# Patient Record
Sex: MALE | Race: BLACK OR AFRICAN AMERICAN | Employment: UNEMPLOYED | URBAN - METROPOLITAN AREA
[De-identification: names, ages, dates, MRNs, and addresses within clinical notes are randomized per-mention and may not be internally consistent; named-entity substitution may affect disease eponyms.]

---

## 2017-11-06 ENCOUNTER — TELEPHONE (OUTPATIENT)
Dept: FAMILY MEDICINE CLINIC | Age: 52
End: 2017-11-06

## 2017-11-06 ENCOUNTER — OFFICE VISIT (OUTPATIENT)
Dept: FAMILY MEDICINE CLINIC | Age: 52
End: 2017-11-06

## 2017-11-06 VITALS
OXYGEN SATURATION: 96 % | TEMPERATURE: 98.6 F | HEART RATE: 87 BPM | DIASTOLIC BLOOD PRESSURE: 93 MMHG | HEIGHT: 70 IN | BODY MASS INDEX: 40.8 KG/M2 | SYSTOLIC BLOOD PRESSURE: 138 MMHG | WEIGHT: 285 LBS | RESPIRATION RATE: 18 BRPM

## 2017-11-06 DIAGNOSIS — I10 ESSENTIAL HYPERTENSION: ICD-10-CM

## 2017-11-06 DIAGNOSIS — F33.0 MILD EPISODE OF RECURRENT MAJOR DEPRESSIVE DISORDER (HCC): ICD-10-CM

## 2017-11-06 DIAGNOSIS — B18.2 CHRONIC HEPATITIS C WITHOUT HEPATIC COMA (HCC): ICD-10-CM

## 2017-11-06 DIAGNOSIS — M54.50 CHRONIC BILATERAL LOW BACK PAIN WITHOUT SCIATICA: ICD-10-CM

## 2017-11-06 DIAGNOSIS — G89.29 CHRONIC BILATERAL LOW BACK PAIN WITHOUT SCIATICA: ICD-10-CM

## 2017-11-06 DIAGNOSIS — N18.30 STAGE 3 CHRONIC KIDNEY DISEASE (HCC): ICD-10-CM

## 2017-11-06 DIAGNOSIS — E78.2 MIXED HYPERLIPIDEMIA: ICD-10-CM

## 2017-11-06 LAB — HBA1C MFR BLD HPLC: 13.9 %

## 2017-11-06 RX ORDER — DICLOFENAC SODIUM 10 MG/G
GEL TOPICAL 4 TIMES DAILY
Qty: 100 G | Refills: 0 | Status: SHIPPED | OUTPATIENT
Start: 2017-11-06 | End: 2017-11-29 | Stop reason: SDUPTHER

## 2017-11-06 RX ORDER — GABAPENTIN 600 MG/1
TABLET ORAL
Refills: 0 | COMMUNITY
Start: 2017-10-10 | End: 2017-11-06 | Stop reason: SDUPTHER

## 2017-11-06 RX ORDER — LISINOPRIL AND HYDROCHLOROTHIAZIDE 12.5; 2 MG/1; MG/1
2 TABLET ORAL DAILY
Qty: 60 TAB | Refills: 2 | Status: SHIPPED | OUTPATIENT
Start: 2017-11-06 | End: 2017-11-29 | Stop reason: SDUPTHER

## 2017-11-06 RX ORDER — ATORVASTATIN CALCIUM 40 MG/1
40 TABLET, FILM COATED ORAL DAILY
Qty: 30 TAB | Refills: 2 | Status: SHIPPED | OUTPATIENT
Start: 2017-11-06 | End: 2017-11-29 | Stop reason: SDUPTHER

## 2017-11-06 RX ORDER — LISINOPRIL 40 MG/1
TABLET ORAL
Refills: 0 | COMMUNITY
Start: 2017-10-24 | End: 2017-11-06 | Stop reason: ALTCHOICE

## 2017-11-06 RX ORDER — INSULIN GLARGINE 100 [IU]/ML
70 INJECTION, SOLUTION SUBCUTANEOUS 2 TIMES DAILY
Qty: 42 ML | Refills: 2 | Status: SHIPPED | OUTPATIENT
Start: 2017-11-06 | End: 2017-11-29 | Stop reason: SDUPTHER

## 2017-11-06 RX ORDER — CITALOPRAM 20 MG/1
TABLET, FILM COATED ORAL
Qty: 30 TAB | Refills: 2 | Status: SHIPPED | OUTPATIENT
Start: 2017-11-06 | End: 2017-11-29 | Stop reason: SDUPTHER

## 2017-11-06 RX ORDER — GABAPENTIN 800 MG/1
1200 TABLET ORAL 3 TIMES DAILY
Qty: 135 TAB | Refills: 2 | Status: SHIPPED | OUTPATIENT
Start: 2017-11-06 | End: 2017-12-04 | Stop reason: DRUGHIGH
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
## 2017-11-06 NOTE — PROGRESS NOTES
Chief Complaint   Patient presents with   [address] patient   Discuss restarting medication for Depression and diabetes  Last medication 3 months ago  1. Have you been to the ER, urgent care clinic since your last visit? Hospitalized since your last visit? Yes [facility] pain    2. Have you seen or consulted any other health care providers outside of the [address] [place] [address] since your last visit? Include any pap smears or colon screening.  No   Room 8

## 2017-11-06 NOTE — MR AVS SNAPSHOT
Visit Information Date & Time Provider Department Dept. Phone Encounter #  
 11/6/2017 11:30 AM Zuleika Goldman MD [facility] at [address] [id number] Follow-up Instructions Return in about 4 weeks (around 12/4/2017), or if symptoms worsen or fail to improve. Upcoming Health Maintenance Date Due FOBT Q 1 YEAR AGE 50-75 3/16/2017 DTaP/Tdap/Td series (1 - Tdap) 12/28/2017* *Topic was postponed. The date shown is not the original due date. Allergies as of 11/6/2017  Review Complete On: 11/6/2017 By: Zuleika Goldman MD  
 No Known Allergies Current Immunizations  Never Reviewed Name Date Influenza Vaccine 9/8/2017 Not reviewed this visit You Were Diagnosed With   
  
 Codes Comments Uncontrolled type 2 diabetes mellitus with peripheral neuropathy (HCC)    -  Primary ICD-10-CM: E11.42, E11.65 ICD-9-CM: 250.62, 357.2 Essential hypertension     ICD-10-CM: I10 
ICD-9-CM: 401.9 Mild episode of recurrent major depressive disorder (HCC)     ICD-10-CM: F33.0 ICD-9-CM: 296.31 Chronic hepatitis C without hepatic coma (HCC)     ICD-10-CM: B18.2 ICD-9-CM: 070.54 Chronic bilateral low back pain without sciatica     ICD-10-CM: M54.5, G89.29 ICD-9-CM: 724.2, 338.29 Mixed hyperlipidemia     ICD-10-CM: E78.2 ICD-9-CM: 272.2 Stage 3 chronic kidney disease     ICD-10-CM: N18.3 ICD-9-CM: 105. 3 Vitals BP Pulse Temp Resp Height(growth percentile) Weight(growth percentile) (!) 138/93 (BP 1 Location: Left arm, BP Patient Position: Sitting) 87 98.6 °F (37 °C) (Oral) 18 5' 10\" (1.778 m) 285 lb (129.3 kg) SpO2 BMI Smoking Status 96% 40.89 kg/m2 Former Smoker Vitals History BMI and BSA Data Body Mass Index Body Surface Area  
 40.89 kg/m 2 2.53 m 2 Your Updated Medication List  
  
   
This list is accurate as of: 11/6/17  1:21 PM.  Always use your most recent med list.  
  
  
 atorvastatin 40 mg tablet Commonly known as:  LIPITOR Take 1 Tab by mouth daily. citalopram 20 mg tablet Commonly known as:  Maddison Narrow Take 1/2 tab by mouth daily x 1 week and then increase to 1 tab daily  
  
 diclofenac 1 % Gel Commonly known as:  VOLTAREN Apply  to affected area four (4) times daily. FLUARIX QUAD 3572-5423 (PF) Syrg injection Generic drug:  influenza vaccine 2017 (3 yrs+)(PF)  
TO BE ADMINISTERED BY PHARMACIST FOR IMMUNIZATION  
  
 gabapentin 800 mg tablet Commonly known as:  NEURONTIN Take 1.5 Tabs by mouth three (3) times daily. insulin glargine 100 unit/mL (3 mL) Inpn Commonly known as:  LANTUS,BASAGLAR  
70 Units by SubCUTAneous route two (2) times a day. insulin glulisine 100 unit/mL injection Commonly known as:  APIDRA  
7 Units by SubCUTAneous route Before breakfast, lunch, and dinner. lisinopril-hydroCHLOROthiazide 20-12.5 mg per tablet Commonly known as:  Sung Decent Take 2 Tabs by mouth daily. Prescriptions Printed Refills  
 lisinopril-hydroCHLOROthiazide (PRINZIDE, ZESTORETIC) 20-12.5 mg per tablet 2 Sig: Take 2 Tabs by mouth daily. Class: Print Route: Oral  
 citalopram (CELEXA) 20 mg tablet 2 Sig: Take 1/2 tab by mouth daily x 1 week and then increase to 1 tab daily Class: Print  
 insulin glargine (LANTUS,BASAGLAR) 100 unit/mL (3 mL) inpn 2 Si Units by SubCUTAneous route two (2) times a day. Class: Print Route: SubCUTAneous  
 insulin glulisine (APIDRA) 100 unit/mL injection 2 Si Units by SubCUTAneous route Before breakfast, lunch, and dinner. Class: Print Route: SubCUTAneous  
 gabapentin (NEURONTIN) 800 mg tablet 2 Sig: Take 1.5 Tabs by mouth three (3) times daily. Class: Print Route: Oral  
 atorvastatin (LIPITOR) 40 mg tablet 2 Sig: Take 1 Tab by mouth daily. Class: Print  Route: Oral  
 diclofenac (VOLTAREN) 1 % gel 0  
 Sig: Apply  to affected area four (4) times daily. Class: Print Route: Topical  
  
We Performed the Following AMB POC HEMOGLOBIN A1C [69286 CPT(R)]  DIABETES EYE EXAM [HM6 Custom]  DIABETES FOOT EXAM [HM7 Custom] REFERRAL TO PHYSICAL THERAPY [VBA05 Custom] Comments:  
 Please evaluate patient for: chronic low back pain, prev PT helped mauricio with aqua therapy Follow-up Instructions Return in about 4 weeks (around 12/4/2017), or if symptoms worsen or fail to improve. To-Do List   
 11/06/2017 Lab:  LIPID PANEL   
  
 11/06/2017 Lab:  METABOLIC PANEL, COMPREHENSIVE   
  
 11/06/2017 Lab:  TSH 3RD GENERATION Referral Information Referral ID Referred By Referred To  
  
 [id number] Addie Celis Not Available Visits Status Start Date End Date 1 New Request 11/6/17 11/6/18 If your referral has a status of pending review or denied, additional information will be sent to support the outcome of this decision. Introducing [facility]! Jeri Junior introduces [employer] patient portal. Now you can access parts of your medical record, email your doctor's office, and request medication refills online. 1. In your internet browser, go to [email or web address] 2. Click on the First Time User? Click Here link in the Sign In box. You will see the New Member Sign Up page. 3. Enter your [employer] Access Code exactly as it appears below. You will not need to use this code after youve completed the sign-up process. If you do not sign up before the expiration date, you must request a new code. · [employer] Access Code: [address] Expires: 2/4/2018 12:35 PM 
 
4. Enter the last four digits of your Social Security Number (xxxx) and Date of Birth (mm/dd/yyyy) as indicated and click Submit. You will be taken to the next sign-up page. 5. Create a [employer] ID.  This will be your [employer] login ID and cannot be changed, so think of one that is secure and easy to remember. 6. Create a [employer] password. You can change your password at any time. 7. Enter your Password Reset Question and Answer. This can be used at a later time if you forget your password. 8. Enter your e-mail address. You will receive e-mail notification when new information is available in [address] 9. Click Sign Up. You can now view and download portions of your medical record. 10. Click the Download Summary menu link to download a portable copy of your medical information. If you have questions, please visit the Frequently Asked Questions section of the [employer] website. Remember, [employer] is NOT to be used for urgent needs. For medical emergencies, dial 911. Now available from your iPhone and Android! Please provide this summary of care documentation to your next provider. Your primary care clinician is listed as NONE. If you have any questions after today's visit, please call [phone].

## 2017-11-06 NOTE — TELEPHONE ENCOUNTER
----- Message from Maris Pacheco sent at 11/6/2017  3:42 PM EST -----  Regarding: /telephone  Pt is requesting a call back in regards to his RX that was called into the pharm. Best contact [phone].     Urgent

## 2017-11-08 NOTE — TELEPHONE ENCOUNTER
Spoke with pharmacist regarding patient insulin. Insurance will not cover 30 day supply of Insulin as prescribed. Patient will need to  every three weeks.

## 2017-11-13 ENCOUNTER — TELEPHONE (OUTPATIENT)
Dept: FAMILY MEDICINE CLINIC | Age: 52
End: 2017-11-13

## 2017-11-13 NOTE — TELEPHONE ENCOUNTER
Pt checking on refill for gabapentin (NEURONTIN) 800 mg tablet [        Best number to reach him is [phone]

## 2017-11-14 LAB
ALBUMIN SERPL-MCNC: 4.2 G/DL (ref 3.5–5.5)
ALBUMIN/GLOB SERPL: 1.5 {RATIO} (ref 1.2–2.2)
ALP SERPL-CCNC: 92 IU/L (ref 39–117)
ALT SERPL-CCNC: 52 IU/L (ref 0–44)
AST SERPL-CCNC: 30 IU/L (ref 0–40)
BILIRUB SERPL-MCNC: 0.4 MG/DL (ref 0–1.2)
BUN SERPL-MCNC: 17 MG/DL (ref 6–24)
BUN/CREAT SERPL: 14 (ref 9–20)
CALCIUM SERPL-MCNC: 9.8 MG/DL (ref 8.7–10.2)
CHLORIDE SERPL-SCNC: 101 MMOL/L (ref 96–106)
CHOLEST SERPL-MCNC: 132 MG/DL (ref 100–199)
CO2 SERPL-SCNC: 23 MMOL/L (ref 18–29)
CREAT SERPL-MCNC: 1.19 MG/DL (ref 0.76–1.27)
GFR SERPLBLD CREATININE-BSD FMLA CKD-EPI: 71 ML/MIN/1.73
GFR SERPLBLD CREATININE-BSD FMLA CKD-EPI: 82 ML/MIN/1.73
GLOBULIN SER CALC-MCNC: 2.8 G/DL (ref 1.5–4.5)
GLUCOSE SERPL-MCNC: 222 MG/DL (ref 65–99)
HDLC SERPL-MCNC: 25 MG/DL
LDLC SERPL CALC-MCNC: 72 MG/DL (ref 0–99)
POTASSIUM SERPL-SCNC: 4.1 MMOL/L (ref 3.5–5.2)
PROT SERPL-MCNC: 7 G/DL (ref 6–8.5)
SODIUM SERPL-SCNC: 142 MMOL/L (ref 134–144)
TRIGL SERPL-MCNC: 173 MG/DL (ref 0–149)
TSH SERPL DL<=0.005 MIU/L-ACNC: 0.47 UIU/ML (ref 0.45–4.5)
VLDLC SERPL CALC-MCNC: 35 MG/DL (ref 5–40)

## 2017-11-28 ENCOUNTER — TELEPHONE (OUTPATIENT)
Dept: FAMILY MEDICINE CLINIC | Age: 52
End: 2017-11-28

## 2017-11-28 NOTE — TELEPHONE ENCOUNTER
----- Message from Chico Ross sent at 11/28/2017  1:08 PM EST -----  Regarding: Dr. Jorge Jaime Rx  Patient would like an Rx for \"Cilayis\" sent to [address] [facility] on file. Please call patient back at [phone] if there are any questions.

## 2017-11-29 DIAGNOSIS — I10 ESSENTIAL HYPERTENSION: ICD-10-CM

## 2017-11-29 DIAGNOSIS — M54.50 CHRONIC BILATERAL LOW BACK PAIN WITHOUT SCIATICA: ICD-10-CM

## 2017-11-29 DIAGNOSIS — E78.2 MIXED HYPERLIPIDEMIA: ICD-10-CM

## 2017-11-29 DIAGNOSIS — F33.0 MILD EPISODE OF RECURRENT MAJOR DEPRESSIVE DISORDER (HCC): ICD-10-CM

## 2017-11-29 DIAGNOSIS — G89.29 CHRONIC BILATERAL LOW BACK PAIN WITHOUT SCIATICA: ICD-10-CM

## 2017-11-29 RX ORDER — ATORVASTATIN CALCIUM 40 MG/1
40 TABLET, FILM COATED ORAL DAILY
Qty: 90 TAB | Refills: 0 | Status: SHIPPED | OUTPATIENT
Start: 2017-11-29 | End: 2018-03-05 | Stop reason: SDUPTHER

## 2017-11-29 RX ORDER — DICLOFENAC SODIUM 10 MG/G
GEL TOPICAL 4 TIMES DAILY
Qty: 300 G | Refills: 0 | Status: SHIPPED | OUTPATIENT
Start: 2017-11-29 | End: 2018-01-09 | Stop reason: SDUPTHER

## 2017-11-29 RX ORDER — LISINOPRIL AND HYDROCHLOROTHIAZIDE 12.5; 2 MG/1; MG/1
2 TABLET ORAL DAILY
Qty: 180 TAB | Refills: 0 | Status: SHIPPED | OUTPATIENT
Start: 2017-11-29 | End: 2017-12-04 | Stop reason: ALTCHOICE

## 2017-11-29 RX ORDER — CITALOPRAM 20 MG/1
TABLET, FILM COATED ORAL
Qty: 90 TAB | Refills: 0 | Status: SHIPPED | OUTPATIENT
Start: 2017-11-29 | End: 2018-03-05 | Stop reason: SDUPTHER

## 2017-11-29 RX ORDER — INSULIN GLARGINE 100 [IU]/ML
70 INJECTION, SOLUTION SUBCUTANEOUS 2 TIMES DAILY
Qty: 126 ML | Refills: 0 | Status: SHIPPED | OUTPATIENT
Start: 2017-11-29 | End: 2017-12-04 | Stop reason: SDUPTHER

## 2017-11-29 NOTE — TELEPHONE ENCOUNTER
----- Message from Linda Silveira sent at 11/29/2017 10:03 AM EST -----  Regarding: Dr Melody Ken is calling to check on the status of their refill request for pt's refill's, please call [phone].

## 2017-11-30 NOTE — TELEPHONE ENCOUNTER
Pt confirmed that prescriptions were called into pharmacy but still wants to know if he will be getting Cialis

## 2017-12-04 ENCOUNTER — OFFICE VISIT (OUTPATIENT)
Dept: FAMILY MEDICINE CLINIC | Age: 52
End: 2017-12-04

## 2017-12-04 VITALS
HEART RATE: 80 BPM | WEIGHT: 298 LBS | SYSTOLIC BLOOD PRESSURE: 148 MMHG | HEIGHT: 70 IN | TEMPERATURE: 97.7 F | RESPIRATION RATE: 20 BRPM | BODY MASS INDEX: 42.66 KG/M2 | DIASTOLIC BLOOD PRESSURE: 93 MMHG | OXYGEN SATURATION: 97 %

## 2017-12-04 DIAGNOSIS — Z23 ENCOUNTER FOR IMMUNIZATION: ICD-10-CM

## 2017-12-04 DIAGNOSIS — I10 ESSENTIAL HYPERTENSION: ICD-10-CM

## 2017-12-04 PROBLEM — E66.01 OBESITY, MORBID (HCC): Status: ACTIVE | Noted: 2017-12-04

## 2017-12-04 RX ORDER — GABAPENTIN 300 MG/1
900 CAPSULE ORAL 3 TIMES DAILY
Qty: 270 CAP | Refills: 0 | Status: SHIPPED | OUTPATIENT
Start: 2017-12-04 | End: 2018-01-03

## 2017-12-04 RX ORDER — METFORMIN HYDROCHLORIDE 500 MG/1
TABLET, EXTENDED RELEASE ORAL
Qty: 60 TAB | Refills: 2 | Status: SHIPPED | OUTPATIENT
Start: 2017-12-04 | End: 2018-03-05 | Stop reason: SDUPTHER

## 2017-12-04 RX ORDER — INSULIN GLARGINE 100 [IU]/ML
67 INJECTION, SOLUTION SUBCUTANEOUS 2 TIMES DAILY
Qty: 126 ML | Refills: 0
Start: 2017-12-04 | End: 2018-01-09 | Stop reason: SDUPTHER

## 2017-12-04 RX ORDER — LISINOPRIL 40 MG/1
40 TABLET ORAL DAILY
Qty: 90 TAB | Refills: 0 | Status: SHIPPED | OUTPATIENT
Start: 2017-12-04 | End: 2018-03-05 | Stop reason: SDUPTHER

## 2017-12-04 RX ORDER — PREGABALIN 75 MG/1
75 CAPSULE ORAL 2 TIMES DAILY
Qty: 60 CAP | Refills: 1 | Status: SHIPPED | OUTPATIENT
Start: 2017-12-04 | End: 2018-01-09 | Stop reason: SDUPTHER

## 2017-12-04 RX ORDER — AMLODIPINE BESYLATE 5 MG/1
5 TABLET ORAL DAILY
Qty: 30 TAB | Refills: 2 | Status: SHIPPED | OUTPATIENT
Start: 2017-12-04 | End: 2018-01-09 | Stop reason: SDUPTHER
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
## 2017-12-04 NOTE — PATIENT INSTRUCTIONS
Vaccine Information Statement    Influenza (Flu) Vaccine (Inactivated or Recombinant): What you need to know    Many Vaccine Information Statements are available in Latvian and other languages. See www.immunize.org/vis  Hojas de Información Sobre Vacunas están disponibles en Español y en muchos otros idiomas. Visite www.immunize.org/vis    1. Why get vaccinated? Influenza (flu) is a contagious disease that spreads around the [place] every year, usually between October and May. Flu is caused by influenza viruses, and is spread mainly by coughing, sneezing, and close contact. Anyone can get flu. Flu strikes suddenly and can last several days. Symptoms vary by age, but can include:   fever/chills   sore throat   muscle aches   fatigue   cough   headache    runny or stuffy nose    Flu can also lead to pneumonia and blood infections, and cause diarrhea and seizures in children. If you have a medical condition, such as heart or lung disease, flu can make it worse. Flu is more dangerous for some people. Infants and young children, people 72years of age and older, pregnant women, and people with certain health conditions or a weakened immune system are at greatest risk. Each year thousands of people in the [facility] die from flu, and many more are hospitalized. Flu vaccine can:   keep you from getting flu,   make flu less severe if you do get it, and   keep you from spreading flu to your family and other people. 2. Inactivated and recombinant flu vaccines    A dose of flu vaccine is recommended every flu season. Children 6 months through 6years of age may need two doses during the same flu season. Everyone else needs only one dose each flu season.        Some inactivated flu vaccines contain a very small amount of a mercury-based preservative called thimerosal. Studies have not shown thimerosal in vaccines to be harmful, but flu vaccines that do not contain thimerosal are available. There is no live flu virus in flu shots. They cannot cause the flu. There are many flu viruses, and they are always changing. Each year a new flu vaccine is made to protect against three or four viruses that are likely to cause disease in the upcoming flu season. But even when the vaccine doesnt exactly match these viruses, it may still provide some protection    Flu vaccine cannot prevent:   flu that is caused by a virus not covered by the vaccine, or   illnesses that look like flu but are not. It takes about 2 weeks for protection to develop after vaccination, and protection lasts through the flu season. 3. Some people should not get this vaccine    Tell the person who is giving you the vaccine:     If you have any severe, life-threatening allergies. If you ever had a life-threatening allergic reaction after a dose of flu vaccine, or have a severe allergy to any part of this vaccine, you may be advised not to get vaccinated. Most, but not all, types of flu vaccine contain a small amount of egg protein.  If you ever had Guillain-Barré Syndrome (also called GBS). Some people with a history of GBS should not get this vaccine. This should be discussed with your doctor.  If you are not feeling well. It is usually okay to get flu vaccine when you have a mild illness, but you might be asked to come back when you feel better. 4. Risks of a vaccine reaction    With any medicine, including vaccines, there is a chance of reactions. These are usually mild and go away on their own, but serious reactions are also possible. Most people who get a flu shot do not have any problems with it.      Minor problems following a flu shot include:    soreness, redness, or swelling where the shot was given     hoarseness   sore, red or itchy eyes   cough   fever   aches   headache   itching   fatigue  If these problems occur, they usually begin soon after the shot and last 1 or 2 days. More serious problems following a flu shot can include the following:     There may be a small increased risk of Guillain-Barré Syndrome (GBS) after inactivated flu vaccine. This risk has been estimated at 1 or 2 additional cases per million people vaccinated. This is much lower than the risk of severe complications from flu, which can be prevented by flu vaccine.  Young children who get the flu shot along with pneumococcal vaccine (PCV13) and/or DTaP vaccine at the same time might be slightly more likely to have a seizure caused by fever. Ask your doctor for more information. Tell your doctor if a child who is getting flu vaccine has ever had a seizure. Problems that could happen after any injected vaccine:      People sometimes faint after a medical procedure, including vaccination. Sitting or lying down for about 15 minutes can help prevent fainting, and injuries caused by a fall. Tell your doctor if you feel dizzy, or have vision changes or ringing in the ears.  Some people get severe pain in the shoulder and have difficulty moving the arm where a shot was given. This happens very rarely.  Any medication can cause a severe allergic reaction. Such reactions from a vaccine are very rare, estimated at about 1 in a million doses, and would happen within a few minutes to a few hours after the vaccination. As with any medicine, there is a very remote chance of a vaccine causing a serious injury or death. The safety of vaccines is always being monitored. For more information, visit: www.cdc.gov/vaccinesafety/    5. What if there is a serious reaction? What should I look for?  Look for anything that concerns you, such as signs of a severe allergic reaction, very high fever, or unusual behavior.     Signs of a severe allergic reaction can include hives, swelling of the face and throat, difficulty breathing, a fast heartbeat, dizziness, and weakness - usually within a few minutes to a few hours after the vaccination. What should I do?  If you think it is a severe allergic reaction or other emergency that cant wait, call 9-1-1 and get the person to the nearest hospital. Otherwise, call your doctor.  Reactions should be reported to the Vaccine Adverse Event Reporting System (VAERS). Your doctor should file this report, or you can do it yourself through  the VAERS web site at www.vaers. [facility].gov, or by calling 6-422.873.3309. VAERS does not give medical advice. 6. The National Vaccine Injury Compensation Program    The [facility] Vaccine Injury Compensation Program (VICP) is a federal program that was created to compensate people who may have been injured by certain vaccines. Persons who believe they may have been injured by a vaccine can learn about the program and about filing a claim by calling 7-926.344.6361 or visiting the [employer] website at www.[facility].gov/vaccinecompensation. There is a time limit to file a claim for compensation. 7. How can I learn more?  Ask your healthcare provider. He or she can give you the vaccine package insert or suggest other sources of information.  Call your local or state health department.  Contact the Centers for Disease Control and Prevention (CDC):  - Call 0-613.956.6574 (1-800-CDC-INFO) or  - Visit CDCs website at www.cdc.gov/flu    Vaccine Information Statement   Inactivated Influenza Vaccine   8/7/2015  42 NIURKA Junaid Dealos [id number]    Department of Health and Human Services  Centers for Disease Control and Prevention    Office Use Only

## 2017-12-04 NOTE — PROGRESS NOTES
Chief Complaint   Patient presents with    Hypertension    Diabetes     Patient here today for  A 4 week follow up. C/O bilateral feet pain. Lisa Doe. is a 50 y.o. male who presents for routine immunizations. He denies any symptoms , reactions or allergies that would exclude them from being immunized today. Risks and adverse reactions were discussed and the VIS was given to them. All questions were addressed. He was observed for 15 min post injection. There were no reactions observed.     Nery Sparks LPN

## 2017-12-04 NOTE — MR AVS SNAPSHOT
Visit Information Date & Time Provider Department Dept. Phone Encounter #  
 12/4/2017  1:40 PM Alena Ferrari MD [facility] at [address] [id number] Follow-up Instructions Return in about 6 weeks (around 1/15/2018), or if symptoms worsen or fail to improve. Upcoming Health Maintenance Date Due MICROALBUMIN Q1 3/16/1977 EYE EXAM RETINAL OR DILATED Q1 3/16/1977 Pneumococcal 19-64 Medium Risk (1 of 1 - PPSV23) 3/16/1986 FOBT Q 1 YEAR AGE 50-75 3/16/2017 DTaP/Tdap/Td series (1 - Tdap) 12/28/2017* HEMOGLOBIN A1C Q6M 5/6/2018 FOOT EXAM Q1 11/6/2018 LIPID PANEL Q1 11/13/2018 *Topic was postponed. The date shown is not the original due date. Allergies as of 12/4/2017  Review Complete On: 12/4/2017 By: Alena Ferrari MD  
 No Known Allergies Current Immunizations  Never Reviewed Name Date Influenza Vaccine 9/8/2017 Influenza Vaccine (Quad) PF  Incomplete Not reviewed this visit You Were Diagnosed With   
  
 Codes Comments Uncontrolled type 2 diabetes mellitus with peripheral neuropathy (HCC)    -  Primary ICD-10-CM: E11.42, E11.65 ICD-9-CM: 250.62, 357.2 Essential hypertension     ICD-10-CM: I10 
ICD-9-CM: 401.9 Encounter for immunization     ICD-10-CM: A16 ICD-9-CM: V03.89 Vitals BP Pulse Temp Resp Height(growth percentile) Weight(growth percentile) (!) 148/93 80 97.7 °F (36.5 °C) (Oral) 20 5' 10\" (1.778 m) 298 lb (135.2 kg) SpO2 BMI Smoking Status 97% 42.76 kg/m2 Former Smoker Vitals History BMI and BSA Data Body Mass Index Body Surface Area 42.76 kg/m 2 2.58 m 2 Preferred Pharmacy Pharmacy Name Phone [id number], [address] [phone] Your Updated Medication List  
  
   
This list is accurate as of: 12/4/17  2:16 PM.  Always use your most recent med list. amLODIPine 5 mg tablet Commonly known as:  Izetta Harder Take 1 Tab by mouth daily. atorvastatin 40 mg tablet Commonly known as:  LIPITOR Take 1 Tab by mouth daily. citalopram 20 mg tablet Commonly known as:  CELEXA  
1 tab daily  
  
 diclofenac 1 % Gel Commonly known as:  VOLTAREN Apply  to affected area four (4) times daily. FLUARIX QUAD 5247-1265 (PF) Syrg injection Generic drug:  influenza vaccine 2017-18 (3 yrs+)(PF)  
TO BE ADMINISTERED BY PHARMACIST FOR IMMUNIZATION  
  
 gabapentin 300 mg capsule Commonly known as:  NEURONTIN Take 3 Caps by mouth three (3) times daily for 30 days. insulin glargine 100 unit/mL (3 mL) Inpn Commonly known as:  LANTUS,BASAGLAR  
67 Units by SubCUTAneous route two (2) times a day. insulin glulisine 100 unit/mL injection Commonly known as:  APIDRA  
7 Units by SubCUTAneous route Before breakfast, lunch, and dinner. lisinopril 40 mg tablet Commonly known as:  Yanez Mira Take 1 Tab by mouth daily. metFORMIN  mg tablet Commonly known as:  GLUCOPHAGE XR Take 1 tab by mouth daily with dinner x 1 week and then increase to 2 tabs daily with dinner  
  
 pregabalin 75 mg capsule Commonly known as:  Ilanman Speaker Take 1 Cap by mouth two (2) times a day. Max Daily Amount: 150 mg. Indications: Diabetic Peripheral Neuropathy Prescriptions Printed Refills  
 pregabalin (LYRICA) 75 mg capsule 1 Sig: Take 1 Cap by mouth two (2) times a day. Max Daily Amount: 150 mg. Indications: Diabetic Peripheral Neuropathy Class: Print Route: Oral  
  
Prescriptions Sent to Pharmacy Refills  
 metFORMIN ER (GLUCOPHAGE XR) 500 mg tablet 2 Sig: Take 1 tab by mouth daily with dinner x 1 week and then increase to 2 tabs daily with dinner  Class: Normal  
 Pharmacy: [address] [place] [address] [facility] Ph #: [phone]  
 amLODIPine (NORVASC) 5 mg tablet 2 Sig: Take 1 Tab by mouth daily. Class: Normal  
 Pharmacy: [address] [place] [address], [address]  #: [phone] Route: Oral  
 lisinopril (PRINIVIL, ZESTRIL) 40 mg tablet 0 Sig: Take 1 Tab by mouth daily. Class: Normal  
 Pharmacy: [address] [place] [address], [address] Ph #: [phone] Route: Oral  
 gabapentin (NEURONTIN) 300 mg capsule 0 Sig: Take 3 Caps by mouth three (3) times daily for 30 days. Class: Normal  
 Pharmacy: [address] [place] [address], [address]  #: [phone] Route: Oral  
  
We Performed the Following INFLUENZA VIRUS VAC QUAD,SPLIT,PRESV FREE SYRINGE IM [id number] CPT(R)] AK IMMUNIZ ADMIN,1 SINGLE/COMB VAC/TOXOID [id number] CPT(R)] Follow-up Instructions Return in about 6 weeks (around 1/15/2018), or if symptoms worsen or fail to improve. Patient Instructions Vaccine Information Statement Influenza (Flu) Vaccine (Inactivated or Recombinant): What you need to know Many Vaccine Information Statements are available in Hebrew and other languages. See www.immunize.org/vis Hojas de Información Sobre Vacunas están disponibles en Español y en muchos otros idiomas. Visite www.immunize.org/vis 1. Why get vaccinated? Influenza (flu) is a contagious disease that spreads around the [place] every year, usually between October and May. Flu is caused by influenza viruses, and is spread mainly by coughing, sneezing, and close contact. Anyone can get flu. Flu strikes suddenly and can last several days. Symptoms vary by age, but can include: 
 fever/chills  sore throat  muscle aches  fatigue  cough  headache  runny or stuffy nose Flu can also lead to pneumonia and blood infections, and cause diarrhea and seizures in children. If you have a medical condition, such as heart or lung disease, flu can make it worse. Flu is more dangerous for some people. Infants and young children, people 72years of age and older, pregnant women, and people with certain health conditions or a weakened immune system are at greatest risk. Each year thousands of people in the [facility] die from flu, and many more are hospitalized. Flu vaccine can: 
 keep you from getting flu, 
 make flu less severe if you do get it, and 
 keep you from spreading flu to your family and other people. 2. Inactivated and recombinant flu vaccines A dose of flu vaccine is recommended every flu season. Children 6 months through 6years of age may need two doses during the same flu season. Everyone else needs only one dose each flu season. Some inactivated flu vaccines contain a very small amount of a mercury-based preservative called thimerosal. Studies have not shown thimerosal in vaccines to be harmful, but flu vaccines that do not contain thimerosal are available. There is no live flu virus in flu shots. They cannot cause the flu. There are many flu viruses, and they are always changing. Each year a new flu vaccine is made to protect against three or four viruses that are likely to cause disease in the upcoming flu season. But even when the vaccine doesnt exactly match these viruses, it may still provide some protection Flu vaccine cannot prevent: 
 flu that is caused by a virus not covered by the vaccine, or 
 illnesses that look like flu but are not. It takes about 2 weeks for protection to develop after vaccination, and protection lasts through the flu season. 3. Some people should not get this vaccine Tell the person who is giving you the vaccine:  If you have any severe, life-threatening allergies. If you ever had a life-threatening allergic reaction after a dose of flu vaccine, or have a severe allergy to any part of this vaccine, you may be advised not to get vaccinated. Most, but not all, types of flu vaccine contain a small amount of egg protein.  If you ever had Guillain-Barré Syndrome (also called GBS). Some people with a history of GBS should not get this vaccine. This should be discussed with your doctor.  If you are not feeling well. It is usually okay to get flu vaccine when you have a mild illness, but you might be asked to come back when you feel better. 4. Risks of a vaccine reaction With any medicine, including vaccines, there is a chance of reactions. These are usually mild and go away on their own, but serious reactions are also possible. Most people who get a flu shot do not have any problems with it. Minor problems following a flu shot include:  
 soreness, redness, or swelling where the shot was given  hoarseness  sore, red or itchy eyes  cough  fever  aches  headache  itching  fatigue If these problems occur, they usually begin soon after the shot and last 1 or 2 days. More serious problems following a flu shot can include the following:  There may be a small increased risk of Guillain-Barré Syndrome (GBS) after inactivated flu vaccine. This risk has been estimated at 1 or 2 additional cases per million people vaccinated. This is much lower than the risk of severe complications from flu, which can be prevented by flu vaccine.  Young children who get the flu shot along with pneumococcal vaccine (PCV13) and/or DTaP vaccine at the same time might be slightly more likely to have a seizure caused by fever. Ask your doctor for more information. Tell your doctor if a child who is getting flu vaccine has ever had a seizure. Problems that could happen after any injected vaccine:  People sometimes faint after a medical procedure, including vaccination. Sitting or lying down for about 15 minutes can help prevent fainting, and injuries caused by a fall. Tell your doctor if you feel dizzy, or have vision changes or ringing in the ears.  Some people get severe pain in the shoulder and have difficulty moving the arm where a shot was given. This happens very rarely.  Any medication can cause a severe allergic reaction. Such reactions from a vaccine are very rare, estimated at about 1 in a million doses, and would happen within a few minutes to a few hours after the vaccination. As with any medicine, there is a very remote chance of a vaccine causing a serious injury or death. The safety of vaccines is always being monitored. For more information, visit: www.cdc.gov/vaccinesafety/ 
 
 
The [facility] Vaccine Injury Compensation Program (VICP) is a federal program that was created to compensate people who may have been injured by certain vaccines. Persons who believe they may have been injured by a vaccine can learn about the program and about filing a claim by calling 0-348.338.8401 or visiting the [employer] website at www.[facility].gov/vaccinecompensation. There is a time limit to file a claim for compensation. 7. How can I learn more?  Ask your healthcare provider. He or she can give you the vaccine package insert or suggest other sources of information.  Call your local or state health department.  Contact the Centers for Disease Control and Prevention (CDC): 
- Call 8-867.463.9986 (8-459-LNR-INFO) or 
- Visit CDCs website at www.cdc.gov/flu Vaccine Information Statement Inactivated Influenza Vaccine 8/7/2015 
42 NIURKA Jensen [id number] [facility] and [place] Centers for Disease Control and Prevention Office Use Only Introducing [facility]! Em Garcia introduces [employer] patient portal. Now you can access parts of your medical record, email your doctor's office, and request medication refills online. 1. In your internet browser, go to [email or web address] 2. Click on the First Time User? Click Here link in the Sign In box. You will see the New Member Sign Up page. 3. Enter your [employer] Access Code exactly as it appears below. You will not need to use this code after youve completed the sign-up process. If you do not sign up before the expiration date, you must request a new code. · [employer] Access Code: [address] Expires: 2/4/2018 12:35 PM 
 
4. Enter the last four digits of your Social Security Number (xxxx) and Date of Birth (mm/dd/yyyy) as indicated and click Submit. You will be taken to the next sign-up page. 5. Create a [employer] ID. This will be your [employer] login ID and cannot be changed, so think of one that is secure and easy to remember. 6. Create a [employer] password. You can change your password at any time. 7. Enter your Password Reset Question and Answer. This can be used at a later time if you forget your password. 8. Enter your e-mail address. You will receive e-mail notification when new information is available in [address] 9. Click Sign Up. You can now view and download portions of your medical record. 10. Click the Download Summary menu link to download a portable copy of your medical information. If you have questions, please visit the Frequently Asked Questions section of the [employer] website. Remember, [employer] is NOT to be used for urgent needs. For medical emergencies, dial 911. Now available from your iPhone and Android! Please provide this summary of care documentation to your next provider. Your primary care clinician is listed as NONE. If you have any questions after today's visit, please call [phone].

## 2017-12-11 ENCOUNTER — TELEPHONE (OUTPATIENT)
Dept: FAMILY MEDICINE CLINIC | Age: 52
End: 2017-12-11

## 2017-12-11 NOTE — TELEPHONE ENCOUNTER
Pt reports he needs a prior auth for Lyrica     Checking to see if the [facility]armacy had contacted us     Pt can be reached at [phone]

## 2017-12-13 ENCOUNTER — DOCUMENTATION ONLY (OUTPATIENT)
Dept: FAMILY MEDICINE CLINIC | Age: 52
End: 2017-12-13

## 2017-12-14 ENCOUNTER — HOSPITAL ENCOUNTER (OUTPATIENT)
Dept: MRI IMAGING | Age: 52
Discharge: HOME | End: 2017-12-14
Attending: SPECIALIST
Payer: MEDICAID

## 2017-12-14 DIAGNOSIS — M54.16 LUMBAR RADICULOPATHY: ICD-10-CM

## 2017-12-14 DIAGNOSIS — M47.816 LUMBAR SPONDYLOSIS: ICD-10-CM

## 2017-12-14 PROCEDURE — 72148 MRI LUMBAR SPINE W/O DYE: CPT

## 2018-01-09 ENCOUNTER — OFFICE VISIT (OUTPATIENT)
Dept: FAMILY MEDICINE CLINIC | Age: 53
End: 2018-01-09

## 2018-01-09 VITALS
HEART RATE: 78 BPM | OXYGEN SATURATION: 95 % | BODY MASS INDEX: 42.09 KG/M2 | HEIGHT: 70 IN | RESPIRATION RATE: 18 BRPM | SYSTOLIC BLOOD PRESSURE: 151 MMHG | TEMPERATURE: 98.3 F | DIASTOLIC BLOOD PRESSURE: 94 MMHG | WEIGHT: 294 LBS

## 2018-01-09 DIAGNOSIS — B18.2 CHRONIC HEPATITIS C WITHOUT HEPATIC COMA (HCC): ICD-10-CM

## 2018-01-09 DIAGNOSIS — G89.29 CHRONIC BILATERAL LOW BACK PAIN WITHOUT SCIATICA: ICD-10-CM

## 2018-01-09 DIAGNOSIS — M54.50 CHRONIC BILATERAL LOW BACK PAIN WITHOUT SCIATICA: ICD-10-CM

## 2018-01-09 DIAGNOSIS — I10 ESSENTIAL HYPERTENSION: ICD-10-CM

## 2018-01-09 RX ORDER — INSULIN GLARGINE 100 [IU]/ML
75 INJECTION, SOLUTION SUBCUTANEOUS 2 TIMES DAILY
Qty: 1 ML | Refills: 0
Start: 2018-01-09 | End: 2018-03-09 | Stop reason: SDUPTHER

## 2018-01-09 RX ORDER — PREGABALIN 100 MG/1
100 CAPSULE ORAL 2 TIMES DAILY
Qty: 60 CAP | Refills: 2 | Status: SHIPPED | OUTPATIENT
Start: 2018-01-09 | End: 2018-02-27 | Stop reason: SDUPTHER

## 2018-01-09 RX ORDER — AMLODIPINE BESYLATE 10 MG/1
10 TABLET ORAL DAILY
Qty: 30 TAB | Refills: 2 | Status: SHIPPED | OUTPATIENT
Start: 2018-01-09 | End: 2018-04-02 | Stop reason: SDUPTHER

## 2018-01-09 RX ORDER — DICLOFENAC SODIUM 10 MG/G
GEL TOPICAL
Qty: 300 G | Refills: 0 | Status: SHIPPED | OUTPATIENT
Start: 2018-01-09 | End: 2018-02-07 | Stop reason: SDUPTHER
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
## 2018-01-09 NOTE — MR AVS SNAPSHOT
Visit Information Date & Time Provider Department Dept. Phone Encounter #  
 1/9/2018  1:00 PM Pat Vuong MD [facility] at [address] [id number] Follow-up Instructions Return in about 4 weeks (around 2/6/2018), or if symptoms worsen or fail to improve. Upcoming Health Maintenance Date Due MICROALBUMIN Q1 3/16/1977 EYE EXAM RETINAL OR DILATED Q1 3/16/1977 DTaP/Tdap/Td series (1 - Tdap) 3/16/1988 FOBT Q 1 YEAR AGE 50-75 3/16/2017 HEMOGLOBIN A1C Q6M 5/6/2018 FOOT EXAM Q1 11/6/2018 LIPID PANEL Q1 11/13/2018 Allergies as of 1/9/2018  Review Complete On: 1/9/2018 By: Pat Vuong MD  
 No Known Allergies Current Immunizations  Never Reviewed Name Date Influenza Vaccine 9/8/2017 Pneumococcal Polysaccharide (PPSV-23) 12/4/2017  1:40 PM  
  
 Not reviewed this visit You Were Diagnosed With   
  
 Codes Comments Uncontrolled type 2 diabetes mellitus with peripheral neuropathy (HCC)    -  Primary ICD-10-CM: E11.42, E11.65 ICD-9-CM: 250.62, 357.2 Essential hypertension     ICD-10-CM: I10 
ICD-9-CM: 401.9 Chronic bilateral low back pain without sciatica     ICD-10-CM: M54.5, G89.29 ICD-9-CM: 724.2, 338.29 Chronic hepatitis C without hepatic coma (HCC)     ICD-10-CM: B18.2 ICD-9-CM: 070.54 Vitals BP Pulse Temp Resp Height(growth percentile) Weight(growth percentile) (!) 151/94 (BP 1 Location: Left arm, BP Patient Position: Sitting) 78 98.3 °F (36.8 °C) (Oral) 18 5' 10\" (1.778 m) 294 lb (133.4 kg) SpO2 BMI Smoking Status 95% 42.18 kg/m2 Former Smoker Vitals History BMI and BSA Data Body Mass Index Body Surface Area  
 42.18 kg/m 2 2.57 m 2 Preferred Pharmacy Pharmacy Name Phone [id number] [place], [address] [phone] Your Updated Medication List  
  
   
 This list is accurate as of: 1/9/18  2:21 PM.  Always use your most recent med list. amLODIPine 10 mg tablet Commonly known as:  Bosie Shield Take 1 Tab by mouth daily. atorvastatin 40 mg tablet Commonly known as:  LIPITOR Take 1 Tab by mouth daily. citalopram 20 mg tablet Commonly known as:  CELEXA  
1 tab daily  
  
 diclofenac 1 % Gel Commonly known as:  VOLTAREN Apply  to affected area four (4) times daily as needed. FLUARIX QUAD 4734-1672 (PF) Syrg injection Generic drug:  influenza vaccine 2017-18 (3 yrs+)(PF)  
TO BE ADMINISTERED BY PHARMACIST FOR IMMUNIZATION  
  
 insulin glargine 100 unit/mL (3 mL) Inpn Commonly known as:  LANTUS,BASAGLAR  
75 Units by SubCUTAneous route two (2) times a day. insulin glulisine 100 unit/mL injection Commonly known as:  APIDRA  
7 Units by SubCUTAneous route Before breakfast, lunch, and dinner. lisinopril 40 mg tablet Commonly known as:  Stephanie Grebe Take 1 Tab by mouth daily. metFORMIN  mg tablet Commonly known as:  GLUCOPHAGE XR Take 1 tab by mouth daily with dinner x 1 week and then increase to 2 tabs daily with dinner  
  
 pregabalin 100 mg capsule Commonly known as:  Bridgett Herndon Take 1 Cap by mouth two (2) times a day. Max Daily Amount: 200 mg. Indications: Diabetic Peripheral Neuropathy Prescriptions Printed Refills  
 pregabalin (LYRICA) 100 mg capsule 2 Sig: Take 1 Cap by mouth two (2) times a day. Max Daily Amount: 200 mg. Indications: Diabetic Peripheral Neuropathy Class: Print Route: Oral  
  
Prescriptions Sent to Pharmacy Refills  
 diclofenac (VOLTAREN) 1 % gel 0 Sig: Apply  to affected area four (4) times daily as needed. Class: Normal  
 Pharmacy: [address], [address]  #: [phone]  Route: Topical  
 amLODIPine (NORVASC) 10 mg tablet 2  
 Sig: Take 1 Tab by mouth daily. Class: Normal  
 Pharmacy: [address] [place] [address], [address]  #: [phone] Route: Oral  
  
Follow-up Instructions Return in about 4 weeks (around 2/6/2018), or if symptoms worsen or fail to improve. To-Do List   
 02/13/2018 Lab:  HEMOGLOBIN A1C WITH EAG   
  
 02/13/2018 Lab:  METABOLIC PANEL, BASIC Introducing [facility]! Carmelita Fothergill introduces [employer] patient portal. Now you can access parts of your medical record, email your doctor's office, and request medication refills online. 1. In your internet browser, go to [email or web address] 2. Click on the First Time User? Click Here link in the Sign In box. You will see the New Member Sign Up page. 3. Enter your [employer] Access Code exactly as it appears below. You will not need to use this code after youve completed the sign-up process. If you do not sign up before the expiration date, you must request a new code. · [employer] Access Code: [address] Expires: 2/4/2018 12:35 PM 
 
4. Enter the last four digits of your Social Security Number (xxxx) and Date of Birth (mm/dd/yyyy) as indicated and click Submit. You will be taken to the next sign-up page. 5. Create a [employer] ID. This will be your [employer] login ID and cannot be changed, so think of one that is secure and easy to remember. 6. Create a [employer] password. You can change your password at any time. 7. Enter your Password Reset Question and Answer. This can be used at a later time if you forget your password. 8. Enter your e-mail address. You will receive e-mail notification when new information is available in [address] 9. Click Sign Up. You can now view and download portions of your medical record. 10. Click the Download Summary menu link to download a portable copy of your medical information. If you have questions, please visit the Frequently Asked Questions section of the [employer] website. Remember, [employer] is NOT to be used for urgent needs. For medical emergencies, dial 911. Now available from your iPhone and Android! Please provide this summary of care documentation to your next provider. Your primary care clinician is listed as Anna Jensen. If you have any questions after today's visit, please call [phone].

## 2018-01-24 ENCOUNTER — TELEPHONE (OUTPATIENT)
Dept: FAMILY MEDICINE CLINIC | Age: 53
End: 2018-01-24

## 2018-01-24 NOTE — TELEPHONE ENCOUNTER
Pt reports weight gain of  20 pds over 2 wks -now weighs 308     Pls call to advise     Best number to reach him is [phone]

## 2018-01-24 NOTE — TELEPHONE ENCOUNTER
Patient was called and advised that Dr Soraya Ceron had increased his Lyrica had been increased to 150 mg three times a day. Patient was advised per Dr Hailey Rene that Lyrica can cause weight increase,but patient needs to call pain management physician to change dosage.

## 2018-01-30 ENCOUNTER — APPOINTMENT (OUTPATIENT)
Dept: MRI IMAGING | Age: 53
End: 2018-01-30
Attending: ORTHOPAEDIC SURGERY
Payer: MEDICAID

## 2018-01-30 ENCOUNTER — HOSPITAL ENCOUNTER (OUTPATIENT)
Dept: MRI IMAGING | Age: 53
Discharge: HOME | End: 2018-01-30
Attending: ORTHOPAEDIC SURGERY
Payer: MEDICAID

## 2018-01-30 DIAGNOSIS — M54.50 LOW BACK PAIN: ICD-10-CM

## 2018-01-30 DIAGNOSIS — Z78.9 WEIGHT ABOVE 97TH PERCENTILE: ICD-10-CM

## 2018-01-30 PROCEDURE — 72141 MRI NECK SPINE W/O DYE: CPT

## 2018-01-31 ENCOUNTER — HOSPITAL ENCOUNTER (OUTPATIENT)
Dept: MRI IMAGING | Age: 53
Discharge: HOME | End: 2018-01-31
Attending: ORTHOPAEDIC SURGERY
Payer: MEDICAID

## 2018-01-31 DIAGNOSIS — M54.50 LOW BACK PAIN: ICD-10-CM

## 2018-01-31 DIAGNOSIS — Z78.9 WEIGHT ABOVE 97TH PERCENTILE: ICD-10-CM

## 2018-01-31 PROCEDURE — 72146 MRI CHEST SPINE W/O DYE: CPT

## 2018-02-07 DIAGNOSIS — M54.50 CHRONIC BILATERAL LOW BACK PAIN WITHOUT SCIATICA: ICD-10-CM

## 2018-02-07 DIAGNOSIS — G89.29 CHRONIC BILATERAL LOW BACK PAIN WITHOUT SCIATICA: ICD-10-CM

## 2018-02-07 RX ORDER — DICLOFENAC SODIUM 10 MG/G
GEL TOPICAL
Qty: 300 G | Refills: 0 | Status: SHIPPED | OUTPATIENT
Start: 2018-02-07 | End: 2018-02-15 | Stop reason: SDUPTHER

## 2018-02-13 DIAGNOSIS — I10 ESSENTIAL HYPERTENSION: ICD-10-CM

## 2018-02-15 DIAGNOSIS — G89.29 CHRONIC BILATERAL LOW BACK PAIN WITHOUT SCIATICA: ICD-10-CM

## 2018-02-15 DIAGNOSIS — M54.50 CHRONIC BILATERAL LOW BACK PAIN WITHOUT SCIATICA: ICD-10-CM

## 2018-02-15 RX ORDER — DICLOFENAC SODIUM 10 MG/G
GEL TOPICAL
Qty: 300 G | Refills: 0 | Status: SHIPPED | OUTPATIENT
Start: 2018-02-15 | End: 2018-02-27 | Stop reason: SDUPTHER

## 2018-02-27 ENCOUNTER — DOCUMENTATION ONLY (OUTPATIENT)
Dept: FAMILY MEDICINE CLINIC | Age: 53
End: 2018-02-27

## 2018-02-27 ENCOUNTER — OFFICE VISIT (OUTPATIENT)
Dept: FAMILY MEDICINE CLINIC | Age: 53
End: 2018-02-27

## 2018-02-27 VITALS
BODY MASS INDEX: 44.64 KG/M2 | RESPIRATION RATE: 20 BRPM | DIASTOLIC BLOOD PRESSURE: 80 MMHG | HEIGHT: 70 IN | HEART RATE: 80 BPM | TEMPERATURE: 97.9 F | SYSTOLIC BLOOD PRESSURE: 136 MMHG | OXYGEN SATURATION: 97 % | WEIGHT: 311.8 LBS

## 2018-02-27 DIAGNOSIS — M54.50 CHRONIC BILATERAL LOW BACK PAIN WITHOUT SCIATICA: ICD-10-CM

## 2018-02-27 DIAGNOSIS — G89.29 CHRONIC BILATERAL LOW BACK PAIN WITHOUT SCIATICA: ICD-10-CM

## 2018-02-27 DIAGNOSIS — Z01.818 PREOP EXAMINATION: Primary | ICD-10-CM

## 2018-02-27 DIAGNOSIS — G47.33 OSA (OBSTRUCTIVE SLEEP APNEA): ICD-10-CM

## 2018-02-27 PROBLEM — M48.00 SPINAL STENOSIS: Status: ACTIVE | Noted: 2018-02-27

## 2018-02-27 PROBLEM — J45.909 ASTHMA: Status: ACTIVE | Noted: 2018-02-27

## 2018-02-27 PROBLEM — R06.81 APNEA: Status: ACTIVE | Noted: 2018-02-27

## 2018-02-27 LAB — HBA1C MFR BLD HPLC: 7.3 %

## 2018-02-27 RX ORDER — DICLOFENAC SODIUM 10 MG/G
GEL TOPICAL
Qty: 300 G | Refills: 3 | Status: SHIPPED | OUTPATIENT
Start: 2018-02-27 | End: 2018-03-08 | Stop reason: SDUPTHER

## 2018-02-27 RX ORDER — GLECAPREVIR AND PIBRENTASVIR 40; 100 MG/1; MG/1
TABLET, FILM COATED ORAL
COMMUNITY
Start: 2018-01-11 | End: 2018-02-27 | Stop reason: ALTCHOICE

## 2018-02-27 RX ORDER — PREGABALIN 150 MG/1
CAPSULE ORAL
Refills: 0 | COMMUNITY
Start: 2018-02-21 | End: 2019-08-22 | Stop reason: ALTCHOICE

## 2018-02-27 RX ORDER — DIAZEPAM 5 MG/1
TABLET ORAL
Refills: 0 | COMMUNITY
Start: 2018-01-15 | End: 2018-02-27

## 2018-02-27 RX ORDER — HYDROCODONE BITARTRATE AND ACETAMINOPHEN 5; 325 MG/1; MG/1
TABLET ORAL
Refills: 0 | COMMUNITY
Start: 2018-02-07 | End: 2018-04-13

## 2018-02-27 NOTE — PROGRESS NOTES
Chief Complaint   Patient presents with    Pre-op Exam     Surgery 3/12/2018 Dr Rachel Santillan Spinal Stenosis Spondylolisthosis L4-5,DDD L5-S1,Lumbar Laminectomy L4-5,PSF L4-S1   1. Have you been to the ER, urgent care clinic since your last visit? Hospitalized since your last visit? No    2. Have you seen or consulted any other health care providers outside of the [address] [place] [address] since your last visit? Include any pap smears or colon screening.  Yes Where: Dr Celia Gonzalez 8
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
## 2018-02-28 ENCOUNTER — HOSPITAL ENCOUNTER (OUTPATIENT)
Dept: PREADMISSION TESTING | Age: 53
Discharge: HOME | End: 2018-02-28
Payer: MEDICAID

## 2018-02-28 VITALS
DIASTOLIC BLOOD PRESSURE: 90 MMHG | WEIGHT: 302 LBS | OXYGEN SATURATION: 97 % | SYSTOLIC BLOOD PRESSURE: 147 MMHG | TEMPERATURE: 98.2 F | HEART RATE: 80 BPM | BODY MASS INDEX: 42.28 KG/M2 | HEIGHT: 71 IN

## 2018-02-28 LAB
ABO + RH BLD: NORMAL
ANION GAP SERPL CALC-SCNC: 8 MMOL/L (ref 5–15)
APPEARANCE UR: CLEAR
BACTERIA URNS QL MICRO: NEGATIVE /HPF
BILIRUB UR QL: NEGATIVE
BLOOD GROUP ANTIBODIES SERPL: NORMAL
BUN SERPL-MCNC: 15 MG/DL (ref 6–20)
BUN/CREAT SERPL: 11 (ref 12–20)
CALCIUM SERPL-MCNC: 9.1 MG/DL (ref 8.5–10.1)
CHLORIDE SERPL-SCNC: 106 MMOL/L (ref 97–108)
CO2 SERPL-SCNC: 28 MMOL/L (ref 21–32)
COLOR UR: NORMAL
CREAT SERPL-MCNC: 1.36 MG/DL (ref 0.7–1.3)
EPITH CASTS URNS QL MICRO: NORMAL /LPF
ERYTHROCYTE [DISTWIDTH] IN BLOOD BY AUTOMATED COUNT: 12.7 % (ref 11.5–14.5)
GLUCOSE SERPL-MCNC: 168 MG/DL (ref 65–100)
GLUCOSE UR STRIP.AUTO-MCNC: NEGATIVE MG/DL
HCT VFR BLD AUTO: 38.8 % (ref 36.6–50.3)
HGB BLD-MCNC: 13 G/DL (ref 12.1–17)
HGB UR QL STRIP: NEGATIVE
HYALINE CASTS URNS QL MICRO: NORMAL /LPF (ref 0–5)
INR PPP: 1 (ref 0.9–1.1)
KETONES UR QL STRIP.AUTO: NEGATIVE MG/DL
LEUKOCYTE ESTERASE UR QL STRIP.AUTO: NEGATIVE
MCH RBC QN AUTO: 28.2 PG (ref 26–34)
MCHC RBC AUTO-ENTMCNC: 33.5 G/DL (ref 30–36.5)
MCV RBC AUTO: 84.2 FL (ref 80–99)
NITRITE UR QL STRIP.AUTO: NEGATIVE
NRBC # BLD: 0 K/UL (ref 0–0.01)
NRBC BLD-RTO: 0 PER 100 WBC
PH UR STRIP: 6 [PH] (ref 5–8)
PLATELET # BLD AUTO: 201 K/UL (ref 150–400)
PMV BLD AUTO: 11.6 FL (ref 8.9–12.9)
POTASSIUM SERPL-SCNC: 4.1 MMOL/L (ref 3.5–5.1)
PROT UR STRIP-MCNC: NEGATIVE MG/DL
PROTHROMBIN TIME: 10.4 SEC (ref 9–11.1)
RBC # BLD AUTO: 4.61 M/UL (ref 4.1–5.7)
RBC #/AREA URNS HPF: NORMAL /HPF (ref 0–5)
SODIUM SERPL-SCNC: 142 MMOL/L (ref 136–145)
SP GR UR REFRACTOMETRY: 1.02 (ref 1–1.03)
SPECIMEN EXP DATE BLD: NORMAL
UA: UC IF INDICATED,UAUC: NORMAL
UROBILINOGEN UR QL STRIP.AUTO: 1 EU/DL (ref 0.2–1)
WBC # BLD AUTO: 5.7 K/UL (ref 4.1–11.1)
WBC URNS QL MICRO: NORMAL /HPF (ref 0–4)

## 2018-02-28 PROCEDURE — 93005 ELECTROCARDIOGRAM TRACING: CPT

## 2018-02-28 PROCEDURE — 85610 PROTHROMBIN TIME: CPT | Performed by: ORTHOPAEDIC SURGERY

## 2018-02-28 PROCEDURE — 86850 RBC ANTIBODY SCREEN: CPT | Performed by: ORTHOPAEDIC SURGERY

## 2018-02-28 PROCEDURE — 85027 COMPLETE CBC AUTOMATED: CPT | Performed by: ORTHOPAEDIC SURGERY

## 2018-02-28 PROCEDURE — 80048 BASIC METABOLIC PNL TOTAL CA: CPT | Performed by: ORTHOPAEDIC SURGERY

## 2018-02-28 PROCEDURE — 81001 URINALYSIS AUTO W/SCOPE: CPT | Performed by: ORTHOPAEDIC SURGERY

## 2018-02-28 PROCEDURE — 36415 COLL VENOUS BLD VENIPUNCTURE: CPT | Performed by: ORTHOPAEDIC SURGERY

## 2018-02-28 NOTE — ADVANCED PRACTICE NURSE
Patient informed of need for follow up with PCP regarding HIGH risk for SHIRA and possibility of need for sleep study. Advised that patients with SHIRA are at increased risk for adverse clinical outcomes ranging from decreased daytime alertness and quality of life, to cardiovascular morbidities and mortality, and risk for hospitalization.

## 2018-03-01 LAB
ATRIAL RATE: 73 BPM
BACTERIA SPEC CULT: NORMAL
BACTERIA SPEC CULT: NORMAL
CALCULATED P AXIS, ECG09: 41 DEGREES
CALCULATED R AXIS, ECG10: 23 DEGREES
CALCULATED T AXIS, ECG11: 18 DEGREES
DIAGNOSIS, 93000: NORMAL
P-R INTERVAL, ECG05: 198 MS
Q-T INTERVAL, ECG07: 384 MS
QRS DURATION, ECG06: 90 MS
QTC CALCULATION (BEZET), ECG08: 423 MS
SERVICE CMNT-IMP: NORMAL
VENTRICULAR RATE, ECG03: 73 BPM

## 2018-03-02 PROBLEM — N18.30 STAGE 3 CHRONIC KIDNEY DISEASE (HCC): Status: RESOLVED | Noted: 2017-11-06 | Resolved: 2018-03-02

## 2018-03-05 DIAGNOSIS — I10 ESSENTIAL HYPERTENSION: ICD-10-CM

## 2018-03-05 DIAGNOSIS — F33.0 MILD EPISODE OF RECURRENT MAJOR DEPRESSIVE DISORDER (HCC): ICD-10-CM

## 2018-03-05 DIAGNOSIS — E78.2 MIXED HYPERLIPIDEMIA: ICD-10-CM

## 2018-03-05 RX ORDER — ATORVASTATIN CALCIUM 40 MG/1
TABLET, FILM COATED ORAL
Qty: 90 TAB | Refills: 0 | Status: SHIPPED | OUTPATIENT
Start: 2018-03-05 | End: 2018-05-28 | Stop reason: SDUPTHER

## 2018-03-05 RX ORDER — CITALOPRAM 20 MG/1
TABLET, FILM COATED ORAL
Qty: 90 TAB | Refills: 0 | Status: SHIPPED | OUTPATIENT
Start: 2018-03-05 | End: 2018-05-28 | Stop reason: SDUPTHER

## 2018-03-05 RX ORDER — LISINOPRIL 40 MG/1
TABLET ORAL
Qty: 90 TAB | Refills: 0 | Status: SHIPPED | OUTPATIENT
Start: 2018-03-05 | End: 2018-07-14 | Stop reason: SDUPTHER

## 2018-03-05 RX ORDER — METFORMIN HYDROCHLORIDE 500 MG/1
TABLET, EXTENDED RELEASE ORAL
Qty: 60 TAB | Refills: 2 | Status: SHIPPED | OUTPATIENT
Start: 2018-03-05 | End: 2018-05-28 | Stop reason: SDUPTHER

## 2018-03-08 DIAGNOSIS — G89.29 CHRONIC BILATERAL LOW BACK PAIN WITHOUT SCIATICA: ICD-10-CM

## 2018-03-08 DIAGNOSIS — M54.50 CHRONIC BILATERAL LOW BACK PAIN WITHOUT SCIATICA: ICD-10-CM

## 2018-03-08 NOTE — TELEPHONE ENCOUNTER
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 It should be 4 of them. Best contact: [phone].

## 2018-03-08 NOTE — TELEPHONE ENCOUNTER
[facility] calling - states ins will cover if written for brand name and medically necessary

## 2018-03-08 NOTE — TELEPHONE ENCOUNTER
Patient unsure of medication needing refill. Will call back with names. Advised PA pending for Diclofenac gel.

## 2018-03-09 RX ORDER — INSULIN GLARGINE 100 [IU]/ML
INJECTION, SOLUTION SUBCUTANEOUS
Qty: 120 ML | Refills: 0 | Status: SHIPPED | OUTPATIENT
Start: 2018-03-09 | End: 2018-03-21

## 2018-03-09 RX ORDER — DICLOFENAC SODIUM 10 MG/G
GEL TOPICAL
Qty: 300 G | Refills: 3 | OUTPATIENT
Start: 2018-03-09 | End: 2018-04-13

## 2018-03-21 DIAGNOSIS — J45.909 ASTHMA, UNSPECIFIED ASTHMA SEVERITY, UNSPECIFIED WHETHER COMPLICATED, UNSPECIFIED WHETHER PERSISTENT: Primary | ICD-10-CM

## 2018-03-21 RX ORDER — ALBUTEROL SULFATE 90 UG/1
2 AEROSOL, METERED RESPIRATORY (INHALATION)
Qty: 1 INHALER | Refills: 0 | Status: SHIPPED | OUTPATIENT
Start: 2018-03-21 | End: 2019-01-11 | Stop reason: SDUPTHER

## 2018-03-21 RX ORDER — TRAMADOL HYDROCHLORIDE 50 MG/1
TABLET ORAL
Refills: 0 | COMMUNITY
Start: 2018-03-09 | End: 2018-04-13

## 2018-03-21 RX ORDER — ALBUTEROL SULFATE 0.83 MG/ML
2.5 SOLUTION RESPIRATORY (INHALATION)
Qty: 24 EACH | Refills: 0 | Status: SHIPPED | OUTPATIENT
Start: 2018-03-21 | End: 2020-06-03

## 2018-03-21 RX ORDER — INSULIN DEGLUDEC 100 U/ML
70 INJECTION, SOLUTION SUBCUTANEOUS 2 TIMES DAILY
Qty: 126 ML | Refills: 1 | Status: SHIPPED | OUTPATIENT
Start: 2018-03-21 | End: 2018-05-10

## 2018-03-21 NOTE — TELEPHONE ENCOUNTER
----- Message from Eden Beltran sent at 3/21/2018  9:18 AM EDT -----  Regarding: Dr. Viola Jurado Rx  Patient would like Rx refill for albuterol and slow acting [facility]. He is out of insulin. Please call patient back at [phone] if there are questions.

## 2018-03-21 NOTE — TELEPHONE ENCOUNTER
Patient called and notified that Insulin prescription refill sent to Pharmacy but Dr. Claudia Cid changed it to [facility] (due to Insurance not approve Roopa Cordon) and in reference to request for Albuterol Nebulizer solution. Patient notified that we did not have Albuterol medication listed as a medication that he has been taking. Patient stated that he has Asthma and that it had been prescribed Albuterol Medications by his previous PCP.

## 2018-04-02 DIAGNOSIS — I10 ESSENTIAL HYPERTENSION: ICD-10-CM

## 2018-04-02 RX ORDER — AMLODIPINE BESYLATE 10 MG/1
TABLET ORAL
Qty: 30 TAB | Refills: 2 | Status: SHIPPED | OUTPATIENT
Start: 2018-04-02 | End: 2018-07-14 | Stop reason: SDUPTHER

## 2018-04-06 NOTE — PERIOP NOTES
PAT PREOP PHONE INTERVIEW COMPLETED WITH: Mayi Menendez, PATIENT. PREOPERATIVE INSTRUCTIONS REVIEWED WITH PATIENT. PREOP DIET AND NUTRITION UPDATED GUIDELINES/ INSTRUCTIONS REVIEWED WITH PATIENT. HE UNDERSTANDS USE OF CHG WIPES, HAS FROM PREVIOUS PAT APPT. PATIENT ADVISED NOT TO EAT/DRINK ANYTHING PAST MIDNIGHT EVENING PRIOR TO SURGERY,  NOTHING TO EAT MORNING OF SURGERY;  LEAVE ALL VALUABLES AT HOME; DO BRING PICTURE ID, INSURANCE CARD AND ANY COPAY; WEAR COMFORTABLE CLOTHING;  NO PERFUMES, POWDERS, LOTIONS; NO ALCOHOL 24 HOURS BEFORE OR AFTER SURGERY;  WILL NEED TO BE DRIVEN HOME BY FAMILY OR FRIEND;  AVOID TAKING NSAIDS, ASPIRIN, FISH OIL, VITAMIN E OR GLUCOSAMINE/CHONDROITIN DURING THIS TIME PRIOR TO SURGERY;  MAY TAKE TYLENOL. INSTRUCTED TO REPORT  [address] BY SURGEON'S OFFICE.

## 2018-04-09 ENCOUNTER — ANESTHESIA EVENT (OUTPATIENT)
Dept: SURGERY | Age: 53
DRG: 304 | End: 2018-04-09
Payer: MEDICAID

## 2018-04-10 ENCOUNTER — HOSPITAL ENCOUNTER (INPATIENT)
Age: 53
LOS: 3 days | Discharge: HOME | DRG: 304 | End: 2018-04-13
Attending: ORTHOPAEDIC SURGERY | Admitting: ORTHOPAEDIC SURGERY
Payer: MEDICAID

## 2018-04-10 ENCOUNTER — APPOINTMENT (OUTPATIENT)
Dept: GENERAL RADIOLOGY | Age: 53
DRG: 304 | End: 2018-04-10
Attending: ORTHOPAEDIC SURGERY
Payer: MEDICAID

## 2018-04-10 ENCOUNTER — ANESTHESIA (OUTPATIENT)
Dept: SURGERY | Age: 53
DRG: 304 | End: 2018-04-10
Payer: MEDICAID

## 2018-04-10 ENCOUNTER — OFFICE VISIT (OUTPATIENT)
Dept: FAMILY MEDICINE CLINIC | Age: 53
End: 2018-04-10

## 2018-04-10 VITALS
HEART RATE: 71 BPM | BODY MASS INDEX: 43.2 KG/M2 | OXYGEN SATURATION: 98 % | WEIGHT: 308.6 LBS | SYSTOLIC BLOOD PRESSURE: 136 MMHG | HEIGHT: 71 IN | TEMPERATURE: 98.5 F | RESPIRATION RATE: 16 BRPM | DIASTOLIC BLOOD PRESSURE: 84 MMHG

## 2018-04-10 DIAGNOSIS — I10 ESSENTIAL HYPERTENSION: ICD-10-CM

## 2018-04-10 DIAGNOSIS — E66.01 OBESITY, MORBID (HCC): ICD-10-CM

## 2018-04-10 DIAGNOSIS — G89.29 CHRONIC BILATERAL LOW BACK PAIN WITHOUT SCIATICA: ICD-10-CM

## 2018-04-10 DIAGNOSIS — M54.50 CHRONIC BILATERAL LOW BACK PAIN WITHOUT SCIATICA: ICD-10-CM

## 2018-04-10 DIAGNOSIS — M43.16 SPONDYLOLISTHESIS, LUMBAR REGION: Primary | ICD-10-CM

## 2018-04-10 PROBLEM — E11.21 TYPE 2 DIABETES WITH NEPHROPATHY (HCC): Status: ACTIVE | Noted: 2018-04-10

## 2018-04-10 LAB
ABO + RH BLD: NORMAL
BLOOD GROUP ANTIBODIES SERPL: NORMAL
GLUCOSE BLD STRIP.AUTO-MCNC: 126 MG/DL (ref 65–100)
GLUCOSE BLD STRIP.AUTO-MCNC: 178 MG/DL (ref 65–100)
SERVICE CMNT-IMP: ABNORMAL
SERVICE CMNT-IMP: ABNORMAL
SPECIMEN EXP DATE BLD: NORMAL

## 2018-04-10 PROCEDURE — 74011000272 HC RX REV CODE- 272: Performed by: ORTHOPAEDIC SURGERY

## 2018-04-10 PROCEDURE — 77030014007 HC SPNG HEMSTAT J&J -B: Performed by: ORTHOPAEDIC SURGERY

## 2018-04-10 PROCEDURE — 77030013079 HC BLNKT BAIR HGGR 3M -A: Performed by: ANESTHESIOLOGY

## 2018-04-10 PROCEDURE — 77030037728 HC GRFT BN FBR CORT 3DEMIN 30CC BACT -I: Performed by: ORTHOPAEDIC SURGERY

## 2018-04-10 PROCEDURE — 74011250636 HC RX REV CODE- 250/636: Performed by: ANESTHESIOLOGY

## 2018-04-10 PROCEDURE — 77030029099 HC BN WAX SSPC -A: Performed by: ORTHOPAEDIC SURGERY

## 2018-04-10 PROCEDURE — 77030020782 HC GWN BAIR PAWS FLX 3M -B

## 2018-04-10 PROCEDURE — 77030038600 HC TU BPLR IRR DISP STRY -B: Performed by: ORTHOPAEDIC SURGERY

## 2018-04-10 PROCEDURE — 76210000017 HC OR PH I REC 1.5 TO 2 HR: Performed by: ORTHOPAEDIC SURGERY

## 2018-04-10 PROCEDURE — 82962 GLUCOSE BLOOD TEST: CPT

## 2018-04-10 PROCEDURE — 77030031139 HC SUT VCRL2 J&J -A: Performed by: ORTHOPAEDIC SURGERY

## 2018-04-10 PROCEDURE — 0SG0071 FUSION OF LUMBAR VERTEBRAL JOINT WITH AUTOLOGOUS TISSUE SUBSTITUTE, POSTERIOR APPROACH, POSTERIOR COLUMN, OPEN APPROACH: ICD-10-PCS | Performed by: ORTHOPAEDIC SURGERY

## 2018-04-10 PROCEDURE — 76060000039 HC ANESTHESIA 4 TO 4.5 HR: Performed by: ORTHOPAEDIC SURGERY

## 2018-04-10 PROCEDURE — 76010000175 HC OR TIME 4 TO 4.5 HR INTENSV-TIER 1: Performed by: ORTHOPAEDIC SURGERY

## 2018-04-10 PROCEDURE — C1713 ANCHOR/SCREW BN/BN,TIS/BN: HCPCS | Performed by: ORTHOPAEDIC SURGERY

## 2018-04-10 PROCEDURE — 77030026438 HC STYL ET INTUB CARD -A: Performed by: ANESTHESIOLOGY

## 2018-04-10 PROCEDURE — 74011000250 HC RX REV CODE- 250: Performed by: ORTHOPAEDIC SURGERY

## 2018-04-10 PROCEDURE — 74011000250 HC RX REV CODE- 250

## 2018-04-10 PROCEDURE — 74011636637 HC RX REV CODE- 636/637: Performed by: PHYSICIAN ASSISTANT

## 2018-04-10 PROCEDURE — 77030034479 HC ADH SKN CLSR PRINEO J&J -B: Performed by: ORTHOPAEDIC SURGERY

## 2018-04-10 PROCEDURE — 86900 BLOOD TYPING SEROLOGIC ABO: CPT | Performed by: ORTHOPAEDIC SURGERY

## 2018-04-10 PROCEDURE — 74011250636 HC RX REV CODE- 250/636

## 2018-04-10 PROCEDURE — 36415 COLL VENOUS BLD VENIPUNCTURE: CPT | Performed by: ORTHOPAEDIC SURGERY

## 2018-04-10 PROCEDURE — 74011250636 HC RX REV CODE- 250/636: Performed by: PHYSICIAN ASSISTANT

## 2018-04-10 PROCEDURE — 77030012406 HC DRN WND PENRS BARD -A: Performed by: ORTHOPAEDIC SURGERY

## 2018-04-10 PROCEDURE — 72020 X-RAY EXAM OF SPINE 1 VIEW: CPT

## 2018-04-10 PROCEDURE — 65270000029 HC RM PRIVATE

## 2018-04-10 PROCEDURE — 77030012894

## 2018-04-10 PROCEDURE — 0SG3071 FUSION OF LUMBOSACRAL JOINT WITH AUTOLOGOUS TISSUE SUBSTITUTE, POSTERIOR APPROACH, POSTERIOR COLUMN, OPEN APPROACH: ICD-10-PCS | Performed by: ORTHOPAEDIC SURGERY

## 2018-04-10 PROCEDURE — 77030018836 HC SOL IRR NACL ICUM -A: Performed by: ORTHOPAEDIC SURGERY

## 2018-04-10 PROCEDURE — 77030008684 HC TU ET CUF COVD -B: Performed by: ANESTHESIOLOGY

## 2018-04-10 PROCEDURE — 74011250636 HC RX REV CODE- 250/636: Performed by: ORTHOPAEDIC SURGERY

## 2018-04-10 PROCEDURE — 77030032490 HC SLV COMPR SCD KNE COVD -B: Performed by: ORTHOPAEDIC SURGERY

## 2018-04-10 PROCEDURE — 77030003194 HC GRFT RECOMB BN MEDT -I1: Performed by: ORTHOPAEDIC SURGERY

## 2018-04-10 PROCEDURE — 77030018723 HC ELCTRD BLD COVD -A: Performed by: ORTHOPAEDIC SURGERY

## 2018-04-10 PROCEDURE — 77030036946 HC GRFT BN FBR CORT 3DEMIN 10CC BACT -G: Performed by: ORTHOPAEDIC SURGERY

## 2018-04-10 PROCEDURE — 77030004391 HC BUR FLUT MEDT -C: Performed by: ORTHOPAEDIC SURGERY

## 2018-04-10 PROCEDURE — 77030034850: Performed by: ORTHOPAEDIC SURGERY

## 2018-04-10 DEVICE — CREO&REG; 5.5, 7.5 X 45MM POLYAXIAL SCREW
Type: IMPLANTABLE DEVICE | Site: SPINE LUMBAR | Status: FUNCTIONAL
Brand: CREO

## 2018-04-10 DEVICE — CREO&REG; 5.5, 6.5 X 45MM POLYAXIAL SCREW
Type: IMPLANTABLE DEVICE | Site: SPINE LUMBAR | Status: FUNCTIONAL
Brand: CREO

## 2018-04-10 DEVICE — IMPLANTABLE DEVICE: Type: IMPLANTABLE DEVICE | Site: SPINE LUMBAR | Status: FUNCTIONAL

## 2018-04-10 DEVICE — 5.5MM CURVED ROD, TITANIUM ALLOY, 70MM LENGTH
Type: IMPLANTABLE DEVICE | Site: SPINE LUMBAR | Status: FUNCTIONAL
Brand: CREO

## 2018-04-10 DEVICE — GRAFT BNE 3D 30 CC CORTICAL FIBER: Type: IMPLANTABLE DEVICE | Site: SPINE LUMBAR | Status: FUNCTIONAL

## 2018-04-10 DEVICE — 5.5 LOCKING CAP, CREO
Type: IMPLANTABLE DEVICE | Site: SPINE LUMBAR | Status: FUNCTIONAL
Brand: CREO

## 2018-04-10 DEVICE — BONE GRAFT KIT 7510200 INFUSE SMALL
Type: IMPLANTABLE DEVICE | Site: SPINE LUMBAR | Status: FUNCTIONAL
Brand: INFUSE® BONE GRAFT

## 2018-04-10 RX ORDER — ACETAMINOPHEN 325 MG/1
650 TABLET ORAL
Status: DISCONTINUED | OUTPATIENT
Start: 2018-04-10 | End: 2018-04-13 | Stop reason: HOSPADM

## 2018-04-10 RX ORDER — MIDAZOLAM HYDROCHLORIDE 1 MG/ML
1 INJECTION, SOLUTION INTRAMUSCULAR; INTRAVENOUS AS NEEDED
Status: DISCONTINUED | OUTPATIENT
Start: 2018-04-10 | End: 2018-04-10 | Stop reason: HOSPADM

## 2018-04-10 RX ORDER — SODIUM CHLORIDE 0.9 % (FLUSH) 0.9 %
5-10 SYRINGE (ML) INJECTION AS NEEDED
Status: DISCONTINUED | OUTPATIENT
Start: 2018-04-10 | End: 2018-04-13 | Stop reason: HOSPADM

## 2018-04-10 RX ORDER — ALBUTEROL SULFATE 90 UG/1
2 AEROSOL, METERED RESPIRATORY (INHALATION)
Status: DISCONTINUED | OUTPATIENT
Start: 2018-04-10 | End: 2018-04-10 | Stop reason: CLARIF

## 2018-04-10 RX ORDER — DEXTROSE, SODIUM CHLORIDE, SODIUM LACTATE, POTASSIUM CHLORIDE, AND CALCIUM CHLORIDE 5; .6; .31; .03; .02 G/100ML; G/100ML; G/100ML; G/100ML; G/100ML
125 INJECTION, SOLUTION INTRAVENOUS CONTINUOUS
Status: DISCONTINUED | OUTPATIENT
Start: 2018-04-10 | End: 2018-04-10 | Stop reason: HOSPADM

## 2018-04-10 RX ORDER — SODIUM CHLORIDE 9 MG/ML
125 INJECTION, SOLUTION INTRAVENOUS CONTINUOUS
Status: DISPENSED | OUTPATIENT
Start: 2018-04-10 | End: 2018-04-11

## 2018-04-10 RX ORDER — DEXTROSE 50 % IN WATER (D50W) INTRAVENOUS SYRINGE
12.5-25 AS NEEDED
Status: DISCONTINUED | OUTPATIENT
Start: 2018-04-10 | End: 2018-04-10 | Stop reason: CLARIF

## 2018-04-10 RX ORDER — MIDAZOLAM HYDROCHLORIDE 1 MG/ML
1 INJECTION, SOLUTION INTRAMUSCULAR; INTRAVENOUS
Status: DISCONTINUED | OUTPATIENT
Start: 2018-04-10 | End: 2018-04-10 | Stop reason: HOSPADM

## 2018-04-10 RX ORDER — ONDANSETRON 2 MG/ML
INJECTION INTRAMUSCULAR; INTRAVENOUS AS NEEDED
Status: DISCONTINUED | OUTPATIENT
Start: 2018-04-10 | End: 2018-04-10 | Stop reason: HOSPADM

## 2018-04-10 RX ORDER — ALBUTEROL SULFATE 0.83 MG/ML
2.5 SOLUTION RESPIRATORY (INHALATION)
Status: DISCONTINUED | OUTPATIENT
Start: 2018-04-10 | End: 2018-04-13 | Stop reason: HOSPADM

## 2018-04-10 RX ORDER — ONDANSETRON 2 MG/ML
4 INJECTION INTRAMUSCULAR; INTRAVENOUS AS NEEDED
Status: DISCONTINUED | OUTPATIENT
Start: 2018-04-10 | End: 2018-04-10 | Stop reason: HOSPADM

## 2018-04-10 RX ORDER — POLYETHYLENE GLYCOL 3350 17 G/17G
17 POWDER, FOR SOLUTION ORAL DAILY
Status: DISCONTINUED | OUTPATIENT
Start: 2018-04-11 | End: 2018-04-13 | Stop reason: HOSPADM

## 2018-04-10 RX ORDER — KETAMINE HYDROCHLORIDE 10 MG/ML
INJECTION, SOLUTION INTRAMUSCULAR; INTRAVENOUS AS NEEDED
Status: DISCONTINUED | OUTPATIENT
Start: 2018-04-10 | End: 2018-04-10 | Stop reason: HOSPADM

## 2018-04-10 RX ORDER — MAGNESIUM SULFATE 100 %
4 CRYSTALS MISCELLANEOUS AS NEEDED
Status: DISCONTINUED | OUTPATIENT
Start: 2018-04-10 | End: 2018-04-13 | Stop reason: HOSPADM

## 2018-04-10 RX ORDER — PHENYLEPHRINE HCL IN 0.9% NACL 0.4MG/10ML
SYRINGE (ML) INTRAVENOUS AS NEEDED
Status: DISCONTINUED | OUTPATIENT
Start: 2018-04-10 | End: 2018-04-10 | Stop reason: HOSPADM

## 2018-04-10 RX ORDER — HYDROMORPHONE HYDROCHLORIDE 1 MG/ML
INJECTION, SOLUTION INTRAMUSCULAR; INTRAVENOUS; SUBCUTANEOUS AS NEEDED
Status: DISCONTINUED | OUTPATIENT
Start: 2018-04-10 | End: 2018-04-10 | Stop reason: HOSPADM

## 2018-04-10 RX ORDER — MAGNESIUM SULFATE 100 %
4 CRYSTALS MISCELLANEOUS AS NEEDED
Status: DISCONTINUED | OUTPATIENT
Start: 2018-04-10 | End: 2018-04-10 | Stop reason: CLARIF

## 2018-04-10 RX ORDER — ACETAMINOPHEN 10 MG/ML
INJECTION, SOLUTION INTRAVENOUS AS NEEDED
Status: DISCONTINUED | OUTPATIENT
Start: 2018-04-10 | End: 2018-04-10 | Stop reason: HOSPADM

## 2018-04-10 RX ORDER — SUCCINYLCHOLINE CHLORIDE 20 MG/ML
INJECTION INTRAMUSCULAR; INTRAVENOUS AS NEEDED
Status: DISCONTINUED | OUTPATIENT
Start: 2018-04-10 | End: 2018-04-10 | Stop reason: HOSPADM

## 2018-04-10 RX ORDER — SODIUM CHLORIDE 0.9 % (FLUSH) 0.9 %
5-10 SYRINGE (ML) INJECTION AS NEEDED
Status: DISCONTINUED | OUTPATIENT
Start: 2018-04-10 | End: 2018-04-10 | Stop reason: HOSPADM

## 2018-04-10 RX ORDER — GLYCOPYRROLATE 0.2 MG/ML
INJECTION INTRAMUSCULAR; INTRAVENOUS AS NEEDED
Status: DISCONTINUED | OUTPATIENT
Start: 2018-04-10 | End: 2018-04-10 | Stop reason: HOSPADM

## 2018-04-10 RX ORDER — MORPHINE SULFATE 10 MG/ML
2 INJECTION, SOLUTION INTRAMUSCULAR; INTRAVENOUS
Status: DISCONTINUED | OUTPATIENT
Start: 2018-04-10 | End: 2018-04-10 | Stop reason: HOSPADM

## 2018-04-10 RX ORDER — NEOSTIGMINE METHYLSULFATE 1 MG/ML
INJECTION INTRAVENOUS AS NEEDED
Status: DISCONTINUED | OUTPATIENT
Start: 2018-04-10 | End: 2018-04-10 | Stop reason: HOSPADM

## 2018-04-10 RX ORDER — FENTANYL CITRATE 50 UG/ML
50 INJECTION, SOLUTION INTRAMUSCULAR; INTRAVENOUS AS NEEDED
Status: COMPLETED | OUTPATIENT
Start: 2018-04-10 | End: 2018-04-10

## 2018-04-10 RX ORDER — INSULIN LISPRO 100 [IU]/ML
INJECTION, SOLUTION INTRAVENOUS; SUBCUTANEOUS
Status: DISCONTINUED | OUTPATIENT
Start: 2018-04-11 | End: 2018-04-10 | Stop reason: SDUPTHER

## 2018-04-10 RX ORDER — LIDOCAINE HYDROCHLORIDE 20 MG/ML
INJECTION, SOLUTION EPIDURAL; INFILTRATION; INTRACAUDAL; PERINEURAL AS NEEDED
Status: DISCONTINUED | OUTPATIENT
Start: 2018-04-10 | End: 2018-04-10 | Stop reason: HOSPADM

## 2018-04-10 RX ORDER — AMLODIPINE BESYLATE 5 MG/1
10 TABLET ORAL DAILY
Status: DISCONTINUED | OUTPATIENT
Start: 2018-04-11 | End: 2018-04-13 | Stop reason: HOSPADM

## 2018-04-10 RX ORDER — SODIUM CHLORIDE, SODIUM LACTATE, POTASSIUM CHLORIDE, CALCIUM CHLORIDE 600; 310; 30; 20 MG/100ML; MG/100ML; MG/100ML; MG/100ML
125 INJECTION, SOLUTION INTRAVENOUS CONTINUOUS
Status: DISCONTINUED | OUTPATIENT
Start: 2018-04-10 | End: 2018-04-10 | Stop reason: HOSPADM

## 2018-04-10 RX ORDER — METFORMIN HYDROCHLORIDE 500 MG/1
500 TABLET, EXTENDED RELEASE ORAL 2 TIMES DAILY WITH MEALS
Status: DISCONTINUED | OUTPATIENT
Start: 2018-04-11 | End: 2018-04-11

## 2018-04-10 RX ORDER — OXYCODONE HYDROCHLORIDE 5 MG/1
5 TABLET ORAL
Status: DISCONTINUED | OUTPATIENT
Start: 2018-04-10 | End: 2018-04-11 | Stop reason: ALTCHOICE

## 2018-04-10 RX ORDER — DIPHENHYDRAMINE HYDROCHLORIDE 50 MG/ML
12.5 INJECTION, SOLUTION INTRAMUSCULAR; INTRAVENOUS AS NEEDED
Status: DISCONTINUED | OUTPATIENT
Start: 2018-04-10 | End: 2018-04-10 | Stop reason: HOSPADM

## 2018-04-10 RX ORDER — FACIAL-BODY WIPES
10 EACH TOPICAL DAILY PRN
Status: DISCONTINUED | OUTPATIENT
Start: 2018-04-12 | End: 2018-04-13 | Stop reason: HOSPADM

## 2018-04-10 RX ORDER — SODIUM CHLORIDE 0.9 % (FLUSH) 0.9 %
5-10 SYRINGE (ML) INJECTION EVERY 8 HOURS
Status: DISCONTINUED | OUTPATIENT
Start: 2018-04-11 | End: 2018-04-13 | Stop reason: HOSPADM

## 2018-04-10 RX ORDER — SODIUM CHLORIDE 0.9 % (FLUSH) 0.9 %
5-10 SYRINGE (ML) INJECTION EVERY 8 HOURS
Status: DISCONTINUED | OUTPATIENT
Start: 2018-04-10 | End: 2018-04-10 | Stop reason: HOSPADM

## 2018-04-10 RX ORDER — HYDROMORPHONE HCL/0.9% NACL/PF 0.5 MG/ML
PLASTIC BAG, INJECTION (ML) INTRAVENOUS CONTINUOUS
Status: DISCONTINUED | OUTPATIENT
Start: 2018-04-10 | End: 2018-04-11

## 2018-04-10 RX ORDER — LIDOCAINE HYDROCHLORIDE 10 MG/ML
0.5 INJECTION, SOLUTION EPIDURAL; INFILTRATION; INTRACAUDAL; PERINEURAL AS NEEDED
Status: DISCONTINUED | OUTPATIENT
Start: 2018-04-10 | End: 2018-04-10 | Stop reason: HOSPADM

## 2018-04-10 RX ORDER — FENTANYL CITRATE 50 UG/ML
INJECTION, SOLUTION INTRAMUSCULAR; INTRAVENOUS AS NEEDED
Status: DISCONTINUED | OUTPATIENT
Start: 2018-04-10 | End: 2018-04-10 | Stop reason: HOSPADM

## 2018-04-10 RX ORDER — INSULIN LISPRO 100 [IU]/ML
INJECTION, SOLUTION INTRAVENOUS; SUBCUTANEOUS
Status: DISPENSED
Start: 2018-04-10 | End: 2018-04-11

## 2018-04-10 RX ORDER — PREGABALIN 75 MG/1
150 CAPSULE ORAL 3 TIMES DAILY
Status: DISCONTINUED | OUTPATIENT
Start: 2018-04-11 | End: 2018-04-13 | Stop reason: HOSPADM

## 2018-04-10 RX ORDER — DIPHENHYDRAMINE HCL 12.5MG/5ML
12.5 ELIXIR ORAL
Status: ACTIVE | OUTPATIENT
Start: 2018-04-10 | End: 2018-04-11

## 2018-04-10 RX ORDER — DEXAMETHASONE SODIUM PHOSPHATE 4 MG/ML
INJECTION, SOLUTION INTRA-ARTICULAR; INTRALESIONAL; INTRAMUSCULAR; INTRAVENOUS; SOFT TISSUE AS NEEDED
Status: DISCONTINUED | OUTPATIENT
Start: 2018-04-10 | End: 2018-04-10 | Stop reason: HOSPADM

## 2018-04-10 RX ORDER — LISINOPRIL 20 MG/1
40 TABLET ORAL DAILY
Status: DISCONTINUED | OUTPATIENT
Start: 2018-04-11 | End: 2018-04-13 | Stop reason: HOSPADM

## 2018-04-10 RX ORDER — OXYCODONE HYDROCHLORIDE 5 MG/1
5 TABLET ORAL AS NEEDED
Status: DISCONTINUED | OUTPATIENT
Start: 2018-04-10 | End: 2018-04-10 | Stop reason: HOSPADM

## 2018-04-10 RX ORDER — ATORVASTATIN CALCIUM 40 MG/1
40 TABLET, FILM COATED ORAL DAILY
Status: DISCONTINUED | OUTPATIENT
Start: 2018-04-11 | End: 2018-04-13 | Stop reason: HOSPADM

## 2018-04-10 RX ORDER — DEXAMETHASONE SODIUM PHOSPHATE 4 MG/ML
4 INJECTION, SOLUTION INTRA-ARTICULAR; INTRALESIONAL; INTRAMUSCULAR; INTRAVENOUS; SOFT TISSUE
Status: DISCONTINUED | OUTPATIENT
Start: 2018-04-10 | End: 2018-04-13 | Stop reason: HOSPADM

## 2018-04-10 RX ORDER — ROCURONIUM BROMIDE 10 MG/ML
INJECTION, SOLUTION INTRAVENOUS AS NEEDED
Status: DISCONTINUED | OUTPATIENT
Start: 2018-04-10 | End: 2018-04-10 | Stop reason: HOSPADM

## 2018-04-10 RX ORDER — MIDAZOLAM HYDROCHLORIDE 1 MG/ML
INJECTION, SOLUTION INTRAMUSCULAR; INTRAVENOUS AS NEEDED
Status: DISCONTINUED | OUTPATIENT
Start: 2018-04-10 | End: 2018-04-10 | Stop reason: HOSPADM

## 2018-04-10 RX ORDER — DEXTROSE 50 % IN WATER (D50W) INTRAVENOUS SYRINGE
12.5-25 AS NEEDED
Status: DISCONTINUED | OUTPATIENT
Start: 2018-04-10 | End: 2018-04-13 | Stop reason: HOSPADM

## 2018-04-10 RX ORDER — ONDANSETRON 2 MG/ML
4 INJECTION INTRAMUSCULAR; INTRAVENOUS
Status: ACTIVE | OUTPATIENT
Start: 2018-04-10 | End: 2018-04-11

## 2018-04-10 RX ORDER — CEFAZOLIN SODIUM/WATER 2 G/20 ML
2 SYRINGE (ML) INTRAVENOUS EVERY 8 HOURS
Status: COMPLETED | OUTPATIENT
Start: 2018-04-11 | End: 2018-04-11

## 2018-04-10 RX ORDER — HYDROCODONE BITARTRATE AND ACETAMINOPHEN 10; 325 MG/1; MG/1
TABLET ORAL
Refills: 0 | COMMUNITY
Start: 2018-04-03 | End: 2018-04-13

## 2018-04-10 RX ORDER — EPHEDRINE SULFATE 50 MG/ML
INJECTION, SOLUTION INTRAVENOUS AS NEEDED
Status: DISCONTINUED | OUTPATIENT
Start: 2018-04-10 | End: 2018-04-10 | Stop reason: HOSPADM

## 2018-04-10 RX ORDER — DIAZEPAM 5 MG/1
5 TABLET ORAL
Status: DISCONTINUED | OUTPATIENT
Start: 2018-04-10 | End: 2018-04-13 | Stop reason: HOSPADM

## 2018-04-10 RX ORDER — OXYCODONE HYDROCHLORIDE 5 MG/1
10 TABLET ORAL
Status: DISCONTINUED | OUTPATIENT
Start: 2018-04-10 | End: 2018-04-11 | Stop reason: ALTCHOICE

## 2018-04-10 RX ORDER — PROPOFOL 10 MG/ML
INJECTION, EMULSION INTRAVENOUS AS NEEDED
Status: DISCONTINUED | OUTPATIENT
Start: 2018-04-10 | End: 2018-04-10 | Stop reason: HOSPADM

## 2018-04-10 RX ORDER — AMOXICILLIN 250 MG
1 CAPSULE ORAL 2 TIMES DAILY
Status: DISCONTINUED | OUTPATIENT
Start: 2018-04-11 | End: 2018-04-13 | Stop reason: HOSPADM

## 2018-04-10 RX ORDER — MORPHINE SULFATE 4 MG/ML
INJECTION, SOLUTION INTRAMUSCULAR; INTRAVENOUS
Status: COMPLETED
Start: 2018-04-10 | End: 2018-04-10

## 2018-04-10 RX ORDER — INSULIN LISPRO 100 [IU]/ML
INJECTION, SOLUTION INTRAVENOUS; SUBCUTANEOUS
Status: DISCONTINUED | OUTPATIENT
Start: 2018-04-11 | End: 2018-04-13 | Stop reason: HOSPADM

## 2018-04-10 RX ORDER — CITALOPRAM 20 MG/1
20 TABLET, FILM COATED ORAL DAILY
Status: DISCONTINUED | OUTPATIENT
Start: 2018-04-11 | End: 2018-04-13 | Stop reason: HOSPADM

## 2018-04-10 RX ORDER — NALOXONE HYDROCHLORIDE 0.4 MG/ML
0.4 INJECTION, SOLUTION INTRAMUSCULAR; INTRAVENOUS; SUBCUTANEOUS AS NEEDED
Status: DISCONTINUED | OUTPATIENT
Start: 2018-04-10 | End: 2018-04-13 | Stop reason: HOSPADM

## 2018-04-10 RX ORDER — FENTANYL CITRATE 50 UG/ML
25 INJECTION, SOLUTION INTRAMUSCULAR; INTRAVENOUS
Status: COMPLETED | OUTPATIENT
Start: 2018-04-10 | End: 2018-04-10

## 2018-04-10 RX ADMIN — MORPHINE SULFATE 2 MG: 4 INJECTION, SOLUTION INTRAMUSCULAR; INTRAVENOUS at 22:01

## 2018-04-10 RX ADMIN — FENTANYL CITRATE 100 MCG: 50 INJECTION, SOLUTION INTRAMUSCULAR; INTRAVENOUS at 17:10

## 2018-04-10 RX ADMIN — MORPHINE SULFATE 2 MG: 4 INJECTION, SOLUTION INTRAMUSCULAR; INTRAVENOUS at 22:20

## 2018-04-10 RX ADMIN — HYDROMORPHONE HYDROCHLORIDE 0.4 MG: 1 INJECTION, SOLUTION INTRAMUSCULAR; INTRAVENOUS; SUBCUTANEOUS at 20:44

## 2018-04-10 RX ADMIN — GLYCOPYRROLATE 0.5 MG: 0.2 INJECTION INTRAMUSCULAR; INTRAVENOUS at 20:47

## 2018-04-10 RX ADMIN — ROCURONIUM BROMIDE 10 MG: 10 INJECTION, SOLUTION INTRAVENOUS at 19:31

## 2018-04-10 RX ADMIN — FENTANYL CITRATE 50 MCG: 50 INJECTION, SOLUTION INTRAMUSCULAR; INTRAVENOUS at 16:03

## 2018-04-10 RX ADMIN — FENTANYL CITRATE 50 MCG: 50 INJECTION, SOLUTION INTRAMUSCULAR; INTRAVENOUS at 15:09

## 2018-04-10 RX ADMIN — CEFAZOLIN 3 G: 1 INJECTION, POWDER, FOR SOLUTION INTRAMUSCULAR; INTRAVENOUS; PARENTERAL at 17:22

## 2018-04-10 RX ADMIN — INSULIN LISPRO 2 UNITS: 100 INJECTION, SOLUTION INTRAVENOUS; SUBCUTANEOUS at 22:18

## 2018-04-10 RX ADMIN — ONDANSETRON 4 MG: 2 INJECTION INTRAMUSCULAR; INTRAVENOUS at 20:10

## 2018-04-10 RX ADMIN — HYDROMORPHONE HYDROCHLORIDE 0.2 MG: 1 INJECTION, SOLUTION INTRAMUSCULAR; INTRAVENOUS; SUBCUTANEOUS at 20:10

## 2018-04-10 RX ADMIN — EPHEDRINE SULFATE 5 MG: 50 INJECTION, SOLUTION INTRAVENOUS at 17:54

## 2018-04-10 RX ADMIN — SUCCINYLCHOLINE CHLORIDE 200 MG: 20 INJECTION INTRAMUSCULAR; INTRAVENOUS at 17:11

## 2018-04-10 RX ADMIN — NEOSTIGMINE METHYLSULFATE 3.5 MG: 1 INJECTION INTRAVENOUS at 20:47

## 2018-04-10 RX ADMIN — SODIUM CHLORIDE 125 ML/HR: 900 INJECTION, SOLUTION INTRAVENOUS at 22:57

## 2018-04-10 RX ADMIN — DEXAMETHASONE SODIUM PHOSPHATE 8 MG: 4 INJECTION, SOLUTION INTRA-ARTICULAR; INTRALESIONAL; INTRAMUSCULAR; INTRAVENOUS; SOFT TISSUE at 17:30

## 2018-04-10 RX ADMIN — Medication: at 21:29

## 2018-04-10 RX ADMIN — ACETAMINOPHEN 1000 MG: 10 INJECTION, SOLUTION INTRAVENOUS at 17:29

## 2018-04-10 RX ADMIN — KETAMINE HYDROCHLORIDE 50 MG: 10 INJECTION, SOLUTION INTRAMUSCULAR; INTRAVENOUS at 17:30

## 2018-04-10 RX ADMIN — EPHEDRINE SULFATE 5 MG: 50 INJECTION, SOLUTION INTRAVENOUS at 18:45

## 2018-04-10 RX ADMIN — EPHEDRINE SULFATE 5 MG: 50 INJECTION, SOLUTION INTRAVENOUS at 17:52

## 2018-04-10 RX ADMIN — PROPOFOL 200 MG: 10 INJECTION, EMULSION INTRAVENOUS at 17:10

## 2018-04-10 RX ADMIN — FENTANYL CITRATE 25 MCG: 50 INJECTION, SOLUTION INTRAMUSCULAR; INTRAVENOUS at 22:10

## 2018-04-10 RX ADMIN — ROCURONIUM BROMIDE 45 MG: 10 INJECTION, SOLUTION INTRAVENOUS at 17:17

## 2018-04-10 RX ADMIN — LIDOCAINE HYDROCHLORIDE 100 MG: 20 INJECTION, SOLUTION EPIDURAL; INFILTRATION; INTRACAUDAL; PERINEURAL at 17:10

## 2018-04-10 RX ADMIN — MIDAZOLAM HYDROCHLORIDE 2 MG: 1 INJECTION, SOLUTION INTRAMUSCULAR; INTRAVENOUS at 17:03

## 2018-04-10 RX ADMIN — SODIUM CHLORIDE, SODIUM LACTATE, POTASSIUM CHLORIDE, AND CALCIUM CHLORIDE 125 ML/HR: 600; 310; 30; 20 INJECTION, SOLUTION INTRAVENOUS at 14:57

## 2018-04-10 RX ADMIN — EPHEDRINE SULFATE 5 MG: 50 INJECTION, SOLUTION INTRAVENOUS at 18:42

## 2018-04-10 RX ADMIN — HYDROMORPHONE HYDROCHLORIDE 0.2 MG: 1 INJECTION, SOLUTION INTRAMUSCULAR; INTRAVENOUS; SUBCUTANEOUS at 20:16

## 2018-04-10 RX ADMIN — FENTANYL CITRATE 50 MCG: 50 INJECTION, SOLUTION INTRAMUSCULAR; INTRAVENOUS at 19:58

## 2018-04-10 RX ADMIN — ROCURONIUM BROMIDE 5 MG: 10 INJECTION, SOLUTION INTRAVENOUS at 17:10

## 2018-04-10 RX ADMIN — EPHEDRINE SULFATE 5 MG: 50 INJECTION, SOLUTION INTRAVENOUS at 19:31

## 2018-04-10 RX ADMIN — ROCURONIUM BROMIDE 20 MG: 10 INJECTION, SOLUTION INTRAVENOUS at 18:07

## 2018-04-10 RX ADMIN — PROPOFOL 50 MG: 10 INJECTION, EMULSION INTRAVENOUS at 17:59

## 2018-04-10 RX ADMIN — Medication 80 MCG: at 17:42

## 2018-04-10 RX ADMIN — FENTANYL CITRATE 25 MCG: 50 INJECTION, SOLUTION INTRAMUSCULAR; INTRAVENOUS at 22:15

## 2018-04-10 RX ADMIN — FENTANYL CITRATE 25 MCG: 50 INJECTION, SOLUTION INTRAMUSCULAR; INTRAVENOUS at 22:01

## 2018-04-10 RX ADMIN — FENTANYL CITRATE 50 MCG: 50 INJECTION, SOLUTION INTRAMUSCULAR; INTRAVENOUS at 20:10

## 2018-04-10 RX ADMIN — SODIUM CHLORIDE, SODIUM LACTATE, POTASSIUM CHLORIDE, AND CALCIUM CHLORIDE: 600; 310; 30; 20 INJECTION, SOLUTION INTRAVENOUS at 14:32

## 2018-04-10 RX ADMIN — FENTANYL CITRATE 25 MCG: 50 INJECTION, SOLUTION INTRAMUSCULAR; INTRAVENOUS at 22:30

## 2018-04-10 RX ADMIN — Medication 80 MCG: at 17:47

## 2018-04-10 RX ADMIN — HYDROMORPHONE HYDROCHLORIDE 0.2 MG: 1 INJECTION, SOLUTION INTRAMUSCULAR; INTRAVENOUS; SUBCUTANEOUS at 20:27

## 2018-04-10 NOTE — IP AVS SNAPSHOT
[address] 
[phone] Patient: Lia Escobar. MRN: [id number] :1965 A check rosanna indicates which time of day the medication should be taken. My Medications START taking these medications Instructions Each Dose to Equal  
 Morning Noon Evening Bedtime  
 diazePAM 5 mg tablet Commonly known as:  VALIUM Your last dose was: Your next dose is: Take 1 Tab by mouth every six (6) hours as needed (muscle spasms as directed). Max Daily Amount: 20 mg.  
 5 mg  
    
   
   
   
  
 oxyCODONE IR 5 mg immediate release tablet Commonly known as:  Kati Nicky Your last dose was: Your next dose is: Take 1-2 Tabs by mouth every four (4) hours as needed for Pain. Max Daily Amount: 60 mg.  
 5-10 mg CHANGE how you take these medications Instructions Each Dose to Equal  
 Morning Noon Evening Bedtime  
 insulin glulisine U-100 100 unit/mL injection Commonly known as:  APIDRA U-100 INSULIN What changed:  additional instructions Your last dose was: Your next dose is:    
   
   
 7 Units by SubCUTAneous route Before breakfast, lunch, and dinner. 7 Units  
    
   
   
   
  
 metFORMIN  mg tablet Commonly known as:  GLUCOPHAGE XR What changed:  See the new instructions. Your last dose was: Your next dose is: TAKE 1 TAB BY MOUTH DAILY WITH DINNER X 1 WEEK AND THEN INCREASE TO 2 TABS DAILY WITH DINNER  
     
   
   
   
  
  
CONTINUE taking these medications Instructions Each Dose to Equal  
 Morning Noon Evening Bedtime * albuterol 90 mcg/actuation inhaler Commonly known as:  PROVENTIL HFA, VENTOLIN HFA, PROAIR HFA Your last dose was: Your next dose is: Take 2 Puffs by inhalation every four (4) hours as needed for Wheezing. 2 Puff * albuterol 2.5 mg /3 mL (0.083 %) nebulizer solution Commonly known as:  PROVENTIL VENTOLIN Your last dose was: Your next dose is:    
   
   
 3 mL by Nebulization route every four (4) hours as needed for Wheezing. 2.5 mg  
    
   
   
   
  
 amLODIPine 10 mg tablet Commonly known as:  Aarti Stacey Your last dose was: Your next dose is: TAKE 1 TAB BY MOUTH DAILY. atorvastatin 40 mg tablet Commonly known as:  LIPITOR Your last dose was: Your next dose is: TAKE 1 TABLET BY MOUTH EVERY DAY  
     
   
   
   
  
 citalopram 20 mg tablet Commonly known as:  Nichols Joni Your last dose was: Your next dose is: TAKE 1 TABLET BY MOUTH EVERY DAY  
     
   
   
   
  
 insulin degludec 100 unit/mL (3 mL) Inpn Commonly known as:  TRESIBA FLEXTOUCH U-100 Your last dose was: Your next dose is:    
   
   
 70 Units by SubCUTAneous route two (2) times a day for 180 days. 70 Units  
    
   
   
   
  
 lisinopril 40 mg tablet Commonly known as:  Myra Hallmans Your last dose was: Your next dose is: TAKE 1 TABLET BY MOUTH EVERY DAY  
     
   
   
   
  
 LYRICA 150 mg capsule Generic drug:  pregabalin Your last dose was: Your next dose is: TAKE ONE CAPSULE BY MOUTH 3 TIMES A DAY * Notice: This list has 2 medication(s) that are the same as other medications prescribed for you. Read the directions carefully, and ask your doctor or other care provider to review them with you. STOP taking these medications   
 diclofenac 1 % Gel Commonly known as:  VOLTAREN  
   
  
 HYDROcodone-acetaminophen  mg tablet Commonly known as:  [place] No HYDROcodone-acetaminophen 5-325 mg per tablet Commonly known as:  NORCO  
   
  
 traMADol 50 mg tablet Commonly known as:  Nolan Mora  
 Where to Get Your Medications Information on where to get these meds will be given to you by the nurse or doctor. ! Ask your nurse or doctor about these medications  
  diazePAM 5 mg tablet  
 oxyCODONE IR 5 mg immediate release tablet

## 2018-04-10 NOTE — H&P
PRE-OP HISTORY AND PHYSICAL    Subjective:     Patient is a 48 y.o.  male presented with a history of pain and lower extremity weakness and was found to have severe lumbar spinal stenosis. .  Onset of symptoms was gradual with gradually worsening course since that time. He is being admitted for surgical management of this condition. The indications for the procedure include pain and weakness. .    Patient Active Problem List    Diagnosis Date Noted    Type 2 diabetes with nephropathy ([address] 04/10/2018    Spondylolisthesis, lumbar region 04/10/2018    Spinal stenosis 02/27/2018    Apnea 02/27/2018    Asthma 02/27/2018    Obesity, morbid ([address] 12/04/2017    Uncontrolled type 2 diabetes mellitus with peripheral neuropathy ([address] 11/06/2017    Essential hypertension 11/06/2017    Mild episode of recurrent major depressive disorder ([address] 11/06/2017    Chronic hepatitis C without hepatic coma ([address] 11/06/2017    Chronic bilateral low back pain without sciatica 11/06/2017    Mixed hyperlipidemia 11/06/2017    Testicular mass 10/29/2013     Past Medical History:   Diagnosis Date    Arthritis     2010    Asthma 1995    INHALER USE PRN    Chronic bilateral low back pain without sciatica 11/6/2017    Chronic hepatitis C without hepatic coma ([address] 11/6/2017    treated at [address] [place] [address] Chronic pain     Essential hypertension 11/6/2017    GERD (gastroesophageal reflux disease)     History of cardiac cath     Hypercholesterolemia     Mild episode of recurrent major depressive disorder ([address] 11/06/2012    Morbid obesity ([address]     Sleep apnea     \"CANNOT AFFORD CPAP\", DR BARRAZA TO FOLLOW UP POST OP    Stage 3 chronic kidney disease 11/06/2017    IMPROVED     Uncontrolled type 2 diabetes mellitus with peripheral neuropathy ([address] 11/06/2011      Past Surgical History:   Procedure Laterality Date    HX COLONOSCOPY  2013    CJW    HX HEART CATHETERIZATION      NEG PER PATIENT    HX HERNIA REPAIR  2462    UMBILICAL    HX KNEE ARTHROSCOPY  1999    right knee    HX KNEE ARTHROSCOPY  2002    left knee    HX ROTATOR CUFF REPAIR Right 2016    HX UROLOGICAL  2015    Vasectomy       Prior to Admission medications    Medication Sig Start Date End Date Taking? Authorizing Provider   amLODIPine (NORVASC) 10 mg tablet TAKE 1 TAB BY MOUTH DAILY. 4/2/18  Yes Liana Long MD   insulin degludec (TRESIBA FLEXTOUCH U-100) 100 unit/mL (3 mL) inpn 70 Units by SubCUTAneous route two (2) times a day for 180 days. 3/21/18 9/17/18 Yes Liana Long MD   traMADol (ULTRAM) 50 mg tablet TAKE 1 TABLET BY MOUTH EVERY 4 TO 6 HOURS AS NEEDED FOR PAIN 3/9/18  Yes Historical Provider   albuterol (PROVENTIL HFA, VENTOLIN HFA, PROAIR HFA) 90 mcg/actuation inhaler Take 2 Puffs by inhalation every four (4) hours as needed for Wheezing. 3/21/18  Yes Liana Long MD   diclofenac (VOLTAREN) 1 % gel APPLY TO AFFECTED AREA FOUR (4) TIMES DAILY. ANASTACIA 1 Brand necessary 3/9/18  Yes Liana Long MD   lisinopril (PRINIVIL, ZESTRIL) 40 mg tablet TAKE 1 TABLET BY MOUTH EVERY DAY 3/5/18  Yes Liana Long MD   citalopram (CELEXA) 20 mg tablet TAKE 1 TABLET BY MOUTH EVERY DAY 3/5/18  Yes Liana Long MD   metFORMIN ER (GLUCOPHAGE XR) 500 mg tablet TAKE 1 TAB BY MOUTH DAILY WITH DINNER X 1 WEEK AND THEN INCREASE TO 2 TABS DAILY WITH DINNER  Patient taking differently: TAKE 1 TAB BY MOUTH DAILY WITH BREAKFAST AND DINNER. 3/5/18  Yes Liana Long MD   atorvastatin (LIPITOR) 40 mg tablet TAKE 1 TABLET BY MOUTH EVERY DAY 3/5/18  Yes Liana Long MD   LYRICA 150 mg capsule TAKE ONE CAPSULE BY MOUTH 3 TIMES A DAY 2/21/18  Yes Historical Provider   insulin glulisine (APIDRA) 100 unit/mL injection 7 Units by SubCUTAneous route Before breakfast, lunch, and dinner. Patient taking differently: 7 Units by SubCUTAneous route Before breakfast, lunch, and dinner.  SLIDING SCALE 11/29/17  Yes Liana Long MD   HYDROcodone-acetaminophen [facility]  mg tablet TAKE 1 TABLET BY MOUTH 3 TIMES A DAY AS NEEDED *PA NEEDED 4/2 4/3/18   Historical Provider   albuterol (PROVENTIL VENTOLIN) 2.5 mg /3 mL (0.083 %) nebulizer solution 3 mL by Nebulization route every four (4) hours as needed for Wheezing. 3/21/18   Daryle Adolf, MD   HYDROcodone-acetaminophen ([place]) 5-325 mg per tablet TAKE 1 TABLET BY MOUTH 3 TIMES A DAY AS NEEDED 2/7/18   Historical Provider     No Known Allergies   Social History   Substance Use Topics    Smoking status: Former Smoker     Packs/day: 1.50     Years: 19.00     Quit date: 8/6/2009    Smokeless tobacco: Never Used    Alcohol use No      Family History   Problem Relation Age of Onset    Hypertension Mother     Diabetes Mother     Heart Disease Mother     Pneumonia Father 67    Diabetes Father     Diabetes Sister     Other Brother 9     House Fire    Diabetes Sister     Other Sister 15     House Fire    Other Sister [address] Other Sister [address] Other Son 6     HURRICANE ARA    Anesth Problems Neg Hx       Review of Systems  A comprehensive review of systems was negative except for that written in the HPI. Objective:     Patient Vitals for the past 8 hrs:   BP Temp Pulse Resp SpO2 Height Weight   04/10/18 1418 128/71 98.3 °F (36.8 °C) 75 17 96 % 5' 11\" (1.803 m) 140 kg (308 lb 9.6 oz)     Visit Vitals    /71 (BP 1 Location: Right arm, BP Patient Position: At rest)    Pulse 75    Temp 98.3 °F (36.8 °C)    Resp 17    Ht 5' 11\" (1.803 m)    Wt 140 kg (308 lb 9.6 oz)    SpO2 96%    BMI 43.04 kg/m2     General:  Alert, cooperative, no distress, appears stated age. Head:  Normocephalic, without obvious abnormality, atraumatic. Eyes:  Conjunctivae/corneas clear. PERRL, EOMs intact. Fundi benign   Ears:  Normal TMs and external ear canals both ears. Nose: Nares normal. Septum midline. Mucosa normal. No drainage or sinus tenderness.    Throat: Lips, mucosa, and tongue normal. Teeth and gums normal.   Neck: Supple, symmetrical, trachea midline, no adenopathy, thyroid: no enlargement/tenderness/nodules, no carotid bruit and no JVD. Back:   Symmetric, no curvature. ROM normal. No CVA tenderness. Lungs:   Clear to auscultation bilaterally. Chest wall:  No tenderness or deformity. Heart:  Regular rate and rhythm, S1, S2 normal, no murmur, click, rub or gallop. Abdomen:   Soft, non-tender. Bowel sounds normal. No masses,  No organomegaly. Genitalia:  Normal male without lesion, discharge or tenderness. Rectal:  Normal tone, normal prostate, no masses or tenderness  Guaiac negative stool. Extremities: Extremities normal, atraumatic, no cyanosis or edema. Pulses: 2+ and symmetric all extremities. Skin: Skin color, texture, turgor normal. No rashes or lesions   Lymph nodes: Cervical, supraclavicular, and axillary nodes normal.   Neurologic: CNII-XII intact. Normal strength, sensation and reflexes throughout. Assessment:     Active Problems:    Spondylolisthesis, lumbar region (4/10/2018)    Lumbar Spinal Stenosis    Plan:     The various methods of treatment have been discussed with the patient and family. After consideration of risks, benefits and other options for treatment, the patient has consented to lumbar laminectomy with posterior spinal fusion. .  .

## 2018-04-10 NOTE — IP AVS SNAPSHOT
[age] y [address] 
[phone] Patient: Leo Bryson. MRN: [id number] :1965 About your hospitalization You were admitted on:  April 10, 2018 You last received care in the:  [address] [place] [address] You were discharged on:  2018 Why you were hospitalized Your primary diagnosis was:  Not on File Your diagnoses also included:  Spondylolisthesis, Lumbar Region Follow-up Information Follow up With Details Comments Contact [address]   [address] 
1st [facility] [address] 
[phone] Yris Leger MD   [address] [place] [facility] [address] 
[phone] Mikey Rodriguez MD Call Please call the office upon being discharged to home to schedule your 2-week post-op office visit. [facility] [address] [place] [facility] [address] 
[phone] Discharge Orders None A check rosanna indicates which time of day the medication should be taken. My Medications START taking these medications Instructions Each Dose to Equal  
 Morning Noon Evening Bedtime  
 diazePAM 5 mg tablet Commonly known as:  VALIUM Your last dose was: Your next dose is: Take 1 Tab by mouth every six (6) hours as needed (muscle spasms as directed). Max Daily Amount: 20 mg.  
 5 mg  
    
   
   
   
  
 oxyCODONE IR 5 mg immediate release tablet Commonly known as:  Verlan Wilder Your last dose was: Your next dose is: Take 1-2 Tabs by mouth every four (4) hours as needed for Pain. Max Daily Amount: 60 mg.  
 5-10 mg CHANGE how you take these medications Instructions Each Dose to Equal  
 Morning Noon Evening Bedtime  
 insulin glulisine U-100 100 unit/mL injection Commonly known as:  APIDRA U-100 INSULIN What changed:  additional instructions Your last dose was: Your next dose is:    
   
   
 7 Units by SubCUTAneous route Before breakfast, lunch, and dinner. 7 Units  
    
   
   
   
  
 metFORMIN  mg tablet Commonly known as:  GLUCOPHAGE XR What changed:  See the new instructions. Your last dose was: Your next dose is: TAKE 1 TAB BY MOUTH DAILY WITH DINNER X 1 WEEK AND THEN INCREASE TO 2 TABS DAILY WITH DINNER  
     
   
   
   
  
  
CONTINUE taking these medications Instructions Each Dose to Equal  
 Morning Noon Evening Bedtime * albuterol 90 mcg/actuation inhaler Commonly known as:  PROVENTIL HFA, VENTOLIN HFA, PROAIR HFA Your last dose was: Your next dose is: Take 2 Puffs by inhalation every four (4) hours as needed for Wheezing. 2 Puff * albuterol 2.5 mg /3 mL (0.083 %) nebulizer solution Commonly known as:  PROVENTIL VENTOLIN Your last dose was: Your next dose is:    
   
   
 3 mL by Nebulization route every four (4) hours as needed for Wheezing. 2.5 mg  
    
   
   
   
  
 amLODIPine 10 mg tablet Commonly known as:  Jil Palma Your last dose was: Your next dose is: TAKE 1 TAB BY MOUTH DAILY. atorvastatin 40 mg tablet Commonly known as:  LIPITOR Your last dose was: Your next dose is: TAKE 1 TABLET BY MOUTH EVERY DAY  
     
   
   
   
  
 citalopram 20 mg tablet Commonly known as:  Drena Lisa Your last dose was: Your next dose is: TAKE 1 TABLET BY MOUTH EVERY DAY  
     
   
   
   
  
 insulin degludec 100 unit/mL (3 mL) Inpn Commonly known as:  TRESIBA FLEXTOUCH U-100 Your last dose was: Your next dose is:    
   
   
 70 Units by SubCUTAneous route two (2) times a day for 180 days. 70 Units  
    
   
   
   
  
 lisinopril 40 mg tablet Commonly known as:  Ana Luisa Larios  
   
 Your last dose was: Your next dose is: TAKE 1 TABLET BY MOUTH EVERY DAY  
     
   
   
   
  
 LYRICA 150 mg capsule Generic drug:  pregabalin Your last dose was: Your next dose is: TAKE ONE CAPSULE BY MOUTH 3 TIMES A DAY * Notice: This list has 2 medication(s) that are the same as other medications prescribed for you. Read the directions carefully, and ask your doctor or other care provider to review them with you. STOP taking these medications   
 diclofenac 1 % Gel Commonly known as:  VOLTAREN  
   
  
 HYDROcodone-acetaminophen  mg tablet Commonly known as:  Alejandro Soares HYDROcodone-acetaminophen 5-325 mg per tablet Commonly known as:  NORCO  
   
  
 traMADol 50 mg tablet Commonly known as:  ULTRAM  
   
  
  
  
Where to Get Your Medications Information on where to get these meds will be given to you by the nurse or doctor. ! Ask your nurse or doctor about these medications  
  diazePAM 5 mg tablet  
 oxyCODONE IR 5 mg immediate release tablet Opioid Education Prescription Opioids: What You Need to Know: 
 
Prescription opioids can be used to help relieve moderate-to-severe pain and are often prescribed following a surgery or injury, or for certain health conditions. These medications can be an important part of treatment but also come with serious risks. Opioids are strong pain medicines. Examples include hydrocodone, oxycodone, fentanyl, and morphine. Heroin is an example of an illegal opioid. It is important to work with your health care provider to make sure you are getting the safest, most effective care. WHAT ARE THE RISKS AND SIDE EFFECTS OF OPIOID USE? Prescription opioids carry serious risks of addiction and overdose, especially with prolonged use. An opioid overdose, often marked by slow breathing, can cause sudden death.   The use of prescription opioids can have a number of side effects as well, even when taken as directed. · Tolerance-meaning you might need to take more of a medication for the same pain relief · Physical dependence-meaning you have symptoms of withdrawal when the medication is stopped. Withdrawal symptoms can include nausea, sweating, chills, diarrhea, stomach cramps, and muscle aches. Withdrawal can last up to several weeks, depending on which drug you took and how long you took it. · Increased sensitivity to pain · Constipation · Nausea, vomiting, and dry mouth · Sleepiness and dizziness · Confusion · Depression · Low levels of testosterone that can result in lower sex drive, energy, and strength · Itching and sweating RISKS ARE GREATER WITH:      
· History of drug misuse, substance use disorder, or overdose · Mental health conditions (such as depression or anxiety) · Sleep apnea · Older age (72 years or older) · Pregnancy Avoid alcohol while taking prescription opioids. Also, unless specifically advised by your health care provider, medications to avoid include: · Benzodiazepines (such as Xanax or Valium) · Muscle relaxants (such as Soma or Flexeril) · Hypnotics (such as Ambien or Lunesta) · Other prescription opioids KNOW YOUR OPTIONS Talk to your health care provider about ways to manage your pain that don't involve prescription opioids. Some of these options may actually work better and have fewer risks and side effects. Options may include: 
· Pain relievers such as acetaminophen, ibuprofen, and naproxen · Some medications that are also used for depression or seizures · Physical therapy and exercise · Counseling to help patients learn how to cope better with triggers of pain and stress. · Application of heat or cold compress · Massage therapy · Relaxation techniques Be Informed Make sure you know the name of your medication, how much and how often to take it, and its potential risks & side effects. IF YOU ARE PRESCRIBED OPIOIDS FOR PAIN: 
· Never take opioids in greater amounts or more often than prescribed. Remember the goal is not to be pain-free but to manage your pain at a tolerable level. · Follow up with your primary care provider to: · Work together to create a plan on how to manage your pain. · Talk about ways to help manage your pain that don't involve prescription opioids. · Talk about any and all concerns and side effects. · Help prevent misuse and abuse. · Never sell or share prescription opioids · Help prevent misuse and abuse. · Store prescription opioids in a secure place and out of reach of others (this may include visitors, children, friends, and family). · Safely dispose of unused/unwanted prescription opioids: Find your community drug take-back program or your pharmacy mail-back program, or flush them down the toilet, following guidance from the Food and Drug Administration (www.fda.gov/Drugs/ResourcesForYou). · Visit www.cdc.gov/drugoverdose to learn about the risks of opioid abuse and overdose. · If you believe you may be struggling with addiction, tell your health care provider and ask for guidance or call [address] [place] [address] at [phone]. Discharge Instructions [facility] Maria Jacobo M.D. GORDY Johns PA-C 
[phone] Lumbar Surgery Discharge Instructions Activities 1. You are going home a well person, be as active as possible. Your only exercise should be walking. Start with short frequent walks and increase your walking distance each day. Limit the amount of time you sit to 20-30 minute intervals. Sitting for prolonged periods of time will be uncomfortable for you following your surgery. 2. Do not lift anything over five pounds. 3. Do not do any straining, twisting or bending. 4. When you are in the bed, you may lay on your back or on either side. Do not lay on your stomach. Diet ? You may resume your normal diet. If your throat is sore, you may want to eat soft foods for a few days. Be sure to drink plenty of fluids, it is important to keep yourself hydrated. ? Avoid alcoholic beverages and tobacco products. Medications 1. Do not take anti-inflammatory medications or aspirin unless instructed by your physician. 2. Take your pain medication as directed. 3. It is important to have regular bowel movements. Pain medications may cause constipation. Stool softeners, prune juice, and increasing your water and fiber intake may help in preventing constipation. 4. Laxatives should only be used if the above preventative measures have failed and you still have not had a bowel movement after three days. Driving You may not drive or return to work until instructed by your physician. However, you may ride in the car for short periods of time. Brace ? If you have a back brace, you should wear your brace at all times when you are out of bed. Do not wear the brace while in bed or showering. ? Remember to always wear a cotton t-shirt underneath your brace. ? Do not bend or twist when your brace is off. Showering 1. You may shower with the Prineo dressing in place - it is designed to be watertight. 2. Leave the dressing on during your shower allowing the water to run over it. 3. Reminder- your brace can be removed while showering. Remember to not bend or twist while your brace is off.   
4. Do not take a tub bath. Caring for your incision 1. You have a new dressing applied to your incision called a Prineo dressing, covered with gauze. You should leave this dressing in place until you are seen in the office in two weeks.  
2. You may have steri strips on your incision (small, white pieces of tape). Do not pull the steri strips; they will fall off on their own after several days. If you have sutures or staples, they will be removed when you see your physician. 3. Do not rub or apply any lotions or ointments to your incision site. Stockings You have been given T.E.D. stockings to wear. Continue to wear these for 7 days after your discharge. Put them on in the morning and take them off at night. (You may require some assistance with this task as you may be restricted in your bending.)  They are used to increase your circulation and prevent blood clots from forming in your legs. Follow Up Once you are home, call your physicians office to schedule an appointment for your two-week postoperative visit. Contact Dr. Cole Gomez at [phone], [phone]. Notify your physician if you develop any of the following conditions: 1. Fever above 101 degrees for 24 hours. 2. Nausea or vomiting. 3. Severe headache. 4. Inability to urinate. 5. Loss of bowel or bladder function. 6. Changes in sensation in your extremities (numbness, tingling, loss of color) that was not present before your surgery. 7. Severe pain or pain not relieved by medications. 8. Redness, swelling, or drainage from your incision. 9. Persistent pain in the chest or calf of either leg. 10. Increased weakness (if this is greater than before your surgery). Marion Cqplplhmeigy-[phone] (after hours call [phone]) Dr. Sixto Jennings [employer] Announcement We are excited to announce that we are making your provider's discharge notes available to you in [employer]. You will see these notes when they are completed and signed by the physician that discharged you from your recent hospital stay.   If you have any questions or concerns about any information you see in [employer], please call the Health Information Department where you were seen or reach out to your Primary Care Provider for more information about your plan of care. Introducing [facility] & HEALTH SERVICES! Dear Adolfo Purcell: 
Thank you for requesting a [employer] account. Our records indicate that you already have an active [employer] account. You can access your account anytime at [email or web address] Did you know that you can access your hospital and ER discharge instructions at any time in [employer]? You can also review all of your test results from your hospital stay or ER visit. Additional Information If you have questions, please visit the Frequently Asked Questions section of the [employer] website at [email or web address] Remember, [employer] is NOT to be used for urgent needs. For medical emergencies, dial 911. Now available from your iPhone and Android! Introducing Sha Connolly As a [employer] [facility] patient, I wanted to make you aware of our electronic visit tool called Sha Connolly. [employer] allows you to connect within minutes with a medical provider 24 hours a day, seven days a week via a mobile device or tablet or logging into a secure website from your computer. You can access Sha Connolly from anywhere in the [place]. A virtual visit might be right for you when you have a simple condition and feel like you just dont want to get out of bed, or cant get away from work for an appointment, when your regular [facility] [employer] [facility] provider is not available (evenings, weekends or holidays), or when youre out of town and need minor care. Electronic visits cost only $49 and if the [employer] provider determines a prescription is needed to treat your condition, one can be electronically transmitted to a nearby pharmacy*. Please take a moment to enroll today if you have not already done so. The enrollment process is free and takes just a few minutes.   To enroll, please download the ScaleMP praveena to your tablet or phone, or visit [email or web address] to enroll on your computer. And, as an [address] [place] [address] patient with a [employer] account, the results of your visits will be scanned into your electronic medical record and your primary care provider will be able to view the scanned results. We urge you to continue to see your regular [facility] provider for your ongoing medical care. And while your primary care provider may not be the one available when you seek a Sha 71lbsramosfin virtual visit, the peace of mind you get from getting a real diagnosis real time can be priceless. For more information on [employer], view our Frequently Asked Questions (FAQs) at [email or web address]. Sincerely, 
 
Eligio Diana MD 
Chief Medical Officer [employer] *:  certain medications cannot be prescribed via [employer] Providers Seen During Your Hospitalization Provider Specialty Primary office phone Jori Villagran MD Orthopedic Surgery [phone] Your Primary Care Physician (PCP) Primary Care Physician Office Phone Office Fax Keesha Germain [phone] You are allergic to the following No active allergies Recent Documentation Height Weight BMI Smoking Status 1.803 m 140 kg 43.04 kg/m2 Former Smoker Emergency Contacts Name Discharge Info Relation Home Work Mobile Arabella Meche  Mother [14] [phone] Dorothy Albright DISCHARGE CAREGIVER [3] Girlfriend [18] [phone] Arabella Meche  Parent [1] [phone] Patient Belongings The following personal items are in your possession at time of discharge: 
  Dental Appliances: None  Visual Aid: Glasses, At bedside   Hearing Aids/Status: Does not own  Home Medications: None   Jewelry: None  Clothing:  (belongings to PACU)    Other Valuables: Shira Paulino (sent to PACU) Please provide this summary of care documentation to your next provider. Signatures-by signing, you are acknowledging that this After Visit Summary has been reviewed with you and you have received a copy. Patient Signature:  ____________________________________________________________ Date:  ____________________________________________________________  
  
Estela Faes Provider Signature:  ____________________________________________________________ Date:  ____________________________________________________________

## 2018-04-10 NOTE — PROGRESS NOTES
Chief Complaint   Patient presents with    Diabetes     6 weeks follow-up   1. Have you been to the ER, urgent care clinic since your last visit? Hospitalized since your last visit? No    2. Have you seen or consulted any other health care providers outside of the [address] [place] [address] since your last visit? Include any pap smears or colon screening. No     Eye exam-as per patient he will sched. Appt.   Room #7

## 2018-04-10 NOTE — MR AVS SNAPSHOT
[address] [facility] 
[phone] Patient: Dorinda Blake. MRN: [id number] :1965 Visit Information Date & Time Provider Department Dept. Phone Encounter #  
 4/10/2018  8:30 AM Daryle Adolf, MD [facility] [address] [id number] Follow-up Instructions Return in about 3 months (around 7/10/2018), or if symptoms worsen or fail to improve, for Regular Follow up. Your Appointments 2018  2:20 PM  
New Patient with Jaiden Morgan MD  
[address] [facility] Appt Note: NP refd by Dr. Shell Montenegro continued care of OSA_ not on pap.  
 217 [facility] [address] [place] [address] [address]  
  
   
 [address] [address] Upcoming Health Maintenance Date Due MICROALBUMIN Q1 3/16/1977 EYE EXAM RETINAL OR DILATED Q1 3/16/1977 FOBT Q 1 YEAR AGE 50-75 3/16/2017 HEMOGLOBIN A1C Q6M 2018 FOOT EXAM Q1 2018 LIPID PANEL Q1 2018 DTaP/Tdap/Td series (3 - Td) 2028 Allergies as of 4/10/2018  Review Complete On: 4/10/2018 By: Daryle Adolf, MD  
 No Known Allergies Current Immunizations  Never Reviewed Name Date Hep A Vaccine 2016 12:00 AM  
 Hep A and Hep B Vaccine 2018 12:00 AM  
 Hep B Vaccine (Adult) 2016 12:00 AM, 2015 12:00 AM, 2014 12:00 AM  
 Influenza Vaccine 2018 12:00 AM, 2017, 10/8/2015 12:00 AM, 10/22/2014 12:00 AM, 10/29/2013 12:00 AM  
 Pneumococcal Polysaccharide (PPSV-23) 2017  1:40 PM, 10/8/2015 12:00 AM  
 Tdap 10/8/2015 12:00 AM  
  
 Not reviewed this visit You Were Diagnosed With   
  
 Codes Comments Uncontrolled type 2 diabetes mellitus with peripheral neuropathy (HCC)    -  Primary ICD-10-CM: E11.42, E11.65 ICD-9-CM: 250.62, 357.2 Essential hypertension     ICD-10-CM: I10 
ICD-9-CM: 401.9 Obesity, morbid ([facility]     ICD-10-CM: E66.01 
ICD-9-CM: 278.01 Vitals BP Pulse Temp Resp Height(growth percentile) Weight(growth percentile) 136/84 (BP 1 Location: Right arm, BP Patient Position: Sitting) 71 98.5 °F (36.9 °C) (Oral) 16 5' 11\" (1.803 m) 308 lb 9.6 oz (140 kg) SpO2 BMI Smoking Status 98% 43.04 kg/m2 Former Smoker Vitals History BMI and BSA Data Body Mass Index Body Surface Area 43.04 kg/m 2 2.65 m 2 Preferred Pharmacy Pharmacy Name Phone [facility]- [place], [address] [phone] Your Updated Medication List  
  
   
This list is accurate as of 4/10/18  9:43 AM.  Always use your most recent med list.  
  
  
  
  
 * albuterol 90 mcg/actuation inhaler Commonly known as:  PROVENTIL HFA, VENTOLIN HFA, PROAIR HFA Take 2 Puffs by inhalation every four (4) hours as needed for Wheezing. * albuterol 2.5 mg /3 mL (0.083 %) nebulizer solution Commonly known as:  PROVENTIL VENTOLIN  
3 mL by Nebulization route every four (4) hours as needed for Wheezing. amLODIPine 10 mg tablet Commonly known as:  Kaushal Fam TAKE 1 TAB BY MOUTH DAILY. atorvastatin 40 mg tablet Commonly known as:  LIPITOR  
TAKE 1 TABLET BY MOUTH EVERY DAY  
  
 citalopram 20 mg tablet Commonly known as:  CELEXA  
TAKE 1 TABLET BY MOUTH EVERY DAY  
  
 diclofenac 1 % Gel Commonly known as:  VOLTAREN  
APPLY TO AFFECTED AREA FOUR (4) TIMES DAILY. ANASTACIA 1 Brand necessary * HYDROcodone-acetaminophen 5-325 mg per tablet Commonly known as:  NORCO  
TAKE 1 TABLET BY MOUTH 3 TIMES A DAY AS NEEDED  
  
 * HYDROcodone-acetaminophen  mg tablet Commonly known as:  NORCO  
TAKE 1 TABLET BY MOUTH 3 TIMES A DAY AS NEEDED *PA NEEDED 4/2  
  
 insulin degludec 100 unit/mL (3 mL) Inpn Commonly known as:  TRESIBA FLEXTOUCH U-100  
70 Units by SubCUTAneous route two (2) times a day for 180 days. insulin glulisine U-100 100 unit/mL injection Commonly known as:  APIDRA U-100 INSULIN  
7 Units by SubCUTAneous route Before breakfast, lunch, and dinner. lisinopril 40 mg tablet Commonly known as:  PRINIVIL, ZESTRIL  
TAKE 1 TABLET BY MOUTH EVERY DAY  
  
 LYRICA 150 mg capsule Generic drug:  pregabalin TAKE ONE CAPSULE BY MOUTH 3 TIMES A DAY  
  
 metFORMIN  mg tablet Commonly known as:  GLUCOPHAGE XR  
TAKE 1 TAB BY MOUTH DAILY WITH DINNER X 1 WEEK AND THEN INCREASE TO 2 TABS DAILY WITH DINNER  
  
 traMADol 50 mg tablet Commonly known as:  ULTRAM  
TAKE 1 TABLET BY MOUTH EVERY 4 TO 6 HOURS AS NEEDED FOR PAIN  
  
 * Notice: This list has 4 medication(s) that are the same as other medications prescribed for you. Read the directions carefully, and ask your doctor or other care provider to review them with you. We Performed the Following MICROALBUMIN, UR, RAND W/ MICROALB/CREAT RATIO [id number] CPT(R)] REFERRAL TO OPHTHALMOLOGY [REF57 Custom] Comments:  
 Please evaluate patient for glaucoma screening and eye exam  
  
Follow-up Instructions Return in about 3 months (around 7/10/2018), or if symptoms worsen or fail to improve, for Regular Follow up. Referral Information Referral ID Referred By Referred To  
  
 [id number] Lenord Shone Not Available Visits Status Start Date End Date 1 New Request 4/10/18 4/10/19 If your referral has a status of pending review or denied, additional information will be sent to support the outcome of this decision. Introducing hospitals & HEALTH SERVICES! Dear Rio Figueredo: 
Thank you for requesting a [employer] account. Our records indicate that you already have an active [employer] account. You can access your account anytime at [email or web address] Did you know that you can access your hospital and ER discharge instructions at any time in [employer]? You can also review all of your test results from your hospital stay or ER visit. Additional Information If you have questions, please visit the Frequently Asked Questions section of the [employer] website at [email or web address] Remember, [employer] is NOT to be used for urgent needs. For medical emergencies, dial 911. Now available from your iPhone and Android! Please provide this summary of care documentation to your next provider. Your primary care clinician is listed as Boone Mueller. If you have any questions after today's visit, please call [phone].

## 2018-04-10 NOTE — ANESTHESIA PREPROCEDURE EVALUATION
Anesthetic History   No history of anesthetic complications            Review of Systems / Medical History  Patient summary reviewed, nursing notes reviewed and pertinent labs reviewed    Pulmonary  Within defined limits                 Neuro/Psych   Within defined limits           Cardiovascular  Within defined limits  Hypertension                   GI/Hepatic/Renal  Within defined limits   GERD  Hepatitis: type C    Liver disease     Endo/Other  Within defined limits  Diabetes    Morbid obesity and arthritis     Other Findings              Physical Exam    Airway  Mallampati: II  TM Distance: > 6 cm  Neck ROM: normal range of motion   Mouth opening: Normal     Cardiovascular  Regular rate and rhythm,  S1 and S2 normal,  no murmur, click, rub, or gallop             Dental  No notable dental hx       Pulmonary  Breath sounds clear to auscultation               Abdominal  GI exam deferred       Other Findings            Anesthetic Plan    ASA: 3  Anesthesia type: general          Induction: Intravenous  Anesthetic plan and risks discussed with: Patient
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
## 2018-04-11 ENCOUNTER — HOME HEALTH ADMISSION (OUTPATIENT)
Dept: HOME HEALTH SERVICES | Facility: HOME HEALTH | Age: 53
End: 2018-04-11
Payer: MEDICAID

## 2018-04-11 LAB
ALBUMIN/CREAT UR: 5.9 MG/G CREAT (ref 0–30)
ANION GAP SERPL CALC-SCNC: 7 MMOL/L (ref 5–15)
BUN SERPL-MCNC: 24 MG/DL (ref 6–20)
BUN/CREAT SERPL: 15 (ref 12–20)
CALCIUM SERPL-MCNC: 8.5 MG/DL (ref 8.5–10.1)
CHLORIDE SERPL-SCNC: 109 MMOL/L (ref 97–108)
CO2 SERPL-SCNC: 23 MMOL/L (ref 21–32)
CREAT SERPL-MCNC: 1.58 MG/DL (ref 0.7–1.3)
CREAT UR-MCNC: 208.8 MG/DL
GLUCOSE BLD STRIP.AUTO-MCNC: 124 MG/DL (ref 65–100)
GLUCOSE BLD STRIP.AUTO-MCNC: 138 MG/DL (ref 65–100)
GLUCOSE BLD STRIP.AUTO-MCNC: 138 MG/DL (ref 65–100)
GLUCOSE BLD STRIP.AUTO-MCNC: 146 MG/DL (ref 65–100)
GLUCOSE SERPL-MCNC: 159 MG/DL (ref 65–100)
HGB BLD-MCNC: 12.8 G/DL (ref 12.1–17)
MICROALBUMIN UR-MCNC: 12.3 UG/ML
POTASSIUM SERPL-SCNC: 4.9 MMOL/L (ref 3.5–5.1)
SERVICE CMNT-IMP: ABNORMAL
SODIUM SERPL-SCNC: 139 MMOL/L (ref 136–145)

## 2018-04-11 PROCEDURE — 97116 GAIT TRAINING THERAPY: CPT

## 2018-04-11 PROCEDURE — 65270000029 HC RM PRIVATE

## 2018-04-11 PROCEDURE — 97161 PT EVAL LOW COMPLEX 20 MIN: CPT

## 2018-04-11 PROCEDURE — 97165 OT EVAL LOW COMPLEX 30 MIN: CPT

## 2018-04-11 PROCEDURE — 80048 BASIC METABOLIC PNL TOTAL CA: CPT | Performed by: PHYSICIAN ASSISTANT

## 2018-04-11 PROCEDURE — 74011250636 HC RX REV CODE- 250/636: Performed by: PHYSICIAN ASSISTANT

## 2018-04-11 PROCEDURE — 82962 GLUCOSE BLOOD TEST: CPT

## 2018-04-11 PROCEDURE — G8988 SELF CARE GOAL STATUS: HCPCS

## 2018-04-11 PROCEDURE — 74011636637 HC RX REV CODE- 636/637: Performed by: PHYSICIAN ASSISTANT

## 2018-04-11 PROCEDURE — 36415 COLL VENOUS BLD VENIPUNCTURE: CPT | Performed by: PHYSICIAN ASSISTANT

## 2018-04-11 PROCEDURE — 77010033678 HC OXYGEN DAILY

## 2018-04-11 PROCEDURE — 85018 HEMOGLOBIN: CPT | Performed by: PHYSICIAN ASSISTANT

## 2018-04-11 PROCEDURE — 74011250637 HC RX REV CODE- 250/637: Performed by: PHYSICIAN ASSISTANT

## 2018-04-11 PROCEDURE — 97535 SELF CARE MNGMENT TRAINING: CPT

## 2018-04-11 PROCEDURE — G8987 SELF CARE CURRENT STATUS: HCPCS

## 2018-04-11 RX ORDER — HYDROMORPHONE HYDROCHLORIDE 4 MG/1
4 TABLET ORAL
Status: DISCONTINUED | OUTPATIENT
Start: 2018-04-11 | End: 2018-04-13

## 2018-04-11 RX ORDER — HYDROMORPHONE HYDROCHLORIDE 2 MG/ML
0.5 INJECTION, SOLUTION INTRAMUSCULAR; INTRAVENOUS; SUBCUTANEOUS
Status: CANCELLED | OUTPATIENT
Start: 2018-04-11

## 2018-04-11 RX ORDER — HYDROMORPHONE HYDROCHLORIDE 2 MG/1
2 TABLET ORAL
Status: DISCONTINUED | OUTPATIENT
Start: 2018-04-11 | End: 2018-04-13

## 2018-04-11 RX ADMIN — Medication 2 G: at 09:56

## 2018-04-11 RX ADMIN — HYDROMORPHONE HYDROCHLORIDE 4 MG: 4 TABLET ORAL at 21:27

## 2018-04-11 RX ADMIN — HYDROMORPHONE HYDROCHLORIDE 2 MG: 2 TABLET ORAL at 18:05

## 2018-04-11 RX ADMIN — AMLODIPINE BESYLATE 10 MG: 5 TABLET ORAL at 09:59

## 2018-04-11 RX ADMIN — CITALOPRAM HYDROBROMIDE 20 MG: 20 TABLET ORAL at 09:59

## 2018-04-11 RX ADMIN — Medication 10 ML: at 05:23

## 2018-04-11 RX ADMIN — PREGABALIN 150 MG: 75 CAPSULE ORAL at 15:42

## 2018-04-11 RX ADMIN — PREGABALIN 150 MG: 75 CAPSULE ORAL at 00:12

## 2018-04-11 RX ADMIN — PREGABALIN 150 MG: 75 CAPSULE ORAL at 09:59

## 2018-04-11 RX ADMIN — STANDARDIZED SENNA CONCENTRATE AND DOCUSATE SODIUM 1 TABLET: 8.6; 5 TABLET, FILM COATED ORAL at 20:44

## 2018-04-11 RX ADMIN — POLYETHYLENE GLYCOL 3350 17 G: 17 POWDER, FOR SOLUTION ORAL at 09:58

## 2018-04-11 RX ADMIN — INSULIN LISPRO 2 UNITS: 100 INJECTION, SOLUTION INTRAVENOUS; SUBCUTANEOUS at 06:59

## 2018-04-11 RX ADMIN — STANDARDIZED SENNA CONCENTRATE AND DOCUSATE SODIUM 1 TABLET: 8.6; 5 TABLET, FILM COATED ORAL at 00:00

## 2018-04-11 RX ADMIN — Medication 2 G: at 01:23

## 2018-04-11 RX ADMIN — Medication 10 ML: at 21:34

## 2018-04-11 RX ADMIN — DIAZEPAM 5 MG: 5 TABLET ORAL at 05:22

## 2018-04-11 RX ADMIN — STANDARDIZED SENNA CONCENTRATE AND DOCUSATE SODIUM 1 TABLET: 8.6; 5 TABLET, FILM COATED ORAL at 09:59

## 2018-04-11 RX ADMIN — OXYCODONE HYDROCHLORIDE 10 MG: 5 TABLET ORAL at 08:15

## 2018-04-11 RX ADMIN — Medication 5 ML: at 00:00

## 2018-04-11 RX ADMIN — ATORVASTATIN CALCIUM 40 MG: 40 TABLET, FILM COATED ORAL at 09:59

## 2018-04-11 RX ADMIN — OXYCODONE HYDROCHLORIDE 10 MG: 5 TABLET ORAL at 14:37

## 2018-04-11 RX ADMIN — PREGABALIN 150 MG: 75 CAPSULE ORAL at 21:27

## 2018-04-11 RX ADMIN — DIAZEPAM 5 MG: 5 TABLET ORAL at 16:32

## 2018-04-11 RX ADMIN — ACETAMINOPHEN 650 MG: 325 TABLET, FILM COATED ORAL at 05:22

## 2018-04-11 RX ADMIN — OXYCODONE HYDROCHLORIDE 10 MG: 5 TABLET ORAL at 11:16

## 2018-04-11 RX ADMIN — LISINOPRIL 40 MG: 20 TABLET ORAL at 09:59

## 2018-04-11 NOTE — OP NOTES
[address]  ACUTE CARE OP NOTE    Favian Lomas.  MR#: [id number]  : 1965  ACCOUNT #: [de-identified]   DATE OF SERVICE: 04/10/2018    PREOPERATIVE DIAGNOSES:  1. Severe lumbar spinal stenosis with associated spondylolisthesis L4-L5, L5-S1. 2.  Morbid obesity. POSTOPERATIVE DIAGNOSES:  1. Severe lumbar spinal stenosis with associated spondylolisthesis L4-L5, L5-S1. 2.  Morbid obesity. PROCEDURE PERFORMED:    1. Lumbar laminectomy, L4, L5, S1.  Bilateral decompression L4, L5 and S1 nerve roots. 2.  Bilateral lateral fusion L4-S1 using Globus spinal instrumentation, supplemental cancellous allograft and bone morphogenic protein (Infuse). SURGEON:  Dorinda Owen MD    ASSISTANT:  VALERY Morales.    ANESTHESIA:  General.    ESTIMATED BLOOD LOSS:  600 mls    SPECIMENS REMOVED:  None    COMPLICATIONS:  None      INDICATIONS:  The patient is a 51-year-old gentleman weighing 308 pounds seen with complaints of back, hip and leg pain. Degree of symptoms and history of symptomatology consistent with that of lumbar stenosis. Imaging demonstrates severe facet arthrosis at L4-5 and notable disk degeneration L5-S1. He has a moderately severe stenosis L4-5 and foraminal disease at L5-S1. Given degree of symptomatology to improve with conservative measures a lumbar decompression and stabilization procedure offered. The potential benefits, complications were reviewed. DESCRIPTION OF PROCEDURE:  The patient brought to the operating room in a supine position. General anesthetic administered. The patient eventually placed in the prone position on the Yeyo type frame. The low back region was prepped and draped in normal fashion. Preoperative antibiotics administered. Thigh high KILEY hose, sequential pumps applied to the patient's lower extremity. Incision made extending from about L3 down to the sacrum. A generous incision was utilized related to his size. Subcutaneous tissue then divided in line with the incision down to level of the fascia. With the fascia incised in the midline, the subperiosteal dissection is completed. It should be noted this patient is morbidly obese, weighing 308 pounds. He is a very muscular gentleman on top of it. So the exposure alone took me well over an hour to complete. I would estimate the surgical procedure related to his size took twice as long to perform and certainly from a technical standpoint was twice as difficult. Deep retractors were placed. The spinous processes of L4 and 5 resected. Lamina thinned using a Leksell rongeur. Then, using a series of 3 and 4 mm Kerrisons, completed a central decompression of the spinal canal with obvious stenosis at the L4-5 segment. A partial medial facetectomy completed at L4-5, allowing visualization to decompress the L4-5 and eventually the S1 nerve roots. I completed a very wide foraminotomy over the L5 roots which I thought clinically were quite tight. I could not pass the ball probes with any ease. Once completed, though it was much easier and I felt the roots to be free. I then removed the overhanging osteophytes about the facet joint at L4-5 and L5-S1. Based on the degree of bone resection, L5-S1, I felt that this needed to be fused. This particular to related to the fact that this is adjacent to a fusion at the L4-5 level which was absolutely necessary. I then placed drill holes in the pedicle and the pedicles were cannulated bilaterally. Pins were placed in the pedicles and marked. An x-ray was taken to verify position direction. 40 mL of cancellous allograft utilized and packed over the decorticated surfaces to include the transverse processes, the sacral ala and facet joints. Screws measured 6.5 mm in diameter, 45 mm length in L4 and L5 and into the sacrum. I used a 7.5 mm diameter screw, same length with excellent purchase.   These were connected with the rods as appropriate contour secured using the locking cap and final tightening device. A slight amount of distraction was placed across each segment. The wound had been irrigated earlier. X-ray showed good position of the instrumentation. A drain was utilized. Fascia closed using #1 Vicryl. The subcutaneous tissue, skin closed using 2-0 and 3-0 Vicryl. The patient then awoken from anesthetic, extubated and taken to recovery in stable condition.       MD RENEA Lerma / [place]  D: 04/10/2018 20:18     T: 04/11/2018 08:48  JOB #: [id number]

## 2018-04-11 NOTE — PROGRESS NOTES
Care Management Interventions  PCP Verified by CM: Yes  Palliative Care Criteria Met (RRAT>21 & CHF Dx)?: No  Mode of Transport at Discharge: Other (see comment) (family)  Transition of Care Consult (CM Consult): [address]: Yes  MyChart Signup: No  Discharge Durable Medical Equipment: No  Physical Therapy Consult: Yes  Occupational Therapy Consult: Yes  Speech Therapy Consult: No  Current Support Network: Own Home  Confirm Follow Up Transport: Family  Plan discussed with Pt/Family/Caregiver: Yes  Freedom of Choice Offered: Yes  [place] Resource Information Provided?: No  Discharge Location  Discharge Placement: Home with home health    Reason for Admission:  L4-5 LUMBAR LAMINECTOMY, POSTERIOR SPINAL FUSION L4-S1    RRAT Score:    19   Do you (patient/family) have any concerns for transition/discharge? no     Plan for utilizing home health: [facility].   Likelihood of readmission?      low   Transition of Care Plan:  Patient admitted for scheduled surgery, has been working with the therapies and they are recommending home health at this time. Cm met with patient to discuss and offer support. Patient will be staying with his fiance at discharge Tommy Holder, address: [address] [place], [place] [address], [address], P: [phone]). Referral sent to [facility]; waiting for response.   [employer] , [place]

## 2018-04-11 NOTE — ANESTHESIA POSTPROCEDURE EVALUATION
Post-Anesthesia Evaluation and Assessment    Patient: Jimmy Gonzalez MRN: [id number]  SSN: [id number]    YOB: 1965  Age: 48 y.o. Sex: male       Cardiovascular Function/Vital Signs  Visit Vitals    /61    Pulse 72    Temp 36.3 °C (97.4 °F)    Resp 10    Ht 5' 11\" (1.803 m)    Wt 140 kg (308 lb 9.6 oz)    SpO2 100%    BMI 43.04 kg/m2       Patient is status post general anesthesia for Procedure(s):  L4-5 LUMBAR LAMINECTOMY, POSTERIOR SPINAL FUSION L4-S1. Nausea/Vomiting: None    Postoperative hydration reviewed and adequate. Pain:  Pain Scale 1: Numeric (0 - 10) (04/10/18 2215)  Pain Intensity 1: 7 (04/10/18 2215)   Managed    Neurological Status:   Neuro (WDL): Within Defined Limits (04/10/18 2114)  Neuro  LUE Motor Response: Purposeful (04/10/18 2139)  LLE Motor Response: Purposeful (04/10/18 2139)  RUE Motor Response: Purposeful (04/10/18 2139)  RLE Motor Response: Purposeful (04/10/18 2139)   At baseline    Mental Status and Level of Consciousness: Arousable    Pulmonary Status:   O2 Device: CO2 nasal cannula (04/10/18 2120)   Adequate oxygenation and airway patent    Complications related to anesthesia: None    Post-anesthesia assessment completed.  No concerns    Signed By: Bandar Hirsch DO     April 10, 2018

## 2018-04-11 NOTE — PROGRESS NOTES
Bedside shift change report given to Natalee (oncoming nurse) by Mario Person (offgoing nurse). Report included the following information SBAR, Kardex, Intake/Output, Recent Results and Cardiac Rhythm .

## 2018-04-11 NOTE — PROGRESS NOTES
Katerina RASMUSSEN (on call for Dr. Morris Rivas) notified and aware that patient has been receiving Oxycodone 10 mg by mouth every 3 hours as needed for pain. Oxycodone 10 mg was given at 14:37. Patient stated that his pain is rating at a level of 8. Patient did receive Valium 5 mg early this morning which only brought his pain level down to a level of 7. Patient takes Norco 10 mg-325 mg and Tramadol. Patient also stated that he would take Tramadol during the day and then the [place] with Tramadol at night. The Norco only helped with sleep during the night but not helped with pain relief. RN inquiring a change in medication for better pain relief. Bharat RASMUSSEN ordered,\"Discontinue Oxycodone and start on Dilaudid 2 mg 1-2 tablets by mouth every 3 hours for pain. Only give the Valium 5 mg by mouth if patient is in severe pain. \" RN will implement VALERY Mcintosh's orders.

## 2018-04-11 NOTE — PERIOP NOTES
TRANSFER - OUT REPORT:    Verbal report given to [facility] on Jerzy LUCIA Zeyad Chin.  being transferred to  for routine post - op       Report consisted of patients Situation, Background, Assessment and   Recommendations(SBAR). Time Pre op antibiotic given:1722  Anesthesia Stop time: 2123  Kirk Present on Transfer to floor:Yes  Order for Kirk on Chart:Yes  Discharge Prescriptions with Chart:N/A    Information from the following report(s) SBAR, OR Summary, Procedure Summary, Intake/Output and MAR was reviewed with the receiving nurse. Opportunity for questions and clarification was provided. Is the patient on 02? YES       L/Min 2       Other N/A    Is the patient on a monitor? NO    Is the nurse transporting with the patient? NO    Surgical Waiting Area notified of patient's transfer from PACU? YES      The following personal items collected during your admission accompanied patient upon transfer:   Dental Appliance: Dental Appliances: None  Vision: Visual Aid: Glasses  Hearing Aid:    Jewelry: Jewelry: None  Clothing: Clothing:  (belongings to PACU)  Other Valuables:  Other Valuables: Shawn Farias (sent to PACU)  Valuables sent to safe:

## 2018-04-11 NOTE — PROGRESS NOTES
Ortho Spine Daily Progress Note    Date of Surgery:  4/10/2018      Patient: Joey Arriaza. YOB: 1965  Age: 48 y.o. SUBJECTIVE:   1 Day Post-Op following L4-5 LUMBAR LAMINECTOMY, POSTERIOR SPINAL FUSION L4-S1    The patient states significant post op back pain this morning. The paitient states that their pre-operative lower extremity symptoms appear to be improved. The patient rates their current level of pain as 8/10, on the VAS. The patient's post operative pain is adequately controlled. The patient denies CP, SOB, N/V/D, dizziness, abdominal pain, HA. OBJECTIVE:     Vital Signs:    Temp (24hrs), Av.2 °F (36.8 °C), Min:97.4 °F (36.3 °C), Max:98.9 °F (37.2 °C)    Patient Vitals for the past 8 hrs:   BP Temp Pulse Resp SpO2   18 0503 113/67 98.4 °F (36.9 °C) 90 16 99 %   18 0123 111/61 98.9 °F (37.2 °C) 80 15 100 %   18 0044 112/66 98.1 °F (36.7 °C) 82 14 100 %           Physical Exam:  General: A&Ox3, NAD. Respiratory: Respirations are unlabored. Abdomen: S/NT  Surgical site: C,D and I.  Musculoskeletal: Calves are S/NT, Lionel dorsi/plantar flexion/EHL/Quads intact, Lionel DP 2+  Neurological:  Lionel LE's NVI    Laboratory Values:             Recent Labs      18   0508   HGB  12.8   CREA  1.58*   GLU  159*     Lab Results   Component Value Date/Time    Sodium 139 2018 05:08 AM    Potassium 4.9 2018 05:08 AM    Chloride 109 (H) 2018 05:08 AM    CO2 23 2018 05:08 AM    Glucose 159 (H) 2018 05:08 AM    BUN 24 (H) 2018 05:08 AM    Creatinine 1.58 (H) 2018 05:08 AM    Calcium 8.5 2018 05:08 AM       Hemovac Output:    240  ml in the last 12 hours. PLAN:     S/P L4-5 LUMBAR LAMINECTOMY, POSTERIOR SPINAL FUSION L4-S1 Mobilize with PT BID until discharged. Hemodynamics Hgb 12.9,  tolerating well thus far. Will continue to monitor. Keep hemovac until tomorrow. Wound Continue dressing changes PRN. Post Operative Pain Pain Control: D/C PCA, oxycodone for pain control. DVT Prophylaxis Continue with SCD'S, Encourage Ankle Pump Exercises, Mobilize. Discharge Disposition Discharge plan: Will focus on pain control and mobilizing with PT/nursing. Will continue to monitor progress closely. The patient was seen and evaluated by Dr Chelle Murguia who agrees with the findings and treatment plan as outlined above.         Signed By: Jim Casron PA-C  April 11, 2018 7:35 AM

## 2018-04-11 NOTE — PROGRESS NOTES
Problem: Mobility Impaired (Adult and Pediatric)  Goal: *Acute Goals and Plan of Care (Insert Text)  Physical Therapy Goals  Initiated 4/11/2018    1. Patient will move from supine to sit and sit to supine  in bed with supervision/set-up within 4 days. 2. Patient will perform sit to stand with supervision/set-up within 4 days. 3. Patient will ambulate with modified independence for 200 feet with the least restrictive device within 4 days. 4. Patient will ascend/descend 6 stairs with 1 handrail(s) with modified independence within 4 days. 5. Patient will verbalize and demonstrate understanding of spinal precautions (No bending, lifting greater than 5 lbs, or twisting; log-roll technique; frequent repositioning as instructed) within 4 days. physical Therapy EVALUATION  Patient: Shaun Varela (56 y.o. male)  Date: 4/11/2018  Primary Diagnosis: SPINAL STENOSIS, SPONDYLOLISTHESIS, DDD  L5-S1  Spondylolisthesis, lumbar region  Procedure(s) (LRB):  L4-5 LUMBAR LAMINECTOMY, POSTERIOR SPINAL FUSION L4-S1 (N/A) 1 Day Post-Op   Precautions:   Back    ASSESSMENT :  Based on the objective data described below, the patient presents with increased post-op pain, impaired ROM, decreased strength, and below baseline functional mobility post L4-5 lum nesbitt and fusion L4-S1. He was ambulatory with a cane prior to admission and reports bilateral LE pain with right sided tingling. This is improved post operatively with a primary c/o surgical pain. He reports being fitted for a brace pre-op and is awaiting delivery after surgery. He plans to discharge home to his fiance's home with HHPT follow up. Patient will benefit from skilled intervention to address the above impairments.   Patients rehabilitation potential is considered to be Good  Factors which may influence rehabilitation potential include:   [x]         None noted  []         Mental ability/status  []         Medical condition  []         Home/family situation and support systems  []         Safety awareness  []         Pain tolerance/management  []         Other:      PLAN :  Recommendations and Planned Interventions:  []           Bed Mobility Training             []    Neuromuscular Re-Education  []           Transfer Training                   []    Orthotic/Prosthetic Training  []           Gait Training                         []    Modalities  []           Therapeutic Exercises           []    Edema Management/Control  []           Therapeutic Activities            []    Patient and Family Training/Education  []           Other (comment):    Frequency/Duration: Patient will be followed by physical therapy  twice daily to address goals. Discharge Recommendations: Home Health  Further Equipment Recommendations for Discharge: to be determined       SUBJECTIVE:   Patient stated I did better than I expected.   Education provided regarding back precautions, log roll technique, brace wear schedule, and sitting for >30 min.     OBJECTIVE DATA SUMMARY:   HISTORY:    Past Medical History:   Diagnosis Date    Arthritis     2010    Asthma 1995    INHALER USE PRN    Chronic bilateral low back pain without sciatica 11/6/2017    Chronic hepatitis C without hepatic coma ([facility] [address] 11/6/2017    treated at [address] [place] [address]     Essential hypertension 11/6/2017    GERD (gastroesophageal reflux disease)     History of cardiac cath     Hypercholesterolemia     Mild episode of recurrent major depressive disorder ([facility] [address] 11/06/2012    Morbid obesity ([facility] [address]     Sleep apnea     \"CANNOT AFFORD CPAP\", DR BARRAZA TO FOLLOW UP POST OP    Stage 3 chronic kidney disease 11/06/2017    IMPROVED     Uncontrolled type 2 diabetes mellitus with peripheral neuropathy ([facility] [address] 11/06/2011     Past Surgical History:   Procedure Laterality Date    HX COLONOSCOPY  2013    CJW    HX HEART CATHETERIZATION      NEG PER PATIENT    HX HERNIA REPAIR  5557    UMBILICAL    HX KNEE ARTHROSCOPY  1999    right knee    HX KNEE ARTHROSCOPY  2002    left knee    HX ROTATOR CUFF REPAIR Right 2016    HX UROLOGICAL  2015    Vasectomy      Prior Level of Function/Home Situation: modified independent with cane prior to admission  Personal factors and/or comorbidities impacting plan of care:     Home Situation  Home Environment: Private residence  # Steps to Enter: 6  Rails to Enter: Yes  Hand Rails : Bilateral  One/Two Story Residence: Two story  Lift Chair Available: No  Living Alone: No  Support Systems: Family member(s), Spouse/Significant Other/Partner  Patient Expects to be Discharged to[de-identified] Private residence  Current DME Used/Available at Home: Cane, straight, Glucometer, Nebulizer    EXAMINATION/PRESENTATION/DECISION MAKING:   Critical Behavior:  Neurologic State: Alert  Orientation Level: Oriented X4        Hearing:   Auditory  Auditory Impairment: None  Hearing Aids/Status: Does not own  Skin:  Dressing and drain intact  Edema:   Range Of Motion:  AROM: Generally decreased, functional                       Strength:    Strength: Generally decreased, functional                    Tone & Sensation:   Tone: Normal              Sensation: Intact               Coordination:  Coordination: Within functional limits  Vision:      Functional Mobility:  Bed Mobility:  Rolling: Minimum assistance  Supine to Sit: Moderate assistance     Scooting: Minimum assistance  Transfers:  Sit to Stand: Minimum assistance  Stand to Sit: Minimum assistance                       Balance:   Sitting: Intact  Standing: Impaired  Standing - Static: Fair  Standing - Dynamic : Fair  Ambulation/Gait Training:  Distance (ft): 35 Feet (ft)  Assistive Device: Walker, rolling  Ambulation - Level of Assistance: Minimal assistance     Gait Description (WDL): Exceptions to WDL  Gait Abnormalities: Decreased step clearance;Shuffling gait        Base of Support: Widened     Speed/Eliza: Slow;Shuffled  Step Length: Left shortened;Right shortened Functional Measure:         Physical Therapy Evaluation Charge Determination   History Examination Presentation Decision-Making   MEDIUM  Complexity : 1-2 comorbidities / personal factors will impact the outcome/ POC  MEDIUM Complexity : 3 Standardized tests and measures addressing body structure, function, activity limitation and / or participation in recreation  LOW Complexity : Stable, uncomplicated  LOW Complexity : FOTO score of       Based on the above components, the patient evaluation is determined to be of the following complexity level: LOW     Pain:  Pain Scale 1: Numeric (0 - 10)  Pain Intensity 1: 7  Pain Location 1: Back  Pain Orientation 1: Lower  Pain Description 1: Aching  Pain Intervention(s) 1: Medication (see MAR)  Activity Tolerance:       After treatment:   []         Patient left in no apparent distress sitting up in chair  [x]         Patient left in no apparent distress in bed  [x]         Call bell left within reach  [x]         Nursing notified  []         Caregiver present  []         Bed alarm activated    COMMUNICATION/EDUCATION:   The patients plan of care was discussed with: Registered Nurse. [x]         Fall prevention education was provided and the patient/caregiver indicated understanding. [x]         Patient/family have participated as able in goal setting and plan of care. [x]         Patient/family agree to work toward stated goals and plan of care. []         Patient understands intent and goals of therapy, but is neutral about his/her participation. []         Patient is unable to participate in goal setting and plan of care.     Thank you for this referral.  Sheila Roberts, PT, DPT   Time Calculation: 28 mins

## 2018-04-11 NOTE — BRIEF OP NOTE
BRIEF OPERATIVE NOTE    Date of Procedure: 4/10/2018   Preoperative Diagnosis: SPINAL STENOSIS, SPONDYLOLISTHESIS, DDD   L5-S1  Postoperative Diagnosis: SPINAL STENOSIS, SPONDYLOLISTHESIS, DDD   L5-S1    Procedure(s):  LUMBAR LAMINECTOMY L4-S1, POSTERIOR SPINAL FUSION L4-S1  Surgeon(s) and Role:     * Farrah Vivas MD - Primary         Surgical Assistant: Griselda Foss PA-C    Surgical Staff:  Maria Elena Pisano: Silverio Hernandez RN  Circ-Relief: [facility]  Physician Assistant: Griselda Foss PA-C  Scrub RN-1: Dana Saleh RN  Scrub RN-Relief: Anoop Morel RN  Event Time In   Incision Start 1747   Incision Close 2048     Anesthesia: General   Estimated Blood Loss: 600 ml  Specimens: * No specimens in log *   Findings: SPINAL STENOSIS, SPONDYLOLISTHESIS, DDD  Complications: NONE  Implants:   Implant Name Type Inv.  Item Serial No.  Lot No. LRB No. Used Action   3 demin cortical fibers 30cc   [id number]  N/A N/A 1 Implanted   GRAFT FIBERS BNE JASON 10CC -- 3DEMIN - [id number]  GRAFT FIBERS BNE JASON 10CC -- 3DEMIN [id number] [employer] N/A N/A 1 Implanted   GRAFT BNE RECOMB HUM INFUS SM --  - SN/A  GRAFT BNE RECOMB HUM INFUS SM --  N/A [employer] [id number] N/A 1 Implanted   SCR SPNE PRE-ASSBL 6.5X45MM -- CREO 5.5MM - SN/A  SCR SPNE PRE-ASSBL 6.5X45MM -- CREO 5.5MM N/A [employer] N/A N/A 4 Implanted   SCR SPNE PRE-ASSBL 7.5X45MM -- CREO 5.5MM - SN/A  SCR SPNE PRE-ASSBL 7.5X45MM -- CREO 5.5MM N/A [employer] N/A N/A 2 Implanted   JADON SPNE CRV TI 5.5X70MM -- CREO - SN/A  JADON SPNE CRV TI 5.5X70MM -- CREO N/A [employer] N/A N/A 2 Implanted   CREO LOCKING CAP     N/A   N/A N/A 6 Implanted

## 2018-04-11 NOTE — PROGRESS NOTES
Problem: Mobility Impaired (Adult and Pediatric)  Goal: *Acute Goals and Plan of Care (Insert Text)  Physical Therapy Goals  Initiated 4/11/2018    1. Patient will move from supine to sit and sit to supine  in bed with supervision/set-up within 4 days. 2. Patient will perform sit to stand with supervision/set-up within 4 days. 3. Patient will ambulate with modified independence for 200 feet with the least restrictive device within 4 days. 4. Patient will ascend/descend 6 stairs with 1 handrail(s) with modified independence within 4 days. 5. Patient will verbalize and demonstrate understanding of spinal precautions (No bending, lifting greater than 5 lbs, or twisting; log-roll technique; frequent repositioning as instructed) within 4 days. physical Therapy TREATMENT  Patient: Mona Soliz (56 y.o. male)  Date: 4/11/2018  Precautions: Back  Chart, physical therapy assessment, plan of care and goals were reviewed. ASSESSMENT:  Patient received in bed and recently medicated. Brace present for pm session with education provided regarding application, wear schedule, and reviewed back precautions. Improved activity tolerance for the pm but continued reliance on RW for balance. Encouraged him to reduce WB on walker and to stand erect with moderate improvement noted. Returned to bedside chair in NAD. Anticipate continued progress towards discharge home with HHPT services. Progression toward goals:  [x]      Improving appropriately and progressing toward goals  []      Improving slowly and progressing toward goals  []      Not making progress toward goals and plan of care will be adjusted     PLAN:  Patient continues to benefit from skilled intervention to address the above impairments. Continue treatment per established plan of care.   Discharge Recommendations:  Home Health and To Be Determined  Further Equipment Recommendations for Discharge:  to be determined       SUBJECTIVE:   Patient stated This brace makes me sit up taller.    The patient stated 3/3 back precautions. Reviewed all 3 with patient. OBJECTIVE DATA SUMMARY:   Functional Mobility Training:  Bed Mobility:  Log Rolling: Moderate assistance  Supine to Sit: Moderate assistance     Scooting: Minimum assistance        Brace donned with  maximal assistance while seated on EOB  Transfers:  Sit to Stand: Minimum assistance  Stand to Sit: Minimum assistance                             Ambulation/Gait Training:  Distance (ft): 65 Feet (ft)  Assistive Device: Walker, rolling  Ambulation - Level of Assistance: Minimal assistance     Gait Description (WDL): Exceptions to WDL  Gait Abnormalities: Decreased step clearance;Shuffling gait        Base of Support: Widened     Speed/Eliza: Slow;Shuffled  Step Length: Left shortened;Right shortened                   Stairs: Therapeutic Exercises:     Pain:  Pain Scale 1: Numeric (0 - 10)  Pain Intensity 1: 7  Pain Location 1: Back  Pain Orientation 1: Lower  Pain Description 1: Aching  Pain Intervention(s) 1: Medication (see MAR) (given roxicodone 10mg po)  Activity Tolerance:     Please refer to the flowsheet for vital signs taken during this treatment.   After treatment:   [x]  Patient left in no apparent distress sitting up in chair  []  Patient left in no apparent distress in bed  [x]  Call bell left within reach  [x]  Nursing notified  []  Caregiver present  []  Bed alarm activated    COMMUNICATION/COLLABORATION:   The patients plan of care was discussed with: Registered Nurse    Urszula Wolff PT, DPT   Time Calculation: 19 mins

## 2018-04-11 NOTE — DIABETES MGMT
DTC Progress Note    Recommendations/ Comments: POD 1 spinal surgery. BG's in range. Highest BG 10 mg/dl at bedtime last night after receiving dexamethasone one time dose yesterday. If appropriate, please consider   If BG's rise to  > 180 mg/dL, add basal Lantus 25 units daily  Document po intake    Current hospital DM medication: lispro normal correction scale    Chart reviewed on [address] .    Patient is a 48 y.o. male with history of DM with the following listed in PTA meds:  Tresiba 70 units BID  Apidra 7 units 3x/day with meals  Metformin  mg BID    Consult for assessment of home managment received. Pt is sound asleep - had just received pain medication. DTC will return to complete assessment. A1c:   No results found for: HBA1C, HGBE8, IMK2ZHSM    Recent Glucose Results:   Lab Results   Component Value Date/Time     (H) 04/11/2018 05:08 AM    GLUCPOC 138 (H) 04/11/2018 12:06 PM    GLUCPOC 146 (H) 04/11/2018 06:06 AM    GLUCPOC 178 (H) 04/10/2018 10:13 PM        Lab Results   Component Value Date/Time    Creatinine 1.58 (H) 04/11/2018 05:08 AM     Estimated Creatinine Clearance: 79.2 mL/min (based on Cr of 1.58). Active Orders   Diet    DIET GI LITE (POST SURGICAL) No Conc. Sweets        PO intake: No data found. Will continue to follow as needed.     Thank you  Antonia Oglesby RN, CDE

## 2018-04-11 NOTE — PROGRESS NOTES
Problem: Self Care Deficits Care Plan (Adult)  Goal: *Acute Goals and Plan of Care (Insert Text)  Occupational Therapy Goals  Initiated 4/11/2018    1. Patient will perform lower body dressing with supervision/set-up using Reacher, Stocking Aid and Dressing Stick PRN within 7 days. 2.  Patient will perform bathing with supervision/set-up using long-handled sponge within 7 days. 3.  Patient will perform grooming with RW standing at sink at supervision/set-up within 7 days. 4.  Patient will don/doff back brace at supervision/set-up within 7 days. 5.  Patient will verbalize/demonstrate 3/3 back precautions during ADL tasks without cues within 7 days. 6.  Patient will perform toileting with supervision/ set-up within 7 days. 7.  Patient will perform toilet transfer with supervision/ set-up with 7 days. Occupational Therapy EVALUATION  Patient: Ashley Hernandes (56 y.o. male)  Date: 4/11/2018  Primary Diagnosis: SPINAL STENOSIS, SPONDYLOLISTHESIS, DDD  L5-S1  Spondylolisthesis, lumbar region  Procedure(s) (LRB):  L4-5 LUMBAR LAMINECTOMY, POSTERIOR SPINAL FUSION L4-S1 (N/A) 1 Day Post-Op   Precautions:   Back    ASSESSMENT :  Based on the objective data described below, the patient presents with back pain, back precautions, impaired functional mobility, and impaired ADL independence s/p above surgery. Patient presents sidelying in bed, alert, oriented x4, and agreeable to therapy. Patient requires min-A for rolling in bed and mod-A for sidelying-sit for trunk righting, receptive to instruction on logrolling technique. Patient receptive to education and provided with handout on back precautions, ADL modifications, activity recommendations, and home safety. Patient limited by pain, declines ambulating to bathroom at this time. Patient unable to tailor sit, would benefit from instruction using AE for LB dressing/ bathing.   Patient currently performs UB ADLs with set-up, requires mod-A to max-A for LB ADLs (inferred). Patient mod-I at baseline using SPC. Patient lives with jac, who works during the day. Anticipate discharge home with support pending progress. Patient will benefit from skilled intervention to address the above impairments. Patients rehabilitation potential is considered to be Good  Factors which may influence rehabilitation potential include:   []             None noted  []             Mental ability/status  []             Medical condition  []             Home/family situation and support systems  []             Safety awareness  [x]             Pain tolerance/management  []             Other:      PLAN :  Recommendations and Planned Interventions:  [x]               Self Care Training                  [x]        Therapeutic Activities  [x]               Functional Mobility Training    []        Cognitive Retraining  [x]               Therapeutic Exercises           [x]        Endurance Activities  [x]               Balance Training                   []        Neuromuscular Re-Education  []               Visual/Perceptual Training     [x]   Home Safety Training  [x]               Patient Education                 [x]        Family Training/Education  []               Other (comment):    Frequency/Duration: Patient will be followed by occupational therapy 5 times a week to address goals. Discharge Recommendations: home with family support pending progress  Further Equipment Recommendations for Discharge: AE for LB dressing/ bathing     SUBJECTIVE:   Patient stated I'm really hurting today, but I'll do more tomorrow.     OBJECTIVE DATA SUMMARY:   HISTORY:   Past Medical History:   Diagnosis Date    Arthritis     2010    Asthma 1995    INHALER USE PRN    Chronic bilateral low back pain without sciatica 11/6/2017    Chronic hepatitis C without hepatic coma ([facility] [address] 11/6/2017    treated at [address] [place] [address]     Essential hypertension 11/6/2017    GERD (gastroesophageal reflux disease)     History of cardiac cath     Hypercholesterolemia     Mild episode of recurrent major depressive disorder ([facility] [address] 11/06/2012    Morbid obesity ([facility] [address]     Sleep apnea     \"CANNOT AFFORD CPAP\", DR BARRAZA TO FOLLOW UP POST OP    Stage 3 chronic kidney disease 11/06/2017    IMPROVED     Uncontrolled type 2 diabetes mellitus with peripheral neuropathy ([facility] [address] 11/06/2011     Past Surgical History:   Procedure Laterality Date    HX COLONOSCOPY  2013    CJW    HX HEART CATHETERIZATION      NEG PER PATIENT    HX HERNIA REPAIR  9352    UMBILICAL    HX KNEE ARTHROSCOPY  1999    right knee    HX KNEE ARTHROSCOPY  2002    left knee    HX ROTATOR CUFF REPAIR Right 2016    HX UROLOGICAL  2015    Vasectomy        Prior Level of Function/Environment/Context: mod-I with ADLs and IADLs using SPC. Lives with jac, who works during the day    Expanded or extensive additional review of patient history:     Home Situation  Home Environment: Private residence  # Steps to Enter: 6  Rails to Enter: Yes  Hand Rails : Bilateral  One/Two Story Residence: Two story  Lift Chair Available: No  Living Alone: No  Support Systems: Family member(s), Spouse/Significant Other/Partner  Patient Expects to be Discharged to[de-identified] Private residence  Current DME Used/Available at Home: Cane, straight, Glucometer, Nebulizer  Tub or Shower Type: Tub/Shower combination  []  Right hand dominant   []  Left hand dominant    EXAMINATION OF PERFORMANCE DEFICITS:  Cognitive/Behavioral Status:  Neurologic State: Alert  Orientation Level: Oriented X4  Cognition: Appropriate decision making; Appropriate for age attention/concentration; Appropriate safety awareness; Follows commands  Perception: Appears intact  Perseveration: No perseveration noted  Safety/Judgement: Awareness of environment; Insight into deficits    Skin: visible skin appears intact. Surgical dressing c/d/i    Edema: none noted    Hearing:   Auditory  Auditory Impairment: None  Hearing Aids/Status: Does not own    Vision/Perceptual:                           Acuity: Within Defined Limits         Range of Motion:    AROM: Generally decreased, functional (BUE)  PROM: Generally decreased, functional (BUE)                      Strength:    Strength: Generally decreased, functional (BUE)                Coordination:  Coordination: Within functional limits  Fine Motor Skills-Upper: Left Intact; Right Intact    Gross Motor Skills-Upper: Left Intact; Right Intact    Tone & Sensation:    Tone: Normal  Sensation: Impaired (reports tingling in both feet at baseline due to neuropathy)                      Balance:  Sitting: Intact  Standing: Impaired  Standing - Static: Fair  Standing - Dynamic : Fair    Functional Mobility and Transfers for ADLs:  Bed Mobility:  Rolling: Minimum assistance  Supine to Sit: Moderate assistance  Scooting: Minimum assistance      ADL Assessment:  Feeding: Independent (inferred)    Oral Facial Hygiene/Grooming: Setup (inferred)    Bathing: Moderate assistance (inferred due to impaired reach/ bending precaution)    Upper Body Dressing: Setup (inferred)    Lower Body Dressing: Maximum assistance (inferred, unable to tailor sit, bending precaution)    Toileting: Contact guard assistance (inferred)                ADL Intervention and task modifications:              Cognitive Retraining  Safety/Judgement: Awareness of environment; Insight into deficits      Functional Measure:  Barthel Index:    Bathin  Bladder: 0  Bowels: 10  Groomin  Dressin  Feeding: 10  Mobility: 0  Stairs: 0  Toilet Use: 5  Transfer (Bed to Chair and Back): 10  Total: 45       Barthel and G-code impairment scale:  Percentage of impairment CH  0% CI  1-19% CJ  20-39% CK  40-59% CL  60-79% CM  80-99% CN  100%   Barthel Score 0-100 100 99-80 79-60 59-40 20-39 1-19   0   Barthel Score 0-20 20 17-19 13-16 9-12 5-8 1-4 0      The Barthel ADL Index: Guidelines  1.  The index should be used as a record of what a patient does, not as a record of what a patient could do. 2. The main aim is to establish degree of independence from any help, physical or verbal, however minor and for whatever reason. 3. The need for supervision renders the patient not independent. 4. A patient's performance should be established using the best available evidence. Asking the patient, friends/relatives and nurses are the usual sources, but direct observation and common sense are also important. However direct testing is not needed. 5. Usually the patient's performance over the preceding 24-48 hours is important, but occasionally longer periods will be relevant. 6. Middle categories imply that the patient supplies over 50 per cent of the effort. 7. Use of aids to be independent is allowed. Rodolfo Alvarado., Barthel, D.W. (6823). Functional evaluation: the Barthel Index. [address] [facility] (14)2. Lula Libman [place], J.J.M.F, Selena Goss., Jeanette Figueroa., [facility], [address] (1999). Measuring the change indisability after inpatient rehabilitation; comparison of the responsiveness of the Barthel Index and Functional [place] Measure. Journal of Neurology, Neurosurgery, and Psychiatry, 66(4), 446-564. Nondsarina Escamilla, N.J.A, MELONY Euceda.DANIEL, & Marlee Hunter, MWilliamA. (2004.) Assessment of post-stroke quality of life in cost-effectiveness studies: The usefulness of the Barthel Index and the EuroQoL-5D. Quality of Life Research, 13, 508-66   . G codes: In compliance with CMSs Claims Based Outcome Reporting, the following G-code set was chosen for this patient based on their primary functional limitation being treated: The outcome measure chosen to determine the severity of the functional limitation was the Barthel Index with a score of 45/100 which was correlated with the impairment scale. ?  Self Care:     - CURRENT STATUS: CK - 40%-59% impaired, limited or restricted    - GOAL STATUS: CI - 1%-19% impaired, limited or restricted    - D/C STATUS:  ---------------To be determined---------------     Occupational Therapy Evaluation Charge Determination   History Examination Decision-Making   LOW Complexity : Brief history review  LOW Complexity : 1-3 performance deficits relating to physical, cognitive , or psychosocial skils that result in activity limitations and / or participation restrictions  LOW Complexity : No comorbidities that affect functional and no verbal or physical assistance needed to complete eval tasks       Based on the above components, the patient evaluation is determined to be of the following complexity level: LOW   Pain:  Pain Scale 1: Numeric (0 - 10)  Pain Intensity 1: 8  Pain Location 1: Back  Pain Orientation 1: Lower  Pain Description 1: Aching  Pain Intervention(s) 1: Medication (see MAR) (given roxicodone 10mg po) Nursing managing  Activity Tolerance:   VSS    After treatment:   [] Patient left in no apparent distress sitting up in chair  [x] Patient left in no apparent distress in bed  [x] Call bell left within reach  [x] Nursing notified  [] Caregiver present  [] Bed alarm activated    COMMUNICATION/EDUCATION:   The patients plan of care was discussed with: Physical Therapist and Registered Nurse. [x] Home safety education was provided and the patient/caregiver indicated understanding. [x] Patient/family have participated as able in goal setting and plan of care. [x] Patient/family agree to work toward stated goals and plan of care. [] Patient understands intent and goals of therapy, but is neutral about his/her participation. [] Patient is unable to participate in goal setting and plan of care. This patients plan of care is appropriate for delegation to \[facility] [place] [facility]\"".     Thank you for this referral.  Godwin Monk OT  Time Calculation: 21 mins

## 2018-04-12 LAB
ANION GAP SERPL CALC-SCNC: 8 MMOL/L (ref 5–15)
BUN SERPL-MCNC: 21 MG/DL (ref 6–20)
BUN/CREAT SERPL: 16 (ref 12–20)
CALCIUM SERPL-MCNC: 8.7 MG/DL (ref 8.5–10.1)
CHLORIDE SERPL-SCNC: 106 MMOL/L (ref 97–108)
CO2 SERPL-SCNC: 25 MMOL/L (ref 21–32)
CREAT SERPL-MCNC: 1.34 MG/DL (ref 0.7–1.3)
GLUCOSE BLD STRIP.AUTO-MCNC: 127 MG/DL (ref 65–100)
GLUCOSE BLD STRIP.AUTO-MCNC: 135 MG/DL (ref 65–100)
GLUCOSE BLD STRIP.AUTO-MCNC: 152 MG/DL (ref 65–100)
GLUCOSE BLD STRIP.AUTO-MCNC: 164 MG/DL (ref 65–100)
GLUCOSE SERPL-MCNC: 139 MG/DL (ref 65–100)
HGB BLD-MCNC: 11.5 G/DL (ref 12.1–17)
POTASSIUM SERPL-SCNC: 3.9 MMOL/L (ref 3.5–5.1)
SERVICE CMNT-IMP: ABNORMAL
SODIUM SERPL-SCNC: 139 MMOL/L (ref 136–145)

## 2018-04-12 PROCEDURE — 36415 COLL VENOUS BLD VENIPUNCTURE: CPT | Performed by: PHYSICIAN ASSISTANT

## 2018-04-12 PROCEDURE — 74011250637 HC RX REV CODE- 250/637: Performed by: PHYSICIAN ASSISTANT

## 2018-04-12 PROCEDURE — 65270000029 HC RM PRIVATE

## 2018-04-12 PROCEDURE — 97116 GAIT TRAINING THERAPY: CPT

## 2018-04-12 PROCEDURE — 74011636637 HC RX REV CODE- 636/637: Performed by: PHYSICIAN ASSISTANT

## 2018-04-12 PROCEDURE — 82962 GLUCOSE BLOOD TEST: CPT

## 2018-04-12 PROCEDURE — 85018 HEMOGLOBIN: CPT | Performed by: PHYSICIAN ASSISTANT

## 2018-04-12 PROCEDURE — 80048 BASIC METABOLIC PNL TOTAL CA: CPT | Performed by: PHYSICIAN ASSISTANT

## 2018-04-12 RX ADMIN — PREGABALIN 150 MG: 75 CAPSULE ORAL at 21:50

## 2018-04-12 RX ADMIN — Medication 10 ML: at 21:13

## 2018-04-12 RX ADMIN — HYDROMORPHONE HYDROCHLORIDE 4 MG: 4 TABLET ORAL at 21:13

## 2018-04-12 RX ADMIN — CITALOPRAM HYDROBROMIDE 20 MG: 20 TABLET ORAL at 09:20

## 2018-04-12 RX ADMIN — AMLODIPINE BESYLATE 10 MG: 5 TABLET ORAL at 09:19

## 2018-04-12 RX ADMIN — Medication 10 ML: at 13:45

## 2018-04-12 RX ADMIN — HYDROMORPHONE HYDROCHLORIDE 4 MG: 4 TABLET ORAL at 16:40

## 2018-04-12 RX ADMIN — PREGABALIN 150 MG: 75 CAPSULE ORAL at 15:25

## 2018-04-12 RX ADMIN — STANDARDIZED SENNA CONCENTRATE AND DOCUSATE SODIUM 1 TABLET: 8.6; 5 TABLET, FILM COATED ORAL at 17:34

## 2018-04-12 RX ADMIN — INSULIN LISPRO 2 UNITS: 100 INJECTION, SOLUTION INTRAVENOUS; SUBCUTANEOUS at 07:30

## 2018-04-12 RX ADMIN — PREGABALIN 150 MG: 75 CAPSULE ORAL at 09:20

## 2018-04-12 RX ADMIN — ACETAMINOPHEN 650 MG: 325 TABLET, FILM COATED ORAL at 15:30

## 2018-04-12 RX ADMIN — DIAZEPAM 5 MG: 5 TABLET ORAL at 13:50

## 2018-04-12 RX ADMIN — INSULIN LISPRO 2 UNITS: 100 INJECTION, SOLUTION INTRAVENOUS; SUBCUTANEOUS at 13:45

## 2018-04-12 RX ADMIN — Medication 10 ML: at 07:18

## 2018-04-12 RX ADMIN — HYDROMORPHONE HYDROCHLORIDE 4 MG: 4 TABLET ORAL at 01:15

## 2018-04-12 RX ADMIN — HYDROMORPHONE HYDROCHLORIDE 2 MG: 2 TABLET ORAL at 05:30

## 2018-04-12 RX ADMIN — Medication 10 ML: at 16:41

## 2018-04-12 RX ADMIN — HYDROMORPHONE HYDROCHLORIDE 4 MG: 4 TABLET ORAL at 09:20

## 2018-04-12 RX ADMIN — LISINOPRIL 40 MG: 20 TABLET ORAL at 09:20

## 2018-04-12 RX ADMIN — ATORVASTATIN CALCIUM 40 MG: 40 TABLET, FILM COATED ORAL at 09:20

## 2018-04-12 RX ADMIN — POLYETHYLENE GLYCOL 3350 17 G: 17 POWDER, FOR SOLUTION ORAL at 09:18

## 2018-04-12 RX ADMIN — STANDARDIZED SENNA CONCENTRATE AND DOCUSATE SODIUM 1 TABLET: 8.6; 5 TABLET, FILM COATED ORAL at 09:20

## 2018-04-12 NOTE — PROGRESS NOTES
Physical Therapy  4/12/2018  Defer Note    Attempted to see pt this afternoon however pt c/o 8/10 headache after receiving pain meds (Dilaudid and Valium for HA) and requesting to rest. Will check back w/pt tomorrow morning for OOB mobility.      Alissa Means, PTA

## 2018-04-12 NOTE — PROGRESS NOTES
Bedside and Verbal shift change report given to Grady Bang RN (oncoming nurse) by Sintia Perez RN (offgoing nurse). Report included the following information SBAR, Kardex, OR Summary, Intake/Output, MAR and Recent Results.

## 2018-04-12 NOTE — PROGRESS NOTES
Problem: Mobility Impaired (Adult and Pediatric)  Goal: *Acute Goals and Plan of Care (Insert Text)  Physical Therapy Goals  Initiated 4/11/2018    1. Patient will move from supine to sit and sit to supine  in bed with supervision/set-up within 4 days. 2. Patient will perform sit to stand with supervision/set-up within 4 days. 3. Patient will ambulate with modified independence for 200 feet with the least restrictive device within 4 days. 4. Patient will ascend/descend 6 stairs with 1 handrail(s) with modified independence within 4 days. 5. Patient will verbalize and demonstrate understanding of spinal precautions (No bending, lifting greater than 5 lbs, or twisting; log-roll technique; frequent repositioning as instructed) within 4 days. physical Therapy TREATMENT  Patient: Johnnie He (56 y.o. male)  Date: 4/12/2018  Diagnosis: SPINAL STENOSIS, SPONDYLOLISTHESIS, DDD  L5-S1  Spondylolisthesis, lumbar region <principal problem not specified>  Procedure(s) (LRB):  L4-5 LUMBAR LAMINECTOMY, POSTERIOR SPINAL FUSION L4-S1 (N/A) 2 Days Post-Op  Precautions: Back  Chart, physical therapy assessment, plan of care and goals were reviewed. ASSESSMENT:  Pt continues to have a little bit of difficulty transferring from side-lying >sit, continues to need Mod A and additional time;reports worsening pain during tranfers yet improves once sitting upright. Pt Min A for standing at bedside to RW. Pt increased gait distance this AM, amb ~110feet using RW. PT amb w/ WBOS and somewhat flexed posture at hips. Encouraged upright posture and to keep RW close to JOE to prevent \"reaching out\" while amb w/ RW. Pt returned to chair at bedside;all needs met, items in reach. Will plan to progress mobility as able and appropriate this afternoon. Will also plan for stair training as pt plans to return home w/ fiance and HHPT.    Progression toward goals:  [x]      Improving appropriately and progressing toward goals  [] Improving slowly and progressing toward goals  []      Not making progress toward goals and plan of care will be adjusted     PLAN:  Patient continues to benefit from skilled intervention to address the above impairments. Continue treatment per established plan of care. Discharge Recommendations:  Home Health  Further Equipment Recommendations for Discharge:  RW vs TBD     SUBJECTIVE:   Patient stated The hardest part is getting up.  (referring to transfer from side-lying>sit)   The patient stated 3/3 back precautions. Reviewed all 3 with patient. OBJECTIVE DATA SUMMARY:   Critical Behavior:  Neurologic State: Alert  Orientation Level: Oriented X4  Cognition: Appropriate for age attention/concentration  Safety/Judgement: Awareness of environment, Insight into deficits  Functional Mobility Training:  Bed Mobility:  Log Rolling: Minimum assistance; Additional time;Assist x1;Other (comment) (pt using bed rail (R))  Supine to Sit: Moderate assistance; Additional time;Assist x1  Sit to Supine:  (NT- pt returned to chair at bedside)  Scooting: Stand-by assistance        Brace donned with  moderate assistance    Transfers:  Sit to Stand: Minimum assistance; Additional time;Assist x1  Stand to Sit: Contact guard assistance                             Balance:  Sitting: Intact  Standing: Intact; With support  Ambulation/Gait Training:  Distance (ft): 110 Feet (ft)  Assistive Device: Brace/Splint;Gait belt;Walker, rolling  Ambulation - Level of Assistance: Stand-by assistance;Contact guard assistance;Assist x1        Gait Abnormalities: Decreased step clearance;Trunk sway increased        Base of Support: Widened     Speed/Eliza: Pace decreased (<100 feet/min); Slow  Step Length: Left shortened;Right shortened                        Pain:  Pain Scale 1: Numeric (0 - 10)  Pain Intensity 1: 7  Pain Location 1: Back  Pain Orientation 1: Lower  Pain Description 1: Aching  Pain Intervention(s) 1: Medication (see MAR) (given dilaudid 4mg po)  Activity Tolerance:   Good  Please refer to the flowsheet for vital signs taken during this treatment.   After treatment:   [x]  Patient left in no apparent distress sitting up in chair  []  Patient left in no apparent distress in bed  [x]  Call bell left within reach  [x]  Nursing notified  []  Caregiver present  []  Bed alarm activated    COMMUNICATION/COLLABORATION:   The patients plan of care was discussed with: Registered Nurse    Medina Iverson,PTA   Time Calculation: 16 mins

## 2018-04-12 NOTE — PROGRESS NOTES
Occupational Therapy: defer    Chart reviewed, attempted to see patient for occupational therapy session. Patient received supine, c/o severe headache and back pain, requesting to rest, and politely declined participating in therapy despite encouragement. Notified RN. Will f/u as able and appropriate.     Omar Colon, OTR/L

## 2018-04-12 NOTE — PROGRESS NOTES
Bedside shift change report given to [facility] (oncoming nurse) by José Miguel Gordon (offgoing nurse). Report included the following information SBAR.

## 2018-04-12 NOTE — DIABETES MGMT
DTC Consult Note    Recommendations/ Comments: BG's in range 124 mg/dL - 164 mg/dL in the past 24 hours.  mg/dL today. POD 2 spinal surgery    If appropriate, please consider   Addition of Lantus 30 units each PM (he is on Tresiba 70 units BID at home)    Current hospital DM medication: lispro correction scale, normal sensitivity    Consult received for:  [x]             Assessment of home management                [x]      Post op ortho education                []             Meter/monitoring     []             Insulin instruction     []             New diagnosis     []             Outpatient education     []             Insulin pump patient     []             Insulin infusion     []             DKA/HHS    Chart reviewed and initial evaluation complete on Jerzy Lopes. .    Patient is a 48 y.o. male with history of DM with the following listed in PTA meds:  Tresiba 70 units BID  Apidra 7 units 3x/day with meals  Metformin  mg BID    BG monitoring at home 2x/day. He reports no readings < 80 mg/dL, no readings > 200 mg/dl      Assessed and instructed patient on the following:   · interpretation of lab results, A1c 7.3%. He staets it was 13%. He reports medication changes and dietary changes lowered the results. · blood sugar goals,   · Post op education including importance of controlling BG's, risk of infection, when to monitor and call MD  · complications of diabetes mellitus,   · medication - he takes it routinely  · Effects of stress, surgery, steroids explained,   · SMBG skills - he has meter, test strips and feels confident using them,   · nutrition - he avoids all sweetened beverages and sweet drinks, he limits starches. Guidelines given.   · Offered DTC class - he declined      Encouraged the following:   · Continue testing 2x/day  · Continue taking medications as prescribed  · F/U with PCP    Provided patient with the following: [x]             Survival skills education materials               [x]             Post op education                []             CHO counting education materials               [x]             Outpatient DTC contact number               []             Glucometer                 Discussed with patient and/or family need for follow up appointment for diabetes management after discharge. A1c:   No results found for: HBA1C, HGBE8, [id number]    Recent Glucose Results: Lab Results   Component Value Date/Time     (H) 04/12/2018 05:38 AM    GLUCPOC 152 (H) 04/12/2018 11:11 AM    GLUCPOC 164 (H) 04/12/2018 06:54 AM    GLUCPOC 124 (H) 04/11/2018 10:37 PM        Lab Results   Component Value Date/Time    Creatinine 1.34 (H) 04/12/2018 05:38 AM     Estimated Creatinine Clearance: 93.4 mL/min (based on Cr of 1.34). Active Orders   Diet    DIET GI LITE (POST SURGICAL) No Conc. Sweets        PO intake: Patient Vitals for the past 72 hrs:   % Diet Eaten   04/11/18 2034 10 %       Will continue to follow as needed. Thank you.   Nikolai Faulkner RN, CDE

## 2018-04-12 NOTE — PROGRESS NOTES
ORTHOPAEDIC LUMBAR FUSION PROGRESS NOTE    NAME: Jo Alvarenga :  1965   MRN:  [id number]   DATE:  2018    POD: 2 Days Post-Op  S/P: Procedure(s):  L4-5 LUMBAR LAMINECTOMY, POSTERIOR SPINAL FUSION L4-S1    SUBJECTIVE:    Patient found lying on right side in bed -states he had just finished with PT, walking around the hallway and sitting in the chair for 25 minutes and was feeling tired. Notes that pain seems to be easing with the change in pain medication. Afebrile/VSS. Denies nausea/vomiting, chest pain, headache or shortness of breath. States that he feels he is making progress, but knows it will be slow and painful. Discussed d/c plans to home, possible tomorrow pending overall progression. Recent Labs      18   0538   HGB  11.5*   NA  139   K  3.9   CL  106   CO2  25   BUN  21*   CREA  1.34*   GLU  139*     Patient Vitals for the past 12 hrs:   BP Temp Pulse Resp SpO2   18 0914 124/72 98.7 °F (37.1 °C) 87 16 97 %       EXAM:  Positive strength/ROM bilat lower ext.   Neuro intact  Dressings clean, dry and intact     PLAN:  Continue PT/OT -OOB  Pain management  Ice gel packs to back prn pain  D/c planning to home tomorrow, pending continued progression      Mal Tijerina PA-C

## 2018-04-12 NOTE — PROGRESS NOTES
Spiritual Care Partner Volunteer visited patient in Rm 539 on 4/12/18. Documented by:   Chaplain Mercado MDiv, MACE  [phone] PRAY [phone])

## 2018-04-13 VITALS
OXYGEN SATURATION: 99 % | SYSTOLIC BLOOD PRESSURE: 118 MMHG | HEIGHT: 71 IN | TEMPERATURE: 99.9 F | WEIGHT: 308.6 LBS | HEART RATE: 97 BPM | BODY MASS INDEX: 43.2 KG/M2 | DIASTOLIC BLOOD PRESSURE: 56 MMHG | RESPIRATION RATE: 18 BRPM

## 2018-04-13 LAB
GLUCOSE BLD STRIP.AUTO-MCNC: 142 MG/DL (ref 65–100)
GLUCOSE BLD STRIP.AUTO-MCNC: 157 MG/DL (ref 65–100)
GLUCOSE BLD STRIP.AUTO-MCNC: 195 MG/DL (ref 65–100)
SERVICE CMNT-IMP: ABNORMAL

## 2018-04-13 PROCEDURE — 77030012896

## 2018-04-13 PROCEDURE — 74011250637 HC RX REV CODE- 250/637: Performed by: PHYSICIAN ASSISTANT

## 2018-04-13 PROCEDURE — 97116 GAIT TRAINING THERAPY: CPT

## 2018-04-13 PROCEDURE — 74011636637 HC RX REV CODE- 636/637: Performed by: PHYSICIAN ASSISTANT

## 2018-04-13 PROCEDURE — 97530 THERAPEUTIC ACTIVITIES: CPT

## 2018-04-13 PROCEDURE — 97535 SELF CARE MNGMENT TRAINING: CPT

## 2018-04-13 PROCEDURE — 82962 GLUCOSE BLOOD TEST: CPT

## 2018-04-13 RX ORDER — OXYCODONE HYDROCHLORIDE 5 MG/1
5-10 TABLET ORAL
Status: DISCONTINUED | OUTPATIENT
Start: 2018-04-13 | End: 2018-04-13 | Stop reason: HOSPADM

## 2018-04-13 RX ORDER — OXYCODONE HYDROCHLORIDE 5 MG/1
5-10 TABLET ORAL
Qty: 60 TAB | Refills: 0 | Status: SHIPPED | OUTPATIENT
Start: 2018-04-13 | End: 2018-07-10 | Stop reason: SDUPTHER

## 2018-04-13 RX ORDER — DIAZEPAM 5 MG/1
5 TABLET ORAL
Qty: 40 TAB | Refills: 0 | Status: SHIPPED | OUTPATIENT
Start: 2018-04-13 | End: 2018-07-10 | Stop reason: ALTCHOICE

## 2018-04-13 RX ADMIN — Medication 10 ML: at 07:00

## 2018-04-13 RX ADMIN — LISINOPRIL 40 MG: 20 TABLET ORAL at 10:47

## 2018-04-13 RX ADMIN — ATORVASTATIN CALCIUM 40 MG: 40 TABLET, FILM COATED ORAL at 10:46

## 2018-04-13 RX ADMIN — BISACODYL 10 MG: 10 SUPPOSITORY RECTAL at 12:31

## 2018-04-13 RX ADMIN — ACETAMINOPHEN 650 MG: 325 TABLET, FILM COATED ORAL at 15:48

## 2018-04-13 RX ADMIN — OXYCODONE HYDROCHLORIDE 10 MG: 5 TABLET ORAL at 17:47

## 2018-04-13 RX ADMIN — DIAZEPAM 5 MG: 5 TABLET ORAL at 04:59

## 2018-04-13 RX ADMIN — POLYETHYLENE GLYCOL 3350 17 G: 17 POWDER, FOR SOLUTION ORAL at 10:46

## 2018-04-13 RX ADMIN — PREGABALIN 150 MG: 75 CAPSULE ORAL at 10:46

## 2018-04-13 RX ADMIN — Medication 10 ML: at 14:40

## 2018-04-13 RX ADMIN — CITALOPRAM HYDROBROMIDE 20 MG: 20 TABLET ORAL at 10:47

## 2018-04-13 RX ADMIN — AMLODIPINE BESYLATE 10 MG: 5 TABLET ORAL at 10:46

## 2018-04-13 RX ADMIN — HYDROMORPHONE HYDROCHLORIDE 4 MG: 4 TABLET ORAL at 05:00

## 2018-04-13 RX ADMIN — INSULIN LISPRO 2 UNITS: 100 INJECTION, SOLUTION INTRAVENOUS; SUBCUTANEOUS at 12:31

## 2018-04-13 RX ADMIN — PREGABALIN 150 MG: 75 CAPSULE ORAL at 15:48

## 2018-04-13 RX ADMIN — STANDARDIZED SENNA CONCENTRATE AND DOCUSATE SODIUM 1 TABLET: 8.6; 5 TABLET, FILM COATED ORAL at 10:46

## 2018-04-13 RX ADMIN — INSULIN LISPRO 2 UNITS: 100 INJECTION, SOLUTION INTRAVENOUS; SUBCUTANEOUS at 17:09

## 2018-04-13 RX ADMIN — OXYCODONE HYDROCHLORIDE 10 MG: 5 TABLET ORAL at 14:37

## 2018-04-13 RX ADMIN — STANDARDIZED SENNA CONCENTRATE AND DOCUSATE SODIUM 1 TABLET: 8.6; 5 TABLET, FILM COATED ORAL at 17:47

## 2018-04-13 RX ADMIN — OXYCODONE HYDROCHLORIDE 10 MG: 5 TABLET ORAL at 10:46

## 2018-04-13 RX ADMIN — INSULIN LISPRO 2 UNITS: 100 INJECTION, SOLUTION INTRAVENOUS; SUBCUTANEOUS at 07:00

## 2018-04-13 NOTE — PROGRESS NOTES
Physical Therapy  4/13/2018    Attempted to see pt this AM however pt currently gettng washed up w/ PCT assist. Will check back later this morning.      Mckenzie Ludwig

## 2018-04-13 NOTE — PROGRESS NOTES
ORTHOPAEDIC LUMBAR FUSION PROGRESS NOTE    NAME: Kenzie Gerber. :  1965   MRN:  [id number]   DATE:  2018    POD: 3 Days Post-Op  S/P: Procedure(s):  L4-5 LUMBAR LAMINECTOMY, POSTERIOR SPINAL FUSION L4-S1    SUBJECTIVE:    Patient found lying in bed, states pain management is improving but developing a headache secondary to dilaudid. Has been up, working and walking with PT/OT stating he is improving slowly. Afebrile/VSS. Denies nausea/vomiting, chest pain, headache or shortness of breath. Discussed d/c planning to home with Rx for Roxicodone and Valium -patient is in agreement. Recent Labs      18   0538   HGB  11.5*   NA  139   K  3.9   CL  106   CO2  25   BUN  21*   CREA  1.34*   GLU  139*     Patient Vitals for the past 12 hrs:   BP Temp Pulse Resp SpO2   18 0328 113/50 98.4 °F (36.9 °C) 90 20 94 %     Drain: removed    EXAM:  Positive strength/ROM bilat lower ext.   Neuro intact  Dressings clean, dry and intact     PLAN:  Continue PT/OT -OOB  Dilaudid d/c'd, will restart oxycodone and continue valium for pain management  D/c to home today   -Rx: Roxicodone and Valium on chart for d/c      Mal Tijerina PA-C

## 2018-04-13 NOTE — PROGRESS NOTES
Occupational Therapy: Patient initially received seated on toilet and attempting to have a bowel movement. Deferred his session. Returned to find patient in bed and awaiting suppository. He deferred sitting on edge of bed, stating \"I just laid down\". He was able to recall 3/3 back precautions and instructed on application of precautions to self care. States he is comfortable donning/doffing TLSO himself. Instructed on AE for LE self care along with explanation of raised toilet seats and tub seats (states he has a tub shower). He has already been issued handouts on above. Instructed on purchase information for above equipment. Patient reports he lives with [employer] who works during the day. Recommend HHOT.   RAZ Saleem/CHALO

## 2018-04-13 NOTE — PROGRESS NOTES
Problem: Mobility Impaired (Adult and Pediatric)  Goal: *Acute Goals and Plan of Care (Insert Text)  Physical Therapy Goals  Initiated 4/11/2018    1. Patient will move from supine to sit and sit to supine  in bed with supervision/set-up within 4 days. 2. Patient will perform sit to stand with supervision/set-up within 4 days. 3. Patient will ambulate with modified independence for 200 feet with the least restrictive device within 4 days. 4. Patient will ascend/descend 6 stairs with 1 handrail(s) with modified independence within 4 days. 5. Patient will verbalize and demonstrate understanding of spinal precautions (No bending, lifting greater than 5 lbs, or twisting; log-roll technique; frequent repositioning as instructed) within 4 days. physical Therapy TREATMENT  Patient: Sam Florian (56 y.o. male)  Date: 4/13/2018  Diagnosis: SPINAL STENOSIS, SPONDYLOLISTHESIS, DDD  L5-S1  Spondylolisthesis, lumbar region <principal problem not specified>  Procedure(s) (LRB):  L4-5 LUMBAR LAMINECTOMY, POSTERIOR SPINAL FUSION L4-S1 (N/A) 3 Days Post-Op  Precautions: Back  Chart, physical therapy assessment, plan of care and goals were reviewed. ASSESSMENT:  Pt w/ gradual improvements w/ mobility;reports increased stiffness this morning. Pt received in right side-lying, continues to need Min to complete side-lying>sit transfer; SBA for sit<>stand transfers from to/from chair and slightly elevated bed. Completed stair training; cleared 8 steps (R rail) using CGA using step to step technique. Required seated rest breaks x2 (before and after stair training d/t pain). Pt reported pain increasing to 9/10 after negotiating steps,also reported increased fatigue. Pt returned to chair at bedside all needs met,items in reach. Reviewed position changes/activity frequency w/pt. Pt reported that he will be alone most of the day until fiance gets home from work.  Appeared as if pt was under the impression that help will be sent out to his home to assist him. Clarified w/ pt that [address] (PT/NSG) will be visiting his home at least 2-3x/week and will not be there consistently. Encouraged pt to set up help/assist throughout the day at least until his fiance gets home from work. Pt reported that he will try to have his brother assist him during times fiance is not present. Progression toward goals:  [x]      Improving appropriately and progressing toward goals  []      Improving slowly and progressing toward goals  []      Not making progress toward goals and plan of care will be adjusted     PLAN:  Patient continues to benefit from skilled intervention to address the above impairments. Continue treatment per established plan of care. Discharge Recommendations:  Home Health  Further Equipment Recommendations for Discharge:  rolling walker     SUBJECTIVE:   Patient stated Those stairs.    The patient stated 3/3 back precautions. Reviewed all 3 with patient. OBJECTIVE DATA SUMMARY:   Critical Behavior:  Neurologic State: Alert  Orientation Level: Oriented X4  Cognition: Follows commands, Appropriate decision making, Appropriate for age attention/concentration, Appropriate safety awareness  Safety/Judgement: Awareness of environment, Insight into deficits  Functional Mobility Training:  Bed Mobility:  Log Rolling:  (received in side-lying (right))  Supine to Sit: Minimum assistance; Additional time  Sit to Supine:  (NT-pt returned to chair)  Scooting: Supervision        Brace donned with  maximal assistance   Transfers:  Sit to Stand: Stand-by assistance (slightly elevated bed; verbal cues-hand placement)  Stand to Sit: Stand-by assistance                             Balance:  Sitting: Intact  Standing: Intact; With support (RW)  Ambulation/Gait Training:  Distance (ft): 110 Feet (ft)  Assistive Device: Brace/Splint;Gait belt;Walker, rolling  Ambulation - Level of Assistance: Stand-by assistance        Gait Abnormalities: Decreased step clearance        Base of Support: Widened     Speed/Eliza: Pace decreased (<100 feet/min); Slow  Step Length: Left shortened;Right shortened                   Stairs:  Number of Stairs Trained: 8  Stairs - Level of Assistance: Contact guard assistance  Rail Use: Both  Therapeutic Exercises:     Pain:  Pain Scale 1: Numeric (0 - 10)  Pain Intensity 1: 7  Pain Location 1: Back  Pain Orientation 1: Lower; Posterior  Pain Description 1: Aching; Sore  Pain Intervention(s) 1: Distraction  Activity Tolerance:   Pt w/ increased pain after stair training, reported 9/10. RN aware  Please refer to the flowsheet for vital signs taken during this treatment.   After treatment:   [x]  Patient left in no apparent distress sitting up in chair  []  Patient left in no apparent distress in bed  [x]  Call bell left within reach  [x]  Nursing notified   []  Caregiver present  []  Bed alarm activated    COMMUNICATION/COLLABORATION:   The patients plan of care was discussed with: Registered Nurse    Sparkle Iverson PTA   Time Calculation: 23 mins

## 2018-04-13 NOTE — DISCHARGE INSTRUCTIONS
[facility], [facility]. Filipe Collier M.D. GORDY Johns PA-C  [id number]     Lumbar Surgery Discharge Instructions    Activities  1. You are going home a well person, be as active as possible. Your only exercise should be walking. Start with short frequent walks and increase your walking distance each day. Limit the amount of time you sit to 20-30 minute intervals. Sitting for prolonged periods of time will be uncomfortable for you following your surgery. 2. Do not lift anything over five pounds. 3. Do not do any straining, twisting or bending. 4. When you are in the bed, you may lay on your back or on either side. Do not lay on your stomach. Diet   You may resume your normal diet. If your throat is sore, you may want to eat soft foods for a few days. Be sure to drink plenty of fluids, it is important to keep yourself hydrated.  Avoid alcoholic beverages and tobacco products. Medications  1. Do not take anti-inflammatory medications or aspirin unless instructed by your physician. 2. Take your pain medication as directed. 3. It is important to have regular bowel movements. Pain medications may cause constipation. Stool softeners, prune juice, and increasing your water and fiber intake may help in preventing constipation. 4. Laxatives should only be used if the above preventative measures have failed and you still have not had a bowel movement after three days. Driving  You may not drive or return to work until instructed by your physician. However, you may ride in the car for short periods of time. Brace   If you have a back brace, you should wear your brace at all times when you are out of bed. Do not wear the brace while in bed or showering.  Remember to always wear a cotton t-shirt underneath your brace.  Do not bend or twist when your brace is off. Showering  1.  You may shower with the Prineo dressing in place - it is designed to be watertight. 2. Leave the dressing on during your shower allowing the water to run over it. 3. Reminder- your brace can be removed while showering. Remember to not bend or twist while your brace is off.    4. Do not take a tub bath. Caring for your incision  1. You have a new dressing applied to your incision called a Prineo dressing, covered with gauze. You should leave this dressing in place until you are seen in the office in two weeks. 2. You may have steri strips on your incision (small, white pieces of tape). Do not pull the steri strips; they will fall off on their own after several days. If you have sutures or staples, they will be removed when you see your physician. 3. Do not rub or apply any lotions or ointments to your incision site. Stockings  You have been given T.E.D. stockings to wear. Continue to wear these for 7 days after your discharge. Put them on in the morning and take them off at night. (You may require some assistance with this task as you may be restricted in your bending.)  They are used to increase your circulation and prevent blood clots from forming in your legs. Follow Up  Once you are home, call your physicians office to schedule an appointment for your two-week postoperative visit. Contact Dr. Jung Nazario at [phone], [phone]. Notify your physician if you develop any of the following conditions:  1. Fever above 101 degrees for 24 hours. 2. Nausea or vomiting. 3. Severe headache. 4. Inability to urinate. 5. Loss of bowel or bladder function. 6. Changes in sensation in your extremities (numbness, tingling, loss of color) that was not present before your surgery. 7. Severe pain or pain not relieved by medications. 8. Redness, swelling, or drainage from your incision. 9. Persistent pain in the chest or calf of either leg. 10. Increased weakness (if this is greater than before your surgery).       Kartik Burgzcuknsth-[phone] (after hours call [phone])  Dr. Gilmore Render

## 2018-04-13 NOTE — DISCHARGE SUMMARY
[facility]   [address] [id number]    DISCHARGE SUMMARY     Patient: Gary Cruz Medical Record Number: [id number]                : 1965  Age: 48 y.o. Admit Date: 4/10/2018  Discharge Date:   Admission Diagnosis: SPINAL STENOSIS, SPONDYLOLISTHESIS, DDD  L5-S1  Spondylolisthesis, lumbar region  Discharge Diagnosis: SPINAL STENOSIS, SPONDYLOLISTHESIS, DDD   L5-S1  Procedures: Procedure(s):  L4-5 LUMBAR LAMINECTOMY, POSTERIOR SPINAL FUSION L4-S1  Surgeon: JONATHAN Mueller MD  Assistants:  Betzy Tijerina PA-C  Anesthesia: general  Complications: None     History of Present Illness:  Gary Cruz is a 53-year-old gentleman weighing 308 pounds seen with complaints of back, hip and leg pain. Degree of symptoms and history of symptomatology consistent with that of lumbar stenosis. Imaging demonstrates severe facet arthrosis at L4-5 and notable disk degeneration L5-S1. He has a moderately severe stenosis L4-5 and foraminal disease at L5-S1. Given degree of symptomatology to improve with conservative measures a lumbar decompression and stabilization procedure offered. The potential benefits, complications were reviewed. Hospital Course:  Gary Cruz tolerated the procedure well. He was transferred to the Spinal Unit from the recovery room in stable condition. On postoperative day 1, the dressing was noted to be clean and dry, he was neurovascularly intact and afebrile, and his vital signs were stable. Hemoglobin was noted to be   Lab Results   Component Value Date/Time    HGB 11.5 (L) 2018 05:38 AM     The PCA pump was discontinued and the patient changed to PO pain medication. On postoperative day 2, Mr. Chantelle Machado pain medication was changed to address pain control, he made excellent progress with physical therapy and was discharged to Home in stable condition on postoperative day 3.   He was given routine postoperative instructions and advised to follow up in the office in 2 weeks following discharge home. He was given the following prescriptions for pain medications. Discharge Medications:     Current Discharge Medication List      START taking these medications    Details   oxyCODONE IR (ROXICODONE) 5 mg immediate release tablet Take 1-2 Tabs by mouth every four (4) hours as needed for Pain. Max Daily Amount: 60 mg.  Qty: 60 Tab, Refills: 0    Associated Diagnoses: Spondylolisthesis, lumbar region      diazePAM (VALIUM) 5 mg tablet Take 1 Tab by mouth every six (6) hours as needed (muscle spasms as directed). Max Daily Amount: 20 mg.  Qty: 40 Tab, Refills: 0    Associated Diagnoses: Spondylolisthesis, lumbar region         CONTINUE these medications which have NOT CHANGED    Details   amLODIPine (NORVASC) 10 mg tablet TAKE 1 TAB BY MOUTH DAILY. Qty: 30 Tab, Refills: 2    Comments: DX Code Needed  . Associated Diagnoses: Essential hypertension      insulin degludec (TRESIBA FLEXTOUCH U-100) 100 unit/mL (3 mL) inpn 70 Units by SubCUTAneous route two (2) times a day for 180 days. Qty: 126 mL, Refills: 1      albuterol (PROVENTIL HFA, VENTOLIN HFA, PROAIR HFA) 90 mcg/actuation inhaler Take 2 Puffs by inhalation every four (4) hours as needed for Wheezing.   Qty: 1 Inhaler, Refills: 0    Associated Diagnoses: Asthma, unspecified asthma severity, unspecified whether complicated, unspecified whether persistent      lisinopril (PRINIVIL, ZESTRIL) 40 mg tablet TAKE 1 TABLET BY MOUTH EVERY DAY  Qty: 90 Tab, Refills: 0    Associated Diagnoses: Essential hypertension      citalopram (CELEXA) 20 mg tablet TAKE 1 TABLET BY MOUTH EVERY DAY  Qty: 90 Tab, Refills: 0    Associated Diagnoses: Mild episode of recurrent major depressive disorder (HCC)      metFORMIN ER (GLUCOPHAGE XR) 500 mg tablet TAKE 1 TAB BY MOUTH DAILY WITH DINNER X 1 WEEK AND THEN INCREASE TO 2 TABS DAILY WITH DINNER  Qty: 60 Tab, Refills: 2    Associated Diagnoses: Uncontrolled type 2 diabetes mellitus with peripheral neuropathy (HCC)      atorvastatin (LIPITOR) 40 mg tablet TAKE 1 TABLET BY MOUTH EVERY DAY  Qty: 90 Tab, Refills: 0    Associated Diagnoses: Mixed hyperlipidemia; Uncontrolled type 2 diabetes mellitus with peripheral neuropathy (HCC)      LYRICA 150 mg capsule TAKE ONE CAPSULE BY MOUTH 3 TIMES A DAY  Refills: 0      insulin glulisine (APIDRA) 100 unit/mL injection 7 Units by SubCUTAneous route Before breakfast, lunch, and dinner. Qty: 18.9 mL, Refills: 0    Associated Diagnoses: Uncontrolled type 2 diabetes mellitus with peripheral neuropathy (HCC)      albuterol (PROVENTIL VENTOLIN) 2.5 mg /3 mL (0.083 %) nebulizer solution 3 mL by Nebulization route every four (4) hours as needed for Wheezing.   Qty: 24 Each, Refills: 0    Associated Diagnoses: Asthma, unspecified asthma severity, unspecified whether complicated, unspecified whether persistent         STOP taking these medications       traMADol (ULTRAM) 50 mg tablet Comments:   Reason for Stopping:         diclofenac (VOLTAREN) 1 % gel Comments:   Reason for Stopping:         HYDROcodone-acetaminophen (NORCO)  mg tablet Comments:   Reason for Stopping:         HYDROcodone-acetaminophen (NORCO) 5-325 mg per tablet Comments:   Reason for Stopping:                   Signed by: Stacy Tijerina PA-C  4/13/2018

## 2018-04-14 ENCOUNTER — HOME CARE VISIT (OUTPATIENT)
Dept: HOME HEALTH SERVICES | Facility: HOME HEALTH | Age: 53
End: 2018-04-14
Payer: MEDICAID

## 2018-04-14 VITALS
RESPIRATION RATE: 16 BRPM | TEMPERATURE: 99.9 F | HEART RATE: 96 BPM | OXYGEN SATURATION: 98 % | DIASTOLIC BLOOD PRESSURE: 74 MMHG | SYSTOLIC BLOOD PRESSURE: 130 MMHG

## 2018-04-14 PROCEDURE — 400013 HH SOC

## 2018-04-14 PROCEDURE — G0151 HHCP-SERV OF PT,EA 15 MIN: HCPCS

## 2018-04-14 NOTE — PROGRESS NOTES
Scripts for valium 5 mg and oxycodone 5 mg given. Removed IV with tip intact. Removed drain and changed dressing while educating fiance on hand hygiene, changing dressing, and signs of infection. Bag of dressing supplies given. Reviewed discharge instructions and follow up information with pt and fiance. Opportunities for questions given. Pt discharged for home in the care of fiance.

## 2018-04-16 ENCOUNTER — HOME CARE VISIT (OUTPATIENT)
Dept: SCHEDULING | Facility: HOME HEALTH | Age: 53
End: 2018-04-16
Payer: MEDICAID

## 2018-04-16 ENCOUNTER — HOME CARE VISIT (OUTPATIENT)
Dept: HOME HEALTH SERVICES | Facility: HOME HEALTH | Age: 53
End: 2018-04-16
Payer: MEDICAID

## 2018-04-16 VITALS
DIASTOLIC BLOOD PRESSURE: 75 MMHG | SYSTOLIC BLOOD PRESSURE: 118 MMHG | TEMPERATURE: 97.3 F | OXYGEN SATURATION: 97 % | HEART RATE: 82 BPM

## 2018-04-16 PROCEDURE — G0152 HHCP-SERV OF OT,EA 15 MIN: HCPCS

## 2018-04-17 ENCOUNTER — HOME CARE VISIT (OUTPATIENT)
Dept: SCHEDULING | Facility: HOME HEALTH | Age: 53
End: 2018-04-17
Payer: MEDICAID

## 2018-04-17 PROCEDURE — G0157 HHC PT ASSISTANT EA 15: HCPCS

## 2018-04-18 ENCOUNTER — HOME CARE VISIT (OUTPATIENT)
Dept: SCHEDULING | Facility: HOME HEALTH | Age: 53
End: 2018-04-18
Payer: MEDICAID

## 2018-04-18 VITALS
RESPIRATION RATE: 20 BRPM | TEMPERATURE: 97.7 F | HEART RATE: 98 BPM | SYSTOLIC BLOOD PRESSURE: 170 MMHG | DIASTOLIC BLOOD PRESSURE: 90 MMHG | OXYGEN SATURATION: 97 %

## 2018-04-18 VITALS
DIASTOLIC BLOOD PRESSURE: 82 MMHG | TEMPERATURE: 99.3 F | HEART RATE: 98 BPM | RESPIRATION RATE: 17 BRPM | OXYGEN SATURATION: 99 % | SYSTOLIC BLOOD PRESSURE: 140 MMHG

## 2018-04-18 VITALS
OXYGEN SATURATION: 95 % | HEART RATE: 86 BPM | DIASTOLIC BLOOD PRESSURE: 62 MMHG | RESPIRATION RATE: 16 BRPM | TEMPERATURE: 98.6 F | SYSTOLIC BLOOD PRESSURE: 130 MMHG

## 2018-04-18 PROCEDURE — G0157 HHC PT ASSISTANT EA 15: HCPCS

## 2018-04-18 PROCEDURE — G0152 HHCP-SERV OF OT,EA 15 MIN: HCPCS

## 2018-04-20 ENCOUNTER — HOME CARE VISIT (OUTPATIENT)
Dept: SCHEDULING | Facility: HOME HEALTH | Age: 53
End: 2018-04-20
Payer: MEDICAID

## 2018-04-20 VITALS
HEART RATE: 94 BPM | SYSTOLIC BLOOD PRESSURE: 138 MMHG | TEMPERATURE: 98.4 F | OXYGEN SATURATION: 98 % | DIASTOLIC BLOOD PRESSURE: 82 MMHG | RESPIRATION RATE: 17 BRPM

## 2018-04-20 PROCEDURE — G0157 HHC PT ASSISTANT EA 15: HCPCS

## 2018-04-23 ENCOUNTER — HOME CARE VISIT (OUTPATIENT)
Dept: SCHEDULING | Facility: HOME HEALTH | Age: 53
End: 2018-04-23
Payer: MEDICAID

## 2018-04-23 PROCEDURE — G0157 HHC PT ASSISTANT EA 15: HCPCS

## 2018-04-24 ENCOUNTER — HOME CARE VISIT (OUTPATIENT)
Dept: SCHEDULING | Facility: HOME HEALTH | Age: 53
End: 2018-04-24
Payer: MEDICAID

## 2018-04-24 VITALS
TEMPERATURE: 97.4 F | OXYGEN SATURATION: 97 % | RESPIRATION RATE: 18 BRPM | HEART RATE: 98 BPM | SYSTOLIC BLOOD PRESSURE: 126 MMHG | DIASTOLIC BLOOD PRESSURE: 86 MMHG

## 2018-04-24 VITALS
HEART RATE: 89 BPM | OXYGEN SATURATION: 99 % | RESPIRATION RATE: 17 BRPM | SYSTOLIC BLOOD PRESSURE: 130 MMHG | DIASTOLIC BLOOD PRESSURE: 68 MMHG | TEMPERATURE: 98.6 F

## 2018-04-24 PROCEDURE — G0152 HHCP-SERV OF OT,EA 15 MIN: HCPCS

## 2018-04-25 ENCOUNTER — HOME CARE VISIT (OUTPATIENT)
Dept: SCHEDULING | Facility: HOME HEALTH | Age: 53
End: 2018-04-25
Payer: MEDICAID

## 2018-04-25 VITALS
RESPIRATION RATE: 16 BRPM | DIASTOLIC BLOOD PRESSURE: 90 MMHG | TEMPERATURE: 98.8 F | OXYGEN SATURATION: 99 % | HEART RATE: 101 BPM | SYSTOLIC BLOOD PRESSURE: 140 MMHG

## 2018-04-25 PROCEDURE — G0151 HHCP-SERV OF PT,EA 15 MIN: HCPCS

## 2018-04-26 ENCOUNTER — HOME CARE VISIT (OUTPATIENT)
Dept: HOME HEALTH SERVICES | Facility: HOME HEALTH | Age: 53
End: 2018-04-26
Payer: MEDICAID

## 2018-04-26 ENCOUNTER — HOME CARE VISIT (OUTPATIENT)
Dept: SCHEDULING | Facility: HOME HEALTH | Age: 53
End: 2018-04-26
Payer: MEDICAID

## 2018-04-26 VITALS
HEART RATE: 90 BPM | TEMPERATURE: 98.2 F | RESPIRATION RATE: 18 BRPM | SYSTOLIC BLOOD PRESSURE: 150 MMHG | OXYGEN SATURATION: 97 % | DIASTOLIC BLOOD PRESSURE: 94 MMHG

## 2018-04-26 VITALS
TEMPERATURE: 98.2 F | OXYGEN SATURATION: 98 % | DIASTOLIC BLOOD PRESSURE: 92 MMHG | HEART RATE: 87 BPM | SYSTOLIC BLOOD PRESSURE: 134 MMHG | RESPIRATION RATE: 16 BRPM

## 2018-04-26 PROCEDURE — G0151 HHCP-SERV OF PT,EA 15 MIN: HCPCS

## 2018-04-26 PROCEDURE — A6402 STERILE GAUZE <= 16 SQ IN: HCPCS

## 2018-04-26 PROCEDURE — A4452 WATERPROOF TAPE: HCPCS

## 2018-04-26 PROCEDURE — G0152 HHCP-SERV OF OT,EA 15 MIN: HCPCS

## 2018-05-02 ENCOUNTER — TELEPHONE (OUTPATIENT)
Dept: FAMILY MEDICINE CLINIC | Age: 53
End: 2018-05-02

## 2018-05-02 NOTE — TELEPHONE ENCOUNTER
----- Message from Breanna Jamison sent at 5/2/2018 11:43 AM EDT -----  Regarding: [facility] Jone Owen([facility] stated that there were no prior authorization form included in pt's clinical notes, and requested a call back with the authorization or refax([phone]). Best contact number [phone].

## 2018-05-10 ENCOUNTER — DOCUMENTATION ONLY (OUTPATIENT)
Dept: FAMILY MEDICINE CLINIC | Age: 53
End: 2018-05-10

## 2018-05-10 ENCOUNTER — TELEPHONE (OUTPATIENT)
Dept: FAMILY MEDICINE CLINIC | Age: 53
End: 2018-05-10

## 2018-05-10 NOTE — TELEPHONE ENCOUNTER
Spoke with cover my med's and Medication was denied by insurance . Dr Betty Obando sent new medication to pharmacy.

## 2018-05-10 NOTE — TELEPHONE ENCOUNTER
----- Message from Vilma Arzate sent at 5/9/2018  4:28 PM EDT -----  Regarding: Dr. Sands Rings: [phone]  Ms. Belinda Puentes, with Cover My Med[employer] is requesting a phone call regarding a prior-authorization for the following medication \"Tresiba\". reference key [id number].

## 2018-05-28 DIAGNOSIS — E78.2 MIXED HYPERLIPIDEMIA: ICD-10-CM

## 2018-05-28 DIAGNOSIS — F33.0 MILD EPISODE OF RECURRENT MAJOR DEPRESSIVE DISORDER (HCC): ICD-10-CM

## 2018-05-29 RX ORDER — CITALOPRAM 20 MG/1
TABLET, FILM COATED ORAL
Qty: 90 TAB | Refills: 0 | Status: SHIPPED | OUTPATIENT
Start: 2018-05-29 | End: 2018-08-21 | Stop reason: SDUPTHER

## 2018-05-29 RX ORDER — METFORMIN HYDROCHLORIDE 500 MG/1
TABLET, EXTENDED RELEASE ORAL
Qty: 60 TAB | Refills: 2 | Status: SHIPPED | OUTPATIENT
Start: 2018-05-29 | End: 2018-09-25 | Stop reason: SDUPTHER

## 2018-05-29 RX ORDER — ATORVASTATIN CALCIUM 40 MG/1
TABLET, FILM COATED ORAL
Qty: 90 TAB | Refills: 0 | Status: SHIPPED | OUTPATIENT
Start: 2018-05-29 | End: 2018-09-15 | Stop reason: SDUPTHER

## 2018-06-12 ENCOUNTER — OFFICE VISIT (OUTPATIENT)
Dept: SLEEP MEDICINE | Age: 53
End: 2018-06-12

## 2018-06-12 VITALS
BODY MASS INDEX: 41.72 KG/M2 | SYSTOLIC BLOOD PRESSURE: 140 MMHG | OXYGEN SATURATION: 98 % | HEART RATE: 100 BPM | WEIGHT: 298 LBS | HEIGHT: 71 IN | DIASTOLIC BLOOD PRESSURE: 96 MMHG

## 2018-06-12 DIAGNOSIS — Z86.59 H/O MAJOR DEPRESSION: ICD-10-CM

## 2018-06-12 DIAGNOSIS — G47.33 OSA (OBSTRUCTIVE SLEEP APNEA): Primary | ICD-10-CM

## 2018-06-12 DIAGNOSIS — I10 ESSENTIAL HYPERTENSION: ICD-10-CM

## 2018-06-12 NOTE — PROGRESS NOTES
[facility] [place]. [place], [address]  Tel.  [phone]  Fax. [facility] [address]  [place], [address] [facility]  Tel.  [phone]  Fax. [phone] [facility]  [place]   Tel.  [phone]  Fax. [phone]         Subjective:      Leighton Mohan is an 48 y.o. male referred for evaluation for a sleep disorder. He complains of snoring associated with excessive daytime sleepiness. Symptoms began a few years ago, unchanged since that time. He usually can fall asleep in 5 minutes. Family or house members note snoring. He denies completely or partially paralyzed while falling asleep or waking up. Leighton Mohan does wake up frequently at night. He is not bothered by waking up too early and left unable to get back to sleep. He actually sleeps about 5 hours at night and wakes up about 3 times during the night. He does not work shifts:  William Bertrand indicates he does get too little sleep at night. His bedtime is 0700. He awakens at 80. He does not take naps. He has the following observed behaviors: Loud snoring, Light snoring;  . Other remarks:   He denies of an urge to move extremities due to discomfort or other sensation and denies of dream enactment behavior. No Known Allergies      Current Outpatient Prescriptions:     atorvastatin (LIPITOR) 40 mg tablet, TAKE 1 TABLET BY MOUTH EVERY DAY, Disp: 90 Tab, Rfl: 0    metFORMIN ER (GLUCOPHAGE XR) 500 mg tablet, TAKE 1 TAB BY MOUTH DAILY WITH DINNER X 1 WEEK AND THEN INCREASE TO 2 TABS DAILY WITH DINNER, Disp: 60 Tab, Rfl: 2    citalopram (CELEXA) 20 mg tablet, TAKE 1 TABLET BY MOUTH EVERY DAY, Disp: 90 Tab, Rfl: 0    insulin detemir U-100 (LEVEMIR FLEXTOUCH) 100 unit/mL (3 mL) inpn, 70 Units by SubCUTAneous route two (2) times a day for 90 days. , Disp: 42 mL, Rfl: 2    amLODIPine (NORVASC) 10 mg tablet, TAKE 1 TAB BY MOUTH DAILY. , Disp: 30 Tab, Rfl: 2    albuterol (PROVENTIL HFA, VENTOLIN HFA, PROAIR HFA) 90 mcg/actuation inhaler, Take 2 Puffs by inhalation every four (4) hours as needed for Wheezing., Disp: 1 Inhaler, Rfl: 0    albuterol (PROVENTIL VENTOLIN) 2.5 mg /3 mL (0.083 %) nebulizer solution, 3 mL by Nebulization route every four (4) hours as needed for Wheezing., Disp: 24 Each, Rfl: 0    lisinopril (PRINIVIL, ZESTRIL) 40 mg tablet, TAKE 1 TABLET BY MOUTH EVERY DAY, Disp: 90 Tab, Rfl: 0    LYRICA 150 mg capsule, TAKE ONE CAPSULE BY MOUTH 3 TIMES A DAY, Disp: , Rfl: 0    insulin glulisine (APIDRA) 100 unit/mL injection, 7 Units by SubCUTAneous route Before breakfast, lunch, and dinner. (Patient taking differently: 7 Units by SubCUTAneous route Before breakfast, lunch, and dinner. SLIDING SCALE), Disp: 18.9 mL, Rfl: 0    diclofenac (VOLTAREN) 1 % gel, Apply 2 g to affected area four (4) times daily. , Disp: , Rfl:     cyclobenzaprine (FLEXERIL) 10 mg tablet, Take 10 mg by mouth three (3) times daily as needed for Muscle Spasm(s). , Disp: , Rfl:     HYDROmorphone (DILAUDID) 2 mg tablet, Take 4 mg by mouth every eight (8) hours as needed for Pain., Disp: , Rfl:     docusate sodium (COLACE) 100 mg capsule, Take 100 mg by mouth two (2) times daily as needed for Constipation. , Disp: , Rfl:     HYDROmorphone (DILAUDID) 2 mg tablet, Take 2 mg by mouth every four (4) hours as needed for Pain., Disp: , Rfl:     oxyCODONE IR (ROXICODONE) 5 mg immediate release tablet, Take 1-2 Tabs by mouth every four (4) hours as needed for Pain. Max Daily Amount: 60 mg., Disp: 60 Tab, Rfl: 0    diazePAM (VALIUM) 5 mg tablet, Take 1 Tab by mouth every six (6) hours as needed (muscle spasms as directed). Max Daily Amount: 20 mg., Disp: 40 Tab, Rfl: 0     He  has a past medical history of Arthritis; Asthma (1995); Chronic bilateral low back pain without sciatica (11/6/2017); [facility] (11/6/2017);  Chronic pain; Essential hypertension (11/6/2017); GERD (gastroesophageal reflux disease); History of cardiac cath; Hypercholesterolemia; Mild episode of recurrent major depressive disorder ([facility] (11/06/2012); Morbid obesity ([facility] Sleep apnea; Stage 3 chronic kidney disease (11/06/2017); and Uncontrolled type 2 diabetes mellitus with peripheral neuropathy ([facility] (11/06/2011). He  has a past surgical history that includes hx colonoscopy (2013); hx urological (2015); hx hernia repair (1990); hx heart catheterization; hx knee arthroscopy (1999); hx knee arthroscopy (2002); and hx rotator cuff repair (Right, 2016). He family history includes Diabetes in his father, mother, sister, and sister; Heart Disease in his mother; Hypertension in his mother; Other (age of onset: 6) in his sister; Other (age of onset: 15) in his sister; Other (age of onset: 10) in his son; Other (age of onset: 9) in his brother; Other (age of onset: 5) in his sister; Pneumonia (age of onset: 67) in his father. There is no history of Anesth Problems. He  reports that he quit smoking about 8 years ago. He has a 28.50 pack-year smoking history. He has never used smokeless tobacco. He reports that he uses illicit drugs, including Marijuana and Cocaine. He reports that he does not drink alcohol.      Review of Systems:  Constitutional:  No significant weight loss or weight gain  Eyes:  No blurred vision  CVS:  No significant chest pain  Pulm:  No significant shortness of breath  GI:  No significant nausea or vomiting  :  No significant nocturia  Musculoskeletal:  No significant joint pain at night  Skin:  No significant rashes  Neuro:  No significant dizziness   Psych:  No active mood issues    Sleep Review of Systems: notable for no difficulty falling asleep; frequent awakenings at night;  regular dreaming not reported; no nightmares ; no early morning headaches; no memory problems; no concentration issues; no history of any automobile or occupational accidents due to daytime drowsiness. Objective:     Visit Vitals    BP (!) 140/96    Pulse 100    Ht 5' 11\" (1.803 m)    Wt 298 lb (135.2 kg)    SpO2 98%    BMI 41.56 kg/m2         General:   Not in acute distress   Eyes:  Anicteric sclerae, no obvious strabismus   Nose:  No obvious nasal septum deviation    Oropharynx:   Class 4 oropharyngeal outlet, thick tongue base, uvula could not be seen due to low-lying soft palate, narrow tonsilo-pharyngeal pilars   Tonsils:   tonsils are not seen due to low-lying soft palate   Neck:   Neck circ. in \"inches\": 19; midline trachea   Chest/Lungs:  Equal lung expansion, clear on auscultation    CVS:  Normal rate, regular rhythm; no JVD   Skin:  Warm to touch; no obvious rashes   Neuro:  No focal deficits ; no obvious tremor    Psych:  Normal affect,  normal countenance;          Assessment:       ICD-10-CM ICD-9-CM    1. SHIRA (obstructive sleep apnea) G47.33 327.23 SLEEP STUDY UNATTENDED, 4 CHANNEL   2. BMI 40.0-44.9, adult ([facility] Z68.41 V85.41    3. Essential hypertension I10 401.9    4. H/O major depression Z86.59 V11.8          Plan:     * The patient currently has a High Risk for having sleep apnea. STOP-BANG score 7.  * Sleep testing was ordered for initial evaluation. * He was provided information on sleep apnea including coresponding risk factors and the importance of proper treatment. * Treatment options if indicated were reviewed today. Patient agrees to a trial of PAP therapy if indicated. * Counseling was provided regarding proper sleep hygiene (including effect of light on sleep), paradoxical intention, stimulus control, sleep environment safety and safe driving. * Effect of sleep disturbance on weight was reviewed. We have recommended a dedicated weight loss through appropriate diet and an exercise regiment as significant weight reduction has been shown to reduce severity of obstructive sleep apnea.      * Patient agrees to telephone [phone]  follow-up by myself or lead sleep technologist shortly after sleep study to review results and plan final management.     (patient has given permission for a message to be left regarding test results and further management if patient cannot be cannot be reached directly). Thank you for allowing us to participate in your patient's medical care. We'll keep you updated on these investigations. Georgia Gabriel MD, FAASM  Electronically signed.  06/12/18

## 2018-06-12 NOTE — MR AVS SNAPSHOT
[address] [place], [place] [address] [address] [address] 
[phone] Patient: Sam Botello. MRN: [id number] :1965 Visit Information Date & Time Provider Department Dept. Phone Encounter #  
 2018  2:20 PM aDrin Hagan MD [address] [address] [id number] Follow-up Instructions Return if symptoms worsen or fail to improve. Follow-up and Disposition History Your Appointments 7/10/2018 10:30 AM  
ROUTINE CARE with Ana Luisa Weber MD  
[facility] at [facility]) Appt Note: 3 mon f/u  
 [facility] [address] [id number]  
[facility] Upcoming Health Maintenance Date Due  
 EYE EXAM RETINAL OR DILATED Q1 3/16/1977 FOBT Q 1 YEAR AGE 50-75 3/16/2017 Influenza Age 5 to Adult 2018 HEMOGLOBIN A1C Q6M 2018 FOOT EXAM Q1 2018 LIPID PANEL Q1 2018 MICROALBUMIN Q1 4/10/2019 DTaP/Tdap/Td series (3 - Td) 2028 Allergies as of 2018  Review Complete On: 2018 By: Darin Hagan MD  
 No Known Allergies Current Immunizations  Never Reviewed Name Date Hep A Vaccine 2016 12:00 AM  
 Hep A and Hep B Vaccine 2018 12:00 AM  
 Hep B Vaccine (Adult) 2016 12:00 AM, 2015 12:00 AM, 2014 12:00 AM  
 Influenza Vaccine 2018 12:00 AM, 2017, 10/8/2015 12:00 AM, 10/22/2014 12:00 AM, 10/29/2013 12:00 AM  
 Pneumococcal Polysaccharide (PPSV-23) 2017  1:40 PM, 10/8/2015 12:00 AM  
 Tdap 10/8/2015 12:00 AM  
  
 Not reviewed this visit You Were Diagnosed With   
  
 Codes Comments SHIRA (obstructive sleep apnea)    -  Primary ICD-10-CM: G47.33 
ICD-9-CM: 327.23 BMI 40.0-44.9, adult [facility])     ICD-10-CM: Z68.41 
ICD-9-CM: V85.41 Essential hypertension     ICD-10-CM: I10 
ICD-9-CM: 401.9 H/O major depression     ICD-10-CM: Z86.59 
ICD-9-CM: V11.8 Vitals BP Pulse Height(growth percentile) Weight(growth percentile) SpO2 BMI  
 (!) 140/96 100 5' 11\" (1.803 m) 298 lb (135.2 kg) 98% 41.56 kg/m2 Smoking Status Former Smoker Vitals History BMI and BSA Data Body Mass Index Body Surface Area 41.56 kg/m 2 2.6 m 2 Preferred Pharmacy Pharmacy Name Phone [id number] [place], [address] [phone] Your Updated Medication List  
  
   
This list is accurate as of 6/12/18  3:01 PM.  Always use your most recent med list.  
  
  
  
  
 * albuterol 90 mcg/actuation inhaler Commonly known as:  PROVENTIL HFA, VENTOLIN HFA, PROAIR HFA Take 2 Puffs by inhalation every four (4) hours as needed for Wheezing. * albuterol 2.5 mg /3 mL (0.083 %) nebulizer solution Commonly known as:  PROVENTIL VENTOLIN  
3 mL by Nebulization route every four (4) hours as needed for Wheezing. amLODIPine 10 mg tablet Commonly known as:  Nasreen Pall TAKE 1 TAB BY MOUTH DAILY. atorvastatin 40 mg tablet Commonly known as:  LIPITOR  
TAKE 1 TABLET BY MOUTH EVERY DAY  
  
 citalopram 20 mg tablet Commonly known as:  CELEXA  
TAKE 1 TABLET BY MOUTH EVERY DAY  
  
 cyclobenzaprine 10 mg tablet Commonly known as:  FLEXERIL Take 10 mg by mouth three (3) times daily as needed for Muscle Spasm(s). diazePAM 5 mg tablet Commonly known as:  VALIUM Take 1 Tab by mouth every six (6) hours as needed (muscle spasms as directed). Max Daily Amount: 20 mg.  
  
 diclofenac 1 % Gel Commonly known as:  VOLTAREN Apply 2 g to affected area four (4) times daily. docusate sodium 100 mg capsule Commonly known as:  Nobleton Marie Take 100 mg by mouth two (2) times daily as needed for Constipation. * HYDROmorphone 2 mg tablet Commonly known as:  DILAUDID  
 Take 2 mg by mouth every four (4) hours as needed for Pain. * HYDROmorphone 2 mg tablet Commonly known as:  DILAUDID Take 4 mg by mouth every eight (8) hours as needed for Pain. insulin detemir U-100 100 unit/mL (3 mL) Inpn Commonly known as:  LEVEMIR FLEXTOUCH  
70 Units by SubCUTAneous route two (2) times a day for 90 days. insulin glulisine U-100 100 unit/mL injection Commonly known as:  APIDRA U-100 INSULIN  
7 Units by SubCUTAneous route Before breakfast, lunch, and dinner. lisinopril 40 mg tablet Commonly known as:  PRINIVIL, ZESTRIL  
TAKE 1 TABLET BY MOUTH EVERY DAY  
  
 LYRICA 150 mg capsule Generic drug:  pregabalin TAKE ONE CAPSULE BY MOUTH 3 TIMES A DAY  
  
 metFORMIN  mg tablet Commonly known as:  GLUCOPHAGE XR  
TAKE 1 TAB BY MOUTH DAILY WITH DINNER X 1 WEEK AND THEN INCREASE TO 2 TABS DAILY WITH DINNER  
  
 oxyCODONE IR 5 mg immediate release tablet Commonly known as:  Sim Mukesh Take 1-2 Tabs by mouth every four (4) hours as needed for Pain. Max Daily Amount: 60 mg.  
  
 * Notice: This list has 4 medication(s) that are the same as other medications prescribed for you. Read the directions carefully, and ask your doctor or other care provider to review them with you. We Performed the Following SLEEP STUDY UNATTENDED, 4 CHANNEL [id number] CPT(R)] Follow-up Instructions Return if symptoms worsen or fail to improve. Patient Instructions 217 [facility], [place]. 16[facility], [facility] Tel.  [phone] Fax. 100 [facility] [address] [place], 200 [facility] Tel.  [phone] Fax. [phone] 3300 [facility] [address]  Tel.  [phone] Fax. [phone] Sleep Apnea: After Your Visit Your Care Instructions Sleep apnea occurs when you frequently stop breathing for 10 seconds or longer during sleep.  It can be mild to severe, based on the number of times per hour that you stop breathing or have slowed breathing. Blocked or narrowed airways in your nose, mouth, or throat can cause sleep apnea. Your airway can become blocked when your throat muscles and tongue relax during sleep. Sleep apnea is common, occurring in 1 out of 20 individuals. Individuals having any of the following characteristics should be evaluated and treated right away due to high risk and detrimental consequences from untreated sleep apnea: 
1. Obesity 2. Congestive Heart failure 3. Atrial Fibrillation 4. Uncontrolled Hypertension 5. Type II Diabetes 6. Night-time Arrhythmias 7. Stroke 8. Pulmonary Hypertension 9. High-risk Driving Populations (pilots, truck drivers, etc.) 10. Patients Considering Weight-loss Surgery How do you know you have sleep apnea? You probably have sleep apnea if you answer 'yes' to 3 or more of the following questions: S - Have you been told that you Snore? T - Are you often Tired during the day? O - Has anyone Observed you stop breathing while sleeping? P- Do you have (or are being treated for) high blood Pressure? B - Are you obese (Body Mass Index > 35)? A - Is your Age 48years old or older? N - Is your Neck size greater than 16 inches? G - Are you male Gender? A sleep physician can prescribe a breathing device that prevents tissues in the throat from blocking your airway. Or your doctor may recommend using a dental device (oral breathing device) to help keep your airway open. In some cases, surgery may be needed to remove enlarged tissues in the throat. Follow-up care is a key part of your treatment and safety. Be sure to make and go to all appointments, and call your doctor if you are having problems. It's also a good idea to know your test results and keep a list of the medicines you take. How can you care for yourself at home? · Lose weight, if needed. It may reduce the number of times you stop breathing or have slowed breathing. · Go to bed at the same time every night. · Sleep on your side. It may stop mild apnea. If you tend to roll onto your back, sew a pocket in the back of your pajama top. Put a tennis ball into the pocket, and stitch the pocket shut. This will help keep you from sleeping on your back. · Avoid alcohol and medicines such as sleeping pills and sedatives before bed. · Do not smoke. Smoking can make sleep apnea worse. If you need help quitting, talk to your doctor about stop-smoking programs and medicines. These can increase your chances of quitting for good. · Prop up the head of your bed 4 to 6 inches by putting bricks under the legs of the bed. · Treat breathing problems, such as a stuffy nose, caused by a cold or allergies. · Use a continuous positive airway pressure (CPAP) breathing machine if lifestyle changes do not help your apnea and your doctor recommends it. The machine keeps your airway from closing when you sleep. · If CPAP does not help you, ask your doctor whether you should try other breathing machines. A bilevel positive airway pressure machine has two types of air pressureâone for breathing in and one for breathing out. Another device raises or lowers air pressure as needed while you breathe. · If your nose feels dry or bleeds when using one of these machines, talk with your doctor about increasing moisture in the air. A humidifier may help. · If your nose is runny or stuffy from using a breathing machine, talk with your doctor about using decongestants or a corticosteroid nasal spray. When should you call for help? Watch closely for changes in your health, and be sure to contact your doctor if: 
· You still have sleep apnea even though you have made lifestyle changes. · You are thinking of trying a device such as CPAP. · You are having problems using a CPAP or similar machine. Where can you learn more? Go to DealExplorer.be. Enter Q409 in the search box to learn more about \"Sleep Apnea: After Your Visit. \"  
© 8213-6254 Healthwise, [employer]. Care instructions adapted under license by [employer] (which disclaims liability or warranty for this information). This care instruction is for use with your licensed healthcare professional. If you have questions about a medical condition or this instruction, always ask your healthcare professional. [employer] any warranty or liability for your use of this information. PROPER SLEEP HYGIENE What to avoid · Do not have drinks with caffeine, such as coffee or black tea, for 8 hours before bed. · Do not smoke or use other types of tobacco near bedtime. Nicotine is a stimulant and can keep you awake. · Avoid drinking alcohol late in the evening, because it can cause you to wake in the middle of the night. · Do not eat a big meal close to bedtime. If you are hungry, eat a light snack. · Do not drink a lot of water close to bedtime, because the need to urinate may wake you up during the night. · Do not read or watch TV in bed. Use the bed only for sleeping and sexual activity. What to try · Go to bed at the same time every night, and wake up at the same time every morning. Do not take naps during the day. · Keep your bedroom quiet, dark, and cool. · Get regular exercise, but not within 3 to 4 hours of your bedtime. Avila Cabrera · Sleep on a comfortable pillow and mattress. · If watching the clock makes you anxious, turn it facing away from you so you cannot see the time. · If you worry when you lie down, start a worry book. Well before bedtime, write down your worries, and then set the book and your concerns aside. · Try meditation or other relaxation techniques before you go to bed. · If you cannot fall asleep, get up and go to another room until you feel sleepy. Do something relaxing. Repeat your bedtime routine before you go to bed again. · Make your house quiet and calm about an hour before bedtime. Turn down the lights, turn off the TV, log off the computer, and turn down the volume on music. This can help you relax after a busy day. Drowsy Driving The [facility] cites drowsiness as a causing factor in more than 644,520 police reported crashes annually, resulting in 76,000 injuries and 1,500 deaths. Other surveys suggest 55% of people polled have driven while drowsy in the past year, 23% had fallen asleep but not crashed, 3% crashed, and 2% had and accident due to drowsy driving. Who is at risk? Young Drivers: One study of drowsy driving accidents states that 55% of the drivers were under 25 years. Of those, 75% were male. Shift Workers and Travelers: People who work overnight or travel across time zones frequently are at higher risk of experiencing Circadian Rhythm Disorders. They are trying to work and function when their body is programed to sleep. Sleep Deprived: Lack of sleep has a serious impact on your ability to pay attention or focus on a task. Consistently getting less than the average of 8 hours your body needs creates partial or cumulative sleep deprivation. Untreated Sleep Disorders: Sleep Apnea, Narcolepsy, R.L.S., and other sleep disorders (untreated) prevent a person from getting enough restful sleep. This leads to excessive daytime sleepiness and increases the risk for drowsy driving accidents by up to 7 times. Medications / Alcohol: Even over the counter medications can cause drowsiness. Medications that impair a drivers attention should have a warning label. Alcohol naturally makes you sleepy and on its own can cause accidents. Combined with excessive drowsiness its effects are amplified. Signs of Drowsy Driving: * You don't remember driving the last few miles * You may drift out of your mtich * You are unable to focus and your thoughts wander * You may yawn more often than normal 
 * You have difficulty keeping your eyes open / nodding off * Missing traffic signs, speeding, or tailgating Prevention-  
Good sleep hygiene, lifestyle and behavioral choices have the most impact on drowsy driving. There is no substitute for sleep and the average person requires 8 hours nightly. If you find yourself driving drowsy, stop and sleep. Consider the sleep hygiene tips provided during your visit as well. Medication Refill Policy: Refills for all medications require 1 week advance notice. Please have your pharmacy fax a refill request. We are unable to fax, or call in \"controled substance\" medications and you will need to pick these prescriptions up from our office. [employer] Activation Thank you for requesting access to [employer]. Please follow the instructions below to securely access and download your online medical record. [employer] allows you to send messages to your doctor, view your test results, renew your prescriptions, schedule appointments, and more. How Do I Sign Up? 1. In your internet browser, go to [email or web address]. 2. Click on the First Time User? Click Here link in the Sign In box. You will see the New Member Sign Up page. 3. Enter your [employer] Access Code exactly as it appears below. You will not need to use this code after youve completed the sign-up process. If you do not sign up before the expiration date, you must request a new code. [employer] Access Code: Activation code not generated Current [employer] Status: Active (This is the date your [employer] access code will ) 4. Enter the last four digits of your Social Security Number (xxxx) and Date of Birth (mm/dd/yyyy) as indicated and click Submit. You will be taken to the next sign-up page. 5. Create a [employer] ID. This will be your [employer] login ID and cannot be changed, so think of one that is secure and easy to remember. 6. Create a [employer] password. You can change your password at any time. 7. Enter your Password Reset Question and Answer. This can be used at a later time if you forget your password. 8. Enter your e-mail address. You will receive e-mail notification when new information is available in [address] 9. Click Sign Up. You can now view and download portions of your medical record. 10. Click the Download Summary menu link to download a portable copy of your medical information. Additional Information If you have questions, please call [phone]. Remember, [employer] is NOT to be used for urgent needs. For medical emergencies, dial 911. Introducing [facility]! Dear Carolee Herrera: 
Thank you for requesting a [employer] account. Our records indicate that you already have an active [employer] account. You can access your account anytime at [email or web address] Did you know that you can access your hospital and ER discharge instructions at any time in TVplus? You can also review all of your test results from your hospital stay or ER visit. Additional Information If you have questions, please visit the Frequently Asked Questions section of the [employer] website at [email or web address] Remember, [employer] is NOT to be used for urgent needs. For medical emergencies, dial 911. Now available from your iPhone and Android! Please provide this summary of care documentation to your next provider. Your primary care clinician is listed as Audelia Blackwood. If you have any questions after today's visit, please call [phone].

## 2018-06-12 NOTE — PATIENT INSTRUCTIONS
217 [facility] [place]. [place], [address]  Tel.  [phone]  Fax. 100 [facility] 60  [place], 200 [address] [facility]  Tel.  [phone]  Fax. [phone] [address]  Tel.  [phone]  Fax. [phone]     Sleep Apnea: After Your Visit  Your Care Instructions  Sleep apnea occurs when you frequently stop breathing for 10 seconds or longer during sleep. It can be mild to severe, based on the number of times per hour that you stop breathing or have slowed breathing. Blocked or narrowed airways in your nose, mouth, or throat can cause sleep apnea. Your airway can become blocked when your throat muscles and tongue relax during sleep. Sleep apnea is common, occurring in 1 out of 20 individuals. Individuals having any of the following characteristics should be evaluated and treated right away due to high risk and detrimental consequences from untreated sleep apnea:  1. Obesity  2. Congestive Heart failure  3. Atrial Fibrillation  4. Uncontrolled Hypertension  5. Type II Diabetes  6. Night-time Arrhythmias  7. Stroke  8. Pulmonary Hypertension  9. High-risk Driving Populations (pilots, truck drivers, etc.)  10. Patients Considering Weight-loss Surgery    How do you know you have sleep apnea? You probably have sleep apnea if you answer 'yes' to 3 or more of the following questions:  S - Have you been told that you Snore? T - Are you often Tired during the day? O - Has anyone Observed you stop breathing while sleeping? P- Do you have (or are being treated for) high blood Pressure? B - Are you obese (Body Mass Index > 35)? A - Is your Age 48years old or older? N - Is your Neck size greater than 16 inches? G - Are you male Gender? A sleep physician can prescribe a breathing device that prevents tissues in the throat from blocking your airway.  Or your doctor may recommend using a dental device (oral breathing device) to help keep your airway open. In some cases, surgery may be needed to remove enlarged tissues in the throat. Follow-up care is a key part of your treatment and safety. Be sure to make and go to all appointments, and call your doctor if you are having problems. It's also a good idea to know your test results and keep a list of the medicines you take. How can you care for yourself at home? · Lose weight, if needed. It may reduce the number of times you stop breathing or have slowed breathing. · Go to bed at the same time every night. · Sleep on your side. It may stop mild apnea. If you tend to roll onto your back, sew a pocket in the back of your pajama top. Put a tennis ball into the pocket, and stitch the pocket shut. This will help keep you from sleeping on your back. · Avoid alcohol and medicines such as sleeping pills and sedatives before bed. · Do not smoke. Smoking can make sleep apnea worse. If you need help quitting, talk to your doctor about stop-smoking programs and medicines. These can increase your chances of quitting for good. · Prop up the head of your bed 4 to 6 inches by putting bricks under the legs of the bed. · Treat breathing problems, such as a stuffy nose, caused by a cold or allergies. · Use a continuous positive airway pressure (CPAP) breathing machine if lifestyle changes do not help your apnea and your doctor recommends it. The machine keeps your airway from closing when you sleep. · If CPAP does not help you, ask your doctor whether you should try other breathing machines. A bilevel positive airway pressure machine has two types of air pressureâone for breathing in and one for breathing out. Another device raises or lowers air pressure as needed while you breathe. · If your nose feels dry or bleeds when using one of these machines, talk with your doctor about increasing moisture in the air. A humidifier may help.   · If your nose is runny or stuffy from using a breathing machine, talk with your doctor about using decongestants or a corticosteroid nasal spray. When should you call for help? Watch closely for changes in your health, and be sure to contact your doctor if:  · You still have sleep apnea even though you have made lifestyle changes. · You are thinking of trying a device such as CPAP. · You are having problems using a CPAP or similar machine. Where can you learn more? Go to [employer]. Enter Z696 in the search box to learn more about \"Sleep Apnea: After Your Visit. \"   © 0279-0929 Healthwise, [employer]. Care instructions adapted under license by Cindi Harris (which disclaims liability or warranty for this information). This care instruction is for use with your licensed healthcare professional. If you have questions about a medical condition or this instruction, always ask your healthcare professional. Nichole Townsend any warranty or liability for your use of this information. PROPER SLEEP HYGIENE    What to avoid  · Do not have drinks with caffeine, such as coffee or black tea, for 8 hours before bed. · Do not smoke or use other types of tobacco near bedtime. Nicotine is a stimulant and can keep you awake. · Avoid drinking alcohol late in the evening, because it can cause you to wake in the middle of the night. · Do not eat a big meal close to bedtime. If you are hungry, eat a light snack. · Do not drink a lot of water close to bedtime, because the need to urinate may wake you up during the night. · Do not read or watch TV in bed. Use the bed only for sleeping and sexual activity. What to try  · Go to bed at the same time every night, and wake up at the same time every morning. Do not take naps during the day. · Keep your bedroom quiet, dark, and cool. · Get regular exercise, but not within 3 to 4 hours of your bedtime. .  · Sleep on a comfortable pillow and mattress.   · If watching the clock makes you anxious, turn it facing away from you so you cannot see the time. · If you worry when you lie down, start a worry book. Well before bedtime, write down your worries, and then set the book and your concerns aside. · Try meditation or other relaxation techniques before you go to bed. · If you cannot fall asleep, get up and go to another room until you feel sleepy. Do something relaxing. Repeat your bedtime routine before you go to bed again. · Make your house quiet and calm about an hour before bedtime. Turn down the lights, turn off the TV, log off the computer, and turn down the volume on music. This can help you relax after a busy day. Drowsy Driving  The [address] [place] [address] Road Traffic Safety Administration cites drowsiness as a causing factor in more than 877,305 police reported crashes annually, resulting in 76,000 injuries and 1,500 deaths. Other surveys suggest 55% of people polled have driven while drowsy in the past year, 23% had fallen asleep but not crashed, 3% crashed, and 2% had and accident due to drowsy driving. Who is at risk? Young Drivers: One study of drowsy driving accidents states that 55% of the drivers were under 25 years. Of those, 75% were male. Shift Workers and Travelers: People who work overnight or travel across time zones frequently are at higher risk of experiencing Circadian Rhythm Disorders. They are trying to work and function when their body is programed to sleep. Sleep Deprived: Lack of sleep has a serious impact on your ability to pay attention or focus on a task. Consistently getting less than the average of 8 hours your body needs creates partial or cumulative sleep deprivation. Untreated Sleep Disorders: Sleep Apnea, Narcolepsy, R.L.S., and other sleep disorders (untreated) prevent a person from getting enough restful sleep. This leads to excessive daytime sleepiness and increases the risk for drowsy driving accidents by up to 7 times.   Medications / Alcohol: Even over the counter medications can cause drowsiness. Medications that impair a drivers attention should have a warning label. Alcohol naturally makes you sleepy and on its own can cause accidents. Combined with excessive drowsiness its effects are amplified. Signs of Drowsy Driving:   * You don't remember driving the last few miles   * You may drift out of your mitch   * You are unable to focus and your thoughts wander   * You may yawn more often than normal   * You have difficulty keeping your eyes open / nodding off   * Missing traffic signs, speeding, or tailgating  Prevention-   Good sleep hygiene, lifestyle and behavioral choices have the most impact on drowsy driving. There is no substitute for sleep and the average person requires 8 hours nightly. If you find yourself driving drowsy, stop and sleep. Consider the sleep hygiene tips provided during your visit as well. Medication Refill Policy: Refills for all medications require 1 week advance notice. Please have your pharmacy fax a refill request. We are unable to fax, or call in \"controled substance\" medications and you will need to pick these prescriptions up from our office. [employer] Activation    Thank you for requesting access to [employer]. Please follow the instructions below to securely access and download your online medical record. [employer] allows you to send messages to your doctor, view your test results, renew your prescriptions, schedule appointments, and more. How Do I Sign Up? 1. In your internet browser, go to [email or web address]. 2. Click on the First Time User? Click Here link in the Sign In box. You will see the New Member Sign Up page. 3. Enter your [employer] Access Code exactly as it appears below. You will not need to use this code after youve completed the sign-up process. If you do not sign up before the expiration date, you must request a new code. [employer] Access Code:  Activation code not generated  Current [employer] Status: Active (This is the date your [employer] access code will )    4. Enter the last four digits of your Social Security Number (xxxx) and Date of Birth (mm/dd/yyyy) as indicated and click Submit. You will be taken to the next sign-up page. 5. Create a [employer] ID. This will be your [employer] login ID and cannot be changed, so think of one that is secure and easy to remember. 6. Create a [employer] password. You can change your password at any time. 7. Enter your Password Reset Question and Answer. This can be used at a later time if you forget your password. 8. Enter your e-mail address. You will receive e-mail notification when new information is available in [address] 9. Click Sign Up. You can now view and download portions of your medical record. 10. Click the Download Summary menu link to download a portable copy of your medical information. Additional Information    If you have questions, please call [phone]. Remember, [employer] is NOT to be used for urgent needs. For medical emergencies, dial 911.

## 2018-06-15 ENCOUNTER — TELEPHONE (OUTPATIENT)
Dept: SLEEP MEDICINE | Age: 53
End: 2018-06-15

## 2018-06-28 ENCOUNTER — DOCUMENTATION ONLY (OUTPATIENT)
Dept: SLEEP MEDICINE | Age: 53
End: 2018-06-28

## 2018-07-05 ENCOUNTER — TELEPHONE (OUTPATIENT)
Dept: SLEEP MEDICINE | Age: 53
End: 2018-07-05

## 2018-07-05 NOTE — TELEPHONE ENCOUNTER
Called was made to provide patient with DME info and to schedule 1st adherence. Left voicemail requesting a call back.

## 2018-07-10 ENCOUNTER — OFFICE VISIT (OUTPATIENT)
Dept: FAMILY MEDICINE CLINIC | Age: 53
End: 2018-07-10

## 2018-07-10 VITALS
WEIGHT: 296 LBS | OXYGEN SATURATION: 97 % | BODY MASS INDEX: 41.44 KG/M2 | HEART RATE: 75 BPM | SYSTOLIC BLOOD PRESSURE: 146 MMHG | DIASTOLIC BLOOD PRESSURE: 93 MMHG | RESPIRATION RATE: 18 BRPM | TEMPERATURE: 97.3 F | HEIGHT: 71 IN

## 2018-07-10 DIAGNOSIS — G47.33 OSA (OBSTRUCTIVE SLEEP APNEA): ICD-10-CM

## 2018-07-10 DIAGNOSIS — M54.50 CHRONIC BILATERAL LOW BACK PAIN WITHOUT SCIATICA: ICD-10-CM

## 2018-07-10 DIAGNOSIS — E11.21 TYPE 2 DIABETES WITH NEPHROPATHY (HCC): Primary | ICD-10-CM

## 2018-07-10 DIAGNOSIS — M48.00 SPINAL STENOSIS, UNSPECIFIED SPINAL REGION: ICD-10-CM

## 2018-07-10 DIAGNOSIS — D64.9 POSTOPERATIVE ANEMIA: ICD-10-CM

## 2018-07-10 DIAGNOSIS — G89.29 CHRONIC BILATERAL LOW BACK PAIN WITHOUT SCIATICA: ICD-10-CM

## 2018-07-10 DIAGNOSIS — Z98.890 S/P LUMBAR LAMINECTOMY: ICD-10-CM

## 2018-07-10 LAB — HBA1C MFR BLD HPLC: 8.3 %

## 2018-07-10 RX ORDER — OXYCODONE AND ACETAMINOPHEN 10; 325 MG/1; MG/1
TABLET ORAL
Refills: 0 | COMMUNITY
Start: 2018-06-07 | End: 2018-07-10 | Stop reason: ALTCHOICE

## 2018-07-10 RX ORDER — DICLOFENAC SODIUM 75 MG/1
TABLET, DELAYED RELEASE ORAL
Refills: 0 | COMMUNITY
Start: 2018-06-18 | End: 2018-08-14 | Stop reason: ALTCHOICE

## 2018-07-10 RX ORDER — METHOCARBAMOL 500 MG/1
500 TABLET, FILM COATED ORAL
Qty: 30 TAB | Refills: 0 | Status: SHIPPED | OUTPATIENT
Start: 2018-07-10 | End: 2018-08-07 | Stop reason: SDUPTHER

## 2018-07-10 NOTE — MR AVS SNAPSHOT
[facility] [address] [facility] [address] [place] 203 [facility] 
[phone] Patient: Selma Solis. MRN: [id number] :1965 Visit Information Date & Time Provider Department Dept. Phone Encounter #  
 7/10/2018 10:30 AM Magalie Cornelius MD [facility] at [address] [id number] Follow-up Instructions Return in about 6 weeks (around 2018). Upcoming Health Maintenance Date Due  
 EYE EXAM RETINAL OR DILATED Q1 3/16/1977 FOBT Q 1 YEAR AGE 50-75 3/16/2017 Influenza Age 5 to Adult 2018 HEMOGLOBIN A1C Q6M 2018 FOOT EXAM Q1 2018 LIPID PANEL Q1 2018 MICROALBUMIN Q1 4/10/2019 DTaP/Tdap/Td series (3 - Td) 2028 Allergies as of 7/10/2018  Review Complete On: 7/10/2018 By: Marvel Dupont LPN No Known Allergies Current Immunizations  Never Reviewed Name Date Hep A Vaccine 2016 12:00 AM  
 Hep A and Hep B Vaccine 2018 12:00 AM  
 Hep B Vaccine (Adult) 2016 12:00 AM, 2015 12:00 AM, 2014 12:00 AM  
 Influenza Vaccine 2018 12:00 AM, 2017, 10/8/2015 12:00 AM, 10/22/2014 12:00 AM, 10/29/2013 12:00 AM  
 Pneumococcal Polysaccharide (PPSV-23) 2017  1:40 PM, 10/8/2015 12:00 AM  
 Tdap 10/8/2015 12:00 AM  
  
 Not reviewed this visit You Were Diagnosed With   
  
 Codes Comments Type 2 diabetes with nephropathy (HCC)    -  Primary ICD-10-CM: E11.21 
ICD-9-CM: 250.40, 583.81 Uncontrolled type 2 diabetes mellitus with peripheral neuropathy (HCC)     ICD-10-CM: E11.42, E11.65 ICD-9-CM: 250.62, 357.2 Chronic bilateral low back pain without sciatica     ICD-10-CM: M54.5, G89.29 ICD-9-CM: 724.2, 338.29 Spinal stenosis, unspecified spinal region     ICD-10-CM: M48.00 ICD-9-CM: 724.00 S/P lumbar laminectomy     ICD-10-CM: I74.618 ICD-9-CM: V45.89 Postoperative anemia     ICD-10-CM: D64.9 ICD-9-CM: 860. 9 Vitals BP Pulse Temp Resp Height(growth percentile) Weight(growth percentile) (!) 146/93 (BP 1 Location: Right arm, BP Patient Position: Sitting) 75 97.3 °F (36.3 °C) (Oral) 18 5' 11\" (1.803 m) 296 lb (134.3 kg) SpO2 BMI Smoking Status 97% 41.28 kg/m2 Former Smoker Vitals History BMI and BSA Data Body Mass Index Body Surface Area  
 41.28 kg/m 2 2.59 m 2 Preferred Pharmacy Pharmacy Name Phone [id number] [place], [address] [phone] Your Updated Medication List  
  
   
This list is accurate as of 7/10/18 12:36 PM.  Always use your most recent med list.  
  
  
  
  
 * albuterol 90 mcg/actuation inhaler Commonly known as:  PROVENTIL HFA, VENTOLIN HFA, PROAIR HFA Take 2 Puffs by inhalation every four (4) hours as needed for Wheezing. * albuterol 2.5 mg /3 mL (0.083 %) nebulizer solution Commonly known as:  PROVENTIL VENTOLIN  
3 mL by Nebulization route every four (4) hours as needed for Wheezing. amLODIPine 10 mg tablet Commonly known as:  Webb Cater TAKE 1 TAB BY MOUTH DAILY. atorvastatin 40 mg tablet Commonly known as:  LIPITOR  
TAKE 1 TABLET BY MOUTH EVERY DAY  
  
 citalopram 20 mg tablet Commonly known as:  CELEXA  
TAKE 1 TABLET BY MOUTH EVERY DAY  
  
 * diclofenac 1 % Gel Commonly known as:  VOLTAREN Apply 2 g to affected area four (4) times daily. * diclofenac EC 75 mg EC tablet Commonly known as:  VOLTAREN  
TAKE 1 TABLET BY MOUTH TWICE A DAY  
  
 docusate sodium 100 mg capsule Commonly known as:  Luwaglynn Berrios Take 100 mg by mouth two (2) times daily as needed for Constipation. insulin detemir U-100 100 unit/mL (3 mL) Inpn Commonly known as:  LEVEMIR FLEXTOUCH  
70 Units by SubCUTAneous route two (2) times a day for 90 days. insulin glulisine U-100 100 unit/mL injection Commonly known as:  APIDRA U-100 INSULIN  
7 Units by SubCUTAneous route Before breakfast, lunch, and dinner. lisinopril 40 mg tablet Commonly known as:  PRINIVIL, ZESTRIL  
TAKE 1 TABLET BY MOUTH EVERY DAY  
  
 LYRICA 150 mg capsule Generic drug:  pregabalin TAKE ONE CAPSULE BY MOUTH 3 TIMES A DAY  
  
 metFORMIN  mg tablet Commonly known as:  GLUCOPHAGE XR  
TAKE 1 TAB BY MOUTH DAILY WITH DINNER X 1 WEEK AND THEN INCREASE TO 2 TABS DAILY WITH DINNER  
  
 methocarbamol 500 mg tablet Commonly known as:  ROBAXIN Take 1 Tab by mouth nightly. * Notice: This list has 4 medication(s) that are the same as other medications prescribed for you. Read the directions carefully, and ask your doctor or other care provider to review them with you. Prescriptions Sent to Pharmacy Refills  
 methocarbamol (ROBAXIN) 500 mg tablet 0 Sig: Take 1 Tab by mouth nightly. Class: Normal  
 Pharmacy: [address] [place] [address], [address] Ph #: [phone] Route: Oral  
  
We Performed the Following AMB POC HEMOGLOBIN A1C [95348 CPT(R)] HEMOGLOBIN [id number] CPT(R)] METABOLIC PANEL, BASIC [95790 CPT(R)] Follow-up Instructions Return in about 6 weeks (around 8/21/2018). Introducing [facility]! Dear Steven Monet: 
Thank you for requesting a In Flow account. Our records indicate that you already have an active In Flow account. You can access your account anytime at [email or web address] Did you know that you can access your hospital and ER discharge instructions at any time in In Flow? You can also review all of your test results from your hospital stay or ER visit. Additional Information If you have questions, please visit the Frequently Asked Questions section of the In Flow website at [email or web address] Remember, MyChart is NOT to be used for urgent needs. For medical emergencies, dial 911. Now available from your iPhone and Android! Please provide this summary of care documentation to your next provider. Your primary care clinician is listed as Shiva Campbell. If you have any questions after today's visit, please call [phone].
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
## 2018-07-10 NOTE — PROGRESS NOTES
Chief Complaint   Patient presents with    Diabetes     3 month follow-up   1. Have you been to the ER, urgent care clinic since your last visit? Hospitalized since your last visit? No    2. Have you seen or consulted any other health care providers outside of the [address] [place] [address] since your last visit? Include any pap smears or colon screening.  No   Stopped lisinopril because he is taking diclofenac  Discuss refill diclofenac gel and tablets  Room 4

## 2018-07-14 DIAGNOSIS — I10 ESSENTIAL HYPERTENSION: ICD-10-CM

## 2018-07-15 RX ORDER — AMLODIPINE BESYLATE 10 MG/1
TABLET ORAL
Qty: 30 TAB | Refills: 2 | Status: SHIPPED | OUTPATIENT
Start: 2018-07-15 | End: 2018-10-06 | Stop reason: SDUPTHER

## 2018-07-15 RX ORDER — LISINOPRIL 40 MG/1
TABLET ORAL
Qty: 90 TAB | Refills: 0 | Status: SHIPPED | OUTPATIENT
Start: 2018-07-15 | End: 2018-10-07 | Stop reason: SDUPTHER

## 2018-07-31 ENCOUNTER — TELEPHONE (OUTPATIENT)
Dept: FAMILY MEDICINE CLINIC | Age: 53
End: 2018-07-31

## 2018-07-31 NOTE — TELEPHONE ENCOUNTER
----- Message from 56Meenakshi Jordan Smith sent at 7/31/2018 11:48 AM EDT -----  Regarding: Dr. Jaylen Del Rio, with [facility] requested confirmation of prescription that was faxed over on 7/24/18.   Best contact:[phone]

## 2018-08-01 ENCOUNTER — TELEPHONE (OUTPATIENT)
Dept: FAMILY MEDICINE CLINIC | Age: 53
End: 2018-08-01

## 2018-08-01 NOTE — TELEPHONE ENCOUNTER
----- Message from Nick Chen sent at 8/1/2018  2:18 PM EDT -----  Regarding: /Telephone  Jeri Stubbs, with [facility] requested confirmation of prescription that was faxed over on 7/31/18.    Best contact:[phone]

## 2018-08-01 NOTE — TELEPHONE ENCOUNTER
Left message regarding prescription for pain management. Per Dr Mireya Mills patient having pain management treat for pain.

## 2018-08-03 ENCOUNTER — TELEPHONE (OUTPATIENT)
Dept: FAMILY MEDICINE CLINIC | Age: 53
End: 2018-08-03

## 2018-08-03 NOTE — TELEPHONE ENCOUNTER
----- Message from Taylor Metz sent at 8/3/2018  3:27 PM EDT -----  Regarding: Dr. Lia Talamantes from [facility] would like to know if the pcp received a fax about a prescription(she did not mention) and wouldlike to know if the request can be faxed back.  Supplier: [phone]

## 2018-08-03 NOTE — TELEPHONE ENCOUNTER
Left message to call office but patient is prescribed pain medications through pain management physician.

## 2018-08-06 ENCOUNTER — TELEPHONE (OUTPATIENT)
Dept: FAMILY MEDICINE CLINIC | Age: 53
End: 2018-08-06

## 2018-08-06 NOTE — TELEPHONE ENCOUNTER
----- Message from Shannon Sprague sent at 8/6/2018  3:30 PM EDT -----  Regarding: Dr Radha Leiva ([facility]) requesting a call back concerning if office has received pt prescriptions request from them.  Best contact [phone]

## 2018-08-07 DIAGNOSIS — M48.00 SPINAL STENOSIS, UNSPECIFIED SPINAL REGION: ICD-10-CM

## 2018-08-07 DIAGNOSIS — Z98.890 S/P LUMBAR LAMINECTOMY: ICD-10-CM

## 2018-08-07 DIAGNOSIS — M54.50 CHRONIC BILATERAL LOW BACK PAIN WITHOUT SCIATICA: ICD-10-CM

## 2018-08-07 DIAGNOSIS — G89.29 CHRONIC BILATERAL LOW BACK PAIN WITHOUT SCIATICA: ICD-10-CM

## 2018-08-08 RX ORDER — METHOCARBAMOL 500 MG/1
TABLET, FILM COATED ORAL
Qty: 30 TAB | Refills: 0 | Status: SHIPPED | OUTPATIENT
Start: 2018-08-08 | End: 2018-08-14 | Stop reason: SDUPTHER

## 2018-08-08 RX ORDER — DICLOFENAC SODIUM 10 MG/G
GEL TOPICAL
Qty: 300 G | Refills: 0 | Status: SHIPPED | OUTPATIENT
Start: 2018-08-08 | End: 2018-08-14 | Stop reason: SDUPTHER

## 2018-08-14 ENCOUNTER — OFFICE VISIT (OUTPATIENT)
Dept: FAMILY MEDICINE CLINIC | Age: 53
End: 2018-08-14

## 2018-08-14 VITALS
BODY MASS INDEX: 41.55 KG/M2 | OXYGEN SATURATION: 95 % | HEIGHT: 71 IN | DIASTOLIC BLOOD PRESSURE: 89 MMHG | SYSTOLIC BLOOD PRESSURE: 139 MMHG | RESPIRATION RATE: 16 BRPM | TEMPERATURE: 99.6 F | WEIGHT: 296.8 LBS | HEART RATE: 102 BPM

## 2018-08-14 DIAGNOSIS — M48.00 SPINAL STENOSIS, UNSPECIFIED SPINAL REGION: ICD-10-CM

## 2018-08-14 DIAGNOSIS — G89.29 CHRONIC BILATERAL LOW BACK PAIN WITHOUT SCIATICA: ICD-10-CM

## 2018-08-14 DIAGNOSIS — Z98.890 S/P LUMBAR LAMINECTOMY: ICD-10-CM

## 2018-08-14 DIAGNOSIS — M17.0 PRIMARY OSTEOARTHRITIS OF BOTH KNEES: ICD-10-CM

## 2018-08-14 DIAGNOSIS — M54.50 CHRONIC BILATERAL LOW BACK PAIN WITHOUT SCIATICA: ICD-10-CM

## 2018-08-14 LAB — HBA1C MFR BLD HPLC: 8.1 %

## 2018-08-14 RX ORDER — CYCLOBENZAPRINE HCL 10 MG
TABLET ORAL
Refills: 0 | COMMUNITY
Start: 2018-07-27 | End: 2018-08-14 | Stop reason: ALTCHOICE

## 2018-08-14 RX ORDER — DICLOFENAC SODIUM 10 MG/G
GEL TOPICAL
Qty: 300 G | Refills: 2 | Status: SHIPPED | OUTPATIENT
Start: 2018-08-14 | End: 2019-10-28

## 2018-08-14 RX ORDER — METHOCARBAMOL 500 MG/1
TABLET, FILM COATED ORAL
Qty: 30 TAB | Refills: 2 | Status: SHIPPED | OUTPATIENT
Start: 2018-08-14 | End: 2018-11-25 | Stop reason: SDUPTHER

## 2018-08-14 RX ORDER — TRAMADOL HYDROCHLORIDE 50 MG/1
TABLET ORAL
Refills: 0 | COMMUNITY
Start: 2018-07-26 | End: 2018-08-14 | Stop reason: ALTCHOICE
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
## 2018-08-14 NOTE — MR AVS SNAPSHOT
[address] 
[phone] Patient: Nikole . MRN: [id number] :[id number] Visit Information Date & Time Provider Department Dept. Phone Encounter #  
 2018  2:30 PM Lenora Mtz MD [facility] at [address] [place] [address] [id number] Follow-up Instructions Return in about 3 months (around 2018), or if symptoms worsen or fail to improve, for Regular Follow up. Your Appointments 10/23/2018  2:00 PM  
Any with Dusty Wheeler MD  
[facility] ([facility]) Appt Note: 1st adherence [place] [address] [id number] Upcoming Health Maintenance Date Due  
 EYE EXAM RETINAL OR DILATED Q1 3/16/1977 FOBT Q 1 YEAR AGE 50-75 3/16/2017 Influenza Age 5 to Adult 2018* FOOT EXAM Q1 2018 LIPID PANEL Q1 2018 HEMOGLOBIN A1C Q6M 1/10/2019 MICROALBUMIN Q1 4/10/2019 DTaP/Tdap/Td series (3 - Td) 2028 *Topic was postponed. The date shown is not the original due date. Allergies as of 2018  Review Complete On: 2018 By: Lenora Mtz MD  
 No Known Allergies Current Immunizations  Never Reviewed Name Date Hep A Vaccine 2016 12:00 AM  
 Hep A and Hep B Vaccine 2018 12:00 AM  
 Hep B Vaccine (Adult) 2016 12:00 AM, 2015 12:00 AM, 2014 12:00 AM  
 Influenza Vaccine 2018 12:00 AM, 2017, 10/8/2015 12:00 AM, 10/22/2014 12:00 AM, 10/29/2013 12:00 AM  
 Pneumococcal Polysaccharide (PPSV-23) 2017  1:40 PM, 10/8/2015 12:00 AM  
 Tdap 10/8/2015 12:00 AM  
  
 Not reviewed this visit You Were Diagnosed With   
  
 Codes Comments  Uncontrolled type 2 diabetes mellitus with peripheral neuropathy (HCC)    -  Primary ICD-10-CM: E11.42, E11.65 
 ICD-9-CM: 250.62, 357.2 Chronic bilateral low back pain without sciatica     ICD-10-CM: M54.5, G89.29 ICD-9-CM: 724.2, 338.29 Spinal stenosis, unspecified spinal region     ICD-10-CM: M48.00 ICD-9-CM: 724.00 S/P lumbar laminectomy     ICD-10-CM: M73.729 ICD-9-CM: V45.89 Primary osteoarthritis of both knees     ICD-10-CM: M17.0 ICD-9-CM: 715.16 BMI 40.0-44.9, adult [facility])     ICD-10-CM: Z68.41 
ICD-9-CM: V85.41 Vitals BP Pulse Temp Resp Height(growth percentile) Weight(growth percentile) 139/89 (BP 1 Location: Left arm, BP Patient Position: Sitting) (!) 102 99.6 °F (37.6 °C) (Oral) 16 5' 11\" (1.803 m) 296 lb 12.8 oz (134.6 kg) SpO2 BMI Smoking Status 95% 41.4 kg/m2 Former Smoker BMI and BSA Data Body Mass Index Body Surface Area  
 41.4 kg/m 2 2.6 m 2 Preferred Pharmacy Pharmacy Name Phone [id number]- [place], [address] [phone] Your Updated Medication List  
  
   
This list is accurate as of 8/14/18  3:55 PM.  Always use your most recent med list.  
  
  
  
  
 * albuterol 90 mcg/actuation inhaler Commonly known as:  PROVENTIL HFA, VENTOLIN HFA, PROAIR HFA Take 2 Puffs by inhalation every four (4) hours as needed for Wheezing. * albuterol 2.5 mg /3 mL (0.083 %) nebulizer solution Commonly known as:  PROVENTIL VENTOLIN  
3 mL by Nebulization route every four (4) hours as needed for Wheezing. amLODIPine 10 mg tablet Commonly known as:  Sangeeta Shandra TAKE 1 TABLET BY MOUTH DAILY. atorvastatin 40 mg tablet Commonly known as:  LIPITOR  
TAKE 1 TABLET BY MOUTH EVERY DAY  
  
 citalopram 20 mg tablet Commonly known as:  CELEXA  
TAKE 1 TABLET BY MOUTH EVERY DAY  
  
 diclofenac 1 % Gel Commonly known as:  VOLTAREN  
APPLY TO AFFECTED AREA FOUR (4) TIMES DAILY AS NEEDED. docusate sodium 100 mg capsule Commonly known as:  Jarrell Cash  
 Take 100 mg by mouth two (2) times daily as needed for Constipation. insulin detemir U-100 100 unit/mL (3 mL) Inpn Commonly known as:  LEVEMIR FLEXTOUCH  
65 Units by SubCUTAneous route two (2) times a day for 90 days. insulin glulisine U-100 100 unit/mL injection Commonly known as:  APIDRA U-100 INSULIN  
7 Units by SubCUTAneous route Before breakfast, lunch, and dinner. Liraglutide 0.6 mg/0.1 mL (18 mg/3 mL) Pnij Commonly known as:  VICTOZA  
0.6 mg by SubCUTAneous route daily (with breakfast). lisinopril 40 mg tablet Commonly known as:  PRINIVIL, ZESTRIL  
TAKE 1 TABLET BY MOUTH EVERY DAY  
  
 LYRICA 150 mg capsule Generic drug:  pregabalin TAKE ONE CAPSULE BY MOUTH 3 TIMES A DAY  
  
 metFORMIN  mg tablet Commonly known as:  GLUCOPHAGE XR  
TAKE 1 TAB BY MOUTH DAILY WITH DINNER X 1 WEEK AND THEN INCREASE TO 2 TABS DAILY WITH DINNER  
  
 methocarbamol 500 mg tablet Commonly known as:  ROBAXIN  
TAKE 1 TABLET BY MOUTH EVERY DAY AT NIGHT * Notice: This list has 2 medication(s) that are the same as other medications prescribed for you. Read the directions carefully, and ask your doctor or other care provider to review them with you. Prescriptions Sent to Pharmacy Refills  
 insulin detemir U-100 (LEVEMIR FLEXTOUCH) 100 unit/mL (3 mL) inpn 2 Si Units by SubCUTAneous route two (2) times a day for 90 days. Class: Normal  
 Pharmacy: [address] [place] [address], [address] Ph #: [phone] Route: SubCUTAneous  
 diclofenac (VOLTAREN) 1 % gel 2 Sig: APPLY TO AFFECTED AREA FOUR (4) TIMES DAILY AS NEEDED. Class: Normal  
 Pharmacy: [facility]/pharmacy #[id number], [address] Ph #: [phone]  
 methocarbamol (ROBAXIN) 500 mg tablet 2 Sig: TAKE 1 TABLET BY MOUTH EVERY DAY AT NIGHT  Class: Normal  
 Pharmacy: [facility] [place], [address]  #: [phone] Liraglutide (VICTOZA) 0.6 mg/0.1 mL (18 mg/3 mL) pnij 2 Si.6 mg by SubCUTAneous route daily (with breakfast). Class: Normal  
 Pharmacy: [facility] [place], [address]  #: [phone] Route: SubCUTAneous We Performed the Following AMB POC HEMOGLOBIN A1C [55387 CPT(R)] Follow-up Instructions Return in about 3 months (around 2018), or if symptoms worsen or fail to improve, for Regular Follow up. Patient Instructions We are starting Victoza (a diabetes medication that you will inject daily). This will help with your blood sugars and help with weight loss. You may start seeing lower blood sugars so you can drop your Levemir down to 60 units twice daily when you start Victoza. Introducing [facility]! Dear Rasta Cross: 
Thank you for requesting a [employer] account. Our records indicate that you already have an active [employer] account. You can access your account anytime at [email or web address] Did you know that you can access your hospital and ER discharge instructions at any time in [employer]? You can also review all of your test results from your hospital stay or ER visit. Additional Information If you have questions, please visit the Frequently Asked Questions section of the [employer] website at [email or web address] Remember, [employer] is NOT to be used for urgent needs. For medical emergencies, dial 911. Now available from your iPhone and Android! Please provide this summary of care documentation to your next provider. Your primary care clinician is listed as Duane Lancaster. If you have any questions after today's visit, please call [phone].

## 2018-08-14 NOTE — PATIENT INSTRUCTIONS
We are starting Victoza (a diabetes medication that you will inject daily). This will help with your blood sugars and help with weight loss. You may start seeing lower blood sugars so you can drop your Levemir down to 60 units twice daily when you start Victoza.

## 2018-08-14 NOTE — PROGRESS NOTES
Chief Complaint   Patient presents with    Diabetes      1 month follow-up     1. Have you been to the ER, urgent care clinic since your last visit? Hospitalized since your last visit? No    2. Have you seen or consulted any other health care providers outside of the [address] [place] [address] since your last visit? Include any pap smears or colon screening.  Yes Where: Dr Amando Hinds pain management ,Dr Cammy Hunyh 8

## 2018-08-21 DIAGNOSIS — F33.0 MILD EPISODE OF RECURRENT MAJOR DEPRESSIVE DISORDER (HCC): ICD-10-CM

## 2018-08-22 RX ORDER — CITALOPRAM 20 MG/1
TABLET, FILM COATED ORAL
Qty: 90 TAB | Refills: 0 | Status: SHIPPED | OUTPATIENT
Start: 2018-08-22 | End: 2018-12-17 | Stop reason: SDUPTHER

## 2018-09-15 DIAGNOSIS — E78.2 MIXED HYPERLIPIDEMIA: ICD-10-CM

## 2018-09-17 RX ORDER — ATORVASTATIN CALCIUM 40 MG/1
TABLET, FILM COATED ORAL
Qty: 90 TAB | Refills: 0 | Status: SHIPPED | OUTPATIENT
Start: 2018-09-17 | End: 2018-12-21 | Stop reason: SDUPTHER

## 2018-09-25 RX ORDER — METFORMIN HYDROCHLORIDE 500 MG/1
TABLET, EXTENDED RELEASE ORAL
Qty: 60 TAB | Refills: 2 | Status: SHIPPED | OUTPATIENT
Start: 2018-09-25 | End: 2018-12-29 | Stop reason: SDUPTHER

## 2018-10-06 DIAGNOSIS — I10 ESSENTIAL HYPERTENSION: ICD-10-CM

## 2018-10-07 DIAGNOSIS — I10 ESSENTIAL HYPERTENSION: ICD-10-CM

## 2018-10-07 RX ORDER — AMLODIPINE BESYLATE 10 MG/1
TABLET ORAL
Qty: 30 TAB | Refills: 2 | Status: SHIPPED | OUTPATIENT
Start: 2018-10-07 | End: 2019-01-13 | Stop reason: SDUPTHER

## 2018-10-07 RX ORDER — LISINOPRIL 40 MG/1
TABLET ORAL
Qty: 90 TAB | Refills: 0 | Status: SHIPPED | OUTPATIENT
Start: 2018-10-07 | End: 2019-01-19 | Stop reason: SDUPTHER

## 2018-10-20 NOTE — TELEPHONE ENCOUNTER
Patient advised refill was sent to pharmacy 11/6/17. Any further concerns have pharmacy call office. None

## 2018-11-14 ENCOUNTER — OFFICE VISIT (OUTPATIENT)
Dept: FAMILY MEDICINE CLINIC | Age: 53
End: 2018-11-14

## 2018-11-14 VITALS
DIASTOLIC BLOOD PRESSURE: 83 MMHG | SYSTOLIC BLOOD PRESSURE: 136 MMHG | WEIGHT: 298 LBS | OXYGEN SATURATION: 94 % | HEIGHT: 71 IN | HEART RATE: 86 BPM | RESPIRATION RATE: 18 BRPM | TEMPERATURE: 98.3 F | BODY MASS INDEX: 41.72 KG/M2

## 2018-11-14 DIAGNOSIS — M54.50 CHRONIC BILATERAL LOW BACK PAIN WITHOUT SCIATICA: ICD-10-CM

## 2018-11-14 DIAGNOSIS — I10 ESSENTIAL HYPERTENSION: ICD-10-CM

## 2018-11-14 DIAGNOSIS — G89.29 CHRONIC BILATERAL LOW BACK PAIN WITHOUT SCIATICA: ICD-10-CM

## 2018-11-14 DIAGNOSIS — M17.0 PRIMARY OSTEOARTHRITIS OF BOTH KNEES: ICD-10-CM

## 2018-11-14 DIAGNOSIS — M48.00 SPINAL STENOSIS, UNSPECIFIED SPINAL REGION: ICD-10-CM

## 2018-11-14 DIAGNOSIS — Z79.899 ENCOUNTER FOR LONG-TERM (CURRENT) USE OF MEDICATIONS: ICD-10-CM

## 2018-11-14 DIAGNOSIS — Z98.890 S/P LUMBAR LAMINECTOMY: ICD-10-CM

## 2018-11-14 PROBLEM — E11.40 TYPE 2 DIABETES MELLITUS WITH DIABETIC NEUROPATHY (HCC): Status: ACTIVE | Noted: 2018-11-14

## 2018-11-14 LAB — HBA1C MFR BLD HPLC: 7.7 %

## 2018-11-14 NOTE — PROGRESS NOTES
Chief Complaint Patient presents with  Diabetes 3 month follow up 1. Have you been to the ER, urgent care clinic since your last visit? Hospitalized since your last visit? No 
 
2. Have you seen or consulted any other health care providers outside of the [facility] since your last visit? Include any pap smears or colon screening. No 
 
Diabetic follow up, foot exam. 
Patient states burning sensation down butt and legs since infusion.
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
## 2018-11-15 NOTE — TELEPHONE ENCOUNTER
----- Message from Kera Downs sent at 11/15/2018  1:57 PM EST -----  Regarding: Dr. Marie Spain  Pt requesting for RX to be resent to [address] [place] [address] on [facility] due to [facility] being unable to fill his Rx due to being unable to open package. Best contact [phone].

## 2018-11-25 DIAGNOSIS — M54.50 CHRONIC BILATERAL LOW BACK PAIN WITHOUT SCIATICA: ICD-10-CM

## 2018-11-25 DIAGNOSIS — Z98.890 S/P LUMBAR LAMINECTOMY: ICD-10-CM

## 2018-11-25 DIAGNOSIS — M48.00 SPINAL STENOSIS, UNSPECIFIED SPINAL REGION: ICD-10-CM

## 2018-11-25 DIAGNOSIS — G89.29 CHRONIC BILATERAL LOW BACK PAIN WITHOUT SCIATICA: ICD-10-CM

## 2018-11-27 RX ORDER — METHOCARBAMOL 500 MG/1
TABLET, FILM COATED ORAL
Qty: 30 TAB | Refills: 2 | Status: SHIPPED | OUTPATIENT
Start: 2018-11-27 | End: 2019-02-20 | Stop reason: ALTCHOICE

## 2018-12-04 LAB
ALBUMIN SERPL-MCNC: 4.4 G/DL (ref 3.5–5.5)
ALBUMIN/GLOB SERPL: 1.4 {RATIO} (ref 1.2–2.2)
ALP SERPL-CCNC: 86 IU/L (ref 39–117)
ALT SERPL-CCNC: 21 IU/L (ref 0–44)
AST SERPL-CCNC: 16 IU/L (ref 0–40)
BILIRUB SERPL-MCNC: 0.4 MG/DL (ref 0–1.2)
BUN SERPL-MCNC: 12 MG/DL (ref 6–24)
BUN/CREAT SERPL: 8 (ref 9–20)
CALCIUM SERPL-MCNC: 9.9 MG/DL (ref 8.7–10.2)
CHLORIDE SERPL-SCNC: 102 MMOL/L (ref 96–106)
CHOLEST SERPL-MCNC: 116 MG/DL (ref 100–199)
CO2 SERPL-SCNC: 21 MMOL/L (ref 20–29)
CREAT SERPL-MCNC: 1.48 MG/DL (ref 0.76–1.27)
GLOBULIN SER CALC-MCNC: 3.1 G/DL (ref 1.5–4.5)
GLUCOSE SERPL-MCNC: 272 MG/DL (ref 65–99)
HCT VFR BLD AUTO: 44.8 % (ref 37.5–51)
HDLC SERPL-MCNC: 26 MG/DL
HGB BLD-MCNC: 14.9 G/DL (ref 13–17.7)
INTERPRETATION: NORMAL
LDLC SERPL CALC-MCNC: 59 MG/DL (ref 0–99)
Lab: NORMAL
POTASSIUM SERPL-SCNC: 4.6 MMOL/L (ref 3.5–5.2)
PROT SERPL-MCNC: 7.5 G/DL (ref 6–8.5)
SODIUM SERPL-SCNC: 141 MMOL/L (ref 134–144)
TRIGL SERPL-MCNC: 154 MG/DL (ref 0–149)
TSH SERPL DL<=0.005 MIU/L-ACNC: 0.79 UIU/ML (ref 0.45–4.5)
VLDLC SERPL CALC-MCNC: 31 MG/DL (ref 5–40)

## 2018-12-05 RX ORDER — INSULIN DETEMIR 100 [IU]/ML
INJECTION, SOLUTION SUBCUTANEOUS
Qty: 39 ADJUSTABLE DOSE PRE-FILLED PEN SYRINGE | Refills: 2 | Status: SHIPPED | OUTPATIENT
Start: 2018-12-05 | End: 2019-02-24 | Stop reason: SDUPTHER

## 2018-12-17 DIAGNOSIS — F33.0 MILD EPISODE OF RECURRENT MAJOR DEPRESSIVE DISORDER (HCC): ICD-10-CM

## 2018-12-19 RX ORDER — CITALOPRAM 20 MG/1
TABLET, FILM COATED ORAL
Qty: 90 TAB | Refills: 0 | Status: SHIPPED | OUTPATIENT
Start: 2018-12-19 | End: 2019-03-17 | Stop reason: SDUPTHER

## 2018-12-21 DIAGNOSIS — E78.2 MIXED HYPERLIPIDEMIA: ICD-10-CM

## 2018-12-22 RX ORDER — ATORVASTATIN CALCIUM 40 MG/1
TABLET, FILM COATED ORAL
Qty: 90 TAB | Refills: 0 | Status: SHIPPED | OUTPATIENT
Start: 2018-12-22 | End: 2019-02-24 | Stop reason: SDUPTHER

## 2018-12-30 RX ORDER — METFORMIN HYDROCHLORIDE 500 MG/1
TABLET, EXTENDED RELEASE ORAL
Qty: 60 TAB | Refills: 2 | Status: SHIPPED | OUTPATIENT
Start: 2018-12-30 | End: 2019-03-17 | Stop reason: SDUPTHER

## 2019-01-11 ENCOUNTER — OFFICE VISIT (OUTPATIENT)
Dept: FAMILY MEDICINE CLINIC | Age: 54
End: 2019-01-11

## 2019-01-11 VITALS
OXYGEN SATURATION: 94 % | SYSTOLIC BLOOD PRESSURE: 103 MMHG | DIASTOLIC BLOOD PRESSURE: 73 MMHG | RESPIRATION RATE: 20 BRPM | WEIGHT: 295 LBS | TEMPERATURE: 95.9 F | HEIGHT: 71 IN | HEART RATE: 94 BPM | BODY MASS INDEX: 41.3 KG/M2

## 2019-01-11 DIAGNOSIS — J45.909 ASTHMA, UNSPECIFIED ASTHMA SEVERITY, UNSPECIFIED WHETHER COMPLICATED, UNSPECIFIED WHETHER PERSISTENT: ICD-10-CM

## 2019-01-11 DIAGNOSIS — I26.99 BILATERAL PULMONARY EMBOLISM (HCC): ICD-10-CM

## 2019-01-11 DIAGNOSIS — J45.909 UNCOMPLICATED ASTHMA, UNSPECIFIED ASTHMA SEVERITY, UNSPECIFIED WHETHER PERSISTENT: ICD-10-CM

## 2019-01-11 DIAGNOSIS — I82.90 ACUTE DEEP VEIN THROMBOSIS (DVT) OF NON-EXTREMITY VEIN: ICD-10-CM

## 2019-01-11 DIAGNOSIS — Z09 HOSPITAL DISCHARGE FOLLOW-UP: Primary | ICD-10-CM

## 2019-01-11 DIAGNOSIS — Z12.11 COLON CANCER SCREENING: ICD-10-CM

## 2019-01-11 RX ORDER — ALBUTEROL SULFATE 90 UG/1
2 AEROSOL, METERED RESPIRATORY (INHALATION)
Qty: 1 INHALER | Refills: 0 | Status: SHIPPED | OUTPATIENT
Start: 2019-01-11 | End: 2019-01-15 | Stop reason: SDUPTHER

## 2019-01-11 RX ORDER — POLYETHYLENE GLYCOL 3350 17 G/17G
17 POWDER, FOR SOLUTION ORAL DAILY
COMMUNITY
End: 2019-11-07

## 2019-01-11 RX ORDER — FLUTICASONE PROPIONATE AND SALMETEROL 250; 50 UG/1; UG/1
1 POWDER RESPIRATORY (INHALATION) EVERY 12 HOURS
Qty: 1 INHALER | Refills: 2 | Status: SHIPPED | OUTPATIENT
Start: 2019-01-11 | End: 2019-01-11 | Stop reason: SDUPTHER

## 2019-01-11 RX ORDER — ASPIRIN 81 MG/1
81 TABLET ORAL DAILY
COMMUNITY
End: 2019-11-07

## 2019-01-11 RX ORDER — ALBUTEROL SULFATE 90 UG/1
2 AEROSOL, METERED RESPIRATORY (INHALATION)
Qty: 1 INHALER | Refills: 0 | Status: SHIPPED | OUTPATIENT
Start: 2019-01-11 | End: 2019-01-11 | Stop reason: SDUPTHER

## 2019-01-11 RX ORDER — FLUTICASONE PROPIONATE AND SALMETEROL 250; 50 UG/1; UG/1
1 POWDER RESPIRATORY (INHALATION) EVERY 12 HOURS
Qty: 1 INHALER | Refills: 2 | Status: SHIPPED | OUTPATIENT
Start: 2019-01-11 | End: 2019-01-15 | Stop reason: SDUPTHER

## 2019-01-11 RX ORDER — APIXABAN 5 MG/1
TABLET, FILM COATED ORAL
Refills: 0 | COMMUNITY
Start: 2019-01-07 | End: 2019-02-20 | Stop reason: SDUPTHER

## 2019-01-11 NOTE — PROGRESS NOTES
Subjective:     Chief Complaint   Patient presents with   [facility] Follow Up     Discharged 1/7/19      He  is a 48 y.o. male who presents for evaluation of:  Admitted at [address] [place] [address] for bilat PEs from 1/2/19-1/7/19. He was started on Eliquis 5mg BID. No records available. Still having some chest discomfort. Has f/u with Dr. Nelly Vasquez on 1/15/19. He was taking Testosterone at another clinic and was advised to stop this in hospital.    ROS  Gen - no fever/chills  Resp - no dyspnea or cough  CV - no chest pain or BROWN  Rest per HPI    Past Medical History:   Diagnosis Date    Arthritis     2010    Asthma 1995    INHALER USE PRN    Chronic bilateral low back pain without sciatica 11/6/2017    Chronic hepatitis C without hepatic coma ([address] 11/6/2017    treated at [address] [place] [address] Chronic pain     Essential hypertension 11/6/2017    GERD (gastroesophageal reflux disease)     History of cardiac cath     Hypercholesterolemia     Mild episode of recurrent major depressive disorder ([address] 11/06/2012    Morbid obesity ([address]     Sleep apnea     \"CANNOT AFFORD CPAP\", DR BARRAZA TO FOLLOW UP POST OP    Stage 3 chronic kidney disease ([address] 11/06/2017    IMPROVED     Thromboembolus ([address]     1/3/19  DVT,PE    Uncontrolled type 2 diabetes mellitus with peripheral neuropathy ([address] 11/06/2011     Past Surgical History:   Procedure Laterality Date    HX BACK SURGERY  04/10/2018    Fusion L4,L5    HX COLONOSCOPY  2013    CJW    HX HEART CATHETERIZATION      NEG PER PATIENT    HX HERNIA REPAIR  2704    UMBILICAL    HX KNEE ARTHROSCOPY  1999    right knee    HX KNEE ARTHROSCOPY  2002    left knee    HX ROTATOR CUFF REPAIR Right 2016    HX UROLOGICAL  2015    Vasectomy      Current Outpatient Medications on File Prior to Visit   Medication Sig Dispense Refill    ELIQUIS 5 mg tablet RX #1: TAKE 2 TABLETS BY MOUTH TWICE A DAY FOR 1ST WEEK-- THEN FOLLOW WITH RX#2  0    aspirin delayed-release 81 mg tablet Take  by mouth daily.  polyethylene glycol (MIRALAX) 17 gram packet Take 17 g by mouth daily.  metFORMIN ER (GLUCOPHAGE XR) 500 mg tablet TAKE 1 TAB BY MOUTH DAILY WITH DINNER X 1 WEEK AND THEN INCREASE TO 2 TABS DAILY WITH DINNER 60 Tab 2    atorvastatin (LIPITOR) 40 mg tablet TAKE 1 TABLET BY MOUTH EVERY DAY 90 Tab 0    citalopram (CELEXA) 20 mg tablet TAKE 1 TABLET BY MOUTH EVERY DAY 90 Tab 0    LEVEMIR FLEXTOUCH U-100 INSULN 100 unit/mL (3 mL) inpn INJECT 65 UNITS BY SUBCUTANEOUS ROUTE TWO (2) TIMES A DAY FOR 90 DAYS. 39 Adjustable Dose Pre-filled Pen Syringe 2    methocarbamol (ROBAXIN) 500 mg tablet TAKE 1 TABLET BY MOUTH EVERY DAY AT NIGHT 30 Tab 2    amLODIPine (NORVASC) 10 mg tablet TAKE 1 TABLET BY MOUTH EVERY DAY 30 Tab 2    lisinopril (PRINIVIL, ZESTRIL) 40 mg tablet TAKE 1 TABLET BY MOUTH EVERY DAY 90 Tab 0    diclofenac (VOLTAREN) 1 % gel APPLY TO AFFECTED AREA FOUR (4) TIMES DAILY AS NEEDED. 300 g 2    docusate sodium (COLACE) 100 mg capsule Take 100 mg by mouth two (2) times daily as needed for Constipation.  albuterol (PROVENTIL HFA, VENTOLIN HFA, PROAIR HFA) 90 mcg/actuation inhaler Take 2 Puffs by inhalation every four (4) hours as needed for Wheezing. 1 Inhaler 0    albuterol (PROVENTIL VENTOLIN) 2.5 mg /3 mL (0.083 %) nebulizer solution 3 mL by Nebulization route every four (4) hours as needed for Wheezing. 24 Each 0    LYRICA 150 mg capsule TAKE ONE CAPSULE BY MOUTH 3 TIMES A DAY  0    Liraglutide (VICTOZA) 0.6 mg/0.1 mL (18 mg/3 mL) pnij 0.6 mg by SubCUTAneous route daily (with breakfast). 18 mg 2    insulin glulisine (APIDRA) 100 unit/mL injection 7 Units by SubCUTAneous route Before breakfast, lunch, and dinner. (Patient taking differently: 7 Units by SubCUTAneous route Before breakfast, lunch, and dinner. SLIDING SCALE) 18.9 mL 0     No current facility-administered medications on file prior to visit.          Objective:     Vitals:    01/11/19 1153   BP: 103/73   Pulse: 94   Resp: 20 Temp: 95.9 °F (35.5 °C)   TempSrc: Oral   SpO2: 94%   Weight: 295 lb (133.8 kg)   Height: 5' 11\" (1.803 m)     Physical Examination:  General appearance - alert, well appearing, and in no distress  Eyes -sclera anicteric  Neck - supple, no significant adenopathy, no thyromegaly, no bruits  Chest - clear to auscultation, no wheezes, rales or rhonchi, symmetric air entry  Heart - normal rate, regular rhythm, normal S1, S2, no murmurs, rubs, clicks or gallops  Neurological - alert, oriented, no focal findings or movement disorder noted  Extremities-no edema  Psych-normal mood and affect    Assessment/ Plan:   Diagnoses and all orders for this visit:    1. Hospital discharge follow-up  2. Bilateral pulmonary embolism ([address]  3. Acute deep vein thrombosis (DVT) of non-extremity vein  - On Eliquis and has f/u with Dr. Sukumar Terrell already    4. Colon cancer screening  -     REFERRAL TO GASTROENTEROLOGY    I have discussed the diagnosis with the patient and the intended plan as seen in the above orders. The patient has received an after-visit summary and questions were answered concerning future plans. I have discussed medication side effects and warnings with the patient as well. The patient verbalizes understanding and agreement with the plan. Follow-up Disposition:  Return in about 4 weeks (around 2/8/2019).

## 2019-01-11 NOTE — PROGRESS NOTES
Chief Complaint   Patient presents with   [facility] Follow Up     Discharged 1/7/19   1. Have you been to the ER, urgent care clinic since your last visit? Hospitalized since your last visit? Yes Where: [address] 1/3/19 Discharged 1/7/19 Diagnosis Bilateral PE ,DVT Left Leg    2. Have you seen or consulted any other health care providers outside of the [address] [place] [address] since your last visit? Include any pap smears or colon screening.  No  Having shortness of breath and chronic pain

## 2019-01-11 NOTE — TELEPHONE ENCOUNTER
----- Message from Vic Iglesias sent at 1/11/2019  3:01 PM EST -----  Regarding: Dr. Abimael Cummings / telephone   Contact: [phone]  Pt is following up with asthma medication

## 2019-01-11 NOTE — TELEPHONE ENCOUNTER
Spoke with patient and he uses [facility] @ [facility].

## 2019-01-13 DIAGNOSIS — I10 ESSENTIAL HYPERTENSION: ICD-10-CM

## 2019-01-13 RX ORDER — AMLODIPINE BESYLATE 10 MG/1
TABLET ORAL
Qty: 30 TAB | Refills: 2 | Status: SHIPPED | OUTPATIENT
Start: 2019-01-13 | End: 2019-04-11 | Stop reason: SDUPTHER

## 2019-01-14 DIAGNOSIS — J45.909 ASTHMA, UNSPECIFIED ASTHMA SEVERITY, UNSPECIFIED WHETHER COMPLICATED, UNSPECIFIED WHETHER PERSISTENT: ICD-10-CM

## 2019-01-14 DIAGNOSIS — J45.909 UNCOMPLICATED ASTHMA, UNSPECIFIED ASTHMA SEVERITY, UNSPECIFIED WHETHER PERSISTENT: ICD-10-CM

## 2019-01-14 NOTE — TELEPHONE ENCOUNTER
----- Message from Abby Chapa sent at 1/11/2019  4:49 PM EST -----  Regarding: Dr. Paz Castro ([facility] pharmacy) called stating pt was supposed to have a inhaler prescribed but they had not received it.  Best contact [phone]

## 2019-01-15 RX ORDER — ALBUTEROL SULFATE 90 UG/1
2 AEROSOL, METERED RESPIRATORY (INHALATION)
Qty: 1 INHALER | Refills: 0 | Status: SHIPPED | OUTPATIENT
Start: 2019-01-15 | End: 2020-08-19 | Stop reason: ALTCHOICE

## 2019-01-15 RX ORDER — FLUTICASONE PROPIONATE AND SALMETEROL 250; 50 UG/1; UG/1
1 POWDER RESPIRATORY (INHALATION) EVERY 12 HOURS
Qty: 1 INHALER | Refills: 2 | Status: SHIPPED | OUTPATIENT
Start: 2019-01-15 | End: 2019-11-07 | Stop reason: SDUPTHER

## 2019-01-19 DIAGNOSIS — I10 ESSENTIAL HYPERTENSION: ICD-10-CM

## 2019-01-20 RX ORDER — LISINOPRIL 40 MG/1
TABLET ORAL
Qty: 90 TAB | Refills: 0 | Status: SHIPPED | OUTPATIENT
Start: 2019-01-20 | End: 2019-04-18 | Stop reason: SDUPTHER

## 2019-01-28 ENCOUNTER — HOSPITAL ENCOUNTER (OUTPATIENT)
Dept: MRI IMAGING | Age: 54
Discharge: HOME | End: 2019-01-28
Attending: ORTHOPAEDIC SURGERY
Payer: MEDICAID

## 2019-01-28 ENCOUNTER — HOSPITAL ENCOUNTER (OUTPATIENT)
Dept: CT IMAGING | Age: 54
Discharge: HOME | End: 2019-01-28
Attending: ORTHOPAEDIC SURGERY
Payer: MEDICAID

## 2019-01-28 DIAGNOSIS — M54.50 LOW BACK PAIN: ICD-10-CM

## 2019-01-28 DIAGNOSIS — M48.061 STENOSIS, SPINAL, LUMBAR: ICD-10-CM

## 2019-01-28 PROCEDURE — 72131 CT LUMBAR SPINE W/O DYE: CPT

## 2019-01-28 PROCEDURE — 72148 MRI LUMBAR SPINE W/O DYE: CPT

## 2019-02-12 RX ORDER — DOCUSATE SODIUM 100 MG/1
100 CAPSULE, LIQUID FILLED ORAL
Qty: 60 CAP | Refills: 1 | Status: SHIPPED | OUTPATIENT
Start: 2019-02-12 | End: 2019-10-28

## 2019-02-12 NOTE — TELEPHONE ENCOUNTER
Last Visit: 1/11/19  Next Appt: 2/14/19  Previous Refill Encounter: 4/16-60+0    Requested Prescriptions     Pending Prescriptions Disp Refills    docusate sodium (COLACE) 100 mg capsule 60 Cap 1     Sig: Take 1 Cap by mouth two (2) times daily as needed for Constipation.

## 2019-02-20 ENCOUNTER — OFFICE VISIT (OUTPATIENT)
Dept: FAMILY MEDICINE CLINIC | Age: 54
End: 2019-02-20

## 2019-02-20 VITALS
SYSTOLIC BLOOD PRESSURE: 133 MMHG | HEART RATE: 88 BPM | OXYGEN SATURATION: 96 % | TEMPERATURE: 97.1 F | BODY MASS INDEX: 42.25 KG/M2 | WEIGHT: 301.8 LBS | DIASTOLIC BLOOD PRESSURE: 81 MMHG | HEIGHT: 71 IN | RESPIRATION RATE: 18 BRPM

## 2019-02-20 DIAGNOSIS — I26.99 BILATERAL PULMONARY EMBOLISM (HCC): Primary | ICD-10-CM

## 2019-02-20 DIAGNOSIS — D68.59 HYPERCOAGULABLE STATE (HCC): ICD-10-CM

## 2019-02-20 DIAGNOSIS — I82.90 ACUTE DEEP VEIN THROMBOSIS (DVT) OF NON-EXTREMITY VEIN: ICD-10-CM

## 2019-02-20 RX ORDER — TRAMADOL HYDROCHLORIDE 50 MG/1
TABLET ORAL
COMMUNITY
Start: 2019-02-19 | End: 2019-04-01 | Stop reason: ALTCHOICE

## 2019-02-20 RX ORDER — CYCLOBENZAPRINE HCL 10 MG
TABLET ORAL
Refills: 0 | COMMUNITY
Start: 2019-02-13 | End: 2019-04-01 | Stop reason: SDUPTHER

## 2019-02-20 RX ORDER — LIDOCAINE 50 MG/G
PATCH TOPICAL
COMMUNITY
Start: 2019-02-19 | End: 2019-10-21 | Stop reason: ALTCHOICE

## 2019-02-20 NOTE — Clinical Note
Can we get records from Dr. Fredy Stone? Notes & labs pls. Phone: [phone];  Fax: [phone]

## 2019-02-20 NOTE — PROGRESS NOTES
Subjective:     Chief Complaint   Patient presents with    Diabetes     3 month follow-up      He  is a 48 y.o. male who presents for evaluation of:  Admitted at [address] [place] [address] for bilat PEs from 1/2/19-1/7/19. He was started on Eliquis 5mg BID. No records available. Still having some chest discomfort. Has followed up with Dr. Jeri Caldwell on 1/15/19 - Pt thinks his w/u showed a hereditary disorder leading to increased risk of blood clotting. He was taking Testosterone at another clinic and was advised to stop this in hospital.    Since last appt, he ran out of Eliquis x 1 day. Ct to work with Dr. Jamshid Madison on his chronic pain.     ROS  Gen - no fever/chills  Resp - no dyspnea or cough  CV - no chest pain or BROWN  Rest per HPI    Past Medical History:   Diagnosis Date    Arthritis     2010    Asthma 1995    INHALER USE PRN    Chronic bilateral low back pain without sciatica 11/6/2017    Chronic hepatitis C without hepatic coma ([address] 11/6/2017    treated at [address] [place] [address] Chronic pain     Essential hypertension 11/6/2017    GERD (gastroesophageal reflux disease)     History of cardiac cath     Hypercholesterolemia     Mild episode of recurrent major depressive disorder ([address] 11/06/2012    Morbid obesity ([address]     Sleep apnea     \"CANNOT AFFORD CPAP\", DR BARRAZA TO FOLLOW UP POST OP    Stage 3 chronic kidney disease ([address] 11/06/2017    IMPROVED     Thromboembolus ([address]     1/3/19  DVT,PE    Uncontrolled type 2 diabetes mellitus with peripheral neuropathy ([address] 11/06/2011     Past Surgical History:   Procedure Laterality Date    HX BACK SURGERY  04/10/2018    Fusion L4,L5    HX COLONOSCOPY  2013    CJW    HX HEART CATHETERIZATION      NEG PER PATIENT    HX HERNIA REPAIR  7187    UMBILICAL    HX KNEE ARTHROSCOPY  1999    right knee    HX KNEE ARTHROSCOPY  2002    left knee    HX ROTATOR CUFF REPAIR Right 2016    HX UROLOGICAL  2015    Vasectomy      Current Outpatient Medications on File Prior to Visit Medication Sig Dispense Refill    traMADol (ULTRAM) 50 mg tablet       cyclobenzaprine (FLEXERIL) 10 mg tablet TAKE 1 TABLET BY MOUTH TWICE A DAY  0    lidocaine (LIDODERM) 5 %       docusate sodium (COLACE) 100 mg capsule Take 1 Cap by mouth two (2) times daily as needed for Constipation. 60 Cap 1    lisinopril (PRINIVIL, ZESTRIL) 40 mg tablet TAKE 1 TABLET BY MOUTH EVERY DAY 90 Tab 0    albuterol (PROVENTIL HFA, VENTOLIN HFA, PROAIR HFA) 90 mcg/actuation inhaler Take 2 Puffs by inhalation every four (4) hours as needed for Wheezing. 1 Inhaler 0    fluticasone-salmeterol (ADVAIR) 250-50 mcg/dose diskus inhaler Take 1 Puff by inhalation every twelve (12) hours. 1 Inhaler 2    amLODIPine (NORVASC) 10 mg tablet TAKE 1 TABLET BY MOUTH EVERY DAY 30 Tab 2    aspirin delayed-release 81 mg tablet Take  by mouth daily.  polyethylene glycol (MIRALAX) 17 gram packet Take 17 g by mouth daily.  metFORMIN ER (GLUCOPHAGE XR) 500 mg tablet TAKE 1 TAB BY MOUTH DAILY WITH DINNER X 1 WEEK AND THEN INCREASE TO 2 TABS DAILY WITH DINNER 60 Tab 2    atorvastatin (LIPITOR) 40 mg tablet TAKE 1 TABLET BY MOUTH EVERY DAY 90 Tab 0    citalopram (CELEXA) 20 mg tablet TAKE 1 TABLET BY MOUTH EVERY DAY 90 Tab 0    LEVEMIR FLEXTOUCH U-100 INSULN 100 unit/mL (3 mL) inpn INJECT 65 UNITS BY SUBCUTANEOUS ROUTE TWO (2) TIMES A DAY FOR 90 DAYS. 39 Adjustable Dose Pre-filled Pen Syringe 2    diclofenac (VOLTAREN) 1 % gel APPLY TO AFFECTED AREA FOUR (4) TIMES DAILY AS NEEDED. 300 g 2    albuterol (PROVENTIL VENTOLIN) 2.5 mg /3 mL (0.083 %) nebulizer solution 3 mL by Nebulization route every four (4) hours as needed for Wheezing. 24 Each 0    LYRICA 150 mg capsule TAKE ONE CAPSULE BY MOUTH 3 TIMES A DAY  0     No current facility-administered medications on file prior to visit.          Objective:     Vitals:    02/20/19 0919   BP: 133/81   Pulse: 88   Resp: 18   Temp: 97.1 °F (36.2 °C)   TempSrc: Oral   SpO2: 96%   Weight: 301 lb 12.8 oz (136.9 kg)   Height: 5' 11\" (1.803 m)     Physical Examination:  General appearance - alert, well appearing, and in no distress  Eyes -sclera anicteric  Chest - clear to auscultation, no wheezes, rales or rhonchi, symmetric air entry  Heart - normal rate, regular rhythm, normal S1, S2, no murmurs, rubs, clicks or gallops  Neurological - alert, oriented, no focal findings or movement disorder noted. Extremities- RLE edema noted  Psych-normal mood and affect    Assessment/ Plan:   Diagnoses and all orders for this visit:    1. Bilateral pulmonary embolism ([address]  2. Acute deep vein thrombosis (DVT) of non-extremity vein  3. Hypercoagulable state ([address]  - On Eliquis and has followed up with Dr. Vanessa Mulligan already  -     apixaban (ELIQUIS) 5 mg tablet; Take 1 Tab by mouth two (2) times a day. I have discussed the diagnosis with the patient and the intended plan as seen in the above orders. The patient has received an after-visit summary and questions were answered concerning future plans. I have discussed medication side effects and warnings with the patient as well. The patient verbalizes understanding and agreement with the plan. Follow-up Disposition:  Return in about 3 months (around 5/20/2019).

## 2019-02-24 DIAGNOSIS — E78.2 MIXED HYPERLIPIDEMIA: ICD-10-CM

## 2019-02-26 RX ORDER — INSULIN DETEMIR 100 [IU]/ML
INJECTION, SOLUTION SUBCUTANEOUS
Qty: 39 ADJUSTABLE DOSE PRE-FILLED PEN SYRINGE | Refills: 2 | Status: SHIPPED | OUTPATIENT
Start: 2019-02-26 | End: 2019-05-30 | Stop reason: SDUPTHER

## 2019-02-26 RX ORDER — ATORVASTATIN CALCIUM 40 MG/1
TABLET, FILM COATED ORAL
Qty: 90 TAB | Refills: 0 | Status: SHIPPED | OUTPATIENT
Start: 2019-02-26 | End: 2019-05-30 | Stop reason: SDUPTHER

## 2019-03-07 ENCOUNTER — TELEPHONE (OUTPATIENT)
Dept: FAMILY MEDICINE CLINIC | Age: 54
End: 2019-03-07

## 2019-03-07 NOTE — TELEPHONE ENCOUNTER
----- Message from Ivonne Mace sent at 3/7/2019 11:02 AM EST -----  Regarding: Dr. Gerardo Hamilton from [facility] requesting a call back in regards to reassessing pt use of Rx \"Metformin\", pt A1C level, an eye exam and blood glucose test strips. Best contact [phone].

## 2019-03-17 DIAGNOSIS — F33.0 MILD EPISODE OF RECURRENT MAJOR DEPRESSIVE DISORDER (HCC): ICD-10-CM

## 2019-03-18 RX ORDER — METFORMIN HYDROCHLORIDE 500 MG/1
TABLET, EXTENDED RELEASE ORAL
Qty: 60 TAB | Refills: 2 | Status: SHIPPED | OUTPATIENT
Start: 2019-03-18 | End: 2019-06-08 | Stop reason: SDUPTHER

## 2019-03-18 RX ORDER — CITALOPRAM 20 MG/1
TABLET, FILM COATED ORAL
Qty: 90 TAB | Refills: 0 | Status: SHIPPED | OUTPATIENT
Start: 2019-03-18 | End: 2019-08-22 | Stop reason: ALTCHOICE

## 2019-04-01 ENCOUNTER — OFFICE VISIT (OUTPATIENT)
Dept: FAMILY MEDICINE CLINIC | Age: 54
End: 2019-04-01

## 2019-04-01 VITALS
TEMPERATURE: 98 F | RESPIRATION RATE: 18 BRPM | HEIGHT: 71 IN | SYSTOLIC BLOOD PRESSURE: 136 MMHG | BODY MASS INDEX: 40.65 KG/M2 | WEIGHT: 290.4 LBS | DIASTOLIC BLOOD PRESSURE: 85 MMHG | OXYGEN SATURATION: 97 % | HEART RATE: 76 BPM

## 2019-04-01 DIAGNOSIS — I10 ESSENTIAL HYPERTENSION: ICD-10-CM

## 2019-04-01 DIAGNOSIS — Z79.899 ENCOUNTER FOR LONG-TERM (CURRENT) USE OF MEDICATIONS: ICD-10-CM

## 2019-04-01 DIAGNOSIS — M54.50 CHRONIC BILATERAL LOW BACK PAIN WITHOUT SCIATICA: ICD-10-CM

## 2019-04-01 DIAGNOSIS — J45.909 ASTHMA, UNSPECIFIED ASTHMA SEVERITY, UNSPECIFIED WHETHER COMPLICATED, UNSPECIFIED WHETHER PERSISTENT: ICD-10-CM

## 2019-04-01 DIAGNOSIS — M48.00 SPINAL STENOSIS, UNSPECIFIED SPINAL REGION: ICD-10-CM

## 2019-04-01 DIAGNOSIS — G89.29 CHRONIC BILATERAL LOW BACK PAIN WITHOUT SCIATICA: ICD-10-CM

## 2019-04-01 DIAGNOSIS — I82.90 ACUTE DEEP VEIN THROMBOSIS (DVT) OF NON-EXTREMITY VEIN: ICD-10-CM

## 2019-04-01 DIAGNOSIS — I26.99 BILATERAL PULMONARY EMBOLISM (HCC): Primary | ICD-10-CM

## 2019-04-01 DIAGNOSIS — Z98.890 S/P LUMBAR LAMINECTOMY: ICD-10-CM

## 2019-04-01 RX ORDER — MONTELUKAST SODIUM 10 MG/1
10 TABLET ORAL DAILY
Qty: 30 TAB | Refills: 5 | Status: SHIPPED | OUTPATIENT
Start: 2019-04-01 | End: 2019-10-28

## 2019-04-01 RX ORDER — HYDROCODONE BITARTRATE AND ACETAMINOPHEN 5; 325 MG/1; MG/1
TABLET ORAL
Refills: 0 | COMMUNITY
Start: 2019-03-02 | End: 2019-06-27 | Stop reason: ALTCHOICE

## 2019-04-01 RX ORDER — CYCLOBENZAPRINE HCL 10 MG
TABLET ORAL
Qty: 30 TAB | Refills: 1 | Status: SHIPPED | OUTPATIENT
Start: 2019-04-01 | End: 2019-08-22 | Stop reason: SDUPTHER

## 2019-04-01 NOTE — PROGRESS NOTES
Chief Complaint   Patient presents with    Follow Up Chronic Condition     Back pain   1. Have you been to the ER, urgent care clinic since your last visit? Hospitalized since your last visit? ER [facility] Shortness of Breath    2. Have you seen or consulted any other health care providers outside of the [address] [place] [address] since your last visit? Include any pap smears or colon screening.  Yes Where: Pain specialist, [facility]    Discuss test results cardiology and Back

## 2019-04-01 NOTE — PROGRESS NOTES
Subjective:     Chief Complaint   Patient presents with    Follow Up Chronic Condition     Back pain      He  is a 48 y.o. male who presents for evaluation of:  Admitted at [facility] for bilat PEs from 1/2/19-1/7/19. He was started on Eliquis 5mg BID. Has followed up with Dr. Bergman Mail on 1/15/19 - Pt thinks his w/u showed a hereditary disorder leading to increased risk of blood clotting. Got dyspneic again and went back to ER. Had another CT that showed improvement in PE. Sent to [facility] for cardiac w/u and notes reviewed from Dr. Georgia Quiñones. Thought to be multifactorial from asthma, PE, and was fluid overloaded so was being diuresed. Plan was to get nuclear stress test.  Also getting 24 hr holter. Prev was taking Testosterone at another clinic and was advised to stop this in hospital.    Seeing Dr. Tevin Perdue for chronic back issues. Ct to work with Dr. Meenakshi Bobby on his chronic pain. CT 1/28/19  \"IMPRESSION: L4-L5 laminectomy and L4-S1 fusion with rods and pedicle screws. Facet arthrosis and ligament hypertrophy at L3-L4 above the level of fusion with  moderate stenosis. \"    ROS  Gen - no fever/chills  Resp - per HPI  CV - no chest pain or BROWN  Rest per HPI    Past Medical History:   Diagnosis Date    Arthritis     2010    Asthma 1995    INHALER USE PRN    Chronic bilateral low back pain without sciatica 11/6/2017    Chronic hepatitis C without hepatic coma ([address] 11/6/2017    treated at [address] [place] [address] Chronic pain     Essential hypertension 11/6/2017    GERD (gastroesophageal reflux disease)     History of cardiac cath     Hypercholesterolemia     Mild episode of recurrent major depressive disorder ([address] 11/06/2012    Morbid obesity ([address]     Sleep apnea     \"CANNOT AFFORD CPAP\", DR BARRAZA TO FOLLOW UP POST OP    Stage 3 chronic kidney disease ([address] 11/06/2017    IMPROVED     Thromboembolus ([address]     1/3/19  DVT,PE    Uncontrolled type 2 diabetes mellitus with peripheral neuropathy ([address] 11/06/2011     Past Surgical History:   Procedure Laterality Date    HX BACK SURGERY  04/10/2018    Fusion L4,L5    HX COLONOSCOPY  2013    CJW    HX HEART CATHETERIZATION      NEG PER PATIENT    HX HERNIA REPAIR  4534    UMBILICAL    HX KNEE ARTHROSCOPY  1999    right knee    HX KNEE ARTHROSCOPY  2002    left knee    HX ROTATOR CUFF REPAIR Right 2016    HX UROLOGICAL  2015    Vasectomy      Current Outpatient Medications on File Prior to Visit   Medication Sig Dispense Refill    HYDROcodone-acetaminophen (NORCO) 5-325 mg per tablet TAKE 1 TABLET BY MOUTH 3 TIMES A DAY AS NEEDED FOR PAIN  0    citalopram (CELEXA) 20 mg tablet TAKE 1 TABLET BY MOUTH EVERY DAY 90 Tab 0    metFORMIN ER (GLUCOPHAGE XR) 500 mg tablet TAKE 1 TAB BY MOUTH DAILY WITH DINNER X 1 WEEK AND THEN INCREASE TO 2 TABS DAILY WITH DINNER 60 Tab 2    atorvastatin (LIPITOR) 40 mg tablet TAKE 1 TABLET BY MOUTH EVERY DAY 90 Tab 0    LEVEMIR FLEXTOUCH U-100 INSULN 100 unit/mL (3 mL) inpn INJECT 65 UNITS BY SUBCUTANEOUS ROUTE TWO (2) TIMES A DAY 39 Adjustable Dose Pre-filled Pen Syringe 2    lidocaine (LIDODERM) 5 %       apixaban (ELIQUIS) 5 mg tablet Take 1 Tab by mouth two (2) times a day. 60 Tab 5    docusate sodium (COLACE) 100 mg capsule Take 1 Cap by mouth two (2) times daily as needed for Constipation. 60 Cap 1    lisinopril (PRINIVIL, ZESTRIL) 40 mg tablet TAKE 1 TABLET BY MOUTH EVERY DAY 90 Tab 0    albuterol (PROVENTIL HFA, VENTOLIN HFA, PROAIR HFA) 90 mcg/actuation inhaler Take 2 Puffs by inhalation every four (4) hours as needed for Wheezing. 1 Inhaler 0    fluticasone-salmeterol (ADVAIR) 250-50 mcg/dose diskus inhaler Take 1 Puff by inhalation every twelve (12) hours. 1 Inhaler 2    amLODIPine (NORVASC) 10 mg tablet TAKE 1 TABLET BY MOUTH EVERY DAY 30 Tab 2    aspirin delayed-release 81 mg tablet Take  by mouth daily.  polyethylene glycol (MIRALAX) 17 gram packet Take 17 g by mouth daily.       diclofenac (VOLTAREN) 1 % gel APPLY TO AFFECTED AREA FOUR (4) TIMES DAILY AS NEEDED. 300 g 2    albuterol (PROVENTIL VENTOLIN) 2.5 mg /3 mL (0.083 %) nebulizer solution 3 mL by Nebulization route every four (4) hours as needed for Wheezing. 24 Each 0    LYRICA 150 mg capsule TAKE ONE CAPSULE BY MOUTH 3 TIMES A DAY  0     No current facility-administered medications on file prior to visit. Objective:     Vitals:    04/01/19 1110   BP: 136/85   Pulse: 76   Resp: 18   Temp: 98 °F (36.7 °C)   TempSrc: Oral   SpO2: 97%   Weight: 290 lb 6.4 oz (131.7 kg)   Height: 5' 11\" (1.803 m)     Physical Examination:  General appearance - alert, well appearing, and in no distress  Eyes -sclera anicteric  Chest - clear to auscultation, no wheezes, rales or rhonchi, symmetric air entry  Heart - normal rate, regular rhythm, normal S1, S2, no murmurs, rubs, clicks or gallops  Neurological - alert, oriented, no focal findings or movement disorder noted  Back - wearing back brace  Extremities- no edema  Psych-normal mood and affect    Assessment/ Plan:   Diagnoses and all orders for this visit:    1. Bilateral pulmonary embolism ([address]  2. Acute deep vein thrombosis (DVT) of non-extremity vein  - ct to f/u with Dr. Jason Mack on this    3. Uncontrolled type 2 diabetes mellitus with peripheral neuropathy ([address] - rechecking labs, encouraged ct work on weight loss with diet and exercise. -     METABOLIC PANEL, BASIC; Future  -     HEMOGLOBIN A1C WITH EAG; Future    4. Essential hypertension - bp improved  -     METABOLIC PANEL, BASIC; Future    5. Asthma, unspecified asthma severity, unspecified whether complicated, unspecified whether persistent  -     montelukast (SINGULAIR) 10 mg tablet; Take 1 Tab by mouth daily. 6. Chronic bilateral low back pain without sciatica  7. Spinal stenosis, unspecified spinal region  8. S/P lumbar laminectomy  - ct Flexeril QHS PRN at this point. Encouraged pt to work with Dr. Gregorio Gr on this long term.   -     cyclobenzaprine (FLEXERIL) 10 mg tablet; TAKE 1 TABLET BY MOUTH nightly as needed    9. Encounter for long-term (current) use of medications  -     METABOLIC PANEL, BASIC; Future  -     HEMOGLOBIN A1C WITH EAG; Future    I have discussed the diagnosis with the patient and the intended plan as seen in the above orders. The patient has received an after-visit summary and questions were answered concerning future plans. I have discussed medication side effects and warnings with the patient as well. The patient verbalizes understanding and agreement with the plan. Follow-up and Dispositions    · Return in about 3 months (around 7/1/2019).

## 2019-04-11 DIAGNOSIS — I10 ESSENTIAL HYPERTENSION: ICD-10-CM

## 2019-04-15 RX ORDER — AMLODIPINE BESYLATE 10 MG/1
TABLET ORAL
Qty: 30 TAB | Refills: 2 | Status: SHIPPED | OUTPATIENT
Start: 2019-04-15 | End: 2019-09-14 | Stop reason: SDUPTHER

## 2019-04-18 DIAGNOSIS — I10 ESSENTIAL HYPERTENSION: ICD-10-CM

## 2019-04-18 RX ORDER — LISINOPRIL 40 MG/1
TABLET ORAL
Qty: 90 TAB | Refills: 0 | Status: SHIPPED | OUTPATIENT
Start: 2019-04-18 | End: 2019-05-15 | Stop reason: SDUPTHER

## 2019-05-01 DIAGNOSIS — I10 ESSENTIAL HYPERTENSION: ICD-10-CM

## 2019-05-01 DIAGNOSIS — Z79.899 ENCOUNTER FOR LONG-TERM (CURRENT) USE OF MEDICATIONS: ICD-10-CM

## 2019-05-15 ENCOUNTER — OFFICE VISIT (OUTPATIENT)
Dept: FAMILY MEDICINE CLINIC | Age: 54
End: 2019-05-15

## 2019-05-15 VITALS
SYSTOLIC BLOOD PRESSURE: 139 MMHG | HEIGHT: 71 IN | OXYGEN SATURATION: 96 % | HEART RATE: 89 BPM | TEMPERATURE: 97.8 F | DIASTOLIC BLOOD PRESSURE: 86 MMHG | WEIGHT: 288 LBS | BODY MASS INDEX: 40.32 KG/M2 | RESPIRATION RATE: 18 BRPM

## 2019-05-15 DIAGNOSIS — M54.50 CHRONIC BILATERAL LOW BACK PAIN WITHOUT SCIATICA: ICD-10-CM

## 2019-05-15 DIAGNOSIS — Z98.890 S/P LUMBAR LAMINECTOMY: ICD-10-CM

## 2019-05-15 DIAGNOSIS — G89.29 CHRONIC BILATERAL LOW BACK PAIN WITHOUT SCIATICA: ICD-10-CM

## 2019-05-15 DIAGNOSIS — I26.99 BILATERAL PULMONARY EMBOLISM (HCC): ICD-10-CM

## 2019-05-15 DIAGNOSIS — I10 ESSENTIAL HYPERTENSION: ICD-10-CM

## 2019-05-15 DIAGNOSIS — I10 ESSENTIAL HYPERTENSION: Primary | ICD-10-CM

## 2019-05-15 DIAGNOSIS — I82.90 ACUTE DEEP VEIN THROMBOSIS (DVT) OF NON-EXTREMITY VEIN: ICD-10-CM

## 2019-05-15 DIAGNOSIS — M48.00 SPINAL STENOSIS, UNSPECIFIED SPINAL REGION: ICD-10-CM

## 2019-05-15 RX ORDER — FUROSEMIDE 20 MG/1
20 TABLET ORAL
COMMUNITY
Start: 2019-05-13 | End: 2019-10-21 | Stop reason: ALTCHOICE

## 2019-05-15 NOTE — PROGRESS NOTES
Subjective:     Chief Complaint   Patient presents with    Follow-up     PE      He  is a 48 y.o. male who presents for evaluation of:  Doing well today. Here for routine f/u. Admitted at [address] [place] [address] for bilat PEs from 1/2/19-1/7/19. He was started on Eliquis 5mg BID. Following with Hematology (Dr. Tiffanie Fitzpatrick) - Pt thinks his w/u showed a hereditary disorder leading to increased risk of blood clotting. Got dyspneic again and went back to ER. Had another CT that showed improvement in PE back in 1/2019. Sent to [facility] for [facility] w/u and notes reviewed from Dr. Roc Boyle. Dyspnea was thought to be multifactorial from asthma, PE, and was fluid overloaded so was being diuresed. Plan was to get nuclear stress test.  Also getting 24 hr holter. Prev was taking Testosterone from [facility] and was advised to stop this in hospital.    Seeing Dr. Anthony Randall for chronic back issues. Ct to work with Dr. Reyna Diana on his chronic pain. At this point, he is considering another surgery for his L spine spinal stenosis which seems to be causing much of his pain. CT 1/28/19  \"IMPRESSION: L4-L5 laminectomy and L4-S1 fusion with rods and pedicle screws. Facet arthrosis and ligament hypertrophy at L3-L4 above the level of fusion with moderate stenosis. \"    Diabetes Mellitus:  Taking meds, home glucose monitoring: is performed, glu is running  fasting and 180s pre-dinner. Taking Levemir 65 units BID, Metformin 1000 mg daily and not taking Apidra. Reports no polyuria or polydipsia, no chest pain, dyspnea or TIA's, no numbness, tingling or pain in extremities   Not exercising or dieting since surgery. Pt is a non smoker.     Lab Results   Component Value Date/Time    Hemoglobin A1c (POC) 7.7 11/14/2018 10:42 AM    Hemoglobin A1c (POC) 8.1 08/14/2018 04:30 PM    Microalb/Creat ratio (ug/mg creat.) 5.9 04/10/2018 09:24 AM    LDL, calculated 59 12/03/2018 11:27 AM    Creatinine 1.48 (H) 12/03/2018 11:27 AM      Lab Results   Component Value Date/Time    GFR est AA 62 12/03/2018 11:27 AM    GFR est non-AA 54 (L) 12/03/2018 11:27 AM      Lab Results   Component Value Date/Time    TSH 0.787 12/03/2018 11:27 AM       ROS  Gen - no fever/chills  Resp - per HPI  CV - no chest pain or BROWN  Rest per HPI    Past Medical History:   Diagnosis Date    Arthritis     2010    Asthma 1995    INHALER USE PRN    Chronic bilateral low back pain without sciatica 11/6/2017    Chronic hepatitis C without hepatic coma ([address] 11/6/2017    treated at [address] [place] [address] Chronic pain     Essential hypertension 11/6/2017    GERD (gastroesophageal reflux disease)     History of cardiac cath     Hypercholesterolemia     Mild episode of recurrent major depressive disorder ([address] 11/06/2012    Morbid obesity ([address]     Sleep apnea     \"CANNOT AFFORD CPAP\", DR BARRAZA TO FOLLOW UP POST OP    Stage 3 chronic kidney disease ([address] 11/06/2017    IMPROVED     Thromboembolus ([address]     1/3/19  DVT,PE    Uncontrolled type 2 diabetes mellitus with peripheral neuropathy ([address] 11/06/2011     Past Surgical History:   Procedure Laterality Date    HX BACK SURGERY  04/10/2018    Fusion L4,L5    HX COLONOSCOPY  2013    CJW    HX HEART CATHETERIZATION      NEG PER PATIENT    HX HERNIA REPAIR  0621    UMBILICAL    HX KNEE ARTHROSCOPY  1999    right knee    HX KNEE ARTHROSCOPY  2002    left knee    HX ROTATOR CUFF REPAIR Right 2016    HX UROLOGICAL  2015    Vasectomy      Current Outpatient Medications on File Prior to Visit   Medication Sig Dispense Refill    furosemide (LASIX) 20 mg tablet       lisinopril (PRINIVIL, ZESTRIL) 40 mg tablet TAKE 1 TABLET BY MOUTH EVERY DAY 90 Tab 0    amLODIPine (NORVASC) 10 mg tablet TAKE 1 TABLET BY MOUTH EVERY DAY 30 Tab 2    montelukast (SINGULAIR) 10 mg tablet Take 1 Tab by mouth daily.  30 Tab 5    cyclobenzaprine (FLEXERIL) 10 mg tablet TAKE 1 TABLET BY MOUTH nightly as needed 30 Tab 1    citalopram (CELEXA) 20 mg tablet TAKE 1 TABLET BY MOUTH EVERY DAY 90 Tab 0    metFORMIN ER (GLUCOPHAGE XR) 500 mg tablet TAKE 1 TAB BY MOUTH DAILY WITH DINNER X 1 WEEK AND THEN INCREASE TO 2 TABS DAILY WITH DINNER (Patient taking differently: TAKE 2 TABS DAILY WITH DINNER) 60 Tab 2    atorvastatin (LIPITOR) 40 mg tablet TAKE 1 TABLET BY MOUTH EVERY DAY 90 Tab 0    LEVEMIR FLEXTOUCH U-100 INSULN 100 unit/mL (3 mL) inpn INJECT 65 UNITS BY SUBCUTANEOUS ROUTE TWO (2) TIMES A DAY 39 Adjustable Dose Pre-filled Pen Syringe 2    lidocaine (LIDODERM) 5 %       apixaban (ELIQUIS) 5 mg tablet Take 1 Tab by mouth two (2) times a day. 60 Tab 5    docusate sodium (COLACE) 100 mg capsule Take 1 Cap by mouth two (2) times daily as needed for Constipation. 60 Cap 1    albuterol (PROVENTIL HFA, VENTOLIN HFA, PROAIR HFA) 90 mcg/actuation inhaler Take 2 Puffs by inhalation every four (4) hours as needed for Wheezing. 1 Inhaler 0    fluticasone-salmeterol (ADVAIR) 250-50 mcg/dose diskus inhaler Take 1 Puff by inhalation every twelve (12) hours. 1 Inhaler 2    aspirin delayed-release 81 mg tablet Take  by mouth daily.  polyethylene glycol (MIRALAX) 17 gram packet Take 17 g by mouth daily.  diclofenac (VOLTAREN) 1 % gel APPLY TO AFFECTED AREA FOUR (4) TIMES DAILY AS NEEDED. 300 g 2    albuterol (PROVENTIL VENTOLIN) 2.5 mg /3 mL (0.083 %) nebulizer solution 3 mL by Nebulization route every four (4) hours as needed for Wheezing. 24 Each 0    LYRICA 150 mg capsule TAKE ONE CAPSULE BY MOUTH 3 TIMES A DAY  0    HYDROcodone-acetaminophen (NORCO) 5-325 mg per tablet TAKE 1 TABLET BY MOUTH 3 TIMES A DAY AS NEEDED FOR PAIN  0     No current facility-administered medications on file prior to visit.          Objective:     Vitals:    05/15/19 1318   BP: 139/86   Pulse: 89   Resp: 18   Temp: 97.8 °F (36.6 °C)   TempSrc: Oral   SpO2: 96%   Weight: 288 lb (130.6 kg)   Height: 5' 11\" (1.803 m)     Physical Examination:  General appearance - alert, well appearing, and in no distress  Eyes -sclera anicteric  Chest - clear to auscultation, no wheezes, rales or rhonchi, symmetric air entry  Heart - normal rate, regular rhythm, normal S1, S2, no murmurs, rubs, clicks or gallops  Neurological - alert, oriented, no focal findings or movement disorder noted  Back - wearing back brace  Extremities- no edema  Psych-normal mood and affect    Assessment/ Plan:   Diagnoses and all orders for this visit:    1. Essential hypertension - controlled    2. Bilateral pulmonary embolism ([address]  3. Acute deep vein thrombosis (DVT) of non-extremity vein  - ct to f/u with Dr. Jason Mack on this    4. Uncontrolled type 2 diabetes mellitus with peripheral neuropathy ([facility]) - A1C from prev labs in hospital showed A1C of 9.0%, encouraged ct work on weight loss with diet and exercise. Need to eval glu logs to adjust meds so will have him bring this to next appt. 5. Chronic bilateral low back pain without sciatica  6. Spinal stenosis, unspecified spinal region  7. S/P lumbar laminectomy  - Encouraged pt to f/u with Dr. Ilana Rios and Dr. Gregorio Gr on this issue long term. I have discussed the diagnosis with the patient and the intended plan as seen in the above orders. The patient has received an after-visit summary and questions were answered concerning future plans. I have discussed medication side effects and warnings with the patient as well. The patient verbalizes understanding and agreement with the plan. Follow-up and Dispositions    · Return in about 3 months (around 8/15/2019).

## 2019-05-16 RX ORDER — METHOCARBAMOL 500 MG/1
TABLET, FILM COATED ORAL
Qty: 30 TAB | Refills: 2 | Status: SHIPPED | OUTPATIENT
Start: 2019-05-16 | End: 2019-08-22 | Stop reason: ALTCHOICE

## 2019-05-16 RX ORDER — LISINOPRIL 40 MG/1
TABLET ORAL
Qty: 90 TAB | Refills: 0 | Status: SHIPPED | OUTPATIENT
Start: 2019-05-16 | End: 2019-09-10 | Stop reason: SDUPTHER

## 2019-05-30 DIAGNOSIS — E78.2 MIXED HYPERLIPIDEMIA: ICD-10-CM

## 2019-05-30 RX ORDER — ATORVASTATIN CALCIUM 40 MG/1
TABLET, FILM COATED ORAL
Qty: 90 TAB | Refills: 0 | Status: SHIPPED | OUTPATIENT
Start: 2019-05-30 | End: 2019-11-07 | Stop reason: SDUPTHER

## 2019-05-30 RX ORDER — INSULIN DETEMIR 100 [IU]/ML
INJECTION, SOLUTION SUBCUTANEOUS
Qty: 39 ADJUSTABLE DOSE PRE-FILLED PEN SYRINGE | Refills: 2 | Status: SHIPPED | OUTPATIENT
Start: 2019-05-30 | End: 2021-03-24 | Stop reason: SDUPTHER

## 2019-06-09 RX ORDER — METFORMIN HYDROCHLORIDE 500 MG/1
TABLET, EXTENDED RELEASE ORAL
Qty: 60 TAB | Refills: 2 | Status: SHIPPED | OUTPATIENT
Start: 2019-06-09 | End: 2019-10-03 | Stop reason: SDUPTHER

## 2019-08-19 ENCOUNTER — TELEPHONE (OUTPATIENT)
Dept: FAMILY MEDICINE CLINIC | Age: 54
End: 2019-08-19

## 2019-08-19 NOTE — TELEPHONE ENCOUNTER
----- Message from Woody Moise sent at 8/19/2019  3:43 PM EDT -----  Regarding: Dr. Chelly Sosa General Message/Vendor Calls    Caller's first and last name: Dakota Tee with [facility] [employer])      Reason for call: Regarding if the practice [facility] received the order request for a glucose monitor. If the practice has received the request all that is needed is to sign the order and send over 6 months of medical notes.         Call back required yes/no and why: Yes, confirm       Best contact number(s): [phone] Ex [phone]      Details to clarify the request:      Woody Moise

## 2019-08-21 ENCOUNTER — TELEPHONE (OUTPATIENT)
Dept: FAMILY MEDICINE CLINIC | Age: 54
End: 2019-08-21

## 2019-08-21 NOTE — TELEPHONE ENCOUNTER
----- Message from Pieter Chance sent at 8/21/2019  9:49 AM EDT -----  Regarding: Dr. Gerald Lovelace Message/Vendor Calls    Caller's first and last name:      Reason for call:      Callback required yes/no and why: yes      Best contact number(s): [phone] ext [phone]      Details to clarify the request: Angelica Peng is calling to make sure you have received the  Request for a glucose monitor for the patient.       Pieter Chance

## 2019-08-21 NOTE — TELEPHONE ENCOUNTER
Left message to call office with any questions.  Medical records request can be faxed to [phone]

## 2019-08-22 ENCOUNTER — OFFICE VISIT (OUTPATIENT)
Dept: FAMILY MEDICINE CLINIC | Age: 54
End: 2019-08-22

## 2019-08-22 ENCOUNTER — TELEPHONE (OUTPATIENT)
Dept: FAMILY MEDICINE CLINIC | Age: 54
End: 2019-08-22

## 2019-08-22 VITALS
TEMPERATURE: 98 F | HEIGHT: 71 IN | WEIGHT: 293.4 LBS | DIASTOLIC BLOOD PRESSURE: 78 MMHG | HEART RATE: 77 BPM | SYSTOLIC BLOOD PRESSURE: 134 MMHG | RESPIRATION RATE: 18 BRPM | BODY MASS INDEX: 41.07 KG/M2 | OXYGEN SATURATION: 98 %

## 2019-08-22 DIAGNOSIS — Z79.891 ENCOUNTER FOR LONG-TERM METHADONE USE: ICD-10-CM

## 2019-08-22 DIAGNOSIS — Z79.899 ENCOUNTER FOR LONG-TERM (CURRENT) USE OF MEDICATIONS: ICD-10-CM

## 2019-08-22 DIAGNOSIS — Z79.4 TYPE 2 DIABETES MELLITUS WITH DIABETIC NEUROPATHY, WITH LONG-TERM CURRENT USE OF INSULIN (HCC): ICD-10-CM

## 2019-08-22 DIAGNOSIS — F33.2 SEVERE EPISODE OF RECURRENT MAJOR DEPRESSIVE DISORDER, WITHOUT PSYCHOTIC FEATURES (HCC): ICD-10-CM

## 2019-08-22 DIAGNOSIS — Z98.890 S/P LUMBAR LAMINECTOMY: ICD-10-CM

## 2019-08-22 DIAGNOSIS — G89.29 CHRONIC BILATERAL LOW BACK PAIN WITHOUT SCIATICA: ICD-10-CM

## 2019-08-22 DIAGNOSIS — M48.00 SPINAL STENOSIS, UNSPECIFIED SPINAL REGION: ICD-10-CM

## 2019-08-22 DIAGNOSIS — Z09 HOSPITAL DISCHARGE FOLLOW-UP: Primary | ICD-10-CM

## 2019-08-22 DIAGNOSIS — F43.10 PTSD (POST-TRAUMATIC STRESS DISORDER): ICD-10-CM

## 2019-08-22 DIAGNOSIS — E11.40 TYPE 2 DIABETES MELLITUS WITH DIABETIC NEUROPATHY, WITH LONG-TERM CURRENT USE OF INSULIN (HCC): ICD-10-CM

## 2019-08-22 DIAGNOSIS — F14.10 COCAINE ABUSE (HCC): ICD-10-CM

## 2019-08-22 DIAGNOSIS — M54.50 CHRONIC BILATERAL LOW BACK PAIN WITHOUT SCIATICA: ICD-10-CM

## 2019-08-22 DIAGNOSIS — I10 ESSENTIAL HYPERTENSION: ICD-10-CM

## 2019-08-22 DIAGNOSIS — B18.2 CHRONIC HEPATITIS C WITHOUT HEPATIC COMA (HCC): ICD-10-CM

## 2019-08-22 RX ORDER — TRAZODONE HYDROCHLORIDE 50 MG/1
TABLET ORAL
COMMUNITY
Start: 2019-07-03 | End: 2019-08-22 | Stop reason: SDUPTHER

## 2019-08-22 RX ORDER — PRAZOSIN HYDROCHLORIDE 2 MG/1
2 CAPSULE ORAL
COMMUNITY
Start: 2019-07-29 | End: 2020-10-30 | Stop reason: SDUPTHER

## 2019-08-22 RX ORDER — DULOXETIN HYDROCHLORIDE 30 MG/1
CAPSULE, DELAYED RELEASE ORAL
Qty: 30 CAP | Refills: 1 | Status: SHIPPED | OUTPATIENT
Start: 2019-08-22 | End: 2020-07-31 | Stop reason: SDUPTHER

## 2019-08-22 RX ORDER — TRAZODONE HYDROCHLORIDE 50 MG/1
50 TABLET ORAL
Qty: 30 TAB | Refills: 1 | Status: SHIPPED | OUTPATIENT
Start: 2019-08-22 | End: 2019-10-10 | Stop reason: SDUPTHER

## 2019-08-22 RX ORDER — MIRTAZAPINE 7.5 MG/1
7.5 TABLET, FILM COATED ORAL
Qty: 30 TAB | Refills: 1 | Status: SHIPPED | OUTPATIENT
Start: 2019-08-22 | End: 2019-10-21 | Stop reason: SDUPTHER

## 2019-08-22 RX ORDER — CYCLOBENZAPRINE HCL 10 MG
TABLET ORAL
Qty: 30 TAB | Refills: 1 | Status: SHIPPED | OUTPATIENT
Start: 2019-08-22 | End: 2019-11-07

## 2019-08-22 RX ORDER — DULOXETIN HYDROCHLORIDE 60 MG/1
CAPSULE, DELAYED RELEASE ORAL
Qty: 30 CAP | Refills: 1 | Status: SHIPPED | OUTPATIENT
Start: 2019-08-22 | End: 2020-07-31 | Stop reason: SDUPTHER

## 2019-08-22 RX ORDER — MELOXICAM 15 MG/1
15 TABLET ORAL AS NEEDED
COMMUNITY
Start: 2019-07-18 | End: 2019-10-28

## 2019-08-22 RX ORDER — MIRTAZAPINE 7.5 MG/1
TABLET, FILM COATED ORAL
COMMUNITY
Start: 2019-07-25 | End: 2019-08-22 | Stop reason: SDUPTHER

## 2019-08-22 RX ORDER — DULOXETIN HYDROCHLORIDE 60 MG/1
CAPSULE, DELAYED RELEASE ORAL
Refills: 0 | COMMUNITY
Start: 2019-08-07 | End: 2019-08-22 | Stop reason: SDUPTHER
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
## 2019-08-22 NOTE — PROGRESS NOTES
Chief Complaint   Patient presents with    Depression     Discharged from [facility] on 7/31/19   1. Have you been to the ER, urgent care clinic since your last visit? Hospitalized since your last visit? Yes Where: [facility] 6/22/19 Suicidal ideations for plan to jump off bridge /HI     2. Have you seen or consulted any other health care providers outside of the [address] [place] [address] since your last visit? Include any pap smears or colon screening.  Yes Where: Admitted to [address] [place] [address],[address] program for Cocaine ,Admitted to [facility] from 7/2/19 and discharged 7/31/19  Currently [facility] [phone]  [facility] Season for Methadone Daily [phone] [facility] [address]  Denies SI/HI

## 2019-08-23 ENCOUNTER — TELEPHONE (OUTPATIENT)
Dept: FAMILY MEDICINE CLINIC | Age: 54
End: 2019-08-23

## 2019-08-23 NOTE — TELEPHONE ENCOUNTER
----- Message from Kita Renae sent at 2019  9:40 AM EDT -----  Regarding: Dr. Todd Patricia: [phone]  [facility] with [facility] is returning call from [address].

## 2019-08-27 LAB
ALBUMIN SERPL-MCNC: 4.1 G/DL (ref 3.5–5.5)
ALBUMIN/GLOB SERPL: 1.6 {RATIO} (ref 1.2–2.2)
ALP SERPL-CCNC: 63 IU/L (ref 39–117)
ALT SERPL-CCNC: 28 IU/L (ref 0–44)
AST SERPL-CCNC: 24 IU/L (ref 0–40)
BILIRUB SERPL-MCNC: 0.2 MG/DL (ref 0–1.2)
BUN SERPL-MCNC: 13 MG/DL (ref 6–24)
BUN/CREAT SERPL: 10 (ref 9–20)
CALCIUM SERPL-MCNC: 9.5 MG/DL (ref 8.7–10.2)
CHLORIDE SERPL-SCNC: 102 MMOL/L (ref 96–106)
CO2 SERPL-SCNC: 25 MMOL/L (ref 20–29)
CREAT SERPL-MCNC: 1.29 MG/DL (ref 0.76–1.27)
DRUGS UR: NORMAL
ERYTHROCYTE [DISTWIDTH] IN BLOOD BY AUTOMATED COUNT: 13.9 % (ref 12.3–15.4)
EST. AVERAGE GLUCOSE BLD GHB EST-MCNC: 226 MG/DL
GLOBULIN SER CALC-MCNC: 2.5 G/DL (ref 1.5–4.5)
GLUCOSE SERPL-MCNC: 128 MG/DL (ref 65–99)
HBA1C MFR BLD: 9.5 % (ref 4.8–5.6)
HCT VFR BLD AUTO: 42 % (ref 37.5–51)
HGB BLD-MCNC: 13.4 G/DL (ref 13–17.7)
MCH RBC QN AUTO: 27 PG (ref 26.6–33)
MCHC RBC AUTO-ENTMCNC: 31.9 G/DL (ref 31.5–35.7)
MCV RBC AUTO: 85 FL (ref 79–97)
PLATELET # BLD AUTO: 257 X10E3/UL (ref 150–450)
POTASSIUM SERPL-SCNC: 4.5 MMOL/L (ref 3.5–5.2)
PROT SERPL-MCNC: 6.6 G/DL (ref 6–8.5)
RBC # BLD AUTO: 4.96 X10E6/UL (ref 4.14–5.8)
SODIUM SERPL-SCNC: 140 MMOL/L (ref 134–144)
TSH SERPL DL<=0.005 MIU/L-ACNC: 1.22 UIU/ML (ref 0.45–4.5)
WBC # BLD AUTO: 5.6 X10E3/UL (ref 3.4–10.8)

## 2019-08-28 NOTE — PROGRESS NOTES
Pls call- Sugar jumped up way too high. Will need to see back in four weeks to make adjustments to meds.   Thx

## 2019-08-30 NOTE — PROGRESS NOTES
Patient advised per Dr Marquis Manriquez blood sugar jumped up way too high. He will need to see you  back in four weeks to make adjustments to meds.  Appointment scheduled for 10/3/19.

## 2019-09-14 DIAGNOSIS — I10 ESSENTIAL HYPERTENSION: ICD-10-CM

## 2019-09-16 ENCOUNTER — APPOINTMENT (OUTPATIENT)
Dept: GENERAL RADIOLOGY | Age: 54
End: 2019-09-16
Attending: EMERGENCY MEDICINE
Payer: MEDICARE

## 2019-09-16 ENCOUNTER — HOSPITAL ENCOUNTER (EMERGENCY)
Age: 54
Discharge: HOME | End: 2019-09-16
Attending: EMERGENCY MEDICINE | Admitting: EMERGENCY MEDICINE
Payer: MEDICARE

## 2019-09-16 VITALS
TEMPERATURE: 98.3 F | OXYGEN SATURATION: 98 % | HEART RATE: 82 BPM | RESPIRATION RATE: 18 BRPM | SYSTOLIC BLOOD PRESSURE: 132 MMHG | DIASTOLIC BLOOD PRESSURE: 80 MMHG

## 2019-09-16 DIAGNOSIS — M43.10 SPONDYLOLISTHESIS, UNSPECIFIED SPINAL REGION: ICD-10-CM

## 2019-09-16 DIAGNOSIS — M54.9 BACK PAIN, UNSPECIFIED BACK LOCATION, UNSPECIFIED BACK PAIN LATERALITY, UNSPECIFIED CHRONICITY: Primary | ICD-10-CM

## 2019-09-16 PROCEDURE — 96372 THER/PROPH/DIAG INJ SC/IM: CPT

## 2019-09-16 PROCEDURE — 99283 EMERGENCY DEPT VISIT LOW MDM: CPT

## 2019-09-16 PROCEDURE — 72072 X-RAY EXAM THORAC SPINE 3VWS: CPT

## 2019-09-16 PROCEDURE — 74011250636 HC RX REV CODE- 250/636: Performed by: EMERGENCY MEDICINE

## 2019-09-16 PROCEDURE — 72100 X-RAY EXAM L-S SPINE 2/3 VWS: CPT

## 2019-09-16 RX ORDER — KETOROLAC TROMETHAMINE 10 MG/1
10 TABLET, FILM COATED ORAL
Qty: 20 TAB | Refills: 0 | Status: SHIPPED | OUTPATIENT
Start: 2019-09-16 | End: 2019-11-04 | Stop reason: ALTCHOICE

## 2019-09-16 RX ORDER — FENTANYL CITRATE 50 UG/ML
50 INJECTION, SOLUTION INTRAMUSCULAR; INTRAVENOUS ONCE
Status: COMPLETED | OUTPATIENT
Start: 2019-09-16 | End: 2019-09-16

## 2019-09-16 RX ORDER — AMLODIPINE BESYLATE 10 MG/1
TABLET ORAL
Qty: 30 TAB | Refills: 2 | Status: SHIPPED | OUTPATIENT
Start: 2019-09-16 | End: 2019-10-15 | Stop reason: SDUPTHER

## 2019-09-16 RX ORDER — FENTANYL CITRATE 50 UG/ML
50 INJECTION, SOLUTION INTRAMUSCULAR; INTRAVENOUS ONCE
Status: DISCONTINUED | OUTPATIENT
Start: 2019-09-16 | End: 2019-09-16

## 2019-09-16 RX ADMIN — FENTANYL CITRATE 50 MCG: 50 INJECTION, SOLUTION INTRAMUSCULAR; INTRAVENOUS at 14:22
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
## 2019-09-16 NOTE — ED NOTES
Pt given discharge instructions, patient education, prescriptions and follow-up information by provider. Pt states understanding - all questions answered. Pt discharged to home in private vehicle with family member, ambulatory. Pt A&Ox4, RA.

## 2019-09-16 NOTE — DISCHARGE INSTRUCTIONS
Patient Education        Back Pain: Care Instructions  Your Care Instructions    Back pain has many possible causes. It is often related to problems with muscles and ligaments of the back. It may also be related to problems with the nerves, discs, or bones of the back. Moving, lifting, standing, sitting, or sleeping in an awkward way can strain the back. Sometimes you don't notice the injury until later. Arthritis is another common cause of back pain. Although it may hurt a lot, back pain usually improves on its own within several weeks. Most people recover in 12 weeks or less. Using good home treatment and being careful not to stress your back can help you feel better sooner. Follow-up care is a key part of your treatment and safety. Be sure to make and go to all appointments, and call your doctor if you are having problems. It's also a good idea to know your test results and keep a list of the medicines you take. How can you care for yourself at home? · Sit or lie in positions that are most comfortable and reduce your pain. Try one of these positions when you lie down:  ? Lie on your back with your knees bent and supported by large pillows. ? Lie on the floor with your legs on the seat of a sofa or chair. ? Lie on your side with your knees and hips bent and a pillow between your legs. ? Lie on your stomach if it does not make pain worse. · Do not sit up in bed, and avoid soft couches and twisted positions. Bed rest can help relieve pain at first, but it delays healing. Avoid bed rest after the first day of back pain. · Change positions every 30 minutes. If you must sit for long periods of time, take breaks from sitting. Get up and walk around, or lie in a comfortable position. · Try using a heating pad on a low or medium setting for 15 to 20 minutes every 2 or 3 hours. Try a warm shower in place of one session with the heating pad. · You can also try an ice pack for 10 to 15 minutes every 2 to 3 hours. Put a thin cloth between the ice pack and your skin. · Take pain medicines exactly as directed. ? If the doctor gave you a prescription medicine for pain, take it as prescribed. ? If you are not taking a prescription pain medicine, ask your doctor if you can take an over-the-counter medicine. · Take short walks several times a day. You can start with 5 to 10 minutes, 3 or 4 times a day, and work up to longer walks. Walk on level surfaces and avoid hills and stairs until your back is better. · Return to work and other activities as soon as you can. Continued rest without activity is usually not good for your back. · To prevent future back pain, do exercises to stretch and strengthen your back and stomach. Learn how to use good posture, safe lifting techniques, and proper body mechanics. When should you call for help? Call your doctor now or seek immediate medical care if:    · You have new or worsening numbness in your legs.     · You have new or worsening weakness in your legs. (This could make it hard to stand up.)     · You lose control of your bladder or bowels.    Watch closely for changes in your health, and be sure to contact your doctor if:    · You have a fever, lose weight, or don't feel well.     · You do not get better as expected. Where can you learn more? Go to http://radames-clive.info/. Enter H573 in the search box to learn more about \"Back Pain: Care Instructions. \"  Current as of: September 20, 2018  Content Version: 12.1  © 8938-5079 Healthwise, Incorporated. Care instructions adapted under license by [employer] (which disclaims liability or warranty for this information). If you have questions about a medical condition or this instruction, always ask your healthcare professional. [place] [address] any warranty or liability for your use of this information.          Patient Education        Spondylolysis and Spondylolisthesis: Exercises  Your Care Instructions  Here are some examples of typical rehabilitation exercises for your condition. Start each exercise slowly. Ease off the exercise if you start to have pain. Your doctor or physical therapist will tell you when you can start these exercises and which ones will work best for you. How to do the exercises  Single knee-to-chest    1. Lie on your back with your knees bent and your feet flat on the floor. You can put a small pillow under your head and neck if it is more comfortable. 2. Bring one knee to your chest, keeping the other foot flat on the floor. 3. Keep your lower back pressed to the floor. Hold for 15 to 30 seconds. 4. Relax, and lower the knee to the starting position. 5. Repeat with the other leg. Repeat 2 to 4 times with each leg. 6. To get more stretch, put your other leg flat on the floor while pulling your knee to your chest.    Double knee-to-chest    1. Lie on your back with your knees bent and your feet flat on the floor. You can put a small pillow under your head and neck if it is more comfortable. 2. Bring both knees to your chest.  3. Keep your lower back pressed to the floor. Hold for 15 to 30 seconds. 4. Relax, and lower your knees to the starting position. 5. Repeat 2 to 4 times. Alternate arm and leg (bird dog) exercise    1. Start on the floor, on your hands and knees. 2. Tighten your belly muscles by pulling your belly button in toward your spine. Be sure you continue to breathe normally and do not hold your breath. 3. Raise one arm off the floor, and hold it straight out in front of you. Be careful not to let your shoulder drop down, because that will twist your trunk. 4. Hold for about 6 seconds, then lower your arm and switch to your other arm. 5. Repeat 8 to 12 times on each arm. 6. When you can do this exercise with ease and no pain, repeat steps 1 through 5.  But this time do it with one leg raised off the floor, holding your leg straight out behind you. Be careful not to let your hip drop down, because that will twist your trunk. 7. When holding your leg straight out becomes easier, try raising your opposite arm at the same time, and repeat steps 1 through 5. Bridging    1. Lie on your back with both knees bent. Your knees should be bent about 90 degrees. 2. Then push your feet into the floor, squeeze your buttocks, and lift your hips off the floor until your shoulders, hips, and knees are all in a straight line. 3. Hold for about 6 seconds as you continue to breathe normally, and then slowly lower your hips back down to the floor and rest for up to 10 seconds. 4. Repeat 8 to 12 times. Curl-ups    1. Lie on the floor on your back with your knees bent at a 90-degree angle. Your feet should be flat on the floor, about 12 inches from your buttocks. 2. Cross your arms over your chest. If this bothers your neck, try putting your hands behind your neck (not your head), with your elbows spread apart. 3. Slowly tighten your belly muscles and raise your shoulder blades off the floor. 4. Keep your head in line with your body, and do not press your chin to your chest.  5. Hold this position for 1 or 2 seconds, then slowly lower yourself back down to the floor. 6. Repeat 8 to 12 times. Plank    1. Lie on your stomach, resting your upper body on your forearms. 2. Tighten your belly muscles by pulling your belly button in toward your spine. 3. Keeping your knees on the floor, press down with your forearms to lift your upper body off the floor. 4. Hold for about 6 seconds, then lower your body to the floor. Rest for up to 10 seconds. 5. Repeat 8 to 12 times. 6. Over time, work up to holding for 15 to 30 seconds each time. 7. If this exercise is easy to do with your knees on the floor, try doing this exercise with your knees and legs straight, supported by your toes on the floor. Follow-up care is a key part of your treatment and safety.  Be sure to make and go to all appointments, and call your doctor if you are having problems. It's also a good idea to know your test results and keep a list of the medicines you take. Where can you learn more? Go to http://radames-clive.info/. Enter [phone] in the search box to learn more about \"Spondylolysis and Spondylolisthesis: Exercises. \"  Current as of: September 20, 2018  Content Version: 12.1  © 6088-0669 Healthwise, Incorporated. Care instructions adapted under license by [employer] (which disclaims liability or warranty for this information). If you have questions about a medical condition or this instruction, always ask your healthcare professional. [address] any warranty or liability for your use of this information.

## 2019-09-23 ENCOUNTER — HOSPITAL ENCOUNTER (OUTPATIENT)
Dept: MRI IMAGING | Age: 54
Discharge: HOME | End: 2019-09-23
Attending: ORTHOPAEDIC SURGERY
Payer: MEDICARE

## 2019-09-23 DIAGNOSIS — M48.061 LUMBAR STENOSIS: ICD-10-CM

## 2019-09-23 DIAGNOSIS — M99.23 SUBLUXATION STENOSIS OF NEURAL CANAL OF LUMBAR REGION: ICD-10-CM

## 2019-09-23 DIAGNOSIS — M54.31 SCIATICA, RIGHT SIDE: ICD-10-CM

## 2019-09-23 DIAGNOSIS — M51.36 DDD (DEGENERATIVE DISC DISEASE), LUMBAR: ICD-10-CM

## 2019-09-23 PROCEDURE — 72148 MRI LUMBAR SPINE W/O DYE: CPT

## 2019-10-03 RX ORDER — METFORMIN HYDROCHLORIDE 500 MG/1
TABLET, EXTENDED RELEASE ORAL
Qty: 60 TAB | Refills: 2 | Status: ON HOLD | OUTPATIENT
Start: 2019-10-03 | End: 2019-10-31 | Stop reason: SDUPTHER

## 2019-10-10 ENCOUNTER — OFFICE VISIT (OUTPATIENT)
Dept: FAMILY MEDICINE CLINIC | Age: 54
End: 2019-10-10

## 2019-10-10 VITALS
RESPIRATION RATE: 18 BRPM | SYSTOLIC BLOOD PRESSURE: 141 MMHG | BODY MASS INDEX: 40.15 KG/M2 | OXYGEN SATURATION: 91 % | TEMPERATURE: 98.7 F | WEIGHT: 286.8 LBS | DIASTOLIC BLOOD PRESSURE: 80 MMHG | HEART RATE: 48 BPM | HEIGHT: 71 IN

## 2019-10-10 DIAGNOSIS — M54.50 CHRONIC BILATERAL LOW BACK PAIN WITHOUT SCIATICA: ICD-10-CM

## 2019-10-10 DIAGNOSIS — R06.09 DYSPNEA ON EXERTION: ICD-10-CM

## 2019-10-10 DIAGNOSIS — Z86.718 HISTORY OF DVT OF LOWER EXTREMITY: ICD-10-CM

## 2019-10-10 DIAGNOSIS — Z01.818 PREOP TESTING: Primary | ICD-10-CM

## 2019-10-10 DIAGNOSIS — F43.10 PTSD (POST-TRAUMATIC STRESS DISORDER): ICD-10-CM

## 2019-10-10 DIAGNOSIS — Z86.711 HISTORY OF PULMONARY EMBOLUS (PE): ICD-10-CM

## 2019-10-10 DIAGNOSIS — F33.2 SEVERE EPISODE OF RECURRENT MAJOR DEPRESSIVE DISORDER, WITHOUT PSYCHOTIC FEATURES (HCC): ICD-10-CM

## 2019-10-10 DIAGNOSIS — G89.29 CHRONIC BILATERAL LOW BACK PAIN WITHOUT SCIATICA: ICD-10-CM

## 2019-10-10 DIAGNOSIS — M48.00 SPINAL STENOSIS, UNSPECIFIED SPINAL REGION: ICD-10-CM

## 2019-10-10 RX ORDER — ONDANSETRON 4 MG/1
TABLET, ORALLY DISINTEGRATING ORAL
Refills: 0 | COMMUNITY
Start: 2019-10-03 | End: 2019-10-21 | Stop reason: ALTCHOICE

## 2019-10-10 RX ORDER — OXYCODONE HYDROCHLORIDE 5 MG/1
TABLET ORAL
Refills: 0 | COMMUNITY
Start: 2019-09-26 | End: 2019-10-28

## 2019-10-10 RX ORDER — PROMETHAZINE HYDROCHLORIDE 25 MG/1
SUPPOSITORY RECTAL
Refills: 0 | COMMUNITY
Start: 2019-10-03 | End: 2019-10-21 | Stop reason: ALTCHOICE

## 2019-10-10 RX ORDER — TRAZODONE HYDROCHLORIDE 50 MG/1
TABLET ORAL
Qty: 30 TAB | Refills: 1 | Status: SHIPPED | OUTPATIENT
Start: 2019-10-10 | End: 2019-10-21 | Stop reason: ALTCHOICE
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
## 2019-10-10 NOTE — PROGRESS NOTES
Subjective:     Chief Complaint   Patient presents with    Pre-op Exam     Surgery 10/30/19 Dr Maria M Cheek Revision Laminectomy ,L3-4,Discectomy L3-4,Rev PSF L3-S1        He  is a 48 y.o. male who presents for evaluation of:  Pre-op - to have surgery on 10/30/19 with Dr. Maria M Cheek. Will have Revision Laminectomy and Discectomy L3-4, with Revision PSF L3-S1. Planning for gen anesthesia. No latex allergy. Has quit methadone with severe withdrawals. Picked up smoking but has been able to quit smoking. He is still having trouble with breathing recently. Unable to walk 4 blocks d/t pain. Able to walk up a flight of stairs with no trouble. Had nml nu stress test in 4/2019    Had Bilat PE and DVT back in 4/2019 and now doing well on Eliquis 5 mg. Diabetes Mellitus: Recently doing very well. Glu running 130s now. Taking meds, home glucose monitoring: is performed regularly  Reports no polyuria or polydipsia, no chest pain, dyspnea or TIA's, last eye exam approximately < 1 yr ago. Exercises regularly with walking. Compliant with diabetic diet. Avoids sodas and sweet teas. Avoids fast food. Limits carbs. Pt is a non smoker.     Lab Results   Component Value Date/Time    Hemoglobin A1c (POC) 7.7 11/14/2018 10:42 AM    Hemoglobin A1c (POC) 8.1 08/14/2018 04:30 PM    Hemoglobin A1c 7.3 (H) 10/10/2019 04:01 PM    Microalb/Creat ratio (ug/mg creat.) 5.9 04/10/2018 09:24 AM    LDL, calculated 59 12/03/2018 11:27 AM    Creatinine 1.25 10/10/2019 04:01 PM      Lab Results   Component Value Date/Time    GFR est AA 76 10/10/2019 04:01 PM    GFR est non-AA 66 10/10/2019 04:01 PM      Lab Results   Component Value Date/Time    TSH 1.220 08/22/2019 09:01 AM           ROS  Gen - no fever/chills  Resp - no dyspnea or cough  CV - no chest pain or BROWN  Rest per HPI    Past Medical History:   Diagnosis Date    Arthritis     2010    Asthma 1995    INHALER USE PRN    Chronic bilateral low back pain without sciatica 11/6/2017    Chronic hepatitis C without hepatic coma ([facility] 11/6/2017    [facility] Chronic pain     Essential hypertension 11/6/2017    GERD (gastroesophageal reflux disease)     History of cardiac cath     Hypercholesterolemia     Mild episode of recurrent major depressive disorder ([facility] 11/06/2012    Morbid obesity ([facility]     PTSD (post-traumatic stress disorder)     Sleep apnea     \"CANNOT AFFORD CPAP\", DR BARRAZA TO FOLLOW UP POST OP    Stage 3 chronic kidney disease ([facility] 11/06/2017    IMPROVED     Thromboembolus ([facility]     1/3/19  DVT,PE    Uncontrolled type 2 diabetes mellitus with peripheral neuropathy ([facility] 11/06/2011     Past Surgical History:   Procedure Laterality Date    HX BACK SURGERY  04/10/2018    Fusion L4,L5    HX COLONOSCOPY  2013    CJW    HX HEART CATHETERIZATION      NEG PER PATIENT    HX HERNIA REPAIR  9527    UMBILICAL    HX KNEE ARTHROSCOPY  1999    right knee    HX KNEE ARTHROSCOPY  2002    left knee    HX ROTATOR CUFF REPAIR Right 2016    HX UROLOGICAL  2015    Vasectomy      Current Outpatient Medications on File Prior to Visit   Medication Sig Dispense Refill    metFORMIN ER (GLUCOPHAGE XR) 500 mg tablet TAKE 2 TABS DAILY WITH DINNER 60 Tab 2    ketorolac (TORADOL) 10 mg tablet Take 1 Tab by mouth every six (6) hours as needed for Pain.  20 Tab 0    lisinopril (PRINIVIL, ZESTRIL) 40 mg tablet TAKE 1 TABLET BY MOUTH EVERY DAY 30 Tab 5    prazosin (MINIPRESS) 2 mg capsule       DULoxetine (CYMBALTA) 60 mg capsule TAKE ONE CAPSULE BY MOUTH EVERY MORNING ALONG WITH 30 MG 30 Cap 1    DULoxetine (CYMBALTA) 30 mg capsule Take 1 capsule by mouth every morning along with 60 mg dose 30 Cap 1    cyclobenzaprine (FLEXERIL) 10 mg tablet TAKE 1 TABLET BY MOUTH nightly as needed 30 Tab 1    atorvastatin (LIPITOR) 40 mg tablet TAKE 1 TABLET BY MOUTH EVERY DAY 90 Tab 0    LEVEMIR FLEXTOUCH U-100 INSULN 100 unit/mL (3 mL) inpn INJECT 65 UNITS BY SUBCUTANEOUS ROUTE TWO (2) TIMES A DAY 39 Adjustable Dose Pre-filled Pen Syringe 2    montelukast (SINGULAIR) 10 mg tablet Take 1 Tab by mouth daily. 30 Tab 5    apixaban (ELIQUIS) 5 mg tablet Take 1 Tab by mouth two (2) times a day. 60 Tab 5    docusate sodium (COLACE) 100 mg capsule Take 1 Cap by mouth two (2) times daily as needed for Constipation. 60 Cap 1    albuterol (PROVENTIL HFA, VENTOLIN HFA, PROAIR HFA) 90 mcg/actuation inhaler Take 2 Puffs by inhalation every four (4) hours as needed for Wheezing. 1 Inhaler 0    fluticasone-salmeterol (ADVAIR) 250-50 mcg/dose diskus inhaler Take 1 Puff by inhalation every twelve (12) hours. 1 Inhaler 2    aspirin delayed-release 81 mg tablet Take  by mouth daily.  polyethylene glycol (MIRALAX) 17 gram packet Take 17 g by mouth daily.  diclofenac (VOLTAREN) 1 % gel APPLY TO AFFECTED AREA FOUR (4) TIMES DAILY AS NEEDED. 300 g 2    albuterol (PROVENTIL VENTOLIN) 2.5 mg /3 mL (0.083 %) nebulizer solution 3 mL by Nebulization route every four (4) hours as needed for Wheezing. 24 Each 0    oxyCODONE IR (ROXICODONE) 5 mg immediate release tablet TAKE 1 TABLET BY MOUTH EVERY 6 TO 8 HOURS AS NEEDED FOR PAIN  0    [DISCONTINUED] promethazine (PHENERGAN) 25 mg suppository INSERT 1 SUPPOSITORY EVERY 4 TO 6 HOURS AS NEEDED  0    [DISCONTINUED] ondansetron (ZOFRAN ODT) 4 mg disintegrating tablet TAKE 1 TABLET BY MOUTH EVERY 6 HOURS AS NEEDED FOR NAUSEA AND VOMITING  0    meloxicam (MOBIC) 15 mg tablet       [DISCONTINUED] methadone HCl (METHADONE PO) Take 50 mg by mouth daily.  [DISCONTINUED] mirtazapine (REMERON) 7.5 mg tablet Take 1 Tab by mouth nightly. 30 Tab 1    [DISCONTINUED] furosemide (LASIX) 20 mg tablet 20 mg daily as needed.  [DISCONTINUED] lidocaine (LIDODERM) 5 %        No current facility-administered medications on file prior to visit.          Objective:     Vitals:    10/10/19 1340   BP: 141/80   Pulse: (!) 48   Resp: 18   Temp: 98.7 °F (37.1 °C) TempSrc: Oral   SpO2: 91%   Weight: 286 lb 12.8 oz (130.1 kg)   Height: 5' 11\" (1.803 m)     Physical Examination:  General appearance - alert, well appearing, and in no distress  Eyes - sclera anicteric  Neck - supple, no significant adenopathy  Lymphatics - no palpable lymphadenopathy, no hepatosplenomegaly  Chest - clear to auscultation, no wheezes, rales or rhonchi, symmetric air entry  Heart - normal rate, regular rhythm, normal S1, S2, no murmurs, rubs, clicks or gallops  Abdomen - soft, nontender, nondistended, no masses or organomegaly  Neurological - alert, oriented, normal speech, no focal findings or movement disorder noted  Musculoskeletal - no joint tenderness, deformity or swelling  Extremities - no edema  Skin - no rashes or suspicious lesions  Psych - nml mood and affect    Assessment/ Plan:   Diagnoses and all orders for this visit:    1. Preop testing - Cleared for surgery pending labs and CT. Mod-high risk patient for moderate risk surgery. -     AMB POC EKG ROUTINE W/ 12 LEADS, INTER & REP  -     CBC WITH AUTOMATED DIFF  -     PROTHROMBIN TIME + INR  -     URINALYSIS W/ RFLX MICROSCOPIC  -     METABOLIC PANEL, COMPREHENSIVE  -     HEMOGLOBIN A1C WITH EAG    2. Uncontrolled type 2 diabetes mellitus with peripheral neuropathy (HCC) - checking labs  -     URINALYSIS W/ RFLX MICROSCOPIC  -     METABOLIC PANEL, COMPREHENSIVE  -     HEMOGLOBIN A1C WITH EAG    3. Spinal stenosis, unspecified spinal region  4. Chronic bilateral low back pain without sciatica  - chronic issues and for surgery    5. History of pulmonary embolus (PE)  6. History of DVT of lower extremity  7. Dyspnea on exertion  - getting new CT prior to surgery, Hematology managing Eliquis mauricio prior to surgery and after surgery  -     CT CHEST WO CONT; Future     I have discussed the diagnosis with the patient and the intended plan as seen in the above orders.   The patient has received an after-visit summary and questions were answered concerning future plans. I have discussed medication side effects and warnings with the patient as well. The patient verbalizes understanding and agreement with the plan. Follow-up and Dispositions    · Return in about 6 weeks (around 11/21/2019).

## 2019-10-11 LAB
ALBUMIN SERPL-MCNC: 4.5 G/DL (ref 3.5–5.5)
ALBUMIN/GLOB SERPL: 1.7 {RATIO} (ref 1.2–2.2)
ALP SERPL-CCNC: 92 IU/L (ref 39–117)
ALT SERPL-CCNC: 32 IU/L (ref 0–44)
APPEARANCE UR: CLEAR
AST SERPL-CCNC: 15 IU/L (ref 0–40)
BASOPHILS # BLD AUTO: 0 X10E3/UL (ref 0–0.2)
BASOPHILS NFR BLD AUTO: 1 %
BILIRUB SERPL-MCNC: 0.3 MG/DL (ref 0–1.2)
BILIRUB UR QL STRIP: NEGATIVE
BUN SERPL-MCNC: 15 MG/DL (ref 6–24)
BUN/CREAT SERPL: 12 (ref 9–20)
CALCIUM SERPL-MCNC: 9.6 MG/DL (ref 8.7–10.2)
CHLORIDE SERPL-SCNC: 100 MMOL/L (ref 96–106)
CO2 SERPL-SCNC: 23 MMOL/L (ref 20–29)
COLOR UR: YELLOW
CREAT SERPL-MCNC: 1.25 MG/DL (ref 0.76–1.27)
EOSINOPHIL # BLD AUTO: 0.2 X10E3/UL (ref 0–0.4)
EOSINOPHIL NFR BLD AUTO: 2 %
ERYTHROCYTE [DISTWIDTH] IN BLOOD BY AUTOMATED COUNT: 12.9 % (ref 12.3–15.4)
EST. AVERAGE GLUCOSE BLD GHB EST-MCNC: 163 MG/DL
GLOBULIN SER CALC-MCNC: 2.6 G/DL (ref 1.5–4.5)
GLUCOSE SERPL-MCNC: 136 MG/DL (ref 65–99)
GLUCOSE UR QL: NEGATIVE
HBA1C MFR BLD: 7.3 % (ref 4.8–5.6)
HCT VFR BLD AUTO: 37.8 % (ref 37.5–51)
HGB BLD-MCNC: 12.8 G/DL (ref 13–17.7)
HGB UR QL STRIP: NEGATIVE
IMM GRANULOCYTES # BLD AUTO: 0 X10E3/UL (ref 0–0.1)
IMM GRANULOCYTES NFR BLD AUTO: 0 %
INR PPP: 1 (ref 0.8–1.2)
KETONES UR QL STRIP: NEGATIVE
LEUKOCYTE ESTERASE UR QL STRIP: NEGATIVE
LYMPHOCYTES # BLD AUTO: 3.5 X10E3/UL (ref 0.7–3.1)
LYMPHOCYTES NFR BLD AUTO: 44 %
MCH RBC QN AUTO: 27.9 PG (ref 26.6–33)
MCHC RBC AUTO-ENTMCNC: 33.9 G/DL (ref 31.5–35.7)
MCV RBC AUTO: 83 FL (ref 79–97)
MICRO URNS: NORMAL
MONOCYTES # BLD AUTO: 0.7 X10E3/UL (ref 0.1–0.9)
MONOCYTES NFR BLD AUTO: 9 %
NEUTROPHILS # BLD AUTO: 3.4 X10E3/UL (ref 1.4–7)
NEUTROPHILS NFR BLD AUTO: 44 %
NITRITE UR QL STRIP: NEGATIVE
PH UR STRIP: 5.5 [PH] (ref 5–7.5)
PLATELET # BLD AUTO: 271 X10E3/UL (ref 150–450)
POTASSIUM SERPL-SCNC: 4.4 MMOL/L (ref 3.5–5.2)
PROT SERPL-MCNC: 7.1 G/DL (ref 6–8.5)
PROT UR QL STRIP: NORMAL
PROTHROMBIN TIME: 10.4 SEC (ref 9.1–12)
RBC # BLD AUTO: 4.58 X10E6/UL (ref 4.14–5.8)
SODIUM SERPL-SCNC: 139 MMOL/L (ref 134–144)
SP GR UR: 1.03 (ref 1–1.03)
UROBILINOGEN UR STRIP-MCNC: 0.2 MG/DL (ref 0.2–1)
WBC # BLD AUTO: 7.9 X10E3/UL (ref 3.4–10.8)

## 2019-10-13 NOTE — PROGRESS NOTES
Please call patientsugars have improved enough to be able to be cleared for surgery.     Mychart message sent for labs also

## 2019-10-15 DIAGNOSIS — I10 ESSENTIAL HYPERTENSION: ICD-10-CM

## 2019-10-15 RX ORDER — AMLODIPINE BESYLATE 10 MG/1
TABLET ORAL
Qty: 30 TAB | Refills: 2 | Status: SHIPPED | OUTPATIENT
Start: 2019-10-15 | End: 2020-01-09

## 2019-10-17 ENCOUNTER — TELEPHONE (OUTPATIENT)
Dept: FAMILY MEDICINE CLINIC | Age: 54
End: 2019-10-17

## 2019-10-17 NOTE — TELEPHONE ENCOUNTER
----- Message from Brenda Frias sent at 10/17/2019  4:04 PM EDT -----  Regarding: Dr. Manuel Puckett Message/Vendor Calls    Caller's first and last name: Edwardo Frausto       Reason for call: Pt calling requesting to get a call back from the nurse in regards to him being hospitalized since Tuesday @ 4am at [facility] for chroni pains, and muscle spasms.        Callback required yes/no and why: yes- requested per pt       Best contact number(s): [phone]      Details to clarify the request:      Brenda Frias

## 2019-10-18 NOTE — TELEPHONE ENCOUNTER
Patient called to advised Dr Alvaro Tan is was in [address] [place] [address]. Dr Alvaro Tan was advised.

## 2019-10-21 DIAGNOSIS — F33.2 SEVERE EPISODE OF RECURRENT MAJOR DEPRESSIVE DISORDER, WITHOUT PSYCHOTIC FEATURES (HCC): ICD-10-CM

## 2019-10-21 DIAGNOSIS — F43.10 PTSD (POST-TRAUMATIC STRESS DISORDER): ICD-10-CM

## 2019-10-21 RX ORDER — MIRTAZAPINE 7.5 MG/1
7.5 TABLET, FILM COATED ORAL
Qty: 90 TAB | Refills: 0 | Status: SHIPPED | OUTPATIENT
Start: 2019-10-21 | End: 2020-01-25

## 2019-10-28 ENCOUNTER — HOSPITAL ENCOUNTER (OUTPATIENT)
Dept: PREADMISSION TESTING | Age: 54
Discharge: HOME | DRG: 460 | End: 2019-10-28
Payer: MEDICARE

## 2019-10-28 VITALS
BODY MASS INDEX: 37.65 KG/M2 | SYSTOLIC BLOOD PRESSURE: 143 MMHG | HEIGHT: 72 IN | RESPIRATION RATE: 18 BRPM | WEIGHT: 278 LBS | DIASTOLIC BLOOD PRESSURE: 84 MMHG | HEART RATE: 91 BPM | TEMPERATURE: 98.4 F

## 2019-10-28 LAB
ABO + RH BLD: NORMAL
BLOOD GROUP ANTIBODIES SERPL: NORMAL
SPECIMEN EXP DATE BLD: NORMAL

## 2019-10-28 PROCEDURE — 86900 BLOOD TYPING SEROLOGIC ABO: CPT

## 2019-10-28 RX ORDER — OXYCODONE HYDROCHLORIDE 10 MG/1
10 TABLET ORAL
COMMUNITY
End: 2019-11-04

## 2019-10-28 RX ORDER — ONDANSETRON 4 MG/1
4 TABLET, ORALLY DISINTEGRATING ORAL
COMMUNITY
End: 2019-11-04

## 2019-10-28 NOTE — PERIOP NOTES
LEFT MESSAGE ON VOICEMAIL FOR MANNY AT  [facility] OFFICE NOTIFYING HER PT STOPPED ASA 81MG 10/28 , 2 DAYS PRIOR, INSTEAD OF  7 DAYS PRIOR TO SURGERY AND KETOROLAC 10/28, 2 DAYS PRIOR INSTEAD OF 5 DAYS PRIOR TO SURGERY.

## 2019-10-29 LAB
BACTERIA SPEC CULT: NORMAL
BACTERIA SPEC CULT: NORMAL
SERVICE CMNT-IMP: NORMAL

## 2019-10-30 ENCOUNTER — ANESTHESIA EVENT (OUTPATIENT)
Dept: SURGERY | Age: 54
DRG: 460 | End: 2019-10-30
Payer: MEDICARE

## 2019-10-30 ENCOUNTER — ANESTHESIA (OUTPATIENT)
Dept: SURGERY | Age: 54
DRG: 460 | End: 2019-10-30
Payer: MEDICARE

## 2019-10-30 ENCOUNTER — HOSPITAL ENCOUNTER (INPATIENT)
Age: 54
LOS: 3 days | Discharge: HOME HEALTH | DRG: 460 | End: 2019-11-02
Attending: ORTHOPAEDIC SURGERY | Admitting: ORTHOPAEDIC SURGERY
Payer: MEDICARE

## 2019-10-30 ENCOUNTER — APPOINTMENT (OUTPATIENT)
Dept: GENERAL RADIOLOGY | Age: 54
DRG: 460 | End: 2019-10-30
Attending: ORTHOPAEDIC SURGERY
Payer: MEDICARE

## 2019-10-30 PROBLEM — M48.061 LUMBAR SPINAL STENOSIS: Status: ACTIVE | Noted: 2019-10-30

## 2019-10-30 LAB
GLUCOSE BLD STRIP.AUTO-MCNC: 169 MG/DL (ref 65–100)
GLUCOSE BLD STRIP.AUTO-MCNC: 226 MG/DL (ref 65–100)
GLUCOSE BLD STRIP.AUTO-MCNC: 228 MG/DL (ref 65–100)
SERVICE CMNT-IMP: ABNORMAL

## 2019-10-30 PROCEDURE — 77030029099 HC BN WAX SSPC -A: Performed by: ORTHOPAEDIC SURGERY

## 2019-10-30 PROCEDURE — 77030038600 HC TU BPLR IRR DISP STRY -B: Performed by: ORTHOPAEDIC SURGERY

## 2019-10-30 PROCEDURE — 0SP304Z REMOVAL OF INTERNAL FIXATION DEVICE FROM LUMBOSACRAL JOINT, OPEN APPROACH: ICD-10-PCS | Performed by: ORTHOPAEDIC SURGERY

## 2019-10-30 PROCEDURE — 3E0U0GB INTRODUCTION OF RECOMBINANT BONE MORPHOGENETIC PROTEIN INTO JOINTS, OPEN APPROACH: ICD-10-PCS | Performed by: ORTHOPAEDIC SURGERY

## 2019-10-30 PROCEDURE — 74011250637 HC RX REV CODE- 250/637: Performed by: ANESTHESIOLOGY

## 2019-10-30 PROCEDURE — 74011250636 HC RX REV CODE- 250/636: Performed by: NURSE ANESTHETIST, CERTIFIED REGISTERED

## 2019-10-30 PROCEDURE — 77030038552 HC DRN WND MDII -A: Performed by: ORTHOPAEDIC SURGERY

## 2019-10-30 PROCEDURE — 77030040922 HC BLNKT HYPOTHRM STRY -A

## 2019-10-30 PROCEDURE — C1713 ANCHOR/SCREW BN/BN,TIS/BN: HCPCS | Performed by: ORTHOPAEDIC SURGERY

## 2019-10-30 PROCEDURE — P9045 ALBUMIN (HUMAN), 5%, 250 ML: HCPCS | Performed by: NURSE ANESTHETIST, CERTIFIED REGISTERED

## 2019-10-30 PROCEDURE — 74011000250 HC RX REV CODE- 250: Performed by: NURSE ANESTHETIST, CERTIFIED REGISTERED

## 2019-10-30 PROCEDURE — 77030037728 HC GRFT BN FBR CORT 3DEMIN 30CC BACT -I: Performed by: ORTHOPAEDIC SURGERY

## 2019-10-30 PROCEDURE — 65270000029 HC RM PRIVATE

## 2019-10-30 PROCEDURE — 74011250636 HC RX REV CODE- 250/636: Performed by: ANESTHESIOLOGY

## 2019-10-30 PROCEDURE — 74011636637 HC RX REV CODE- 636/637: Performed by: ANESTHESIOLOGY

## 2019-10-30 PROCEDURE — 0SG30K1 FUSION OF LUMBOSACRAL JOINT WITH NONAUTOLOGOUS TISSUE SUBSTITUTE, POSTERIOR APPROACH, POSTERIOR COLUMN, OPEN APPROACH: ICD-10-PCS | Performed by: ORTHOPAEDIC SURGERY

## 2019-10-30 PROCEDURE — 72020 X-RAY EXAM OF SPINE 1 VIEW: CPT

## 2019-10-30 PROCEDURE — 77030003194 HC GRFT RECOMB BN MEDT -I1: Performed by: ORTHOPAEDIC SURGERY

## 2019-10-30 PROCEDURE — 77030039267 HC ADH SKN EXOFIN S2SG -B: Performed by: ORTHOPAEDIC SURGERY

## 2019-10-30 PROCEDURE — 01NB0ZZ RELEASE LUMBAR NERVE, OPEN APPROACH: ICD-10-PCS | Performed by: ORTHOPAEDIC SURGERY

## 2019-10-30 PROCEDURE — 76060000038 HC ANESTHESIA 3.5 TO 4 HR: Performed by: ORTHOPAEDIC SURGERY

## 2019-10-30 PROCEDURE — 74011000250 HC RX REV CODE- 250: Performed by: PHYSICIAN ASSISTANT

## 2019-10-30 PROCEDURE — 77030008684 HC TU ET CUF COVD -B: Performed by: NURSE ANESTHETIST, CERTIFIED REGISTERED

## 2019-10-30 PROCEDURE — 76010000174 HC OR TIME 3.5 TO 4 HR INTENSV-TIER 1: Performed by: ORTHOPAEDIC SURGERY

## 2019-10-30 PROCEDURE — 77030014007 HC SPNG HEMSTAT J&J -B: Performed by: ORTHOPAEDIC SURGERY

## 2019-10-30 PROCEDURE — 77030005513 HC CATH URETH FOL11 MDII -B: Performed by: ORTHOPAEDIC SURGERY

## 2019-10-30 PROCEDURE — 77030031139 HC SUT VCRL2 J&J -A: Performed by: ORTHOPAEDIC SURGERY

## 2019-10-30 PROCEDURE — 77030040361 HC SLV COMPR DVT MDII -B: Performed by: ORTHOPAEDIC SURGERY

## 2019-10-30 PROCEDURE — 77030018836 HC SOL IRR NACL ICUM -A: Performed by: ORTHOPAEDIC SURGERY

## 2019-10-30 PROCEDURE — 82962 GLUCOSE BLOOD TEST: CPT

## 2019-10-30 PROCEDURE — 77030012893

## 2019-10-30 PROCEDURE — 74011636637 HC RX REV CODE- 636/637: Performed by: PHYSICIAN ASSISTANT

## 2019-10-30 PROCEDURE — 77030014647 HC SEAL FBRN TISSL BAXT -D: Performed by: ORTHOPAEDIC SURGERY

## 2019-10-30 PROCEDURE — 0SP004Z REMOVAL OF INTERNAL FIXATION DEVICE FROM LUMBAR VERTEBRAL JOINT, OPEN APPROACH: ICD-10-PCS | Performed by: ORTHOPAEDIC SURGERY

## 2019-10-30 PROCEDURE — 77030026438 HC STYL ET INTUB CARD -A: Performed by: NURSE ANESTHETIST, CERTIFIED REGISTERED

## 2019-10-30 PROCEDURE — 74011250636 HC RX REV CODE- 250/636: Performed by: PHYSICIAN ASSISTANT

## 2019-10-30 PROCEDURE — 77030036946 HC GRFT BN FBR CORT 3DEMIN 10CC BACT -G: Performed by: ORTHOPAEDIC SURGERY

## 2019-10-30 PROCEDURE — 76210000017 HC OR PH I REC 1.5 TO 2 HR: Performed by: ORTHOPAEDIC SURGERY

## 2019-10-30 PROCEDURE — 0SG10K1 FUSION OF 2 OR MORE LUMBAR VERTEBRAL JOINTS WITH NONAUTOLOGOUS TISSUE SUBSTITUTE, POSTERIOR APPROACH, POSTERIOR COLUMN, OPEN APPROACH: ICD-10-PCS | Performed by: ORTHOPAEDIC SURGERY

## 2019-10-30 PROCEDURE — 77030004391 HC BUR FLUT MEDT -C: Performed by: ORTHOPAEDIC SURGERY

## 2019-10-30 PROCEDURE — 74011250637 HC RX REV CODE- 250/637: Performed by: PHYSICIAN ASSISTANT

## 2019-10-30 DEVICE — IMPLANTABLE DEVICE: Type: IMPLANTABLE DEVICE | Site: BACK | Status: FUNCTIONAL

## 2019-10-30 DEVICE — GRAFT BNE 3D 30 CC CORTICAL FIBER: Type: IMPLANTABLE DEVICE | Site: BACK | Status: FUNCTIONAL

## 2019-10-30 DEVICE — BONE GRAFT KIT 7510200 INFUSE SMALL
Type: IMPLANTABLE DEVICE | Site: BACK | Status: FUNCTIONAL
Brand: INFUSE® BONE GRAFT

## 2019-10-30 RX ORDER — ALBUMIN HUMAN 50 G/1000ML
SOLUTION INTRAVENOUS AS NEEDED
Status: DISCONTINUED | OUTPATIENT
Start: 2019-10-30 | End: 2019-10-30 | Stop reason: HOSPADM

## 2019-10-30 RX ORDER — ONDANSETRON 2 MG/ML
INJECTION INTRAMUSCULAR; INTRAVENOUS AS NEEDED
Status: DISCONTINUED | OUTPATIENT
Start: 2019-10-30 | End: 2019-10-30 | Stop reason: HOSPADM

## 2019-10-30 RX ORDER — MIDAZOLAM HYDROCHLORIDE 1 MG/ML
1 INJECTION, SOLUTION INTRAMUSCULAR; INTRAVENOUS AS NEEDED
Status: DISCONTINUED | OUTPATIENT
Start: 2019-10-30 | End: 2019-10-30 | Stop reason: HOSPADM

## 2019-10-30 RX ORDER — MIRTAZAPINE 15 MG/1
7.5 TABLET, FILM COATED ORAL
Status: DISCONTINUED | OUTPATIENT
Start: 2019-10-30 | End: 2019-11-02 | Stop reason: HOSPADM

## 2019-10-30 RX ORDER — SUCCINYLCHOLINE CHLORIDE 20 MG/ML
INJECTION INTRAMUSCULAR; INTRAVENOUS AS NEEDED
Status: DISCONTINUED | OUTPATIENT
Start: 2019-10-30 | End: 2019-10-30 | Stop reason: HOSPADM

## 2019-10-30 RX ORDER — DULOXETIN HYDROCHLORIDE 60 MG/1
60 CAPSULE, DELAYED RELEASE ORAL DAILY
Status: DISCONTINUED | OUTPATIENT
Start: 2019-10-31 | End: 2019-11-02 | Stop reason: HOSPADM

## 2019-10-30 RX ORDER — AMLODIPINE BESYLATE 5 MG/1
10 TABLET ORAL
Status: DISCONTINUED | OUTPATIENT
Start: 2019-10-31 | End: 2019-11-02 | Stop reason: HOSPADM

## 2019-10-30 RX ORDER — ONDANSETRON 2 MG/ML
4 INJECTION INTRAMUSCULAR; INTRAVENOUS AS NEEDED
Status: DISCONTINUED | OUTPATIENT
Start: 2019-10-30 | End: 2019-10-30 | Stop reason: HOSPADM

## 2019-10-30 RX ORDER — HYDROMORPHONE HCL/0.9% NACL/PF 0.5 MG/ML
PLASTIC BAG, INJECTION (ML) INTRAVENOUS CONTINUOUS
Status: DISCONTINUED | OUTPATIENT
Start: 2019-10-30 | End: 2019-10-31

## 2019-10-30 RX ORDER — MIDAZOLAM HYDROCHLORIDE 1 MG/ML
0.5 INJECTION, SOLUTION INTRAMUSCULAR; INTRAVENOUS
Status: DISCONTINUED | OUTPATIENT
Start: 2019-10-30 | End: 2019-10-30 | Stop reason: HOSPADM

## 2019-10-30 RX ORDER — SODIUM CHLORIDE 9 MG/ML
25 INJECTION, SOLUTION INTRAVENOUS CONTINUOUS
Status: DISCONTINUED | OUTPATIENT
Start: 2019-10-30 | End: 2019-10-30 | Stop reason: HOSPADM

## 2019-10-30 RX ORDER — ALBUTEROL SULFATE 0.83 MG/ML
2.5 SOLUTION RESPIRATORY (INHALATION)
Status: DISCONTINUED | OUTPATIENT
Start: 2019-10-30 | End: 2019-11-02 | Stop reason: HOSPADM

## 2019-10-30 RX ORDER — MAGNESIUM SULFATE 100 %
4 CRYSTALS MISCELLANEOUS AS NEEDED
Status: DISCONTINUED | OUTPATIENT
Start: 2019-10-30 | End: 2019-11-02 | Stop reason: HOSPADM

## 2019-10-30 RX ORDER — ROPIVACAINE HYDROCHLORIDE 5 MG/ML
30 INJECTION, SOLUTION EPIDURAL; INFILTRATION; PERINEURAL AS NEEDED
Status: DISCONTINUED | OUTPATIENT
Start: 2019-10-30 | End: 2019-10-30 | Stop reason: HOSPADM

## 2019-10-30 RX ORDER — SODIUM CHLORIDE 0.9 % (FLUSH) 0.9 %
5-40 SYRINGE (ML) INJECTION AS NEEDED
Status: DISCONTINUED | OUTPATIENT
Start: 2019-10-30 | End: 2019-10-30 | Stop reason: HOSPADM

## 2019-10-30 RX ORDER — DEXMEDETOMIDINE HYDROCHLORIDE 100 UG/ML
INJECTION, SOLUTION INTRAVENOUS AS NEEDED
Status: DISCONTINUED | OUTPATIENT
Start: 2019-10-30 | End: 2019-10-30 | Stop reason: HOSPADM

## 2019-10-30 RX ORDER — MORPHINE SULFATE 10 MG/ML
2 INJECTION, SOLUTION INTRAMUSCULAR; INTRAVENOUS
Status: DISCONTINUED | OUTPATIENT
Start: 2019-10-30 | End: 2019-10-30 | Stop reason: HOSPADM

## 2019-10-30 RX ORDER — DEXAMETHASONE SODIUM PHOSPHATE 4 MG/ML
INJECTION, SOLUTION INTRA-ARTICULAR; INTRALESIONAL; INTRAMUSCULAR; INTRAVENOUS; SOFT TISSUE AS NEEDED
Status: DISCONTINUED | OUTPATIENT
Start: 2019-10-30 | End: 2019-10-30 | Stop reason: HOSPADM

## 2019-10-30 RX ORDER — GLYCOPYRROLATE 0.2 MG/ML
INJECTION INTRAMUSCULAR; INTRAVENOUS AS NEEDED
Status: DISCONTINUED | OUTPATIENT
Start: 2019-10-30 | End: 2019-10-30 | Stop reason: HOSPADM

## 2019-10-30 RX ORDER — OXYCODONE HYDROCHLORIDE 5 MG/1
10 TABLET ORAL
Status: DISCONTINUED | OUTPATIENT
Start: 2019-10-30 | End: 2019-11-01

## 2019-10-30 RX ORDER — MIDAZOLAM HYDROCHLORIDE 1 MG/ML
INJECTION, SOLUTION INTRAMUSCULAR; INTRAVENOUS AS NEEDED
Status: DISCONTINUED | OUTPATIENT
Start: 2019-10-30 | End: 2019-10-30 | Stop reason: HOSPADM

## 2019-10-30 RX ORDER — NALOXONE HYDROCHLORIDE 0.4 MG/ML
0.4 INJECTION, SOLUTION INTRAMUSCULAR; INTRAVENOUS; SUBCUTANEOUS AS NEEDED
Status: DISCONTINUED | OUTPATIENT
Start: 2019-10-30 | End: 2019-11-02 | Stop reason: HOSPADM

## 2019-10-30 RX ORDER — AMOXICILLIN 250 MG
1 CAPSULE ORAL 2 TIMES DAILY
Status: DISCONTINUED | OUTPATIENT
Start: 2019-10-31 | End: 2019-11-02 | Stop reason: HOSPADM

## 2019-10-30 RX ORDER — METFORMIN HYDROCHLORIDE 500 MG/1
500 TABLET, EXTENDED RELEASE ORAL
Status: DISCONTINUED | OUTPATIENT
Start: 2019-10-31 | End: 2019-11-02 | Stop reason: HOSPADM

## 2019-10-30 RX ORDER — DIPHENHYDRAMINE HYDROCHLORIDE 50 MG/ML
12.5 INJECTION, SOLUTION INTRAMUSCULAR; INTRAVENOUS
Status: ACTIVE | OUTPATIENT
Start: 2019-10-30 | End: 2019-10-31

## 2019-10-30 RX ORDER — DULOXETIN HYDROCHLORIDE 30 MG/1
30 CAPSULE, DELAYED RELEASE ORAL DAILY
Status: DISCONTINUED | OUTPATIENT
Start: 2019-10-31 | End: 2019-11-02 | Stop reason: HOSPADM

## 2019-10-30 RX ORDER — SODIUM CHLORIDE 0.9 % (FLUSH) 0.9 %
5-40 SYRINGE (ML) INJECTION EVERY 8 HOURS
Status: DISCONTINUED | OUTPATIENT
Start: 2019-10-30 | End: 2019-10-30 | Stop reason: HOSPADM

## 2019-10-30 RX ORDER — FLUTICASONE PROPIONATE AND SALMETEROL 250; 50 UG/1; UG/1
1 POWDER RESPIRATORY (INHALATION) EVERY 12 HOURS
Status: DISCONTINUED | OUTPATIENT
Start: 2019-10-30 | End: 2019-10-30 | Stop reason: CLARIF

## 2019-10-30 RX ORDER — DIPHENHYDRAMINE HYDROCHLORIDE 50 MG/ML
12.5 INJECTION, SOLUTION INTRAMUSCULAR; INTRAVENOUS AS NEEDED
Status: DISCONTINUED | OUTPATIENT
Start: 2019-10-30 | End: 2019-10-30 | Stop reason: HOSPADM

## 2019-10-30 RX ORDER — PRAZOSIN HYDROCHLORIDE 1 MG/1
2 CAPSULE ORAL
Status: DISCONTINUED | OUTPATIENT
Start: 2019-10-30 | End: 2019-11-02 | Stop reason: HOSPADM

## 2019-10-30 RX ORDER — SODIUM CHLORIDE 0.9 % (FLUSH) 0.9 %
5-40 SYRINGE (ML) INJECTION AS NEEDED
Status: DISCONTINUED | OUTPATIENT
Start: 2019-10-30 | End: 2019-11-02 | Stop reason: HOSPADM

## 2019-10-30 RX ORDER — SODIUM CHLORIDE 9 MG/ML
125 INJECTION, SOLUTION INTRAVENOUS CONTINUOUS
Status: DISPENSED | OUTPATIENT
Start: 2019-10-30 | End: 2019-10-31

## 2019-10-30 RX ORDER — HYDROMORPHONE HYDROCHLORIDE 2 MG/ML
INJECTION, SOLUTION INTRAMUSCULAR; INTRAVENOUS; SUBCUTANEOUS AS NEEDED
Status: DISCONTINUED | OUTPATIENT
Start: 2019-10-30 | End: 2019-10-30 | Stop reason: HOSPADM

## 2019-10-30 RX ORDER — SODIUM CHLORIDE 0.9 % (FLUSH) 0.9 %
5-40 SYRINGE (ML) INJECTION EVERY 8 HOURS
Status: DISCONTINUED | OUTPATIENT
Start: 2019-10-30 | End: 2019-11-02 | Stop reason: HOSPADM

## 2019-10-30 RX ORDER — DEXTROSE MONOHYDRATE 100 MG/ML
0-250 INJECTION, SOLUTION INTRAVENOUS AS NEEDED
Status: DISCONTINUED | OUTPATIENT
Start: 2019-10-30 | End: 2019-11-02 | Stop reason: HOSPADM

## 2019-10-30 RX ORDER — FENTANYL CITRATE 50 UG/ML
50 INJECTION, SOLUTION INTRAMUSCULAR; INTRAVENOUS AS NEEDED
Status: DISCONTINUED | OUTPATIENT
Start: 2019-10-30 | End: 2019-10-30 | Stop reason: HOSPADM

## 2019-10-30 RX ORDER — ALBUTEROL SULFATE 90 UG/1
2 AEROSOL, METERED RESPIRATORY (INHALATION)
Status: DISCONTINUED | OUTPATIENT
Start: 2019-10-30 | End: 2019-10-30 | Stop reason: CLARIF

## 2019-10-30 RX ORDER — FACIAL-BODY WIPES
10 EACH TOPICAL DAILY PRN
Status: DISCONTINUED | OUTPATIENT
Start: 2019-11-01 | End: 2019-11-02 | Stop reason: HOSPADM

## 2019-10-30 RX ORDER — LIDOCAINE HYDROCHLORIDE 20 MG/ML
INJECTION, SOLUTION EPIDURAL; INFILTRATION; INTRACAUDAL; PERINEURAL AS NEEDED
Status: DISCONTINUED | OUTPATIENT
Start: 2019-10-30 | End: 2019-10-30 | Stop reason: HOSPADM

## 2019-10-30 RX ORDER — HYDROMORPHONE HYDROCHLORIDE 1 MG/ML
0.2 INJECTION, SOLUTION INTRAMUSCULAR; INTRAVENOUS; SUBCUTANEOUS
Status: DISCONTINUED | OUTPATIENT
Start: 2019-10-30 | End: 2019-10-30 | Stop reason: HOSPADM

## 2019-10-30 RX ORDER — OXYCODONE HYDROCHLORIDE 5 MG/1
5 TABLET ORAL
Status: DISCONTINUED | OUTPATIENT
Start: 2019-10-30 | End: 2019-11-01

## 2019-10-30 RX ORDER — INSULIN LISPRO 100 [IU]/ML
INJECTION, SOLUTION INTRAVENOUS; SUBCUTANEOUS
Status: DISCONTINUED | OUTPATIENT
Start: 2019-10-30 | End: 2019-11-02 | Stop reason: HOSPADM

## 2019-10-30 RX ORDER — FENTANYL CITRATE 50 UG/ML
INJECTION, SOLUTION INTRAMUSCULAR; INTRAVENOUS AS NEEDED
Status: DISCONTINUED | OUTPATIENT
Start: 2019-10-30 | End: 2019-10-30 | Stop reason: HOSPADM

## 2019-10-30 RX ORDER — PHENYLEPHRINE HCL IN 0.9% NACL 0.4MG/10ML
SYRINGE (ML) INTRAVENOUS AS NEEDED
Status: DISCONTINUED | OUTPATIENT
Start: 2019-10-30 | End: 2019-10-30 | Stop reason: HOSPADM

## 2019-10-30 RX ORDER — ATORVASTATIN CALCIUM 40 MG/1
40 TABLET, FILM COATED ORAL
Status: DISCONTINUED | OUTPATIENT
Start: 2019-10-30 | End: 2019-11-02 | Stop reason: HOSPADM

## 2019-10-30 RX ORDER — POLYETHYLENE GLYCOL 3350 17 G/17G
17 POWDER, FOR SOLUTION ORAL DAILY
Status: DISCONTINUED | OUTPATIENT
Start: 2019-10-31 | End: 2019-10-30 | Stop reason: SDUPTHER

## 2019-10-30 RX ORDER — FENTANYL CITRATE 50 UG/ML
25 INJECTION, SOLUTION INTRAMUSCULAR; INTRAVENOUS
Status: DISCONTINUED | OUTPATIENT
Start: 2019-10-30 | End: 2019-10-30 | Stop reason: HOSPADM

## 2019-10-30 RX ORDER — ARFORMOTEROL TARTRATE 15 UG/2ML
15 SOLUTION RESPIRATORY (INHALATION)
Status: DISCONTINUED | OUTPATIENT
Start: 2019-10-30 | End: 2019-11-02 | Stop reason: HOSPADM

## 2019-10-30 RX ORDER — CEFAZOLIN SODIUM/WATER 2 G/20 ML
2 SYRINGE (ML) INTRAVENOUS EVERY 8 HOURS
Status: COMPLETED | OUTPATIENT
Start: 2019-10-30 | End: 2019-10-31

## 2019-10-30 RX ORDER — LIDOCAINE HYDROCHLORIDE 10 MG/ML
0.1 INJECTION, SOLUTION EPIDURAL; INFILTRATION; INTRACAUDAL; PERINEURAL AS NEEDED
Status: DISCONTINUED | OUTPATIENT
Start: 2019-10-30 | End: 2019-10-30 | Stop reason: HOSPADM

## 2019-10-30 RX ORDER — ACETAMINOPHEN 325 MG/1
650 TABLET ORAL
Status: DISCONTINUED | OUTPATIENT
Start: 2019-10-30 | End: 2019-11-02 | Stop reason: HOSPADM

## 2019-10-30 RX ORDER — OXYCODONE HYDROCHLORIDE 5 MG/1
5 TABLET ORAL AS NEEDED
Status: DISCONTINUED | OUTPATIENT
Start: 2019-10-30 | End: 2019-10-30 | Stop reason: HOSPADM

## 2019-10-30 RX ORDER — DIAZEPAM 5 MG/1
5 TABLET ORAL
Status: DISCONTINUED | OUTPATIENT
Start: 2019-10-30 | End: 2019-11-02 | Stop reason: HOSPADM

## 2019-10-30 RX ORDER — SODIUM CHLORIDE, SODIUM LACTATE, POTASSIUM CHLORIDE, CALCIUM CHLORIDE 600; 310; 30; 20 MG/100ML; MG/100ML; MG/100ML; MG/100ML
25 INJECTION, SOLUTION INTRAVENOUS CONTINUOUS
Status: DISCONTINUED | OUTPATIENT
Start: 2019-10-30 | End: 2019-10-30 | Stop reason: HOSPADM

## 2019-10-30 RX ORDER — CEFAZOLIN SODIUM 1 G/3ML
INJECTION, POWDER, FOR SOLUTION INTRAMUSCULAR; INTRAVENOUS AS NEEDED
Status: DISCONTINUED | OUTPATIENT
Start: 2019-10-30 | End: 2019-10-30 | Stop reason: HOSPADM

## 2019-10-30 RX ORDER — ACETAMINOPHEN 325 MG/1
650 TABLET ORAL ONCE
Status: COMPLETED | OUTPATIENT
Start: 2019-10-30 | End: 2019-10-30

## 2019-10-30 RX ORDER — POLYETHYLENE GLYCOL 3350 17 G/17G
17 POWDER, FOR SOLUTION ORAL DAILY
Status: DISCONTINUED | OUTPATIENT
Start: 2019-10-31 | End: 2019-11-02 | Stop reason: HOSPADM

## 2019-10-30 RX ORDER — ONDANSETRON 4 MG/1
4 TABLET, ORALLY DISINTEGRATING ORAL
Status: DISCONTINUED | OUTPATIENT
Start: 2019-10-30 | End: 2019-11-02 | Stop reason: HOSPADM

## 2019-10-30 RX ORDER — ROCURONIUM BROMIDE 10 MG/ML
INJECTION, SOLUTION INTRAVENOUS AS NEEDED
Status: DISCONTINUED | OUTPATIENT
Start: 2019-10-30 | End: 2019-10-30 | Stop reason: HOSPADM

## 2019-10-30 RX ORDER — SODIUM CHLORIDE, SODIUM LACTATE, POTASSIUM CHLORIDE, CALCIUM CHLORIDE 600; 310; 30; 20 MG/100ML; MG/100ML; MG/100ML; MG/100ML
100 INJECTION, SOLUTION INTRAVENOUS CONTINUOUS
Status: DISCONTINUED | OUTPATIENT
Start: 2019-10-30 | End: 2019-10-30 | Stop reason: HOSPADM

## 2019-10-30 RX ORDER — PROPOFOL 10 MG/ML
INJECTION, EMULSION INTRAVENOUS AS NEEDED
Status: DISCONTINUED | OUTPATIENT
Start: 2019-10-30 | End: 2019-10-30 | Stop reason: HOSPADM

## 2019-10-30 RX ORDER — LISINOPRIL 20 MG/1
40 TABLET ORAL
Status: DISCONTINUED | OUTPATIENT
Start: 2019-10-31 | End: 2019-11-02 | Stop reason: HOSPADM

## 2019-10-30 RX ORDER — KETAMINE HYDROCHLORIDE 10 MG/ML
INJECTION, SOLUTION INTRAMUSCULAR; INTRAVENOUS AS NEEDED
Status: DISCONTINUED | OUTPATIENT
Start: 2019-10-30 | End: 2019-10-30 | Stop reason: HOSPADM

## 2019-10-30 RX ORDER — ONDANSETRON 2 MG/ML
4 INJECTION INTRAMUSCULAR; INTRAVENOUS
Status: DISCONTINUED | OUTPATIENT
Start: 2019-10-30 | End: 2019-11-02 | Stop reason: HOSPADM

## 2019-10-30 RX ORDER — BUDESONIDE 0.5 MG/2ML
500 INHALANT ORAL
Status: DISCONTINUED | OUTPATIENT
Start: 2019-10-30 | End: 2019-11-02 | Stop reason: HOSPADM

## 2019-10-30 RX ADMIN — MIDAZOLAM 2 MG: 1 INJECTION INTRAMUSCULAR; INTRAVENOUS at 12:41

## 2019-10-30 RX ADMIN — FENTANYL CITRATE 100 MCG: 50 INJECTION, SOLUTION INTRAMUSCULAR; INTRAVENOUS at 12:48

## 2019-10-30 RX ADMIN — ONDANSETRON HYDROCHLORIDE 4 MG: 2 INJECTION, SOLUTION INTRAMUSCULAR; INTRAVENOUS at 15:31

## 2019-10-30 RX ADMIN — ROCURONIUM BROMIDE 10 MG: 10 SOLUTION INTRAVENOUS at 15:00

## 2019-10-30 RX ADMIN — Medication 10 ML: at 22:37

## 2019-10-30 RX ADMIN — SODIUM CHLORIDE, SODIUM LACTATE, POTASSIUM CHLORIDE, AND CALCIUM CHLORIDE: 600; 310; 30; 20 INJECTION, SOLUTION INTRAVENOUS at 16:15

## 2019-10-30 RX ADMIN — DEXAMETHASONE SODIUM PHOSPHATE 4 MG: 4 INJECTION, SOLUTION INTRAMUSCULAR; INTRAVENOUS at 12:53

## 2019-10-30 RX ADMIN — Medication: at 17:34

## 2019-10-30 RX ADMIN — SODIUM CHLORIDE 125 ML/HR: 900 INJECTION, SOLUTION INTRAVENOUS at 18:13

## 2019-10-30 RX ADMIN — MIDAZOLAM 0.5 MG: 1 INJECTION INTRAMUSCULAR; INTRAVENOUS at 18:49

## 2019-10-30 RX ADMIN — DEXMEDETOMIDINE HYDROCHLORIDE 6 MCG: 100 INJECTION, SOLUTION, CONCENTRATE INTRAVENOUS at 13:23

## 2019-10-30 RX ADMIN — ROCURONIUM BROMIDE 20 MG: 10 SOLUTION INTRAVENOUS at 14:13

## 2019-10-30 RX ADMIN — PRAZOSIN HYDROCHLORIDE 2 MG: 1 CAPSULE ORAL at 22:41

## 2019-10-30 RX ADMIN — INSULIN LISPRO 3 UNITS: 100 INJECTION, SOLUTION INTRAVENOUS; SUBCUTANEOUS at 18:15

## 2019-10-30 RX ADMIN — PROPOFOL 150 MG: 10 INJECTION, EMULSION INTRAVENOUS at 12:48

## 2019-10-30 RX ADMIN — Medication 10 ML: at 22:00

## 2019-10-30 RX ADMIN — DEXMEDETOMIDINE HYDROCHLORIDE 6 MCG: 100 INJECTION, SOLUTION, CONCENTRATE INTRAVENOUS at 13:33

## 2019-10-30 RX ADMIN — HUMAN INSULIN 5 UNITS: 100 INJECTION, SOLUTION SUBCUTANEOUS at 12:08

## 2019-10-30 RX ADMIN — ACETAMINOPHEN 650 MG: 325 TABLET, FILM COATED ORAL at 12:00

## 2019-10-30 RX ADMIN — CEFAZOLIN 2 G: 330 INJECTION, POWDER, FOR SOLUTION INTRAMUSCULAR; INTRAVENOUS at 13:09

## 2019-10-30 RX ADMIN — DEXMEDETOMIDINE HYDROCHLORIDE 6 MCG: 100 INJECTION, SOLUTION, CONCENTRATE INTRAVENOUS at 14:56

## 2019-10-30 RX ADMIN — DEXMEDETOMIDINE HYDROCHLORIDE 6 MCG: 100 INJECTION, SOLUTION, CONCENTRATE INTRAVENOUS at 14:32

## 2019-10-30 RX ADMIN — HYDROMORPHONE HYDROCHLORIDE 2 MG: 2 INJECTION, SOLUTION INTRAMUSCULAR; INTRAVENOUS; SUBCUTANEOUS at 13:33

## 2019-10-30 RX ADMIN — MIRTAZAPINE 7.5 MG: 15 TABLET, FILM COATED ORAL at 22:35

## 2019-10-30 RX ADMIN — ALBUMIN (HUMAN) 250 ML: 12.5 INJECTION, SOLUTION INTRAVENOUS at 15:10

## 2019-10-30 RX ADMIN — FENTANYL CITRATE 25 MCG: 50 INJECTION, SOLUTION INTRAMUSCULAR; INTRAVENOUS at 19:59

## 2019-10-30 RX ADMIN — Medication 2 G: at 22:35

## 2019-10-30 RX ADMIN — Medication 80 MCG: at 15:25

## 2019-10-30 RX ADMIN — SODIUM CHLORIDE, SODIUM LACTATE, POTASSIUM CHLORIDE, AND CALCIUM CHLORIDE 25 ML/HR: 600; 310; 30; 20 INJECTION, SOLUTION INTRAVENOUS at 12:10

## 2019-10-30 RX ADMIN — KETAMINE HYDROCHLORIDE 30 MG: 10 INJECTION, SOLUTION INTRAMUSCULAR; INTRAVENOUS at 13:35

## 2019-10-30 RX ADMIN — SUGAMMADEX 252 MG: 100 INJECTION, SOLUTION INTRAVENOUS at 16:16

## 2019-10-30 RX ADMIN — SODIUM CHLORIDE 125 ML/HR: 900 INJECTION, SOLUTION INTRAVENOUS at 22:49

## 2019-10-30 RX ADMIN — FENTANYL CITRATE 100 MCG: 50 INJECTION, SOLUTION INTRAMUSCULAR; INTRAVENOUS at 12:59

## 2019-10-30 RX ADMIN — PROPOFOL 50 MG: 10 INJECTION, EMULSION INTRAVENOUS at 16:09

## 2019-10-30 RX ADMIN — ROCURONIUM BROMIDE 20 MG: 10 SOLUTION INTRAVENOUS at 13:09

## 2019-10-30 RX ADMIN — FENTANYL CITRATE 25 MCG: 50 INJECTION, SOLUTION INTRAMUSCULAR; INTRAVENOUS at 20:09

## 2019-10-30 RX ADMIN — MIDAZOLAM 0.5 MG: 1 INJECTION INTRAMUSCULAR; INTRAVENOUS at 18:02

## 2019-10-30 RX ADMIN — LIDOCAINE HYDROCHLORIDE 60 MG: 20 INJECTION, SOLUTION EPIDURAL; INFILTRATION; INTRACAUDAL; PERINEURAL at 12:48

## 2019-10-30 RX ADMIN — Medication 80 MCG: at 15:04

## 2019-10-30 RX ADMIN — DEXMEDETOMIDINE HYDROCHLORIDE 6 MCG: 100 INJECTION, SOLUTION, CONCENTRATE INTRAVENOUS at 14:15

## 2019-10-30 RX ADMIN — ROCURONIUM BROMIDE 5 MG: 10 SOLUTION INTRAVENOUS at 12:48

## 2019-10-30 RX ADMIN — ROCURONIUM BROMIDE 20 MG: 10 SOLUTION INTRAVENOUS at 13:33

## 2019-10-30 RX ADMIN — GLYCOPYRROLATE 0.2 MG: 0.2 INJECTION, SOLUTION INTRAMUSCULAR; INTRAVENOUS at 12:54

## 2019-10-30 RX ADMIN — SUCCINYLCHOLINE CHLORIDE 140 MG: 20 INJECTION, SOLUTION INTRAMUSCULAR; INTRAVENOUS at 12:53

## 2019-10-30 RX ADMIN — ROCURONIUM BROMIDE 20 MG: 10 SOLUTION INTRAVENOUS at 14:33

## 2019-10-30 RX ADMIN — MIDAZOLAM 0.5 MG: 1 INJECTION INTRAMUSCULAR; INTRAVENOUS at 18:30

## 2019-10-30 RX ADMIN — ROCURONIUM BROMIDE 35 MG: 10 SOLUTION INTRAVENOUS at 12:53

## 2019-10-30 RX ADMIN — KETAMINE HYDROCHLORIDE 20 MG: 10 INJECTION, SOLUTION INTRAMUSCULAR; INTRAVENOUS at 16:09

## 2019-10-30 RX ADMIN — ATORVASTATIN CALCIUM 40 MG: 40 TABLET, FILM COATED ORAL at 22:35

## 2019-10-30 RX ADMIN — ROCURONIUM BROMIDE 20 MG: 10 SOLUTION INTRAVENOUS at 13:16

## 2019-10-30 RX ADMIN — FENTANYL CITRATE 25 MCG: 50 INJECTION, SOLUTION INTRAMUSCULAR; INTRAVENOUS at 19:48

## 2019-10-30 RX ADMIN — Medication 80 MCG: at 15:10

## 2019-10-30 RX ADMIN — Medication 120 MCG: at 13:54

## 2019-10-30 RX ADMIN — FENTANYL CITRATE 50 MCG: 50 INJECTION, SOLUTION INTRAMUSCULAR; INTRAVENOUS at 15:48

## 2019-10-30 NOTE — ANESTHESIA PREPROCEDURE EVALUATION
Anesthetic History   No history of anesthetic complications            Review of Systems / Medical History  Patient summary reviewed, nursing notes reviewed and pertinent labs reviewed    Pulmonary        Sleep apnea    Asthma : well controlled       Neuro/Psych         Psychiatric history     Cardiovascular  Within defined limits  Hypertension                   GI/Hepatic/Renal     GERD  Hepatitis: type C  Renal disease: CRI  Liver disease     Endo/Other    Diabetes    Obesity and arthritis     Other Findings              Physical Exam    Airway  Mallampati: II  TM Distance: > 6 cm  Neck ROM: normal range of motion   Mouth opening: Normal     Cardiovascular  Regular rate and rhythm,  S1 and S2 normal,  no murmur, click, rub, or gallop             Dental  No notable dental hx       Pulmonary  Breath sounds clear to auscultation               Abdominal  GI exam deferred       Other Findings            Anesthetic Plan    ASA: 3  Anesthesia type: general          Induction: Intravenous  Anesthetic plan and risks discussed with: Patient

## 2019-10-30 NOTE — ANESTHESIA POSTPROCEDURE EVALUATION
Procedure(s):  REVISION LUMBAR LAMINECTOMY L3-4 WITH DISCECTOMY, REVISION POSTERIOR SPINAL FUSION L3-S1.    general    Anesthesia Post Evaluation        Patient location during evaluation: PACU  Patient participation: complete - patient participated  Level of consciousness: awake and alert  Pain management: adequate  Airway patency: patent  Anesthetic complications: no  Cardiovascular status: acceptable  Respiratory status: acceptable  Hydration status: acceptable  Comments: I have seen and evaluated the patient and is ready for discharge. Gaetano Verma MD    Post anesthesia nausea and vomiting:  none      Vitals Value Taken Time   /73 10/30/2019  6:30 PM   Temp     Pulse 79 10/30/2019  6:45 PM   Resp 10 10/30/2019  6:45 PM   SpO2 97 % 10/30/2019  6:45 PM   Vitals shown include unvalidated device data.

## 2019-10-31 LAB
ANION GAP SERPL CALC-SCNC: 7 MMOL/L (ref 5–15)
BUN SERPL-MCNC: 17 MG/DL (ref 6–20)
BUN/CREAT SERPL: 12 (ref 12–20)
CALCIUM SERPL-MCNC: 8.5 MG/DL (ref 8.5–10.1)
CHLORIDE SERPL-SCNC: 105 MMOL/L (ref 97–108)
CO2 SERPL-SCNC: 24 MMOL/L (ref 21–32)
CREAT SERPL-MCNC: 1.47 MG/DL (ref 0.7–1.3)
GLUCOSE BLD STRIP.AUTO-MCNC: 170 MG/DL (ref 65–100)
GLUCOSE BLD STRIP.AUTO-MCNC: 171 MG/DL (ref 65–100)
GLUCOSE BLD STRIP.AUTO-MCNC: 223 MG/DL (ref 65–100)
GLUCOSE BLD STRIP.AUTO-MCNC: 238 MG/DL (ref 65–100)
GLUCOSE SERPL-MCNC: 225 MG/DL (ref 65–100)
HGB BLD-MCNC: 11.6 G/DL (ref 12.1–17)
POTASSIUM SERPL-SCNC: 4.2 MMOL/L (ref 3.5–5.1)
SERVICE CMNT-IMP: ABNORMAL
SODIUM SERPL-SCNC: 136 MMOL/L (ref 136–145)

## 2019-10-31 PROCEDURE — 74011636637 HC RX REV CODE- 636/637: Performed by: NURSE PRACTITIONER

## 2019-10-31 PROCEDURE — 97165 OT EVAL LOW COMPLEX 30 MIN: CPT

## 2019-10-31 PROCEDURE — 94640 AIRWAY INHALATION TREATMENT: CPT

## 2019-10-31 PROCEDURE — 77010033678 HC OXYGEN DAILY

## 2019-10-31 PROCEDURE — 80048 BASIC METABOLIC PNL TOTAL CA: CPT

## 2019-10-31 PROCEDURE — 97161 PT EVAL LOW COMPLEX 20 MIN: CPT

## 2019-10-31 PROCEDURE — 97116 GAIT TRAINING THERAPY: CPT

## 2019-10-31 PROCEDURE — 74011000250 HC RX REV CODE- 250: Performed by: PHYSICIAN ASSISTANT

## 2019-10-31 PROCEDURE — 82962 GLUCOSE BLOOD TEST: CPT

## 2019-10-31 PROCEDURE — 74011636637 HC RX REV CODE- 636/637: Performed by: PHYSICIAN ASSISTANT

## 2019-10-31 PROCEDURE — 36415 COLL VENOUS BLD VENIPUNCTURE: CPT

## 2019-10-31 PROCEDURE — 74011250636 HC RX REV CODE- 250/636: Performed by: PHYSICIAN ASSISTANT

## 2019-10-31 PROCEDURE — 65270000029 HC RM PRIVATE

## 2019-10-31 PROCEDURE — 97530 THERAPEUTIC ACTIVITIES: CPT

## 2019-10-31 PROCEDURE — 74011250637 HC RX REV CODE- 250/637: Performed by: PHYSICIAN ASSISTANT

## 2019-10-31 PROCEDURE — 85018 HEMOGLOBIN: CPT

## 2019-10-31 RX ORDER — INSULIN GLARGINE 100 [IU]/ML
65 INJECTION, SOLUTION SUBCUTANEOUS EVERY 12 HOURS
Status: DISCONTINUED | OUTPATIENT
Start: 2019-10-31 | End: 2019-11-02 | Stop reason: HOSPADM

## 2019-10-31 RX ADMIN — Medication 10 ML: at 21:18

## 2019-10-31 RX ADMIN — OXYCODONE HYDROCHLORIDE 10 MG: 5 TABLET ORAL at 18:46

## 2019-10-31 RX ADMIN — OXYCODONE HYDROCHLORIDE 10 MG: 5 TABLET ORAL at 21:16

## 2019-10-31 RX ADMIN — Medication 10 ML: at 06:00

## 2019-10-31 RX ADMIN — Medication 10 ML: at 05:31

## 2019-10-31 RX ADMIN — OXYCODONE HYDROCHLORIDE 5 MG: 5 TABLET ORAL at 01:22

## 2019-10-31 RX ADMIN — DULOXETINE HYDROCHLORIDE 30 MG: 30 CAPSULE, DELAYED RELEASE ORAL at 09:09

## 2019-10-31 RX ADMIN — ARFORMOTEROL TARTRATE 15 MCG: 15 SOLUTION RESPIRATORY (INHALATION) at 09:32

## 2019-10-31 RX ADMIN — Medication 10 ML: at 21:17

## 2019-10-31 RX ADMIN — ATORVASTATIN CALCIUM 40 MG: 40 TABLET, FILM COATED ORAL at 21:16

## 2019-10-31 RX ADMIN — OXYCODONE HYDROCHLORIDE 10 MG: 5 TABLET ORAL at 12:21

## 2019-10-31 RX ADMIN — DULOXETINE HYDROCHLORIDE 60 MG: 60 CAPSULE, DELAYED RELEASE ORAL at 09:08

## 2019-10-31 RX ADMIN — INSULIN GLARGINE 65 UNITS: 100 INJECTION, SOLUTION SUBCUTANEOUS at 21:16

## 2019-10-31 RX ADMIN — PRAZOSIN HYDROCHLORIDE 2 MG: 1 CAPSULE ORAL at 21:17

## 2019-10-31 RX ADMIN — METFORMIN HYDROCHLORIDE 500 MG: 500 TABLET, EXTENDED RELEASE ORAL at 16:55

## 2019-10-31 RX ADMIN — INSULIN LISPRO 3 UNITS: 100 INJECTION, SOLUTION INTRAVENOUS; SUBCUTANEOUS at 12:22

## 2019-10-31 RX ADMIN — BUDESONIDE 500 MCG: 0.5 INHALANT RESPIRATORY (INHALATION) at 09:32

## 2019-10-31 RX ADMIN — INSULIN GLARGINE 65 UNITS: 100 INJECTION, SOLUTION SUBCUTANEOUS at 11:43

## 2019-10-31 RX ADMIN — SODIUM CHLORIDE 125 ML/HR: 900 INJECTION, SOLUTION INTRAVENOUS at 15:30

## 2019-10-31 RX ADMIN — Medication 10 ML: at 15:30

## 2019-10-31 RX ADMIN — OXYCODONE HYDROCHLORIDE 10 MG: 5 TABLET ORAL at 09:08

## 2019-10-31 RX ADMIN — SENNOSIDES, DOCUSATE SODIUM 1 TABLET: 50; 8.6 TABLET, FILM COATED ORAL at 09:08

## 2019-10-31 RX ADMIN — OXYCODONE HYDROCHLORIDE 10 MG: 5 TABLET ORAL at 15:29

## 2019-10-31 RX ADMIN — MIRTAZAPINE 7.5 MG: 15 TABLET, FILM COATED ORAL at 21:16

## 2019-10-31 RX ADMIN — POLYETHYLENE GLYCOL 3350 17 G: 17 POWDER, FOR SOLUTION ORAL at 09:08

## 2019-10-31 RX ADMIN — SENNOSIDES, DOCUSATE SODIUM 1 TABLET: 50; 8.6 TABLET, FILM COATED ORAL at 17:00

## 2019-10-31 RX ADMIN — Medication 2 G: at 05:31

## 2019-10-31 NOTE — PROGRESS NOTES
Problem: Self Care Deficits Care Plan (Adult)  Goal: *Acute Goals and Plan of Care (Insert Text)  Description  FUNCTIONAL STATUS PRIOR TO ADMISSION: Patient was modified independent using a single point cane for functional mobility. Patient was modified independent for basic and instrumental ADLs, increased time. Completed outpatient exercises s/p back surgery 4/10/18. On permanent disability now. Wife independent. HOME SUPPORT: The patient lived with wife but did not require assist.    Occupational Therapy Goals  Initiated 10/31/2019    1. Patient will perform grooming standing at sink with modified independence within 7 days. 2.  Patient will perform lower body dressing with supervision/set-up using AE PRN within 7 days. 3.  Patient will toileting at supervision/set-up within 7 days. 4.  Patient will don/doff back brace at supervision/set-up within 7 days. 5.  Patient will verbalize/demonstrate 3/3 back precautions during ADL tasks without cues within 7 days. Outcome: Not Met     OCCUPATIONAL THERAPY EVALUATION  Patient: Figueroa Duran (50 y.o. male)  Date: 10/31/2019  Primary Diagnosis: Lumbar spinal stenosis [M48.061]  Procedure(s) (LRB):  REVISION LUMBAR LAMINECTOMY L3-4 WITH DISCECTOMY, REVISION POSTERIOR SPINAL FUSION L3-S1 (N/A) 1 Day Post-Op   Precautions:  Back, Other (comment)(TLSO)    ASSESSMENT  Based on the objective data described below, the patient presents with R > L weakness, decreased standing tolerance, balance (requiring RW), back precautions, and pain management. Current Level of Function Impacting Discharge (ADLs/self-care): max A lower body ADLs and standing ADLS. Other factors to consider for discharge: wife works full time     Patient will benefit from skilled therapy intervention to address the above noted impairments.        PLAN :  Recommendations and Planned Interventions: self care training, functional mobility training, therapeutic exercise, balance training, therapeutic activities, endurance activities, patient education, home safety training and family training/education    Frequency/Duration: Patient will be followed by occupational therapy 5 times a week to address goals. Recommendation for discharge: (in order for the patient to meet his/her long term goals)  Occupational therapy at least 2 days/week in the home  pending progression with OT in acute care (this is a revision, so it would be great to ensure patient is completing ADLs as should to prevent re-injury/surgery). This discharge recommendation:  Has not yet been discussed the attending provider and/or case management    IF patient discharges home will need the following DME: long handled AE        SUBJECTIVE:   Patient stated I do not want to go through this again.     OBJECTIVE DATA SUMMARY:   HISTORY:   Past Medical History:   Diagnosis Date    Arthritis     2010    Asthma 1995    INHALER USE PRN    Chronic bilateral low back pain without sciatica 11/6/2017    Chronic hepatitis C without hepatic coma ([address] 11/6/2017    treated at [facility]    Chronic pain     lower back    Essential hypertension 11/6/2017    GERD (gastroesophageal reflux disease)     History of cardiac cath     Hypercholesterolemia     Mild episode of recurrent major depressive disorder ([address] 11/06/2012    Morbid obesity ([address]     PTSD (post-traumatic stress disorder)     Sleep apnea     pt stated was taken off cpap while on blood thinner    Stage 3 chronic kidney disease ([address] 11/06/2017    IMPROVED     Thromboembolus ([address]     1/3/19  DVT,PE while taking testosterone    Uncontrolled type 2 diabetes mellitus with peripheral neuropathy ([address] 11/06/2011     Past Surgical History:   Procedure Laterality Date    HX BACK SURGERY  04/10/2018    Fusion L4,L5    HX COLONOSCOPY  2013    CJW    HX HEART CATHETERIZATION  2017    NEG PER PATIENT    HX HERNIA REPAIR  7480    UMBILICAL    HX KNEE ARTHROSCOPY  1999    right knee    HX KNEE ARTHROSCOPY  2002    left knee    HX ROTATOR CUFF REPAIR Right 2016    HX UROLOGICAL  2015    Vasectomy        Expanded or extensive additional review of patient history:     Home Situation  Home Environment: Private residence  # Steps to Enter: 8  Rails to Enter: Yes  Hand Rails : Bilateral  One/Two Story Residence: Two story  # of Interior Steps: 12  Interior Rails: Both  Living Alone: No  Support Systems: Spouse/Significant Other/Partner  Patient Expects to be Discharged to[de-identified] Private residence  Current DME Used/Available at Home: Nancie Rosio, straight, Grab bars(grab bar in shower, low toilet seats )  Tub or Shower Type: Tub/Shower combination    Hand dominance: Right    EXAMINATION OF PERFORMANCE DEFICITS:  Cognitive/Behavioral Status:  Neurologic State: Alert  Orientation Level: Oriented X4  Cognition: Appropriate decision making; Appropriate safety awareness; Appropriate for age attention/concentration; Follows commands  Perception: Appears intact  Perseveration: No perseveration noted  Safety/Judgement: Awareness of environment;Good awareness of safety precautions    Skin: intact bandage, Hemovac, L PIV    Edema: intact    Hearing: Auditory  Auditory Impairment: None    Vision/Perceptual:                                     Range of Motion:  Shoulder flexion 90*  Elbows-digits WDL  LEs poor hip ER  AROM: Generally decreased, functional  PROM: Generally decreased, functional                      Strength:  -3/5 shoulders  Strength: Generally decreased, functional                Coordination:  Coordination: Generally decreased, functional  Fine Motor Skills-Upper: Left Intact; Right Intact    Gross Motor Skills-Upper: Left Intact; Right Intact    Tone & Sensation:    Tone: Normal  Sensation: Intact                      Balance:  Sitting: High guard; Intact  Standing: Impaired; With support  Standing - Static: Fair;Constant support  Standing - Dynamic : Fair;Constant support    Functional Mobility and Transfers for ADLs:  Bed Mobility: exit R as at home. Rolling: Minimum assistance  Supine to Sit: Minimum assistance  Sit to Supine: Minimum assistance  Scooting: Setup    Transfers:  Sit to Stand: Minimum assistance  Stand to Sit: Minimum assistance    ADL Assessment:  Feeding: Independent    Oral Facial Hygiene/Grooming: Supervision    Bathing: Total assistance    Upper Body Dressing: Total assistance    Lower Body Dressing: Total assistance     Toileting: Moderate assistance  Meal Preparation: Total assistance      *poor tailor sitting, standing able to gather with RW unilateral reaching and pain with B UE reaching. TLSO not present so limited activity OOB and having to return back to supine. Demonstrated functional mobility down moeller with RW, cues to stand tall/ not flexed in prep for ADLs in the home while evaluating patient and educating. ADL Intervention and task modifications:  Patient instructed and demonstrated 3/3 back precautions with no  cues. Handouts provided on AE and exercise modifications and increasing independence. Cognitive Retraining  Safety/Judgement: Awareness of environment;Good awareness of safety precautions    Dressing lower body: Patient instructed to don brace first and on the benefits to remain seated to don all clothing to increase independence with precautions and pain management. Standing:  Patient instructed to increase amount of time standing in order to increase independence and tolerance with ADLs. Therapeutic Exercise:  Patient instructed on the benefits shoulder flexion exercises to increase independence with ADLs, active ROM, and strength of spine. Patient instructed and demonstrated techniques of activating abdomen and pelvic floor muscles. Patient instructed and indicated understanding how to progress reps, sets, against gravity to then working up to 5 lbs until surgeon clears for increased weight, supine to sitting and then standing. Can use household items as weights. Pain Rating:  10/10 nurse providing pain meds s/p session    Activity Tolerance:   requires rest breaks  Please refer to the flowsheet for vital signs taken during this treatment. After treatment patient left in no apparent distress:    Supine in bed and Call bell within reach    COMMUNICATION/EDUCATION:   The patients plan of care was discussed with: Registered Nurse. Home safety education was provided and the patient/caregiver indicated understanding., Patient/family have participated as able in goal setting and plan of care. and Patient/family agree to work toward stated goals and plan of care. This patients plan of care is appropriate for delegation to [facility].     Thank you for this referral.  Frankie Mejia  Time Calculation: 21 mins

## 2019-10-31 NOTE — PROGRESS NOTES
INDER:  1. [facility] referral pending. 2. Home when stable. Care Management Interventions  PCP Verified by CM: Yes  Palliative Care Criteria Met (RRAT>21 & CHF Dx)?: No  Mode of Transport at Discharge: Other (see comment)  Transition of Care Consult (CM Consult): Discharge Planning  MyChart Signup: Yes  Discharge Durable Medical Equipment: No  Health Maintenance Reviewed: Yes  Physical Therapy Consult: Yes  Occupational Therapy Consult: Yes  Speech Therapy Consult: No  Current Support Network: Lives with Spouse  Confirm Follow Up Transport: Family  Plan discussed with Pt/Family/Caregiver: Yes   Resource Information Provided?: No  Discharge Location  Discharge Placement: Home with home health      Reason for Admission: Lumbar spinal stenosis                 RRAT Score:     27             Resources/supports as identified by patient/family:   His wife, Renetta Lancaster and his daughter, Steven aDniels facing patient (as identified by patient/family and CM): No concerns at this time. Finances/Medication cost?      Patient is listed as having [employer] and Medicaid. Transportation? His wife will transport at discharge. Support system or lack thereof? See above. Living arrangements? See above. Self-care/ADLs/Cognition? Independent with ADLs and IADLs prior to admission. Current Advanced Directive/Advance Care Plan: On file, MPOA is Yolanda Cotton and secondary POA is Renetta Lancaster. Plan for utilizing home health:   [facility]                  Transition of Care Plan:                Reviewed chart for transitions of care,and discussed in rounds. CM met with patient at bedside to explain role and offer support. Patient is alert and oriented x4, and confirmed demographics. He uses a cane for ambulation as needed, also requesting therapy to order a walker for him.  Patient had [facility] in the past, and would like referral sent for home health. CM to follow for discharge needs.     Patric Greene [facility]

## 2019-10-31 NOTE — PROGRESS NOTES
Orthopedics Spine Incoming Shift Nursing Note        INCOMING SHIFT REPORT:    Verbal and/or Written report received from Nelida Triplett RN by Tatyana Arredondo RN on Troy Carrillo. a 48 y.o. male who was admitted on 10/30/2019  9:36 AM and currently admitted to unit. Code Status: Prior     Admit Diagnosis: Lumbar spinal stenosis [M48.061]     Surgery: Procedure(s):  REVISION LUMBAR LAMINECTOMY L3-4 WITH DISCECTOMY, REVISION POSTERIOR SPINAL FUSION L3-S1     Infections: No current active infections     Allergies: Patient has no known allergies. Current diet: DIET DIABETIC CONSISTENT CARB Regular     Lines:   Peripheral IV 10/30/19 Left Antecubital (Active)   Site Assessment Clean, dry, & intact 10/31/2019  9:19 AM   Phlebitis Assessment 0 10/31/2019  9:19 AM   Infiltration Assessment 0 10/31/2019  9:19 AM   Dressing Status Clean, dry, & intact 10/31/2019  9:19 AM   Dressing Type Transparent 10/31/2019  9:19 AM   Hub Color/Line Status Capped 10/31/2019  9:19 AM   Alcohol Cap Used Yes 10/31/2019  9:19 AM       Peripheral IV 10/30/19 Left Forearm (Active)   Site Assessment Clean, dry, & intact 10/31/2019  9:19 AM   Phlebitis Assessment 0 10/31/2019  9:19 AM   Infiltration Assessment 0 10/31/2019  9:19 AM   Dressing Status Clean, dry, & intact 10/31/2019  9:19 AM   Dressing Type Transparent 10/31/2019  9:19 AM   Hub Color/Line Status Infusing 10/31/2019  9:19 AM   Alcohol Cap Used Yes 10/31/2019  9:19 AM          Report consisted of the following information SBAR, Kardex and MAR and the information was reviewed with the reporting nurse. Opportunity for questions and clarifications were provided. Patient's bed locked and in low position, side rails up x 2, door open PRN, call bell within patient's reach and patient is not in distress. A complete nursing assessment will be completed by the receiving nurse.       Tatyana Arredondo RN, BSN, [facility]    10/31/2019 at 4:26 PM

## 2019-10-31 NOTE — PROGRESS NOTES
Attempted to perform PT treatment for PM session x3. Pt refusing stating that he already got out of bed x2 this AM and does not want to get out again. Pt became upset with encouragement so treatment session cessated.      Ruth Mackey, DPT, PT

## 2019-10-31 NOTE — OP NOTES
[address]  OPERATIVE REPORT    Name:  Beryle Bob  MR#:  [id number]  :  1965  ACCOUNT #:  [de-identified]  DATE OF SERVICE:  10/30/2019      PREOPERATIVE DIAGNOSES:  1. Lumbar spinal stenosis, L3-4.  2.  Status post lumbar decompression and spinal fusion with instrumentation, L4-S1. POSTOPERATIVE DIAGNOSES:  1. Lumbar spinal stenosis, L3-4.  2.  Status post lumbar decompression and spinal fusion with instrumentation, L4-S1. PROCEDURES PERFORMED:  1. Removal of segmental instrumentation, L4-S1.  2.  Exploration of spinal fusion, L4-S1.  3.  Revision bilateral laminectomy, L3-L4; bilateral compression of the L3 and L4 nerve roots. 4.  Revision posterior spinal fusion, L3-S1 using K2M spinal instrumentation supplemented with cancellous allograft and bone morphogenic protein (Infuse). SURGEON:  Luis Miguel Ybarra MD    ASSISTANT:  Carlene Gonzalez PA-C    ANESTHESIA:  General.    COMPLICATIONS: None    SPECIMENS REMOVED:  None    IMPLANTS:  Documented on Brief Post Op Note    ESTIMATED BLOOD LOSS:   Documented on Brief Post Op Note    INDICATIONS:  The patient is a 61-year-old gentleman with symptomatic lumbar stenosis and associated radicular symptomatology. I performed lumbar decompression and spinal fusion on him, L4-S1. Imaging demonstrates what was thought to be a solid fusion, L4-S1 based on the CT scan. Screws appeared to be well positioned without any evidence of loosening. He had severe facet disease at L3-L4 with large joint effusions producing a moderate great stenosis. The patient is a very large gentleman. Given the level of pain and dysfunction, those findings clinically and radiologically, a revision decompression and spinal fusion offered. Potential benefits and complications were reviewed. PROCEDURE:  The patient was brought to the operating room in the supine position and general anesthetic administered.   The patient was placed in the prone position Vaccine Information Sheet, \"Influenza - Inactivated\"  given to Cruz Shearer, or parent/legal guardian of  Cruz Shearer and verbalized understanding. Patient responses:    Have you ever had a reaction to a flu vaccine? No  Are you able to eat eggs without adverse effects? No  Do you have any current illness? No  Have you ever had Guillian Wakefield Syndrome? No    Flu vaccine given per order. Please see immunization tab. on the Yeyo-type frame. The low back region was then prepped and draped in normal fashion. Preoperative antibiotics were administered. Thigh-high KILEY hose and sequential pumps were applied to the patient's lower extremities. An incision was made extending from L2 to the sacrum. Subcutaneous tissue was then divided in line with the incision down to the level of the fascia. The fascia was incised in the midline and a subperiosteal dissection completed over the spinous process and laminar surfaces. X-ray was taken to verify the level. A complete laminectomy of L3 completed. Revision laminectomy of L4 completed. The L3 and L4 roots identified and decompressed to include a foraminotomy over the L3 roots. Once complete, each roots were thought to be free and mobile. We then cleaned up the lateral gutter. Hardware was removed. Each of the screws had excellent purchase. Scar tissue was excised. Fusion mass appeared to be solid. No obvious motion with stress testing. Drill hole was placed in pedicle of L3. Pedicles were cannulated. Markers were placed. X-ray was taken to verify position and direction. A 40 mL of cancellous allograft utilized and packed over the decorticated surfaces to include the transverse process of L3 bilaterally, the pars interarticularis, and the fusion mass. The facet joint widely decorticated. A large amount of bone graft packed into facet joint and into the gutter bilaterally. Screws were then placed measuring 6.5 to 7.5 mm in diameter, 45 mm in length, at all levels with excellent purchase. These were then connected with the triston. There was appropriate contour, secured using a locking cap, final plate, and device. Final x-ray taken showing good position of the instrumentation. The wound had been irrigated earlier. A drain was placed. A small kit of bone morphogenic protein utilized. The sponges mixed amongst the bone graft.   FloSeal was used for the purposes of hemostasis. Fascia was closed using #1 Vicryl. Subcutaneous tissues and skin were closed using 2-0 and 3-0 Vicryl. The patient awoken from the anesthetic, extubated, and taken to the recovery in stable condition.         India Ross MD      [id number]  D:  10/30/2019 15:52  T:  10/31/2019 3:13  JOB #:  [id number]

## 2019-10-31 NOTE — DIABETES MGMT
[facility]    [facility] Consult Note    Recommendations/ Comments: BG trending in 200's at this time. Note home dose of basal insulin and Metformin resumed. Will follow. Current hospital DM medication: Lantus, 65 units BID  Me    Consult received for:  [x]             Assessment of home management                []      Medication Recommendations                []             Meter/monitoring     []             Insulin instruction     []             New diagnosis     []             Outpatient education     []             Insulin pump patient     []             Insulin infusion     []             DKA/HHS    Chart reviewed and initial evaluation complete on Jerzy Nolan. .    Patient is a 48 y.o. male with known hx of DM on Levemir, 65 units BID and Metformin, 1000 mg at dinner. BG monitoring at home 3x/d. Pt reports BG ranging 140-160 mg/dl on avg. Assessed and instructed patient on the following:   ·  interpretation of lab results, blood sugar goals, hypoglycemia prevention and treatment, exercise, insulin adjustments, nutrition, and site rotation    Pt aware of his BG targets. He tells me he brought his A1c down from 11.9% to 7.3% (current) from diet changes alone. Reports he cut out sweets and eats much smaller starch portions. Avoids some carb sources (bread, pasta, certain fruits). He is symptomatic if BG below 70 mg/dl. Reports it happens rarely - doesn't remember when it occurred last. He reports compliance with medication regimen. Understands he should call PCP for adjustments if sugars consistently above 180 mg/dl. He rotates injections between arms. Encouraged rotating to other site as able. Reviewed plate method and encourage continued avoidance of SSB. Pt reports he is looking forward to PT and plans to use his elliptical he has at home once cleared.        Encouraged the following:   · increased exercise, dietary modifications: plate method, avoidance SSB, regular blood sugar monitoring: continue 3x/d     Provided patient with the following: [x]             Diabetes Self Care Guide               []             Insulin education materials               []             CHO counting education materials               [x]             Outpatient DTC contact number               []             Glucometer               Discussed with patient and/or family need for follow up appointment for diabetes management after discharge. A1c:   Lab Results   Component Value Date/Time    Hemoglobin A1c 7.3 (H) 10/10/2019 04:01 PM       Recent Glucose Results:   Lab Results   Component Value Date/Time     (H) 10/31/2019 05:33 AM    GLUCPOC 223 (H) 10/31/2019 12:18 PM    GLUCPOC 238 (H) 10/31/2019 05:48 AM    GLUCPOC 169 (H) 10/30/2019 10:34 PM        Lab Results   Component Value Date/Time    Creatinine 1.47 (H) 10/31/2019 05:33 AM     Estimated Creatinine Clearance: 80.7 mL/min (A) (based on SCr of 1.47 mg/dL (H)). Active Orders   Diet    DIET DIABETIC CONSISTENT CARB Regular        PO intake: No data found. Will continue to follow as needed. Thank you.   Alonzo Alfaro RD, Diabetes Clinician       Time spent: 12 minutes

## 2019-10-31 NOTE — PROGRESS NOTES
TRANSFER - OUT REPORT:    Verbal report given to [facility] RN(name) on Jerzy Redmond.  being transferred to 550(unit) for routine post - op       Report consisted of patients Situation, Background, Assessment and   Recommendations(SBAR). Time Pre op antibiotic given:1309  Anesthesia Stop time: 6686  Kirk Present on Transfer to floor:y  Order for Kirk on Chart:y  Discharge Prescriptions with Chart:n    Information from the following report(s) SBAR, Kardex, OR Summary, Procedure Summary, Intake/Output and MAR was reviewed with the receiving nurse. Opportunity for questions and clarification was provided. Is the patient on 02? YES       L/Min 2         Is the patient on a monitor? NO    Is the nurse transporting with the patient? NO    Surgical Waiting Area notified of patient's transfer from PACU? YES      The following personal items collected during your admission accompanied patient upon transfer:   Dental Appliance: Dental Appliances: None  Vision:    Hearing Aid:    Jewelry:    Clothing: Clothing: At bedside, With patient  Other Valuables: Other Valuables:  At bedside, Christian Diss, With patient  Valuables sent to safe:

## 2019-10-31 NOTE — PROGRESS NOTES
Problem: Complex Spine Procedure:  Post Op Day 1  Goal: Off Pathway (Use only if patient is Off Pathway)  Outcome: Progressing Towards Goal  Goal: Activity/Safety  Outcome: Progressing Towards Goal  Goal: Consults, if ordered  Outcome: Progressing Towards Goal  Goal: Diagnostic Test/Procedures  Outcome: Progressing Towards Goal  Goal: Nutrition/Diet  Outcome: Progressing Towards Goal  Goal: Discharge Planning  Outcome: Progressing Towards Goal  Goal: Medications  Outcome: Progressing Towards Goal  Goal: Respiratory  Outcome: Progressing Towards Goal  Goal: Treatments/Interventions/Procedures  Outcome: Progressing Towards Goal  Goal: Psychosocial  Outcome: Progressing Towards Goal  Goal: *Progress independence mobility/activities (eg: Mobility precautions)  Outcome: Progressing Towards Goal  Goal: *Verbalizes/demonstrates understanding of proper body mechanics and use of stabilization device if ordered  Outcome: Progressing Towards Goal  Goal: *Optimal pain control at patient's stated goal  Outcome: Progressing Towards Goal  Goal: *Resumes normal function of bladder and bowel  Outcome: Progressing Towards Goal  Goal: *Hemodynamically stable  Outcome: Progressing Towards Goal  Goal: *Tolerating diet  Outcome: Progressing Towards Goal  Goal: *Labs within defined limits  Outcome: Progressing Towards Goal

## 2019-10-31 NOTE — PROGRESS NOTES
Ortho Spine Daily Progress Note    Date of Surgery:  10/30/2019      Patient: Ayah Rogers. YOB: 1965  Age: 48 y.o. SUBJECTIVE:   1 Day Post-Op following REVISION LUMBAR LAMINECTOMY L3-4 WITH DISCECTOMY, REVISION POSTERIOR SPINAL FUSION L3-S1    The patient states moderate back pain this morning. Leg symptoms appear to be improved. The patient rates their current level of pain as 6/10. The patient's post operative pain is adequately controlled. The patient denies CP, SOB, N/V, HA. OBJECTIVE:     Vital Signs:    Temp (24hrs), Av.1 °F (36.7 °C), Min:97.5 °F (36.4 °C), Max:98.5 °F (36.9 °C)      Patient Vitals for the past 24 hrs:   BP Temp Pulse Resp SpO2 Weight   10/31/19 0530 108/70  76      10/31/19 0425 115/74 98.1 °F (36.7 °C) 66 15 100 %    10/30/19 2330 130/72 98.3 °F (36.8 °C) 79 15 98 %    10/30/19 2225 144/88 98.5 °F (36.9 °C) 73 16 98 %    10/30/19 2045 145/84 98.3 °F (36.8 °C) 63 15 97 %    10/30/19 2015 150/87  79 12 98 %    10/30/19 2000 140/77  79 14 98 %    10/30/19 1930 137/88  76 14 98 %    10/30/19 1915 136/67  87 12 99 %    10/30/19 1900 137/77  80 10 97 %    10/30/19 1845 134/75  79 10 97 %    10/30/19 1830 145/73  79 11 98 %    10/30/19 1815 130/78 97.8 °F (36.6 °C) 81 11 98 %    10/30/19 1800 140/75  69 12 97 %    10/30/19 1745 126/78  78 11 97 %    10/30/19 1730 143/74  78 12 96 %    10/30/19 1715 125/73  74 13 95 %    10/30/19 1700 122/80  81 11 90 %    10/30/19 1655   83 15 91 %    10/30/19 1650   82 19 91 %    10/30/19 1645 122/65  81 14 94 %    10/30/19 1640 118/69  74 17 95 %    10/30/19 1635 124/78 97.5 °F (36.4 °C) 74 19 96 %    10/30/19 1129 144/85 98.4 °F (36.9 °C) 86 18 100 % 126.1 kg (278 lb)           Physical Exam:  General: A&Ox3, NAD. Respiratory: Respirations are unlabored.   Abdomen: S/NT  Surgical site: C,D and I.  Musculoskeletal: Calves are S/NT, Lionel dorsi/plantar flexion/EHL/quads/hip flexion intact, Lionel DP 2+  Neurological:  Lionel LE's NVI    Laboratory Values:             Recent Labs     10/31/19  0533   HGB 11.6*   CREA 1.47*   *     Lab Results   Component Value Date/Time    Sodium 136 10/31/2019 05:33 AM    Potassium 4.2 10/31/2019 05:33 AM    Chloride 105 10/31/2019 05:33 AM    CO2 24 10/31/2019 05:33 AM    Glucose 225 (H) 10/31/2019 05:33 AM    BUN 17 10/31/2019 05:33 AM    Creatinine 1.47 (H) 10/31/2019 05:33 AM    Calcium 8.5 10/31/2019 05:33 AM       Hemovac Output:   370 ml in the last 12 hours. PLAN:     S/P REVISION LUMBAR LAMINECTOMY L3-4 WITH DISCECTOMY, REVISION POSTERIOR SPINAL FUSION L3-S1 Mobilize with PT/nursing. Spine Precautions. TLSO when OOB. May mobilize with PT/nursing prior to arrival of TLSO. Hemodynamics Post op anemia. Tolerating well thus far. Will continue to monitor. Wound Continue dressing changes PRN. Post Operative Pain Pain Control: D/C PCA, oxycodone for pain control. Ice packs to back PRN. DVT Prophylaxis Continue with SCD'S, Encourage Ankle Pump Exercises, Mobilize. Discharge Disposition Discharge plan: Will focus on pain control and mobilizing with PT/nursing. Case Management for discharge planning. Patient would like go to [facility]. Will continue to monitor progress closely. The patient was seen and evaluated by Dr Naveed Smith who agrees with the findings and treatment plan as outlined above.         Signed By: Severa Bowler, PA-C  October 31, 2019 7:14 AM

## 2019-11-01 ENCOUNTER — HOME HEALTH ADMISSION (OUTPATIENT)
Dept: HOME HEALTH SERVICES | Facility: HOME HEALTH | Age: 54
End: 2019-11-01
Payer: MEDICARE

## 2019-11-01 LAB
GLUCOSE BLD STRIP.AUTO-MCNC: 116 MG/DL (ref 65–100)
GLUCOSE BLD STRIP.AUTO-MCNC: 118 MG/DL (ref 65–100)
GLUCOSE BLD STRIP.AUTO-MCNC: 130 MG/DL (ref 65–100)
GLUCOSE BLD STRIP.AUTO-MCNC: 214 MG/DL (ref 65–100)
HGB BLD-MCNC: 10.5 G/DL (ref 12.1–17)
SERVICE CMNT-IMP: ABNORMAL

## 2019-11-01 PROCEDURE — 65270000029 HC RM PRIVATE

## 2019-11-01 PROCEDURE — 97116 GAIT TRAINING THERAPY: CPT

## 2019-11-01 PROCEDURE — 74011636637 HC RX REV CODE- 636/637: Performed by: NURSE PRACTITIONER

## 2019-11-01 PROCEDURE — 97530 THERAPEUTIC ACTIVITIES: CPT

## 2019-11-01 PROCEDURE — 74011250637 HC RX REV CODE- 250/637: Performed by: PHYSICIAN ASSISTANT

## 2019-11-01 PROCEDURE — 94640 AIRWAY INHALATION TREATMENT: CPT

## 2019-11-01 PROCEDURE — 36415 COLL VENOUS BLD VENIPUNCTURE: CPT

## 2019-11-01 PROCEDURE — 85018 HEMOGLOBIN: CPT

## 2019-11-01 PROCEDURE — 82962 GLUCOSE BLOOD TEST: CPT

## 2019-11-01 PROCEDURE — 74011000250 HC RX REV CODE- 250: Performed by: PHYSICIAN ASSISTANT

## 2019-11-01 PROCEDURE — 74011636637 HC RX REV CODE- 636/637: Performed by: PHYSICIAN ASSISTANT

## 2019-11-01 RX ORDER — HYDROMORPHONE HYDROCHLORIDE 4 MG/1
4 TABLET ORAL
Status: DISCONTINUED | OUTPATIENT
Start: 2019-11-01 | End: 2019-11-02 | Stop reason: HOSPADM

## 2019-11-01 RX ORDER — HYDROMORPHONE HYDROCHLORIDE 2 MG/1
2 TABLET ORAL
Status: DISCONTINUED | OUTPATIENT
Start: 2019-11-01 | End: 2019-11-02 | Stop reason: HOSPADM

## 2019-11-01 RX ORDER — METFORMIN HYDROCHLORIDE 500 MG/1
TABLET, EXTENDED RELEASE ORAL
Qty: 60 TAB | Refills: 2 | Status: SHIPPED | OUTPATIENT
Start: 2019-11-01 | End: 2019-11-07

## 2019-11-01 RX ADMIN — Medication 10 ML: at 22:00

## 2019-11-01 RX ADMIN — Medication 10 ML: at 06:11

## 2019-11-01 RX ADMIN — DULOXETINE HYDROCHLORIDE 60 MG: 60 CAPSULE, DELAYED RELEASE ORAL at 09:03

## 2019-11-01 RX ADMIN — HYDROMORPHONE HYDROCHLORIDE 4 MG: 4 TABLET ORAL at 09:03

## 2019-11-01 RX ADMIN — ARFORMOTEROL TARTRATE 15 MCG: 15 SOLUTION RESPIRATORY (INHALATION) at 08:36

## 2019-11-01 RX ADMIN — Medication 10 ML: at 13:35

## 2019-11-01 RX ADMIN — Medication 10 ML: at 22:18

## 2019-11-01 RX ADMIN — ATORVASTATIN CALCIUM 40 MG: 40 TABLET, FILM COATED ORAL at 22:16

## 2019-11-01 RX ADMIN — BUDESONIDE 500 MCG: 0.5 INHALANT RESPIRATORY (INHALATION) at 08:36

## 2019-11-01 RX ADMIN — BUDESONIDE 500 MCG: 0.5 INHALANT RESPIRATORY (INHALATION) at 20:06

## 2019-11-01 RX ADMIN — PRAZOSIN HYDROCHLORIDE 2 MG: 1 CAPSULE ORAL at 22:28

## 2019-11-01 RX ADMIN — INSULIN GLARGINE 65 UNITS: 100 INJECTION, SOLUTION SUBCUTANEOUS at 09:11

## 2019-11-01 RX ADMIN — MIRTAZAPINE 7.5 MG: 15 TABLET, FILM COATED ORAL at 22:16

## 2019-11-01 RX ADMIN — OXYCODONE HYDROCHLORIDE 10 MG: 5 TABLET ORAL at 06:21

## 2019-11-01 RX ADMIN — HYDROMORPHONE HYDROCHLORIDE 4 MG: 4 TABLET ORAL at 13:34

## 2019-11-01 RX ADMIN — INSULIN GLARGINE 65 UNITS: 100 INJECTION, SOLUTION SUBCUTANEOUS at 22:15

## 2019-11-01 RX ADMIN — HYDROMORPHONE HYDROCHLORIDE 4 MG: 4 TABLET ORAL at 17:16

## 2019-11-01 RX ADMIN — METFORMIN HYDROCHLORIDE 500 MG: 500 TABLET, EXTENDED RELEASE ORAL at 16:35

## 2019-11-01 RX ADMIN — OXYCODONE HYDROCHLORIDE 10 MG: 5 TABLET ORAL at 03:36

## 2019-11-01 RX ADMIN — AMLODIPINE BESYLATE 10 MG: 5 TABLET ORAL at 09:04

## 2019-11-01 RX ADMIN — DULOXETINE HYDROCHLORIDE 30 MG: 30 CAPSULE, DELAYED RELEASE ORAL at 09:03

## 2019-11-01 RX ADMIN — LISINOPRIL 40 MG: 20 TABLET ORAL at 17:04

## 2019-11-01 RX ADMIN — INSULIN LISPRO 3 UNITS: 100 INJECTION, SOLUTION INTRAVENOUS; SUBCUTANEOUS at 17:17

## 2019-11-01 RX ADMIN — SENNOSIDES, DOCUSATE SODIUM 1 TABLET: 50; 8.6 TABLET, FILM COATED ORAL at 09:03

## 2019-11-01 RX ADMIN — POLYETHYLENE GLYCOL 3350 17 G: 17 POWDER, FOR SOLUTION ORAL at 09:04

## 2019-11-01 RX ADMIN — ARFORMOTEROL TARTRATE 15 MCG: 15 SOLUTION RESPIRATORY (INHALATION) at 20:06

## 2019-11-01 RX ADMIN — HYDROMORPHONE HYDROCHLORIDE 4 MG: 4 TABLET ORAL at 20:27

## 2019-11-01 RX ADMIN — SENNOSIDES, DOCUSATE SODIUM 1 TABLET: 50; 8.6 TABLET, FILM COATED ORAL at 17:18

## 2019-11-01 NOTE — ROUTINE PROCESS
The Rehabilitation department at [facility] has ordered the following durable medical equipment (DME):  
rolling walker From: 
[employer] [phone] If the rehab department or DME company is waiting for insurance approval for the equipment and the patient decides to discharge from the hospital before the medical equipment arrives, the patient may contact the company above to work out the delivery. Please keep in mind that some DME companies WILL NOT deliver to the home. Insurance companies and DME companies are not open on the weekends to approve authorization and deliver to the hospital. Therefore it is the patient's responsibility to figure out a way to access the DME medical equipment. Thank you so much for your help as we provide the equipment the patient requires.

## 2019-11-01 NOTE — PROGRESS NOTES
Physical Therapy Note    906 Patient was fit for custom brace yesterday and it is due to arrive this AM. Will wait to complete stair training with brace. RW order placed for D/C.      Thank you,  Dalton EASTONT

## 2019-11-01 NOTE — PROGRESS NOTES
Physical Therapy Note    Patient is received in bed sleeping. Back brace has been delivered. Patient reports he has been up and sitting in a chair today. He request pain medication prior to stair clearance.      Thank you,  [place] EASTONT

## 2019-11-01 NOTE — PROGRESS NOTES
Problem: Self Care Deficits Care Plan (Adult)  Goal: *Acute Goals and Plan of Care (Insert Text)  Description  FUNCTIONAL STATUS PRIOR TO ADMISSION: Patient was modified independent using a single point cane for functional mobility. Patient was modified independent for basic and instrumental ADLs, increased time. Stands to shower, sits and tailor sits to dress. Completed outpatient exercises s/p back surgery 4/10/18. On permanent disability now. Wife independent. HOME SUPPORT: The patient lived with wife but did not require assist.    Occupational Therapy Goals  Initiated 10/31/2019    1. Patient will perform grooming standing at sink with modified independence within 7 days. 2.  Patient will perform lower body dressing with supervision/set-up using AE PRN within 7 days. 3.  Patient will toileting at supervision/set-up within 7 days. 4.  Patient will don/doff back brace at supervision/set-up within 7 days. 5.  Patient will verbalize/demonstrate 3/3 back precautions during ADL tasks without cues within 7 days. Outcome: Progressing Towards Goal    OCCUPATIONAL THERAPY TREATMENT  Patient: Cresencio Louis (50 y.o. male)  Date: 11/1/2019  Diagnosis: Lumbar spinal stenosis [M48.061] <principal problem not specified>  Procedure(s) (LRB):  REVISION LUMBAR LAMINECTOMY L3-4 WITH DISCECTOMY, REVISION POSTERIOR SPINAL FUSION L3-S1 (N/A) 2 Days Post-Op  Precautions: Back, Other (comment)(TLSO)  Chart, occupational therapy assessment, plan of care, and goals were reviewed. ASSESSMENT  Patient continues with skilled OT services and is progressing towards goals. Patient has met goals, is discharging home with DME needs met and wife A PRN. Current Level of Function Impacting Discharge (ADLs): moderate A lower body ADLs however using long handled AE    Other factors to consider for discharge: wife works 12 hour shifts         PLAN :Discharging further skilled acute OT services at this time. Recommend with staff: Juliann Cervantes for all meals, functional mobility to and from bathroom    Recommendation for discharge: (in order for the patient to meet his/her long term goals)  No skilled occupational therapy/ follow up rehabilitation needs identified at this time. SUBJECTIVE:   Patient stated I do not want to do this surgery again.     OBJECTIVE DATA SUMMARY:   Cognitive/Behavioral Status:  Neurologic State: Alert                   Functional Mobility and Transfers for ADLs:      Transfers:  Sit to Stand: Contact guard assistance chair          ADL Intervention:                                          The patient recalled and demonstrated 3/3 back precautions. Reviewed all 3 with patient. Bathing: Patient instructed and indicated understanding when bathing to not submerge wound in water, stand to shower or sponge bathe, cover wound with plastic and tape to ensure no water reaches bandage/wound without cues. Dressing brace: Patient instructed and demonstrated to don/doff velcro on brace using dominant side, keeping non-dominant side intact. Patient instructed and demonstrated in meantime of being able to stand with back against wall to don/doff brace, to don/doff seated using lap and bed/chair surface to support brace while manipulating. Dressing lower body: Patient instructed to don brace first and on the benefits to remain seated to don all clothing to increase independence with precautions and pain management. Toileting: Patient instructed on the benefits of using flushable wet wipes and toilet tongs if decreased reach or pain for heather care. Also, the benefits of a reacher to aid in clothing management. Home safety: Patient instructed and indicated understanding on home modifications and safety (raise height of ADL objects, appropriate height of chair surfaces, recliner safety, change of floor surfaces, clear pathways) to increase independence and fall prevention.     Standing: Patient instructed and indicated understanding to walk up to sink/counter top/surfaces, step into walker, square off while using objects, slide objects along surfaces, to increase adherence to back precautions and fall prevention. Patient instructed to increase amount of time standing in order to increase independence and tolerance with ADLs. During prolonged standing, can open cabinet door or place foot on stool to decrease spinal pressure/increase pain. Patient instructed and indicated understanding the benefits of maintaining activity tolerance, functional mobility, and independence with self care tasks during acute stay  to ensure safe return home and to baseline. Encouraged patient to increase frequency and duration OOB, not sitting longer than 30 mins without marching/walking with staff, be out of bed for all meals, perform daily ADLs (as approved by RN/MD regarding bathing etc), and performing functional mobility to/from bathroom. Patient instruction and indicated understanding on body mechanics, ergonomics and gravitational force on the spine during different body positions to plan activities in prep for return home to complete instrumental ADLs and back to work safely. Therapeutic Exercises:   Patient instructed on the benefits shoulder exercises to increase independence with ADLs, active ROM, and strength of spine. Patient demonstrated activating core. Patient instructed and demonstrated techniques of activating abdominal muscles. Patient instructed and indicated understanding how to progress reps and sets against gravity to then working up to 5 lbs, until surgeon clears, in which can use household items for weights. Pain:  Minimal pain noted    Activity Tolerance:   Fair  Please refer to the flowsheet for vital signs taken during this treatment.     After treatment patient left in no apparent distress:   Sitting in chair, Call bell within reach and Caregiver / family present    COMMUNICATION/COLLABORATION:   The patients plan of care was discussed with: Physical Therapist    Mikael Apley  Time Calculation: 20 mins

## 2019-11-01 NOTE — PROGRESS NOTES
Orthopedic & Surgical Nursing Communication Tool        Bedside and Verbal shift change report given to Harpreet Guerrero RN (incoming nurse) by Rodri Ceja RN (outgoing nurse) on Mo Mejia. a 48 y.o. male and born 1965 who arrived at the hospital on 10/30/2019  9:36 AM. Report included the following information SBAR, Kardex and MAR. Significant changes during shift: none      Issues for physician to address: none          Pain Controlled          [x] yes          [] no    Bowel Movement          [] yes          [x] no          Code Status: Prior     Admit Diagnosis: Lumbar spinal stenosis [M48.061]     Surgery: Procedure(s):  REVISION LUMBAR LAMINECTOMY L3-4 WITH DISCECTOMY, REVISION POSTERIOR SPINAL FUSION L3-S1     Infections: No current active infections     Allergies: Patient has no known allergies. Current diet: DIET DIABETIC CONSISTENT CARB Regular           Vital Signs:     Patient Vitals for the past 12 hrs:   Temp Pulse Resp BP SpO2   10/31/19 1947 99.1 °F (37.3 °C) 94 16 146/87 91 %   10/31/19 1534 98.9 °F (37.2 °C) 83 15 128/79 95 %      Intake & Output:       Intake/Output Summary (Last 24 hours) at 11/1/2019 0046  Last data filed at 10/31/2019 1658  Gross per 24 hour   Intake 2360.83 ml   Output 1970 ml   Net 390.83 ml      Laboratory Results:     Recent Results (from the past 12 hour(s))   GLUCOSE, POC    Collection Time: 10/31/19  4:46 PM   Result Value Ref Range    Glucose (POC) 171 (H) 65 - 100 mg/dL    Performed by [address] [place] [address], POC    Collection Time: 10/31/19  9:01 PM   Result Value Ref Range    Glucose (POC) 170 (H) 65 - 100 mg/dL    Performed by [place] [id number] for questions and clarifications were given to the incoming nurse. Patient's bed locked and is in low position, side rails up x2, door open PRN, call bell within reach of patient and patient not in distress.       Signed by: Rodri Ceja RN, BSN, CMSRN 11/1/2019 at 12:46 AM

## 2019-11-01 NOTE — PROGRESS NOTES
INDER:  1. [facility] accepted patient  2. His wife will transport when stable.       Patric Greene [facility]

## 2019-11-01 NOTE — PROGRESS NOTES
Ortho Spine Daily Progress Note    Date of Surgery:  10/30/2019      Patient: Julianne Bragg YOB: 1965  Age: 48 y.o. SUBJECTIVE:   2 Days Post-Op following REVISION LUMBAR LAMINECTOMY L3-4 WITH DISCECTOMY, REVISION POSTERIOR SPINAL FUSION L3-S1    The patient states significant back pain this morning. The patient states that their pre-operative lower extremity symptoms appear to be somewhat improved. The patient rates their current level of pain as 10/10. The patient's post operative pain is not adequately controlled. The patient denies CP, SOB, N/V, dizziness, abdominal pain, HA. OBJECTIVE:     Vital Signs:    Temp (24hrs), Av.1 °F (37.3 °C), Min:98.6 °F (37 °C), Max:99.6 °F (37.6 °C)      Patient Vitals for the past 24 hrs:   BP Temp Pulse Resp SpO2   19 0325 171/76 99.6 °F (37.6 °C) 99 16    10/31/19 1947 146/87 99.1 °F (37.3 °C) 94 16 91 %   10/31/19 1534 128/79 98.9 °F (37.2 °C) 83 15 95 %   10/31/19 0930     97 %   10/31/19 0901 126/78 98.6 °F (37 °C) 77 14 97 %           Physical Exam:  General: A&Ox3, NAD. Respiratory: Respirations are unlabored. Abdomen: S/NT  Surgical site: C,D and I.  Musculoskeletal: Calves are S/NT, Lionel dorsi/plantar flexion/EHL/quads/hip flexion intact, Lionel DP 2+  Neurological:  Lionel LE's NVI    Laboratory Values:             Recent Labs     19  0347 10/31/19  0533   HGB 10.5* 11.6*   CREA  --  1.47*   GLU  --  225*     Lab Results   Component Value Date/Time    Sodium 136 10/31/2019 05:33 AM    Potassium 4.2 10/31/2019 05:33 AM    Chloride 105 10/31/2019 05:33 AM    CO2 24 10/31/2019 05:33 AM    Glucose 225 (H) 10/31/2019 05:33 AM    BUN 17 10/31/2019 05:33 AM    Creatinine 1.47 (H) 10/31/2019 05:33 AM    Calcium 8.5 10/31/2019 05:33 AM       Hemovac Output:  190 ml in the last 12 hours.     PLAN:     S/P REVISION LUMBAR LAMINECTOMY L3-4 WITH DISCECTOMY, REVISION POSTERIOR SPINAL FUSION L3-S1 Mobilize with PT/nursing until discharged. TLSO ordered. May mobilize with PT prior to brace arrival. Spine precautions. Hemodynamics Post op anemia. Tolerating well thus far. Will continue to monitor. Wound Continue dressing changes PRN.D/C hemovac in AM tomorrow. Post Operative Pain Will D/C oxycodone and start dilaudid PO. Monitor for AMS changes. DVT Prophylaxis Continue with SCD'S, Encourage Ankle Pump Exercises, Mobilize. Discharge Disposition Discharge plan: Will focus on pain control and mobilizing with PT/nursing. Case Management for discharge planning. Patient would caitlin to be considered of Inpt Rehab. Anticipate discharge to Rehab over weekend depending upon ability to mobilize and adequate pain control. Will continue to monitor progress closely. Rx's and discharge instructions in chart in anticipation of discharge over weekend.                   Signed By: Ailene Cooks, PA-C  November 1, 2019 8:11 AM

## 2019-11-01 NOTE — PROGRESS NOTES
Problem: Mobility Impaired (Adult and Pediatric)  Goal: *Acute Goals and Plan of Care (Insert Text)  Description  FUNCTIONAL STATUS PRIOR TO ADMISSION: Patient was modified independent using a single point cane for functional mobility. HOME SUPPORT PRIOR TO ADMISSION: The patient lived with family. Physical Therapy Goals  Initiated 10/31/2019  1. Patient will move from supine to sit and sit to supine , scoot up and down and roll side to side in bed with supervision/set-up within 7 day(s). 2.  Patient will transfer from bed to chair and chair to bed with supervision/set-up using the least restrictive device within 7 day(s). 3.  Patient will perform sit to stand with supervision/set-up within 7 day(s). 4.  Patient will ambulate with supervision/set-up for 300 feet with the least restrictive device within 7 day(s). 5.  Patient will ascend/descend 4 stairs with 1 handrail(s) with supervision/set-up within 7 day(s). Outcome: Progressing Towards Goal   PHYSICAL THERAPY TREATMENT  Patient: Cresencio Louis (50 y.o. male)  Date: 11/1/2019  Diagnosis: Lumbar spinal stenosis [M48.061] <principal problem not specified>  Procedure(s) (LRB):  REVISION LUMBAR LAMINECTOMY L3-4 WITH DISCECTOMY, REVISION POSTERIOR SPINAL FUSION L3-S1 (N/A) 2 Days Post-Op  Precautions: Back, Other (comment)(TLSO)  Chart, physical therapy assessment, plan of care and goals were reviewed. ASSESSMENT  Patient continues with skilled PT services and is progressing towards goals. Patient transfers supine to sit with HOB elevated. He reports his bed at home is firmer and that he does not anticipate needing assistance for bed mobility once home. Patient dons clam shell brace with Min A. He demonstrates the ability to ascend and descend 8 steps with one rail and step to gait pattern. He ambulates 300 ft with decreased gait speed and rolling walker.  Patient reports back pain 7/10 post ambulation but is willing to sit up at bedside chair for at least 30 minutes. Patient is cleared by PT at this time. Current Level of Function Impacting Discharge (mobility/balance): Min A bed mobility, CGA gait and transfers with rolling walker     Other factors to consider for discharge: medical stability, rolling walker order placed          PLAN :  Patient continues to benefit from skilled intervention to address the above impairments. Continue treatment per established plan of care. to address goals. Recommendation for discharge: (in order for the patient to meet his/her long term goals)  Physical therapy at least 2 days/week in the home     This discharge recommendation:  Has been made in collaboration with the attending provider and/or case management    IF patient discharges home will need the following DME: rolling walker on order with [facility]        SUBJECTIVE:   Patient stated I don't want to go to rehab they pushed me too hard.     OBJECTIVE DATA SUMMARY:   Critical Behavior:  Neurologic State: Alert  Orientation Level: Oriented X4  Cognition: Appropriate for age attention/concentration, Appropriate safety awareness  Safety/Judgement: Awareness of environment, Good awareness of safety precautions  Functional Mobility Training:  Bed Mobility:     Supine to Sit: Minimum assistance;Bed Modified     Scooting: Contact guard assistance        Transfers:  Sit to Stand: Contact guard assistance  Stand to Sit: Contact guard assistance                             Balance:  Sitting: Intact  Standing: Intact; With support  Standing - Static: Good;Constant support  Standing - Dynamic : Good;Constant support  Ambulation/Gait Training:  Distance (ft): 300 Feet (ft)  Assistive Device: Gait belt;Walker, rolling  Ambulation - Level of Assistance: Contact guard assistance        Gait Abnormalities: Shuffling gait        Base of Support: Widened     Speed/Eliza: Slow;Shuffled  Step Length: Right shortened;Left shortened                    Stairs: Therapeutic Exercises:     Pain Rating:      Activity Tolerance:   Good  Please refer to the flowsheet for vital signs taken during this treatment.     After treatment patient left in no apparent distress:   Sitting in chair and Call bell within reach    COMMUNICATION/COLLABORATION:   The patients plan of care was discussed with: Registered Nurse    Stacey Jacques, PT   Time Calculation: 26 mins

## 2019-11-01 NOTE — PROGRESS NOTES
Problem: Complex Spine Procedure:  Post Op Day 2  Goal: Off Pathway (Use only if patient is Off Pathway)  Outcome: Progressing Towards Goal  Goal: Activity/Safety  Outcome: Progressing Towards Goal  Goal: Diagnostic Test/Procedures  Outcome: Progressing Towards Goal  Goal: Nutrition/Diet  Outcome: Progressing Towards Goal  Goal: Discharge Planning  Outcome: Progressing Towards Goal  Goal: Medications  Outcome: Progressing Towards Goal  Goal: Respiratory  Outcome: Progressing Towards Goal  Goal: Treatments/Interventions/Procedures  Outcome: Progressing Towards Goal  Goal: Psychosocial  Outcome: Progressing Towards Goal  Goal: *Progress independence mobility/activities (eg: Mobility precautions)  Outcome: Progressing Towards Goal  Goal: *Verbalizes/demonstrates understanding of proper body mechanics and use of stabilization device if ordered  Outcome: Progressing Towards Goal  Goal: *Optimal pain control at patient's stated goal  Outcome: Progressing Towards Goal  Goal: *Resumes normal function of bladder and bowel  Outcome: Progressing Towards Goal  Goal: *Hemodynamically stable  Outcome: Progressing Towards Goal  Goal: *Tolerating diet  Outcome: Progressing Towards Goal  Goal: *Labs within defined limits  Outcome: Progressing Towards Goal  Goal: *Able to place stabilization device  Outcome: Progressing Towards Goal

## 2019-11-01 NOTE — PROGRESS NOTES
Problem: Diabetes Self-Management  Goal: *Disease process and treatment process  Description  Define diabetes and identify own type of diabetes; list 3 options for treating diabetes. Outcome: Progressing Towards Goal  Goal: *Using medications safely  Description  State effect of diabetes medications on diabetes; name diabetes medication taking, action and side effects. Outcome: Progressing Towards Goal  Goal: *Monitoring blood glucose, interpreting and using results  Description  Identify recommended blood glucose targets  and personal targets.   Outcome: Progressing Towards Goal     Problem: Patient Education: Go to Patient Education Activity  Goal: Patient/Family Education  Outcome: Progressing Towards Goal     Problem: Complex Spine Procedure:  Day of Surgery  Goal: Activity/Safety  Outcome: Progressing Towards Goal  Goal: Nutrition/Diet  Outcome: Progressing Towards Goal  Goal: Medications  Outcome: Progressing Towards Goal  Goal: Respiratory  Outcome: Progressing Towards Goal

## 2019-11-02 VITALS
SYSTOLIC BLOOD PRESSURE: 131 MMHG | DIASTOLIC BLOOD PRESSURE: 84 MMHG | RESPIRATION RATE: 18 BRPM | WEIGHT: 278 LBS | OXYGEN SATURATION: 94 % | TEMPERATURE: 99.6 F | BODY MASS INDEX: 37.7 KG/M2 | HEART RATE: 92 BPM

## 2019-11-02 LAB
GLUCOSE BLD STRIP.AUTO-MCNC: 113 MG/DL (ref 65–100)
GLUCOSE BLD STRIP.AUTO-MCNC: 132 MG/DL (ref 65–100)
GLUCOSE BLD STRIP.AUTO-MCNC: 88 MG/DL (ref 65–100)
SERVICE CMNT-IMP: ABNORMAL
SERVICE CMNT-IMP: ABNORMAL
SERVICE CMNT-IMP: NORMAL

## 2019-11-02 PROCEDURE — 74011250637 HC RX REV CODE- 250/637: Performed by: PHYSICIAN ASSISTANT

## 2019-11-02 PROCEDURE — 74011000250 HC RX REV CODE- 250: Performed by: PHYSICIAN ASSISTANT

## 2019-11-02 PROCEDURE — 82962 GLUCOSE BLOOD TEST: CPT

## 2019-11-02 PROCEDURE — 94640 AIRWAY INHALATION TREATMENT: CPT

## 2019-11-02 PROCEDURE — 74011636637 HC RX REV CODE- 636/637: Performed by: NURSE PRACTITIONER

## 2019-11-02 RX ADMIN — HYDROMORPHONE HYDROCHLORIDE 6 MG: 4 TABLET ORAL at 17:01

## 2019-11-02 RX ADMIN — AMLODIPINE BESYLATE 10 MG: 5 TABLET ORAL at 07:10

## 2019-11-02 RX ADMIN — LISINOPRIL 40 MG: 20 TABLET ORAL at 07:09

## 2019-11-02 RX ADMIN — INSULIN GLARGINE 65 UNITS: 100 INJECTION, SOLUTION SUBCUTANEOUS at 09:52

## 2019-11-02 RX ADMIN — Medication 10 ML: at 07:14

## 2019-11-02 RX ADMIN — POLYETHYLENE GLYCOL 3350 17 G: 17 POWDER, FOR SOLUTION ORAL at 09:48

## 2019-11-02 RX ADMIN — HYDROMORPHONE HYDROCHLORIDE 6 MG: 4 TABLET ORAL at 07:10

## 2019-11-02 RX ADMIN — BUDESONIDE 500 MCG: 0.5 INHALANT RESPIRATORY (INHALATION) at 09:31

## 2019-11-02 RX ADMIN — HYDROMORPHONE HYDROCHLORIDE 6 MG: 4 TABLET ORAL at 01:04

## 2019-11-02 RX ADMIN — HYDROMORPHONE HYDROCHLORIDE 6 MG: 4 TABLET ORAL at 11:16

## 2019-11-02 RX ADMIN — DULOXETINE HYDROCHLORIDE 30 MG: 30 CAPSULE, DELAYED RELEASE ORAL at 09:47

## 2019-11-02 RX ADMIN — DULOXETINE HYDROCHLORIDE 60 MG: 60 CAPSULE, DELAYED RELEASE ORAL at 09:45

## 2019-11-02 RX ADMIN — SENNOSIDES, DOCUSATE SODIUM 1 TABLET: 50; 8.6 TABLET, FILM COATED ORAL at 09:45

## 2019-11-02 RX ADMIN — ARFORMOTEROL TARTRATE 15 MCG: 15 SOLUTION RESPIRATORY (INHALATION) at 09:31

## 2019-11-02 NOTE — PROGRESS NOTES
Orthopaedics Daily Progress Note                            Date of Surgery:  10/30/2019      Patient: Kole Ivy YOB: 1965  Age: 48 y.o. SUBJECTIVE:   3 Days Post-Op following REVISION LUMBAR LAMINECTOMY L3-4 WITH DISCECTOMY, REVISION POSTERIOR SPINAL FUSION L3-S1. The patient's post operative pain is controlled. Resting comfortably. No CP/SOB. No N/V. Was cleared by PT yesterday. OBJECTIVE:     Vital Signs:      Visit Vitals  /69 (BP 1 Location: Left arm, BP Patient Position: At rest)   Pulse 99   Temp 98.9 °F (37.2 °C)   Resp 14   Wt 126.1 kg (278 lb)   SpO2 90%   BMI 37.70 kg/m²       Physical Exam:  General: A&Ox3. The patient is cooperative, and in no acute distress. Respiratory: Respirations are unlabored. Surgical site(s): dressing clean, dry  Musculoskeletal: Calves are soft, supple, and non-tender upon palpation. Motor 5/5. Neurological:  Neurovascularly intact with good dorsi and plantar flexion. Pulses symmetrical.    Laboratory Values:             Recent Results (from the past 12 hour(s))   GLUCOSE, POC    Collection Time: 11/01/19  9:45 PM   Result Value Ref Range    Glucose (POC) 116 (H) 65 - 100 mg/dL    Performed by Maritza Mendez          PLAN:     S/P REVISION LUMBAR LAMINECTOMY L3-4 WITH DISCECTOMY, REVISION POSTERIOR SPINAL FUSION L3-S1 -Continue WBAT. -Mobilize and continue with PT/OT until discharged.  -Was cleared by PT yesterday but should still work with them over the weekend if he is here. Hemodynamics Acute blood loss anemia as expected. Patient asymptomatic. Continue to monitor. Wound Monitor postop dressing; no postop dressing changes necessary. Reinforce PRN. Post Operative Pain Pain Control: stable, mild-to-moderate joint symptoms intermittently, reasonably well controlled by current meds. DVT Prophylaxis Continue with SCD'S, Ankle Pump Exercises.       Discharge Disposition Discharge plan: may d/c home once drain output less then 50cc per 8 hours.         Signed By: Kenny German PA-C  November 2, 2019 5:20 AM

## 2019-11-02 NOTE — PROGRESS NOTES
Bedside and Verbal shift change report given to [address] Liu (oncoming nurse) by ARON Post RN (offgoing nurse). Report given with SBAR, Kardex, Intake/Output, MAR and Recent Results.

## 2019-11-04 ENCOUNTER — HOME CARE VISIT (OUTPATIENT)
Dept: SCHEDULING | Facility: HOME HEALTH | Age: 54
End: 2019-11-04
Payer: MEDICARE

## 2019-11-04 ENCOUNTER — DOCUMENTATION ONLY (OUTPATIENT)
Dept: FAMILY MEDICINE CLINIC | Age: 54
End: 2019-11-04

## 2019-11-04 VITALS
OXYGEN SATURATION: 98 % | DIASTOLIC BLOOD PRESSURE: 68 MMHG | HEART RATE: 119 BPM | SYSTOLIC BLOOD PRESSURE: 110 MMHG | TEMPERATURE: 99.1 F | RESPIRATION RATE: 18 BRPM

## 2019-11-04 PROCEDURE — 400013 HH SOC

## 2019-11-04 PROCEDURE — G0151 HHCP-SERV OF PT,EA 15 MIN: HCPCS

## 2019-11-04 NOTE — PROGRESS NOTES
Spoke with Areli Brewster with [facility] regarding restarting blood thinner. Appointment scheduled to follow-up with Dr Pamela Partida 11/6/19 . Given Dr Phi Lechuga name to discuss restarting blood thinner.

## 2019-11-05 ENCOUNTER — PATIENT OUTREACH (OUTPATIENT)
Dept: INTERNAL MEDICINE CLINIC | Age: 54
End: 2019-11-05

## 2019-11-05 ENCOUNTER — HOME CARE VISIT (OUTPATIENT)
Dept: HOME HEALTH SERVICES | Facility: HOME HEALTH | Age: 54
End: 2019-11-05
Payer: MEDICARE

## 2019-11-05 ENCOUNTER — HOME CARE VISIT (OUTPATIENT)
Dept: SCHEDULING | Facility: HOME HEALTH | Age: 54
End: 2019-11-05
Payer: MEDICARE

## 2019-11-05 VITALS
OXYGEN SATURATION: 96 % | DIASTOLIC BLOOD PRESSURE: 72 MMHG | HEART RATE: 98 BPM | TEMPERATURE: 99.6 F | SYSTOLIC BLOOD PRESSURE: 110 MMHG

## 2019-11-05 PROCEDURE — G0152 HHCP-SERV OF OT,EA 15 MIN: HCPCS

## 2019-11-05 NOTE — PROGRESS NOTES
Hospital Discharge Follow-Up      Date/Time:  2019 1:35 PM    Patient was admitted to [facility] on 10/30/19 and discharged on 19 for Scheduled Lumbar Lami by Dr. Lenny Marte. The physician discharge summary was not available at the time of outreach. Patient was contacted within 2 business days of discharge. Top Challenges reviewed with the provider   - Reports that he was told to hold off on taking Eliquis and only take ASA 81 mg at this time- AVS does not reflect. I called Dr. Sravani Fofana office and left message for his nurse, Jeannine.    - Pt requested to change his INDER appt with Dr. Moni Stover to Thursday d/t transportation issue with his family. Future Appointments   Date Time Provider Itz Easley   2019 11:10 AM Enma Ibarra MD [address] [facility]     - Consider rechecking labs  Results for Hannah Robertson (MRN [id number]) as of 2019 13:40   10/10/2019 16:01 10/31/2019 05:33 2019 03:47   HGB 12.8 (L) 11.6 (L) 10.5 (L)   Glucose 136 (H) 225 (H)    Creatinine 1.25 1.47 (H)      Advance Care Planning:   Does patient have an Advance Directive:  reviewed- pt would like to change the order of his healthcare agents so that his wife is his primary instead of his mother. He will request a copy of [facility] from his MD at appt on 19. Reviewed the living will sections and he does not wish to make any changes on that part. Method of communication with provider :chart routing    Inpatient RRAT score: 32  Was this a readmission? no     Care Transition Nurse (CTN) contacted the patient by telephone to perform post hospital discharge assessment. Verified name and  with patient as identifiers. Provided introduction to self, and explanation of the CTN role. Reports that he is doing well overall. Is wearing his TLSO at all times while out of bed. Is doing his exercises and following instructions that were provided to him at discharge in the book from [facility]   He is taking Dilaudid about every 6 hours prn. Patient received hospital discharge instructions. CTN reviewed discharge instructions and red flags with patient who verbalized understanding. Patient given an opportunity to ask questions and does not have any further questions or concerns at this time. The patient agrees to contact the PCP office for questions related to their healthcare. CTN provided contact information for future reference. Disease Specific:   N/A    Patients top risk factors for readmission:  medication management    Home Health orders at discharge: PT, [facility]: [facility]  Date of initial visit: 11/4/19    Durable Medical Equipment ordered at discharge: [employer] walker  Durable Medical Equipment received: yes    Medication(s):   New Medications at Discharge: Dilaudid, Stool Softener  Changed Medications at Discharge: none  Discontinued Medications at Discharge: none    Medication reconciliation was performed with patient, who verbalizes understanding of administration of home medications. There were no barriers to obtaining medications identified at this time. Referral to Pharm D needed: no     Current Outpatient Medications   Medication Sig    traZODone (DESYREL) 50 mg tablet Take 50 mg by mouth nightly.  docusate sodium (COLACE) 100 mg capsule Take 100 mg by mouth two (2) times daily as needed for Constipation.  HYDROmorphone (DILAUDID) 4 mg tablet Take 1-1.5 Tabs by mouth every four (4) hours as needed for Pain.  furosemide (LASIX) 20 mg tablet Take 20 mg by mouth daily.  senna (SENNA) 8.6 mg tablet Take 2 Tabs by mouth daily.  metFORMIN ER (GLUCOPHAGE XR) 500 mg tablet TAKE 2 TABLETS BY MOUTH DAILY WITH DINNER (Patient taking differently: Take 500 mg by mouth two (2) times a day. With breakfast and dinner. )    mirtazapine (REMERON) 7.5 mg tablet Take 1 Tab by mouth nightly.     amLODIPine (NORVASC) 10 mg tablet TAKE 1 TABLET BY MOUTH EVERY DAY (Patient taking differently: Take 10 mg by mouth every morning.)    lisinopril (PRINIVIL, ZESTRIL) 40 mg tablet TAKE 1 TABLET BY MOUTH EVERY DAY (Patient taking differently: Take 40 mg by mouth every morning.)    prazosin (MINIPRESS) 2 mg capsule Take 2 mg by mouth nightly.  DULoxetine (CYMBALTA) 60 mg capsule TAKE ONE CAPSULE BY MOUTH EVERY MORNING ALONG WITH 30 MG (Patient taking differently: Take 60 mg by mouth daily. TAKE ONE CAPSULE BY MOUTH EVERY MORNING ALONG WITH 30 MG)    DULoxetine (CYMBALTA) 30 mg capsule Take 1 capsule by mouth every morning along with 60 mg dose (Patient taking differently: Take 30 mg by mouth daily.)    cyclobenzaprine (FLEXERIL) 10 mg tablet TAKE 1 TABLET BY MOUTH nightly as needed (Patient taking differently: Take 10 mg by mouth nightly. TAKE 1 TABLET BY MOUTH nightly as needed for spasms  Indications: muscle spasm)    atorvastatin (LIPITOR) 40 mg tablet TAKE 1 TABLET BY MOUTH EVERY DAY (Patient taking differently: Take 40 mg by mouth nightly.)    LEVEMIR FLEXTOUCH U-100 INSULN 100 unit/mL (3 mL) inpn INJECT 65 UNITS BY SUBCUTANEOUS ROUTE TWO (2) TIMES A DAY    apixaban (ELIQUIS) 5 mg tablet Take 1 Tab by mouth two (2) times a day.  albuterol (PROVENTIL HFA, VENTOLIN HFA, PROAIR HFA) 90 mcg/actuation inhaler Take 2 Puffs by inhalation every four (4) hours as needed for Wheezing.  fluticasone-salmeterol (ADVAIR) 250-50 mcg/dose diskus inhaler Take 1 Puff by inhalation every twelve (12) hours.  aspirin delayed-release 81 mg tablet Take  by mouth daily.  polyethylene glycol (MIRALAX) 17 gram packet Take 17 g by mouth daily.  albuterol (PROVENTIL VENTOLIN) 2.5 mg /3 mL (0.083 %) nebulizer solution 3 mL by Nebulization route every four (4) hours as needed for Wheezing. No current facility-administered medications for this visit. There are no discontinued medications.     [facility] follow up appointment(s):   Future Appointments   Date Time Provider Itz Easley 11/5/2019  2:00 PM Deandre Bishop [facility]   11/6/2019 11:10 AM Terri Torres MD [address]   11/6/2019 12:45 PM Juliann Kruse PT [facility]   11/8/2019  1:00 PM [facility]   11/11/2019 To Be Determined [facility]   11/13/2019 To Be Determined [facility]   11/15/2019 To Be Determined [facility]   11/18/2019 To Be Determined [facility]   11/20/2019 To Be Determined [facility]   11/22/2019 To Be Determined Juliann Kruse PT [facility]      Non-[facility] follow up appointment(s): Needs to schedule f/u with Dr. Abhinav Brewster for 6 weeks?     [facility]:  information provided as a resource

## 2019-11-06 ENCOUNTER — HOME CARE VISIT (OUTPATIENT)
Dept: SCHEDULING | Facility: HOME HEALTH | Age: 54
End: 2019-11-06
Payer: MEDICARE

## 2019-11-06 VITALS
OXYGEN SATURATION: 93 % | TEMPERATURE: 98.9 F | SYSTOLIC BLOOD PRESSURE: 120 MMHG | RESPIRATION RATE: 18 BRPM | HEART RATE: 95 BPM | DIASTOLIC BLOOD PRESSURE: 58 MMHG

## 2019-11-06 VITALS
DIASTOLIC BLOOD PRESSURE: 82 MMHG | RESPIRATION RATE: 17 BRPM | OXYGEN SATURATION: 98 % | HEART RATE: 88 BPM | SYSTOLIC BLOOD PRESSURE: 122 MMHG | TEMPERATURE: 98.3 F

## 2019-11-06 PROCEDURE — G0152 HHCP-SERV OF OT,EA 15 MIN: HCPCS

## 2019-11-06 PROCEDURE — G0151 HHCP-SERV OF PT,EA 15 MIN: HCPCS

## 2019-11-07 ENCOUNTER — HOSPITAL ENCOUNTER (OUTPATIENT)
Age: 54
Setting detail: OBSERVATION
Discharge: HOME | End: 2019-11-08
Attending: EMERGENCY MEDICINE | Admitting: STUDENT IN AN ORGANIZED HEALTH CARE EDUCATION/TRAINING PROGRAM
Payer: MEDICARE

## 2019-11-07 ENCOUNTER — HOSPITAL ENCOUNTER (OUTPATIENT)
Dept: CT IMAGING | Age: 54
Discharge: HOME | End: 2019-11-07
Attending: FAMILY MEDICINE
Payer: MEDICARE

## 2019-11-07 ENCOUNTER — OFFICE VISIT (OUTPATIENT)
Dept: FAMILY MEDICINE CLINIC | Age: 54
End: 2019-11-07

## 2019-11-07 VITALS
OXYGEN SATURATION: 97 % | SYSTOLIC BLOOD PRESSURE: 88 MMHG | HEIGHT: 72 IN | BODY MASS INDEX: 36.42 KG/M2 | HEART RATE: 109 BPM | WEIGHT: 268.9 LBS | RESPIRATION RATE: 18 BRPM | DIASTOLIC BLOOD PRESSURE: 61 MMHG | TEMPERATURE: 99 F

## 2019-11-07 DIAGNOSIS — Z86.711 HISTORY OF PULMONARY EMBOLUS (PE): ICD-10-CM

## 2019-11-07 DIAGNOSIS — I10 ESSENTIAL HYPERTENSION: ICD-10-CM

## 2019-11-07 DIAGNOSIS — M48.00 SPINAL STENOSIS, UNSPECIFIED SPINAL REGION: ICD-10-CM

## 2019-11-07 DIAGNOSIS — Z98.890 S/P LUMBAR LAMINECTOMY: ICD-10-CM

## 2019-11-07 DIAGNOSIS — E87.5 ACUTE HYPERKALEMIA: ICD-10-CM

## 2019-11-07 DIAGNOSIS — R06.09 DOE (DYSPNEA ON EXERTION): ICD-10-CM

## 2019-11-07 DIAGNOSIS — E78.2 MIXED HYPERLIPIDEMIA: ICD-10-CM

## 2019-11-07 DIAGNOSIS — I26.94 MULTIPLE SUBSEGMENTAL PULMONARY EMBOLI WITHOUT ACUTE COR PULMONALE (HCC): Primary | ICD-10-CM

## 2019-11-07 DIAGNOSIS — Z09 HOSPITAL DISCHARGE FOLLOW-UP: Primary | ICD-10-CM

## 2019-11-07 DIAGNOSIS — J45.909 ASTHMA, UNSPECIFIED ASTHMA SEVERITY, UNSPECIFIED WHETHER COMPLICATED, UNSPECIFIED WHETHER PERSISTENT: ICD-10-CM

## 2019-11-07 DIAGNOSIS — N17.9 ACUTE RENAL FAILURE SUPERIMPOSED ON CHRONIC KIDNEY DISEASE, UNSPECIFIED CKD STAGE, UNSPECIFIED ACUTE RENAL FAILURE TYPE (HCC): ICD-10-CM

## 2019-11-07 DIAGNOSIS — N18.9 ACUTE RENAL FAILURE SUPERIMPOSED ON CHRONIC KIDNEY DISEASE, UNSPECIFIED CKD STAGE, UNSPECIFIED ACUTE RENAL FAILURE TYPE (HCC): ICD-10-CM

## 2019-11-07 DIAGNOSIS — Z86.718 HISTORY OF DVT OF LOWER EXTREMITY: ICD-10-CM

## 2019-11-07 DIAGNOSIS — Z09 HOSPITAL DISCHARGE FOLLOW-UP: ICD-10-CM

## 2019-11-07 PROBLEM — I26.99 PULMONARY EMBOLI (HCC): Status: ACTIVE | Noted: 2019-11-07

## 2019-11-07 LAB
ALBUMIN SERPL-MCNC: 2.5 G/DL (ref 3.5–5)
ALBUMIN/GLOB SERPL: 0.6 {RATIO} (ref 1.1–2.2)
ALP SERPL-CCNC: 71 U/L (ref 45–117)
ALT SERPL-CCNC: 25 U/L (ref 12–78)
ANION GAP SERPL CALC-SCNC: 9 MMOL/L (ref 5–15)
AST SERPL-CCNC: 15 U/L (ref 15–37)
BASOPHILS # BLD: 0 K/UL (ref 0–0.1)
BASOPHILS NFR BLD: 0 % (ref 0–1)
BILIRUB SERPL-MCNC: 0.3 MG/DL (ref 0.2–1)
BUN SERPL-MCNC: 23 MG/DL (ref 6–20)
BUN/CREAT SERPL: 10 (ref 12–20)
CALCIUM SERPL-MCNC: 9.4 MG/DL (ref 8.5–10.1)
CHLORIDE SERPL-SCNC: 100 MMOL/L (ref 97–108)
CO2 SERPL-SCNC: 23 MMOL/L (ref 21–32)
CREAT SERPL-MCNC: 2.28 MG/DL (ref 0.7–1.3)
DIFFERENTIAL METHOD BLD: ABNORMAL
EOSINOPHIL # BLD: 0.1 K/UL (ref 0–0.4)
EOSINOPHIL NFR BLD: 1 % (ref 0–7)
ERYTHROCYTE [DISTWIDTH] IN BLOOD BY AUTOMATED COUNT: 12.4 % (ref 11.5–14.5)
GLOBULIN SER CALC-MCNC: 4.4 G/DL (ref 2–4)
GLUCOSE BLD STRIP.AUTO-MCNC: 148 MG/DL (ref 65–100)
GLUCOSE SERPL-MCNC: 231 MG/DL (ref 65–100)
HCT VFR BLD AUTO: 33.4 % (ref 36.6–50.3)
HGB BLD-MCNC: 10.8 G/DL (ref 12.1–17)
IMM GRANULOCYTES # BLD AUTO: 0 K/UL (ref 0–0.04)
IMM GRANULOCYTES NFR BLD AUTO: 0 % (ref 0–0.5)
LYMPHOCYTES # BLD: 0.7 K/UL (ref 0.8–3.5)
LYMPHOCYTES NFR BLD: 9 % (ref 12–49)
MCH RBC QN AUTO: 28 PG (ref 26–34)
MCHC RBC AUTO-ENTMCNC: 32.3 G/DL (ref 30–36.5)
MCV RBC AUTO: 86.5 FL (ref 80–99)
MONOCYTES # BLD: 0.3 K/UL (ref 0–1)
MONOCYTES NFR BLD: 4 % (ref 5–13)
NEUTS SEG # BLD: 6.7 K/UL (ref 1.8–8)
NEUTS SEG NFR BLD: 86 % (ref 32–75)
NRBC # BLD: 0 K/UL (ref 0–0.01)
NRBC BLD-RTO: 0 PER 100 WBC
PLATELET # BLD AUTO: 232 K/UL (ref 150–400)
PMV BLD AUTO: 9.8 FL (ref 8.9–12.9)
POTASSIUM SERPL-SCNC: 5.4 MMOL/L (ref 3.5–5.1)
PROT SERPL-MCNC: 6.9 G/DL (ref 6.4–8.2)
RBC # BLD AUTO: 3.86 M/UL (ref 4.1–5.7)
RBC MORPH BLD: ABNORMAL
SERVICE CMNT-IMP: ABNORMAL
SODIUM SERPL-SCNC: 132 MMOL/L (ref 136–145)
TROPONIN I SERPL-MCNC: <0.05 NG/ML
WBC # BLD AUTO: 7.8 K/UL (ref 4.1–11.1)

## 2019-11-07 PROCEDURE — 84484 ASSAY OF TROPONIN QUANT: CPT

## 2019-11-07 PROCEDURE — 74011250636 HC RX REV CODE- 250/636: Performed by: STUDENT IN AN ORGANIZED HEALTH CARE EDUCATION/TRAINING PROGRAM

## 2019-11-07 PROCEDURE — 74011250637 HC RX REV CODE- 250/637: Performed by: STUDENT IN AN ORGANIZED HEALTH CARE EDUCATION/TRAINING PROGRAM

## 2019-11-07 PROCEDURE — 36415 COLL VENOUS BLD VENIPUNCTURE: CPT

## 2019-11-07 PROCEDURE — 99218 HC RM OBSERVATION: CPT

## 2019-11-07 PROCEDURE — 74011636637 HC RX REV CODE- 636/637: Performed by: STUDENT IN AN ORGANIZED HEALTH CARE EDUCATION/TRAINING PROGRAM

## 2019-11-07 PROCEDURE — 74011250637 HC RX REV CODE- 250/637: Performed by: EMERGENCY MEDICINE

## 2019-11-07 PROCEDURE — 74011636320 HC RX REV CODE- 636/320: Performed by: RADIOLOGY

## 2019-11-07 PROCEDURE — 93005 ELECTROCARDIOGRAM TRACING: CPT

## 2019-11-07 PROCEDURE — 94762 N-INVAS EAR/PLS OXIMTRY CONT: CPT

## 2019-11-07 PROCEDURE — 82962 GLUCOSE BLOOD TEST: CPT

## 2019-11-07 PROCEDURE — 71275 CT ANGIOGRAPHY CHEST: CPT

## 2019-11-07 PROCEDURE — 85025 COMPLETE CBC W/AUTO DIFF WBC: CPT

## 2019-11-07 PROCEDURE — 99285 EMERGENCY DEPT VISIT HI MDM: CPT

## 2019-11-07 PROCEDURE — 74011250636 HC RX REV CODE- 250/636: Performed by: EMERGENCY MEDICINE

## 2019-11-07 PROCEDURE — 80053 COMPREHEN METABOLIC PANEL: CPT

## 2019-11-07 RX ORDER — PROMETHAZINE HYDROCHLORIDE 25 MG/1
25 SUPPOSITORY RECTAL
COMMUNITY
End: 2019-11-07

## 2019-11-07 RX ORDER — CYCLOBENZAPRINE HCL 10 MG
5 TABLET ORAL
Status: DISCONTINUED | OUTPATIENT
Start: 2019-11-07 | End: 2019-11-08 | Stop reason: HOSPADM

## 2019-11-07 RX ORDER — PREGABALIN 75 MG/1
150 CAPSULE ORAL 3 TIMES DAILY
Status: DISCONTINUED | OUTPATIENT
Start: 2019-11-07 | End: 2019-11-07

## 2019-11-07 RX ORDER — ALBUTEROL SULFATE 90 UG/1
2 AEROSOL, METERED RESPIRATORY (INHALATION)
Status: DISCONTINUED | OUTPATIENT
Start: 2019-11-07 | End: 2019-11-08 | Stop reason: HOSPADM

## 2019-11-07 RX ORDER — ATORVASTATIN CALCIUM 40 MG/1
40 TABLET, FILM COATED ORAL
Status: DISCONTINUED | OUTPATIENT
Start: 2019-11-07 | End: 2019-11-08 | Stop reason: HOSPADM

## 2019-11-07 RX ORDER — LIDOCAINE 50 MG/G
1-3 PATCH TOPICAL EVERY 24 HOURS
COMMUNITY
End: 2019-11-07

## 2019-11-07 RX ORDER — DOCUSATE SODIUM 100 MG/1
100 CAPSULE, LIQUID FILLED ORAL
Status: DISCONTINUED | OUTPATIENT
Start: 2019-11-07 | End: 2019-11-08 | Stop reason: HOSPADM

## 2019-11-07 RX ORDER — DEXTROSE MONOHYDRATE 100 MG/ML
0-250 INJECTION, SOLUTION INTRAVENOUS AS NEEDED
Status: DISCONTINUED | OUTPATIENT
Start: 2019-11-07 | End: 2019-11-08 | Stop reason: HOSPADM

## 2019-11-07 RX ORDER — ACETAMINOPHEN 325 MG/1
975 TABLET ORAL 3 TIMES DAILY
Status: DISCONTINUED | OUTPATIENT
Start: 2019-11-07 | End: 2019-11-08 | Stop reason: HOSPADM

## 2019-11-07 RX ORDER — OXYCODONE HYDROCHLORIDE 10 MG/1
10 TABLET ORAL
COMMUNITY
End: 2019-11-07

## 2019-11-07 RX ORDER — ONDANSETRON 4 MG/1
4 TABLET, ORALLY DISINTEGRATING ORAL
Status: DISCONTINUED | OUTPATIENT
Start: 2019-11-07 | End: 2019-11-08 | Stop reason: HOSPADM

## 2019-11-07 RX ORDER — SODIUM CHLORIDE 0.9 % (FLUSH) 0.9 %
10 SYRINGE (ML) INJECTION
Status: COMPLETED | OUTPATIENT
Start: 2019-11-07 | End: 2019-11-07

## 2019-11-07 RX ORDER — INSULIN GLARGINE 100 [IU]/ML
0.3 INJECTION, SOLUTION SUBCUTANEOUS
Status: DISCONTINUED | OUTPATIENT
Start: 2019-11-07 | End: 2019-11-08 | Stop reason: HOSPADM

## 2019-11-07 RX ORDER — TRAZODONE HYDROCHLORIDE 50 MG/1
50 TABLET ORAL
Status: DISCONTINUED | OUTPATIENT
Start: 2019-11-07 | End: 2019-11-08 | Stop reason: HOSPADM

## 2019-11-07 RX ORDER — ONDANSETRON 4 MG/1
4 TABLET, ORALLY DISINTEGRATING ORAL
COMMUNITY
End: 2019-11-07

## 2019-11-07 RX ORDER — HYDROMORPHONE HYDROCHLORIDE 2 MG/1
4 TABLET ORAL
Status: DISCONTINUED | OUTPATIENT
Start: 2019-11-07 | End: 2019-11-08 | Stop reason: HOSPADM

## 2019-11-07 RX ORDER — PREGABALIN 150 MG/1
150 CAPSULE ORAL 3 TIMES DAILY
COMMUNITY
End: 2019-11-07

## 2019-11-07 RX ORDER — POLYETHYLENE GLYCOL 3350 17 G/17G
17 POWDER, FOR SOLUTION ORAL DAILY
Status: DISCONTINUED | OUTPATIENT
Start: 2019-11-08 | End: 2019-11-08 | Stop reason: HOSPADM

## 2019-11-07 RX ORDER — ACETAMINOPHEN 325 MG/1
650 TABLET ORAL
Status: DISCONTINUED | OUTPATIENT
Start: 2019-11-07 | End: 2019-11-07

## 2019-11-07 RX ORDER — INSULIN LISPRO 100 [IU]/ML
INJECTION, SOLUTION INTRAVENOUS; SUBCUTANEOUS
Status: DISCONTINUED | OUTPATIENT
Start: 2019-11-07 | End: 2019-11-08 | Stop reason: HOSPADM

## 2019-11-07 RX ORDER — MELOXICAM 15 MG/1
15 TABLET ORAL DAILY
COMMUNITY
End: 2019-11-07

## 2019-11-07 RX ORDER — FLUTICASONE PROPIONATE AND SALMETEROL 250; 50 UG/1; UG/1
1 POWDER RESPIRATORY (INHALATION) 2 TIMES DAILY
Status: DISCONTINUED | OUTPATIENT
Start: 2019-11-07 | End: 2019-11-08 | Stop reason: HOSPADM

## 2019-11-07 RX ORDER — METFORMIN HYDROCHLORIDE 500 MG/1
500 TABLET, FILM COATED, EXTENDED RELEASE ORAL 2 TIMES DAILY
COMMUNITY
End: 2020-08-19 | Stop reason: SDUPTHER

## 2019-11-07 RX ORDER — MAGNESIUM SULFATE 100 %
4 CRYSTALS MISCELLANEOUS AS NEEDED
Status: DISCONTINUED | OUTPATIENT
Start: 2019-11-07 | End: 2019-11-08 | Stop reason: HOSPADM

## 2019-11-07 RX ORDER — MIRTAZAPINE 15 MG/1
7.5 TABLET, FILM COATED ORAL
Status: DISCONTINUED | OUTPATIENT
Start: 2019-11-07 | End: 2019-11-07

## 2019-11-07 RX ORDER — MONTELUKAST SODIUM 10 MG/1
10 TABLET ORAL DAILY
Status: DISCONTINUED | OUTPATIENT
Start: 2019-11-08 | End: 2019-11-08 | Stop reason: HOSPADM

## 2019-11-07 RX ORDER — SODIUM CHLORIDE 0.9 % (FLUSH) 0.9 %
5-40 SYRINGE (ML) INJECTION EVERY 8 HOURS
Status: DISCONTINUED | OUTPATIENT
Start: 2019-11-07 | End: 2019-11-08 | Stop reason: HOSPADM

## 2019-11-07 RX ORDER — SODIUM CHLORIDE, SODIUM LACTATE, POTASSIUM CHLORIDE, CALCIUM CHLORIDE 600; 310; 30; 20 MG/100ML; MG/100ML; MG/100ML; MG/100ML
125 INJECTION, SOLUTION INTRAVENOUS CONTINUOUS
Status: DISPENSED | OUTPATIENT
Start: 2019-11-07 | End: 2019-11-08

## 2019-11-07 RX ORDER — ATORVASTATIN CALCIUM 40 MG/1
40 TABLET, FILM COATED ORAL
Qty: 90 TAB | Refills: 1 | Status: SHIPPED | OUTPATIENT
Start: 2019-11-07 | End: 2020-07-31 | Stop reason: SDUPTHER

## 2019-11-07 RX ORDER — AMLODIPINE BESYLATE 5 MG/1
10 TABLET ORAL
Status: DISCONTINUED | OUTPATIENT
Start: 2019-11-08 | End: 2019-11-07

## 2019-11-07 RX ORDER — CYCLOBENZAPRINE HCL 10 MG
10 TABLET ORAL
COMMUNITY
End: 2020-05-26 | Stop reason: SDUPTHER

## 2019-11-07 RX ORDER — ACETAMINOPHEN 500 MG
TABLET ORAL
Qty: 1 KIT | Refills: 0 | Status: SHIPPED | OUTPATIENT
Start: 2019-11-07 | End: 2019-11-07

## 2019-11-07 RX ORDER — MONTELUKAST SODIUM 10 MG/1
10 TABLET ORAL DAILY
COMMUNITY
End: 2020-12-17

## 2019-11-07 RX ORDER — SODIUM CHLORIDE 0.9 % (FLUSH) 0.9 %
5-40 SYRINGE (ML) INJECTION AS NEEDED
Status: DISCONTINUED | OUTPATIENT
Start: 2019-11-07 | End: 2019-11-08 | Stop reason: HOSPADM

## 2019-11-07 RX ORDER — HYDROMORPHONE HYDROCHLORIDE 2 MG/1
4 TABLET ORAL ONCE
Status: COMPLETED | OUTPATIENT
Start: 2019-11-07 | End: 2019-11-07

## 2019-11-07 RX ORDER — HYDROMORPHONE HYDROCHLORIDE 2 MG/1
2 TABLET ORAL
Status: DISCONTINUED | OUTPATIENT
Start: 2019-11-07 | End: 2019-11-08 | Stop reason: HOSPADM

## 2019-11-07 RX ADMIN — IOPAMIDOL 100 ML: 755 INJECTION, SOLUTION INTRAVENOUS at 15:10

## 2019-11-07 RX ADMIN — TRAZODONE HYDROCHLORIDE 50 MG: 50 TABLET ORAL at 21:11

## 2019-11-07 RX ADMIN — HYDROMORPHONE HYDROCHLORIDE 4 MG: 2 TABLET ORAL at 16:16

## 2019-11-07 RX ADMIN — HYDROMORPHONE HYDROCHLORIDE 4 MG: 2 TABLET ORAL at 20:16

## 2019-11-07 RX ADMIN — Medication 10 ML: at 15:12

## 2019-11-07 RX ADMIN — ATORVASTATIN CALCIUM 40 MG: 40 TABLET, FILM COATED ORAL at 21:11

## 2019-11-07 RX ADMIN — Medication 10 ML: at 21:12

## 2019-11-07 RX ADMIN — INSULIN GLARGINE 36 UNITS: 100 INJECTION, SOLUTION SUBCUTANEOUS at 21:11

## 2019-11-07 RX ADMIN — APIXABAN 10 MG: 2.5 TABLET, FILM COATED ORAL at 20:16

## 2019-11-07 RX ADMIN — SODIUM CHLORIDE, SODIUM LACTATE, POTASSIUM CHLORIDE, AND CALCIUM CHLORIDE 125 ML/HR: 600; 310; 30; 20 INJECTION, SOLUTION INTRAVENOUS at 21:12

## 2019-11-07 RX ADMIN — SODIUM CHLORIDE 1000 ML: 900 INJECTION, SOLUTION INTRAVENOUS at 17:13

## 2019-11-07 NOTE — PATIENT INSTRUCTIONS
Since you are having more shortness of breath, I have sent you for a stat CT scan of your chest to ensure there is no blood clot. Please start on your Eliquis 5 mg twice a day to thin out your blood. Since you are not taking the aspirin, I will take this off your list.    In addition, your blood pressure was running quite low in the office. I suspect this is related to your other medications and your decreased intake. Please hold your amlodipine until I see you back in 1 week. You may continue taking your lisinopril. Do your best to increase your protein intake and your fluids. Please check your blood pressure once daily at home to monitor this.

## 2019-11-07 NOTE — PROGRESS NOTES
Subjective:     Chief Complaint   Patient presents with   [facility] Follow Up     Discharged 11/2/19      He  is a 48 y.o. male who presents for evaluation of:  Hosp d/c f/u - admitted for 10/17/19- 10/20/19 at [address] [place] [address] for back spasms. Pt had CT scan at that time which he tells me was okay. He says he had a lot of testing at that time but these records are not available to me today. Admitted again from 10/30/19-11/2/19 at [facility] for revision of lumbar laminectomy L3-4 with discectomy and revision post spinal fusion L3-S1 with Dr. Andrea Acuña. He did ok with surgery. Was taken off Eliquis for 3 days prior to surgery and sent home on Aspirin until seeing me. He does have a Hematologist but did not talk to Dr. Nasreen Murrell about when to restart Eliquis. Pt reports having significantly more dyspnea with going up or down steps. Has denies any leg swelling. He has been taking HTN meds and home readings with the 100s/60s when checked by home health.     ROS  Gen - no fever/chills  Resp - no dyspnea or cough  CV - no chest pain or BROWN  Rest per HPI    Past Medical History:   Diagnosis Date    Arthritis     2010    Asthma 1995    INHALER USE PRN    Chronic bilateral low back pain without sciatica 11/6/2017    Chronic hepatitis C without hepatic coma ([address] 11/6/2017    treated at [facility] Chronic pain     lower back    Essential hypertension 11/6/2017    GERD (gastroesophageal reflux disease)     History of cardiac cath     Hypercholesterolemia     Mild episode of recurrent major depressive disorder ([address] 11/06/2012    Morbid obesity ([address]     PTSD (post-traumatic stress disorder)     Sleep apnea     pt stated was taken off cpap while on blood thinner    Stage 3 chronic kidney disease ([address] 11/06/2017    IMPROVED     Thromboembolus ([address]     1/3/19  DVT,PE while taking testosterone    Uncontrolled type 2 diabetes mellitus with peripheral neuropathy ([address] 11/06/2011     Past Surgical History:   Procedure Laterality Date    HX BACK SURGERY  04/10/2018    Fusion L4,L5    HX COLONOSCOPY  2013    CJW    HX HEART CATHETERIZATION  2017    NEG PER PATIENT    HX HERNIA REPAIR  9692    UMBILICAL    HX KNEE ARTHROSCOPY  1999    right knee    HX KNEE ARTHROSCOPY  2002    left knee    HX ROTATOR CUFF REPAIR Right 2016    HX UROLOGICAL  2015    Vasectomy      Current Outpatient Medications on File Prior to Visit   Medication Sig Dispense Refill    fluticasone propion-salmeterol (WIXELA INHUB) 250-50 mcg/dose diskus inhaler Take 1 Puff by inhalation two (2) times a day.  traZODone (DESYREL) 50 mg tablet Take 50 mg by mouth nightly.  docusate sodium (COLACE) 100 mg capsule Take 100 mg by mouth two (2) times daily as needed for Constipation.  HYDROmorphone (DILAUDID) 4 mg tablet Take 1-1.5 Tabs by mouth every four (4) hours as needed for Pain.  furosemide (LASIX) 20 mg tablet Take 20 mg by mouth daily.  senna (SENNA) 8.6 mg tablet Take 2 Tabs by mouth daily.  metFORMIN ER (GLUCOPHAGE XR) 500 mg tablet TAKE 2 TABLETS BY MOUTH DAILY WITH DINNER (Patient taking differently: Take 500 mg by mouth two (2) times a day. With breakfast and dinner. ) 60 Tab 2    mirtazapine (REMERON) 7.5 mg tablet Take 1 Tab by mouth nightly. 90 Tab 0    amLODIPine (NORVASC) 10 mg tablet TAKE 1 TABLET BY MOUTH EVERY DAY (Patient taking differently: Take 10 mg by mouth every morning.) 30 Tab 2    lisinopril (PRINIVIL, ZESTRIL) 40 mg tablet TAKE 1 TABLET BY MOUTH EVERY DAY (Patient taking differently: Take 40 mg by mouth every morning.) 30 Tab 5    prazosin (MINIPRESS) 2 mg capsule Take 2 mg by mouth nightly.  DULoxetine (CYMBALTA) 60 mg capsule TAKE ONE CAPSULE BY MOUTH EVERY MORNING ALONG WITH 30 MG (Patient taking differently: Take 60 mg by mouth daily.  TAKE ONE CAPSULE BY MOUTH EVERY MORNING ALONG WITH 30 MG) 30 Cap 1    DULoxetine (CYMBALTA) 30 mg capsule Take 1 capsule by mouth every morning along with 60 mg dose (Patient taking differently: Take 30 mg by mouth daily.) 30 Cap 1    cyclobenzaprine (FLEXERIL) 10 mg tablet TAKE 1 TABLET BY MOUTH nightly as needed (Patient taking differently: Take 10 mg by mouth nightly. TAKE 1 TABLET BY MOUTH nightly as needed for spasms  Indications: muscle spasm) 30 Tab 1    LEVEMIR FLEXTOUCH U-100 INSULN 100 unit/mL (3 mL) inpn INJECT 65 UNITS BY SUBCUTANEOUS ROUTE TWO (2) TIMES A DAY 39 Adjustable Dose Pre-filled Pen Syringe 2    [DISCONTINUED] atorvastatin (LIPITOR) 40 mg tablet TAKE 1 TABLET BY MOUTH EVERY DAY (Patient taking differently: Take 40 mg by mouth nightly.) 90 Tab 0    albuterol (PROVENTIL HFA, VENTOLIN HFA, PROAIR HFA) 90 mcg/actuation inhaler Take 2 Puffs by inhalation every four (4) hours as needed for Wheezing. 1 Inhaler 0    polyethylene glycol (MIRALAX) 17 gram packet Take 17 g by mouth daily.  albuterol (PROVENTIL VENTOLIN) 2.5 mg /3 mL (0.083 %) nebulizer solution 3 mL by Nebulization route every four (4) hours as needed for Wheezing. 24 Each 0    [DISCONTINUED] apixaban (ELIQUIS) 5 mg tablet Take 1 Tab by mouth two (2) times a day. (Patient taking differently: Reeived verbal orders from Dr. Yinka Cotton to start taking aspirin and hold on Eliquis until he sess Dr. Ronal Perez. 11/5/19) 60 Tab 5    [DISCONTINUED] fluticasone-salmeterol (ADVAIR) 250-50 mcg/dose diskus inhaler Take 1 Puff by inhalation every twelve (12) hours. (Patient not taking: Reported on 11/6/2019) 1 Inhaler 2    aspirin delayed-release 81 mg tablet Take 81 mg by mouth daily. Take until Dr. Ronal Perez restarts Eliquis       No current facility-administered medications on file prior to visit.          Objective:     Vitals:    11/07/19 1127   BP: (!) 88/61   Pulse: (!) 109   Resp: 18   Temp: 99 °F (37.2 °C)   TempSrc: Oral   SpO2: 97%   Weight: 268 lb 14.4 oz (122 kg)   Height: 6' (1.829 m)     Physical Examination:  General appearance - alert, well appearing, and in no distress  Eyes -sclera anicteric  Neck - supple, no significant adenopathy, no thyromegaly  Chest - clear to auscultation, no wheezes, rales or rhonchi, symmetric air entry  Heart - normal rate, regular rhythm, normal S1, S2, no murmurs, rubs, clicks or gallops  Neurological - alert, oriented, no focal findings or movement disorder noted  MSK-wearing back brace  Extremities-no edema  Psych-normal mood and affect    Assessment/ Plan:   Diagnoses and all orders for this visit:    1. Hospital discharge follow-up  2. S/P lumbar laminectomy  3. Spinal stenosis, unspecified spinal region  4. BROWN (dyspnea on exertion)  5. History of pulmonary embolus (PE)  6. History of DVT of lower extremity  -Seems to be recovering from surgery but significantly having more dyspnea on exertion and some tachycardia. Getting stat CTA to rule out PE given history and especially being off Eliquis for the past 10 days including after surgery 7 days ago. -     apixaban (ELIQUIS) 5 mg tablet; Take 1 Tab by mouth two (2) times a day. -     CTA CHEST W OR W WO CONT; Future    7. Asthma, unspecified asthma severity, unspecified whether complicated, unspecified whether persistent-may be contributor for dyspnea  -     AMB SUPPLY ORDER    8. Essential hypertension- hypotensive recently after surgery. Holding amlodipine and encouraged p.o. intake. Patient monitor blood pressure at home and with home health checks  -     Blood Pressure Monitor kit; Please check blood pressure daily    9. Uncontrolled type 2 diabetes mellitus with peripheral neuropathy (HCC)-last A1c 7.3% on 10/10/2019     I have discussed the diagnosis with the patient and the intended plan as seen in the above orders. The patient has received an after-visit summary and questions were answered concerning future plans. I have discussed medication side effects and warnings with the patient as well. The patient verbalizes understanding and agreement with the plan.     Follow-up and Dispositions · Return in about 1 week (around 11/14/2019).

## 2019-11-07 NOTE — ED PROVIDER NOTES
EMERGENCY DEPARTMENT HISTORY AND PHYSICAL EXAM      Date: 11/7/2019  Patient Name: Jovanny Azul. History of Presenting Illness     Chief Complaint   Patient presents with    Shortness of Breath     History Provided By: Patient    HPI: Jovanny Azul., 48 y.o. male with past medical history significant for PEs, DVT, chronic low back pain, and recent lumbar spine fusion who presents via private vehicle from outpatient radiology to the ED with cc of shortness of breath and bilateral PEs. Patient states he had a lumbar fusion on October 30th by Dr. Salma Simms at [facility]. He has a history of a DVT and bilateral PEs from March of this year that he was taking Eliquis. He stopped taking the Eliquis on October 27th, 3 days before his surgery. He was told by Dr. Beatrice Arias to follow-up with his primary care doctor to restart the Eliquis. He has been experiencing shortness of breath since his surgery that has progressively worsened over the past few days. He denies any chest pain, lightheadedness, or dizziness. He has been taking 4 mg of Dilaudid every 4 hours as needed for his back pain. He was sent over today by his primary care doctor for an outpatient CTA to assess for PEs which was positive. PMHx: Asthma, chronic low back pain, hepatitis C, hypertension, GERD, hyperlipidemia, diabetes, depression, sleep apnea, DVT, PE  Social Hx: Former smoker, denies alcohol use, history of marijuana and cocaine use    PCP: Kayden Freire MD    There are no other complaints, changes, or physical findings at this time.     Current Facility-Administered Medications on File Prior to Encounter   Medication Dose Route Frequency Provider Last Rate Last Dose    [COMPLETED] iopamidol (ISOVUE-370) 76 % injection 100 mL  100 mL IntraVENous RAD Asuncion Wright MD   100 mL at 11/07/19 1510    [COMPLETED] sodium chloride (NS) flush 10 mL  10 mL IntraVENous RAD Jesus Manuel Sweet MD   10 mL at 11/07/19 1512 Current Outpatient Medications on File Prior to Encounter   Medication Sig Dispense Refill    atorvastatin (LIPITOR) 40 mg tablet Take 1 Tab by mouth nightly. 90 Tab 1    apixaban (ELIQUIS) 5 mg tablet Take 1 Tab by mouth two (2) times a day. 60 Tab 0    pregabalin (LYRICA) 150 mg capsule Take 150 mg by mouth three (3) times daily.  meloxicam (MOBIC) 15 mg tablet Take 15 mg by mouth daily.  montelukast (SINGULAIR) 10 mg tablet Take 10 mg by mouth daily.  oxyCODONE IR (ROXICODONE) 10 mg tab immediate release tablet Take 10 mg by mouth every six (6) hours as needed for Pain.  promethazine (PHENERGAN) 25 mg suppository Insert 25 mg into rectum every four (4) hours as needed for Nausea.  ondansetron (ZOFRAN ODT) 4 mg disintegrating tablet Take 4 mg by mouth every four (4) hours as needed for Nausea.  lidocaine (LIDODERM) 5 % 1-3 Patches by TransDERmal route every twenty-four (24) hours. Apply ONE to THREE patches to the affected area for 12 hours a day and remove for 12 hours a day.  fluticasone propion-salmeterol (WIXELA INHUB) 250-50 mcg/dose diskus inhaler Take 1 Puff by inhalation two (2) times a day.  traZODone (DESYREL) 50 mg tablet Take 50 mg by mouth nightly.  HYDROmorphone (DILAUDID) 4 mg tablet Take 1-1.5 Tabs by mouth every four (4) hours as needed for Pain.  furosemide (LASIX) 20 mg tablet Take 20 mg by mouth daily.  metFORMIN ER (GLUCOPHAGE XR) 500 mg tablet TAKE 2 TABLETS BY MOUTH DAILY WITH DINNER (Patient taking differently: Take 500 mg by mouth two (2) times a day. With breakfast and dinner. ) 60 Tab 2    mirtazapine (REMERON) 7.5 mg tablet Take 1 Tab by mouth nightly.  90 Tab 0    amLODIPine (NORVASC) 10 mg tablet TAKE 1 TABLET BY MOUTH EVERY DAY (Patient taking differently: Take 10 mg by mouth every morning.) 30 Tab 2    lisinopril (PRINIVIL, ZESTRIL) 40 mg tablet TAKE 1 TABLET BY MOUTH EVERY DAY (Patient taking differently: Take 40 mg by mouth every morning.) 30 Tab 5    prazosin (MINIPRESS) 2 mg capsule Take 2 mg by mouth nightly.  cyclobenzaprine (FLEXERIL) 10 mg tablet TAKE 1 TABLET BY MOUTH nightly as needed (Patient taking differently: Take 10 mg by mouth nightly. TAKE 1 TABLET BY MOUTH nightly as needed for spasms  Indications: muscle spasm) 30 Tab 1    LEVEMIR FLEXTOUCH U-100 INSULN 100 unit/mL (3 mL) inpn INJECT 65 UNITS BY SUBCUTANEOUS ROUTE TWO (2) TIMES A DAY 39 Adjustable Dose Pre-filled Pen Syringe 2    aspirin delayed-release 81 mg tablet Take 81 mg by mouth daily. Take until Dr. Delfina Stover restarts Eliquis      [DISCONTINUED] apixaban (ELIQUIS) 5 mg tablet Take 1 Tab by mouth two (2) times a day. Reeived verbal orders from Dr. Pansy Gosselin to start taking aspirin and hold on Eliquis until he sess Dr. Delfina Stover. 11/5/19 60 Tab 5    [DISCONTINUED] Blood Pressure Monitor kit Please check blood pressure daily 1 Kit 0    docusate sodium (COLACE) 100 mg capsule Take 100 mg by mouth two (2) times daily as needed for Constipation.  senna (SENNA) 8.6 mg tablet Take 2 Tabs by mouth daily.  DULoxetine (CYMBALTA) 60 mg capsule TAKE ONE CAPSULE BY MOUTH EVERY MORNING ALONG WITH 30 MG (Patient taking differently: Take 60 mg by mouth daily. TAKE ONE CAPSULE BY MOUTH EVERY MORNING ALONG WITH 30 MG) 30 Cap 1    DULoxetine (CYMBALTA) 30 mg capsule Take 1 capsule by mouth every morning along with 60 mg dose (Patient taking differently: Take 30 mg by mouth daily.) 30 Cap 1    [DISCONTINUED] atorvastatin (LIPITOR) 40 mg tablet TAKE 1 TABLET BY MOUTH EVERY DAY (Patient taking differently: Take 40 mg by mouth nightly.) 90 Tab 0    [DISCONTINUED] apixaban (ELIQUIS) 5 mg tablet Take 1 Tab by mouth two (2) times a day. (Patient taking differently: Reeived verbal orders from Dr. Pansy Gosselin to start taking aspirin and hold on Eliquis until he sess Dr. Delfina Stover.  11/5/19) 60 Tab 5    albuterol (PROVENTIL HFA, VENTOLIN HFA, PROAIR HFA) 90 mcg/actuation inhaler Take 2 Puffs by inhalation every four (4) hours as needed for Wheezing. 1 Inhaler 0    [DISCONTINUED] fluticasone-salmeterol (ADVAIR) 250-50 mcg/dose diskus inhaler Take 1 Puff by inhalation every twelve (12) hours. (Patient not taking: Reported on 11/6/2019) 1 Inhaler 2    polyethylene glycol (MIRALAX) 17 gram packet Take 17 g by mouth daily.  albuterol (PROVENTIL VENTOLIN) 2.5 mg /3 mL (0.083 %) nebulizer solution 3 mL by Nebulization route every four (4) hours as needed for Wheezing.  25 Each 0     Past History     Past Medical History:  Past Medical History:   Diagnosis Date    Arthritis     2010    Asthma 1995    INHALER USE PRN    Chronic bilateral low back pain without sciatica 11/6/2017    Chronic hepatitis C without hepatic coma ([address] 11/6/2017    [facility] at [facility] Chronic pain     lower back    Essential hypertension 11/6/2017    GERD (gastroesophageal reflux disease)     History of cardiac cath     Hypercholesterolemia     Mild episode of recurrent major depressive disorder ([address] 11/06/2012    Morbid obesity ([address]     PTSD (post-traumatic stress disorder)     Sleep apnea     pt stated was taken off cpap while on blood thinner    Stage 3 chronic kidney disease ([address] 11/06/2017    IMPROVED     Thromboembolus ([address]     1/3/19  DVT,PE while taking testosterone    Uncontrolled type 2 diabetes mellitus with peripheral neuropathy ([address] 11/06/2011     Past Surgical History:  Past Surgical History:   Procedure Laterality Date    HX BACK SURGERY  04/10/2018    Fusion L4,L5    HX COLONOSCOPY  2013    [facility]    HX HEART CATHETERIZATION  2017    NEG PER PATIENT    HX HERNIA REPAIR  2265    UMBILICAL    HX KNEE ARTHROSCOPY  1999    right knee    HX KNEE ARTHROSCOPY  2002    left knee    HX ROTATOR CUFF REPAIR Right 2016    HX UROLOGICAL  2015    Vasectomy      Family History:  Family History   Problem Relation Age of Onset    Hypertension Mother     Diabetes Mother     Heart Disease Mother cabg    Pneumonia Father 67    Diabetes Father     Diabetes Sister     Other Brother 9        House Fire    Diabetes Sister     Other Sister 15        House Fire    Other Sister 6        House Fire    Other Sister [address] Other Son 6        HURRICANE ARA    Anesth Problems Neg Hx      Social History:  Social History     Tobacco Use    Smoking status: Former Smoker     Packs/day: 1.50     Years: 19.00     Pack years: 28.50     Last attempt to quit: 8/6/2009     Years since quitting: 10.2    Smokeless tobacco: Never Used   Substance Use Topics    Alcohol use: No    Drug use: Yes     Types: Marijuana, Cocaine     Comment: Quit 2013/Relapsed 7/31/19, last used marijuana 4/2019     Allergies:  No Known Allergies  Review of Systems   Review of Systems   Constitutional: Negative for chills and fever. HENT: Negative for congestion, rhinorrhea, sneezing and sore throat. Eyes: Negative for redness and visual disturbance. Respiratory: Positive for shortness of breath. Cardiovascular: Negative for chest pain and leg swelling. Gastrointestinal: Negative for abdominal pain, nausea and vomiting. Genitourinary: Negative for difficulty urinating and frequency. Musculoskeletal: Positive for back pain. Negative for myalgias and neck stiffness. Skin: Negative for rash. Neurological: Negative for dizziness, syncope, weakness and headaches. Hematological: Negative for adenopathy. All other systems reviewed and are negative. Physical Exam   Physical Exam   Constitutional: He is oriented to person, place, and time. He appears well-developed and well-nourished. Obese   HENT:   Head: Normocephalic and atraumatic. Mouth/Throat: Oropharynx is clear and moist and mucous membranes are normal.   Eyes: EOM are normal.   Neck: Normal range of motion and full passive range of motion without pain. Neck supple.    Cardiovascular: Regular rhythm, normal heart sounds, intact distal pulses and normal pulses. Tachycardia present. No murmur heard. Pulmonary/Chest: Effort normal and breath sounds normal. Tachypnea noted. No respiratory distress. He exhibits no tenderness. Abdominal: Soft. Normal appearance and bowel sounds are normal. There is no tenderness. There is no rebound and no guarding. Musculoskeletal:        Back:    Neurological: He is alert and oriented to person, place, and time. He has normal strength. Skin: Skin is warm, dry and intact. No rash noted. No erythema. Psychiatric: He has a normal mood and affect. His speech is normal and behavior is normal. Judgment and thought content normal.   Nursing note and vitals reviewed. Diagnostic Study Results   Labs -     Recent Results (from the past 12 hour(s))   CBC WITH AUTOMATED DIFF    Collection Time: 11/07/19  4:15 PM   Result Value Ref Range    WBC 7.8 4.1 - 11.1 K/uL    RBC 3.86 (L) 4.10 - 5.70 M/uL    HGB 10.8 (L) 12.1 - 17.0 g/dL    HCT 33.4 (L) 36.6 - 50.3 %    MCV 86.5 80.0 - 99.0 FL    MCH 28.0 26.0 - 34.0 PG    MCHC 32.3 30.0 - 36.5 g/dL    RDW 12.4 11.5 - 14.5 %    PLATELET 617 054 - 778 K/uL    MPV 9.8 8.9 - 12.9 FL    NRBC 0.0 0  WBC    ABSOLUTE NRBC 0.00 0.00 - 0.01 K/uL    NEUTROPHILS 86 (H) 32 - 75 %    LYMPHOCYTES 9 (L) 12 - 49 %    MONOCYTES 4 (L) 5 - 13 %    EOSINOPHILS 1 0 - 7 %    BASOPHILS 0 0 - 1 %    IMMATURE GRANULOCYTES 0 0.0 - 0.5 %    ABS. NEUTROPHILS 6.7 1.8 - 8.0 K/UL    ABS. LYMPHOCYTES 0.7 (L) 0.8 - 3.5 K/UL    ABS. MONOCYTES 0.3 0.0 - 1.0 K/UL    ABS. EOSINOPHILS 0.1 0.0 - 0.4 K/UL    ABS. BASOPHILS 0.0 0.0 - 0.1 K/UL    ABS. IMM.  GRANS. 0.0 0.00 - 0.04 K/UL    DF SMEAR SCANNED      RBC COMMENTS NORMOCYTIC, NORMOCHROMIC     METABOLIC PANEL, COMPREHENSIVE    Collection Time: 11/07/19  4:15 PM   Result Value Ref Range    Sodium 132 (L) 136 - 145 mmol/L    Potassium 5.4 (H) 3.5 - 5.1 mmol/L    Chloride 100 97 - 108 mmol/L    CO2 23 21 - 32 mmol/L    Anion gap 9 5 - 15 mmol/L    Glucose 231 (H) 65 - 100 mg/dL    BUN 23 (H) 6 - 20 MG/DL    Creatinine 2.28 (H) 0.70 - 1.30 MG/DL    BUN/Creatinine ratio 10 (L) 12 - 20      GFR est AA 37 (L) >60 ml/min/1.73m2    GFR est non-AA 30 (L) >60 ml/min/1.73m2    Calcium 9.4 8.5 - 10.1 MG/DL    Bilirubin, total 0.3 0.2 - 1.0 MG/DL    ALT (SGPT) 25 12 - 78 U/L    AST (SGOT) 15 15 - 37 U/L    Alk. phosphatase 71 45 - 117 U/L    Protein, total 6.9 6.4 - 8.2 g/dL    Albumin 2.5 (L) 3.5 - 5.0 g/dL    Globulin 4.4 (H) 2.0 - 4.0 g/dL    A-G Ratio 0.6 (L) 1.1 - 2.2     TROPONIN I    Collection Time: 11/07/19  4:15 PM   Result Value Ref Range    Troponin-I, Qt. <0.05 <0.05 ng/mL       Radiologic Studies -   No orders to display     Cta Chest W Or W Wo Cont    Result Date: 11/7/2019  IMPRESSION: Bilateral moderate pulmonary emboli This result was relayed by me to  [facility] at 1515 hours and to Dr. Nichole Zambrano at 1520 hours. The patient was taken to the ER for reinitiation of treatment. [address] [facility]   I am the first provider for this patient. I reviewed the vital signs, available nursing notes, past medical history, past surgical history, family history and social history. Vital Signs-Reviewed the patient's vital signs. Patient Vitals for the past 12 hrs:   Temp Pulse Resp BP SpO2   11/07/19 1708  98 20  95 %   11/07/19 1600  (!) 104 23 114/65 99 %   11/07/19 1551  (!) 104 22  96 %   11/07/19 1542  (!) 104 30  97 %   11/07/19 1526 98.4 °F (36.9 °C) (!) 108 30 113/68 96 %     Pulse Oximetry Analysis - 96% on RA    Cardiac Monitor:   Rate: 101 bpm  Rhythm: Sinus Tachycardia     ED EKG interpretation: 16:16  Rhythm: normal sinus rhythm; and regular . Rate (approx.): 99; Axis: normal; P wave: normal; QRS interval: normal ; ST/T wave: normal; Other findings: normal. This EKG was interpreted by Pat Wisdom MD,ED Provider.     Records Reviewed: Nursing Notes, Old Medical Records and Previous Laboratory Studies    Provider Notes (Medical Decision Making):   68-year-old male presents from radiology with bilateral PEs. He does have a history of PEs in the past and was on Eliquis up until 3 days prior to his surgery. He is complaining of worsening shortness of breath ever since his surgery. Will check basic labs and discuss with orthopedic surgery. ED Course:   Initial assessment performed. The patients presenting problems have been discussed, and they are in agreement with the care plan formulated and outlined with them. I have encouraged them to ask questions as they arise throughout their visit. Progress Note  4:02 PM  I have paged Dr. Gulshan Boudreaux to discuss patient's PEs and to see if he would like me to admit the patient here at this facility or if he would like me to transfer the patient to [facility] given his recent surgery. I am awaiting callback at this time. 5:08 PM  Isaura Hi MD spoke with Dr. Yousif Mortensen, Consult for Orthopedics. Discussed HPI and PE, available diagnostic tests and clinical findings. He is in agreement with care plans as outlined. He states that there is no reason that we cannot start anticoagulation at this time. He does not feel that the patient needs to be transferred at this time as long as the hospitalist is willing to accept him. If they do have any questions about the surgical wound or any of the surgical management, please have them call.    5:24 PM  Isaura Hi MD spoke with Dr. Jarad Trevino, Consult for Hospitalist. Discussed HPI and PE, available diagnostic tests and clinical findings. She is in agreement with care plans as outlined. She agrees with admission and will order his anticoagulation.     CRITICAL CARE NOTE :    5:27 PM    IMPENDING DETERIORATION -Airway, Respiratory, Cardiovascular and Renal    ASSOCIATED RISK FACTORS - Bleeding, Dysrhythmia, Metabolic changes and Vascular Compromise    MANAGEMENT- Bedside Assessment    INTERPRETATION -  CT Scan, ECG and Blood Pressure    INTERVENTIONS - hemodynamic mngmt and Metobolic interventions    CASE REVIEW - Hospitalist, Medical Sub-Specialist, Nursing and Family    TREATMENT RESPONSE -Improved    PERFORMED BY - Self    NOTES   :    I have spent 55 minutes of critical care time involved in lab review, consultations with specialist, family decision- making, bedside attention and documentation. During this entire length of time I was immediately available to the patient. Critical Care: The reason for providing this level of medical care for this critically ill patient was due to a critical illness that impaired one or more vital organ systems such that there was a high probability of imminent or life threatening deterioration in the patients condition. This care involved high complexity decision making to assess, manipulate, and support vital system functions. Tay Cage MD    Progress Note:   Updated pt on all returned results and findings. Discussed the importance of proper follow up as referred below along with return precautions. Pt in agreement with the care plan and expresses agreement with and understanding of all items discussed. Disposition:  Admit    Diagnosis     Clinical Impression:   1. Multiple subsegmental pulmonary emboli without acute cor pulmonale    2. Acute hyperkalemia    3. Acute renal failure superimposed on chronic kidney disease, unspecified CKD stage, unspecified acute renal failure type [facility])            Please note that this dictation was completed with Dragon, computer voice recognition software. Quite often unanticipated grammatical, syntax, homophones, and other interpretive errors are inadvertently transcribed by the computer software. Please disregard these errors. Additionally, please excuse any errors that have escaped final proofreading.

## 2019-11-07 NOTE — PROGRESS NOTES
Chief Complaint   Patient presents with   [facility] Follow Up     Discharged 11/2/19   1. Have you been to the ER, urgent care clinic since your last visit? Hospitalized since your last visit? Yes Where: [address]  10/30/19  2. Have you seen or consulted any other health care providers outside of the [address] [place] [address] since your last visit? Include any pap smears or colon screening.  No  Was hospitalized [facility] 10/17/19 until 10/20/19  Discuss BP medication

## 2019-11-07 NOTE — ED NOTES
TRANSFER - OUT REPORT:    Verbal report given to Juan Sin RN(name) on [facility].  being transferred to tele(unit) for urgent transfer       Report consisted of patients Situation, Background, Assessment and   Recommendations(SBAR). Information from the following report(s) SBAR, Kardex, ED Summary, Intake/Output, MAR and Recent Results was reviewed with the receiving nurse. Lines:   Peripheral IV 11/07/19 Left Antecubital (Active)       Peripheral IV 11/07/19 Antecubital (Active)        Opportunity for questions and clarification was provided.       Patient transported with:   Monitor  Registered Nurse

## 2019-11-07 NOTE — DISCHARGE SUMMARY
[address] [facility]   [address] [address]    DISCHARGE SUMMARY     Patient: Raudel Méndez Medical Record Number: [id number]                : 1965  Age: 48 y.o. Admit Date: 10/30/2019  Discharge Date: 2019  Admission Diagnosis: Lumbar spinal stenosis [M48.061]  Discharge Diagnosis: SPINAL STENOSIS/HNP L3-4, STATUS POST POSTERIOR SPINAL FUSION L4-S1  Procedures: Procedure(s):  REVISION LUMBAR LAMINECTOMY L3-4 WITH DISCECTOMY, REVISION POSTERIOR SPINAL FUSION L3-S1  Surgeon: JONATHAN Brady MD  Assistants:  Minh Urbano. AUNG Tijerina  Anesthesia: general  Complications: None     History of Present Illness:  Raudel Méndez is a 57-year-old gentleman with symptomatic lumbar stenosis and associated radicular symptomatology. I performed lumbar decompression and spinal fusion on him, L4-S1. Imaging demonstrates what was thought to be a solid fusion, L4-S1 based on the CT scan. Screws appeared to be well positioned without any evidence of loosening. He had severe facet disease at L3-L4 with large joint effusions producing a moderate great stenosis. The patient is a very large gentleman. Given the level of pain and dysfunction, those findings clinically and radiologically, a revision decompression and spinal fusion offered. Potential benefits and complications were reviewed.     Hospital Course:  Raudel Méndez tolerated the procedure well. He was transferred to the Spinal Unit from the recovery room in stable condition. On postoperative day 1, the dressing was noted to be clean and dry, he was neurovascularly intact and afebrile, and his vital signs were stable.   Hemoglobin was noted to be   Lab Results   Component Value Date/Time    HGB 10.5 (L) 2019 03:47 AM     On postoperative day 1-3, Jerzy Herrera. made excellent progress with physical therapy and was discharged to in-patient rehab in stable condition on postoperative day 3. He was given routine postoperative instructions and advised to follow up in the office in 2 weeks following discharge home. He was given the following prescriptions for pain medications. Discharge Medications:   Cannot display discharge medications since this patient is expected but has not yet arrived.           Signed by: Haroon Barbosa PA-C  11/7/2019

## 2019-11-07 NOTE — H&P
Hospitalist Admission Note    NAME: Sandi Rebolledo. :  1965   MRN:  [id number]   Room Number: 178/64  @ [address] [facility]     Date/Time:  2019 6:58 PM    Patient PCP: Ambar Keys MD  ______________________________________________________________________  Given the patient's current clinical presentation, I have a high level of concern for decompensation if discharged from the emergency department. Complex decision making was performed, which includes reviewing the patient's available past medical records, laboratory results, and x-ray films. My assessment of this patient's clinical condition and my plan of care is as follows. 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 He was on Eliquis which was stopped for recent surgery. Eliquis has been held since 10/27, surgery on 10/30. Patient was waiting for his follow-up appointment with orthopedic surgery just to restart anticoagulation. Noted to be short of breath at home by wife. CT chest as outpatient shows bilateral moderate pulmonary emboli. He is hemodynamically stable without any troponin leak. EKG pending. He is not hypoxic.     - Restart Eliquis 10 mg BID x 7 days followed by Eliquis 5 mg BID. Creatinine clearance 50.   - ECHO, US Duplex doppler bilateral lower extremity, telemetry  - Outpatient follow up with PCP, pulmonology, hematology. Acute kidney injury on chronic kidney disease stage III, POA, due to hypovolemia  Baseline creatinine is 1.21.4. Current creatinine 2.28. Likely prerenal due to hypovolemia. Received contrast dye for CTA chest that was done at 3 PM today. He is at risk for contrast-induced nephropathy given BLANCA on CKD. Underlying CKD is likely as a result of uncontrolled diabetes and hypertension. Strict I's and O's.  Administer gentle hydration with isotonic fluid. Hyperkalemia likely due to decreased renal clearance    Obtain EKG. CHEM 10 tomorrow. Hyponatremia likely as a result of decreased oral intake    Gentle hydration with lactated Ringer's at 125 cc/hour. Come 10 tomorrow. Type 2 diabetes, uncontrolled, with hyperglycemia, on long-term insulin  Diabetic neuropathy  Lab Results   Component Value Date/Time    Hemoglobin A1c 7.3 (H) 10/10/2019 04:01 PM    Hemoglobin A1c (POC) 7.7 11/14/2018 10:42 AM   Takes 65 units Levemir twice daily at home with metformin thousand milligrams twice daily.  We will start with Lantus 36 units at bedtime along with lispro sliding scale. Consistent carbohydrate diet. Hypoglycemia protocol. Fingersticks AC at bedtime. Resume home regimen at discharge. History of lumbar spinal stenosis status post spinal fusion 1 week ago  Postoperative pain  Dr. Emeterio Oneil spoke with orthopedic surgeon at [facility] who stated that it is safe to resume anticoagulation. Patient  does not need to be seen by orthopedic surgery while admitted as inpatient and they will follow with him next week as scheduled.  Pain regimen: Scheduled Tylenol 975 3 times daily, Dilaudid 2 mg every 6 hours as needed for moderate pain, Dilaudid oral 4 mg every 6 hours as needed for severe pain. Normochromic normocytic anemia   likely associated with recent blood loss during surgery      Hypertension      Hyperlipidemia  Continue atorvastatin. Polysubstance abuse  History of cocaine use in the past last use 5 months ago. History of marijuana use in the past last used 2 weeks ago. Patient denies any opiate dependence in the past.  He was on methadone briefly that was started by a drug treatment program for cocaine addiction ?   Patient reportedly discontinued it himself 2 months ago.  History of tobacco abuse has been smoking 1 pack a day since age of 14. He has intermittently quit. Currently does not smoke for the last 1 month. Asthma, moderate persistent, well controlled :   Lungs clear to auscultation. Continue Advair twice daily and as needed albuterol. Body mass index is 36.18 kg/m². Obesity   Counseled on Lifestyle modifications, AHA Diet ,weight loss strategies. Code Status: FULL   Surrogate Decision Maker: Pam Dandy. [phone]    DVT Prophylaxis: Apixaban  GI Prophylaxis: not indicated    Baseline: ambulatory independently        Subjective:   CHIEF COMPLAINT: shortness of breath    HISTORY OF PRESENT ILLNESS:     Nguyễn Pablo is a 48 y.o.  male with PMH of provoked DVT/PE in January 2019, diabetic neuropathy, lumbar spinal stenosis status post recent spinal fusion and laminectomy, chronic hepatitis C.  VCU, essential hypertension, hyperlipidemia, obesity, polysubstance abuse including cocaine and marijuana, tobacco abuse in remission, type 2 diabetes, stage III CKD who presents to ED after being found to have bilateral pulmonary emboli on outpatient CTA chest.    He has a History of DVT/PE, provoked while on testosterone in January 2019  Prior pulmonary embolism provoked as a result of testosterone use. He was on Eliquis which was stopped for recent surgery. Eliquis has been held since 10/27, surgery on 10/30. Patient was waiting for his follow-up appointment with orthopedic surgery just to restart anticoagulation. Noted to be short of breath at home by wife for the last 3 days on minimal exertion such as dressing himself/walking to the bathroom. She brought him to the primary care physician who ordered a CTA chest that showed bilateral pulmonary embolism followed by presentation to the ED. Patient declines current hormonal supplementation. He has been mostly sedentary and confined to bed since his surgery on 10/30.   He has been taking prescribed pain medications for treatment of his back pain. He denies any lightheadedness/chest pain/cough/hemoptysis but does note that he feels like his heart is fluttering. There is no family history of DVT/PE except in a niece. He has 2 siblings who are both . Father's family history is unknown. Mother has hypertension diabetes. He drinks grocery store brands of green tea and has been using a herbal tea by \"[employer] \". No personal or family history of malignancy. Past cigarette smoker, quit 1 month ago, smoking 1 pack a day since age 14  Denies alcohol use. Denies current illicit drug use. Intermittently  smokes cocaine and marijuana last use 5 months ago and 2 weeks ago respectively. He has been on disability since   because his \"right leg gave out \". Used to work for a [employer] prior to that.     Past Medical History:   Diagnosis Date    Arthritis         Asthma     INHALER USE PRN    Chronic bilateral low back pain without sciatica 2017    Chronic hepatitis C without hepatic coma ([address] 2017    [facility] at [facility] Chronic pain     lower back    Essential hypertension 2017    GERD (gastroesophageal reflux disease)     History of cardiac cath     Hypercholesterolemia     Mild episode of recurrent major depressive disorder ([address] 2012    Morbid obesity ([address]     PTSD (post-traumatic stress disorder)     Sleep apnea     pt stated was taken off cpap while on blood thinner    Stage 3 chronic kidney disease ([address] 2017    IMPROVED     Thromboembolus ([address]     1/3/19  DVT,PE while taking testosterone    Uncontrolled type 2 diabetes mellitus with peripheral neuropathy ([address] 2011        Past Surgical History:   Procedure Laterality Date    HX BACK SURGERY  04/10/2018    Fusion L4,L5    HX COLONOSCOPY      CJW    HX HEART CATHETERIZATION      NEG PER PATIENT    HX HERNIA REPAIR  2578    UMBILICAL    HX KNEE ARTHROSCOPY  1999    right knee    HX KNEE ARTHROSCOPY  2002    left knee    HX ROTATOR CUFF REPAIR Right 2016    HX UROLOGICAL  2015    Vasectomy        Social History     Tobacco Use    Smoking status: Former Smoker     Packs/day: 1.50     Years: 19.00     Pack years: 28.50     Last attempt to quit: 8/6/2009     Years since quitting: 10.2    Smokeless tobacco: Never Used   Substance Use Topics    Alcohol use: No        Family History   Problem Relation Age of Onset    Hypertension Mother     Diabetes Mother     Heart Disease Mother         cabg    Pneumonia Father 67    Diabetes Father     Diabetes Sister     Other Brother 9        House Fire    Diabetes Sister     Other Sister 15        House Fire    Other Sister [address] Other Sister [address] [facility] Other Son 6        HURRICANE ARA    Anesth Problems Neg Hx      No Known Allergies     Prior to Admission medications    Medication Sig Start Date End Date Taking? Authorizing Provider   atorvastatin (LIPITOR) 40 mg tablet Take 1 Tab by mouth nightly. 11/7/19  Yes Glenis Basurto MD   apixaban (ELIQUIS) 5 mg tablet Take 1 Tab by mouth two (2) times a day. 11/7/19  Yes Glenis Basurto MD   montelukast (SINGULAIR) 10 mg tablet Take 10 mg by mouth daily. Yes Provider, Historical   cyclobenzaprine (FLEXERIL) 10 mg tablet Take 10 mg by mouth three (3) times daily as needed for Muscle Spasm(s). Yes Provider, Historical   metFORMIN (GLUMETZA ER) 500 mg TG24 24 hour tablet Take 500 mg by mouth two (2) times a day. Yes Provider, Historical   fluticasone propion-salmeterol (WIXELA INHUB) 250-50 mcg/dose diskus inhaler Take 1 Puff by inhalation two (2) times a day. Yes Glenis Basurto MD   traZODone (DESYREL) 50 mg tablet Take 50 mg by mouth nightly. Yes Glenis Basurto MD   docusate sodium (COLACE) 100 mg capsule Take 100 mg by mouth two (2) times daily as needed for Constipation.    Yes Farida Silva MD   HYDROmorphone (DILAUDID) 4 mg tablet Take 1-1.5 Tabs by mouth every four (4) hours as needed for Pain. Yes Mynor Porter MD   furosemide (LASIX) 20 mg tablet Take 20 mg by mouth daily. Yes Rosa Maria Ochoa MD   mirtazapine (REMERON) 7.5 mg tablet Take 1 Tab by mouth nightly. 10/21/19  Yes Rosa Maria Ochoa MD   lisinopril (PRINIVIL, ZESTRIL) 40 mg tablet TAKE 1 TABLET BY MOUTH EVERY DAY  Patient taking differently: Take 40 mg by mouth every morning. 9/10/19  Yes Rosa Maria Ochoa MD   prazosin (MINIPRESS) 2 mg capsule Take 2 mg by mouth nightly. 7/29/19  Yes Olga Thomas   DULoxetine (CYMBALTA) 60 mg capsule TAKE ONE CAPSULE BY MOUTH EVERY MORNING ALONG WITH 30 MG  Patient taking differently: Take 60 mg by mouth daily. TAKE ONE CAPSULE BY MOUTH EVERY MORNING ALONG WITH 30 MG 8/22/19  Yes Rosa Maria Ochoa MD   DULoxetine (CYMBALTA) 30 mg capsule Take 1 capsule by mouth every morning along with 60 mg dose  Patient taking differently: Take 30 mg by mouth daily. 8/22/19  Yes Rosa Maria Ochoa MD   LEVEMIR FLEXTOUCH U-100 INSULN 100 unit/mL (3 mL) inpn INJECT 65 UNITS BY SUBCUTANEOUS ROUTE TWO (2) TIMES A DAY 5/30/19  Yes Rosa Maria Ochoa MD   albuterol (PROVENTIL HFA, VENTOLIN HFA, PROAIR HFA) 90 mcg/actuation inhaler Take 2 Puffs by inhalation every four (4) hours as needed for Wheezing. 1/15/19  Yes Rosa Maria Ochoa MD   albuterol (PROVENTIL VENTOLIN) 2.5 mg /3 mL (0.083 %) nebulizer solution 3 mL by Nebulization route every four (4) hours as needed for Wheezing. 3/21/18  Yes Rosa Maria Ochoa MD   senna (SENNA) 8.6 mg tablet Take 2 Tabs by mouth daily. Mynor Porter MD   amLODIPine (NORVASC) 10 mg tablet TAKE 1 TABLET BY MOUTH EVERY DAY  Patient taking differently: Take 10 mg by mouth every morning.  10/15/19   Rosa Maria Ochoa MD       REVIEW OF SYSTEMS:     Total of 12 systems reviewed as follows:         General:  Negative for fever, chills, sweats, generalized weakness, weight loss/gain,      loss of appetite   Eyes:    Negative for blurred vision, eye pain, loss of vision, double vision  ENT:    Negative for rhinorrhea, pharyngitis   Respiratory:   + SOB, BROWN. Negative for cough, sputum production,  wheezing, pleuritic pain   Cardiology:   + palpitations. Negative for chest pain, orthopnea, PND, edema, syncope   Gastrointestinal:  Negative for abdominal pain , N/V, diarrhea, dysphagia, constipation, bleeding   Genitourinary:  Negative for frequency, urgency, dysuria, hematuria, incontinence   Muskuloskeletal :  + back pain. Negative for arthralgia, myalgia  Hematology:  Negative for easy bruising, nose or gum bleeding, lymphadenopathy   Dermatological: Negative for rash, ulceration, pruritis, color change / jaundice  Endocrine:   Negative for hot flashes or polydipsia   Neurological:  Negative for headache, dizziness, confusion, focal weakness, paresthesia,     Speech difficulties, memory loss, gait difficulty  Psychological: Negative for Feelings of anxiety, depression, agitation    Objective:   VITALS:    Visit Vitals  /63   Pulse 95   Temp 96.1 °F (35.6 °C)   Resp 18   Ht 6' (1.829 m)   Wt 121 kg (266 lb 12.1 oz)   SpO2 95%   BMI 36.18 kg/m²       PHYSICAL EXAM:    General:    Obese, laying still due to back pain, Alert, cooperative, no distress, appears stated age. HEENT: Atraumatic, anicteric sclerae, pink conjunctivae     No oral ulcers, mucosa moist, throat clear, dentition fair  Neck:  Supple, symmetrical,  no JVD, thyroid: non tender  Lungs:   Clear to auscultation bilaterally. No Wheezing or Rhonchi. No rales. Chest wall:  No tenderness  No Accessory muscle use. Heart:   Regular  rhythm,  No  murmur   No edema  Abdomen:   Soft, non-tender. Not distended. Bowel sounds normal  Extremities: No cyanosis. No clubbing,      Skin turgor normal, Capillary refill normal, Radial dial pulse 2+  Skin:     Not pale.   Not Jaundiced  No rashes   Psych:  Good insight. Not depressed. Not anxious or agitated. Neurologic: EOMs intact. No facial asymmetry. No aphasia or slurred speech. Symmetrical strength, Sensation grossly intact. Alert and oriented X 4.     ______________________________________________________________________    Care Plan discussed with:  Patient/Family, Nurse and     Expected  Disposition:  Home w/Family  ________________________________________________________________________  TOTAL TIME:  40 Minutes    Critical Care Provided     Minutes non procedure based      Comments     Reviewed previous records   >50% of visit spent in counseling and coordination of care  Discussion with patient and/or family and questions answered       ________________________________________________________________________  Signed: Akash Bolton MD    Procedures: see electronic medical records for all procedures/Xrays and details which were not copied into this note but were reviewed prior to creation of Plan. LAB DATA REVIEWED:    Recent Results (from the past 24 hour(s))   CBC WITH AUTOMATED DIFF    Collection Time: 11/07/19  4:15 PM   Result Value Ref Range    WBC 7.8 4.1 - 11.1 K/uL    RBC 3.86 (L) 4.10 - 5.70 M/uL    HGB 10.8 (L) 12.1 - 17.0 g/dL    HCT 33.4 (L) 36.6 - 50.3 %    MCV 86.5 80.0 - 99.0 FL    MCH 28.0 26.0 - 34.0 PG    MCHC 32.3 30.0 - 36.5 g/dL    RDW 12.4 11.5 - 14.5 %    PLATELET 230 491 - 259 K/uL    MPV 9.8 8.9 - 12.9 FL    NRBC 0.0 0  WBC    ABSOLUTE NRBC 0.00 0.00 - 0.01 K/uL    NEUTROPHILS 86 (H) 32 - 75 %    LYMPHOCYTES 9 (L) 12 - 49 %    MONOCYTES 4 (L) 5 - 13 %    EOSINOPHILS 1 0 - 7 %    BASOPHILS 0 0 - 1 %    IMMATURE GRANULOCYTES 0 0.0 - 0.5 %    ABS. NEUTROPHILS 6.7 1.8 - 8.0 K/UL    ABS. LYMPHOCYTES 0.7 (L) 0.8 - 3.5 K/UL    ABS. MONOCYTES 0.3 0.0 - 1.0 K/UL    ABS. EOSINOPHILS 0.1 0.0 - 0.4 K/UL    ABS. BASOPHILS 0.0 0.0 - 0.1 K/UL    ABS. IMM.  GRANS. 0.0 0.00 - 0.04 K/UL    DF SMEAR SCANNED      RBC COMMENTS NORMOCYTIC, NORMOCHROMIC     METABOLIC PANEL, COMPREHENSIVE    Collection Time: 11/07/19  4:15 PM   Result Value Ref Range    Sodium 132 (L) 136 - 145 mmol/L    Potassium 5.4 (H) 3.5 - 5.1 mmol/L    Chloride 100 97 - 108 mmol/L    CO2 23 21 - 32 mmol/L    Anion gap 9 5 - 15 mmol/L    Glucose 231 (H) 65 - 100 mg/dL    BUN 23 (H) 6 - 20 MG/DL    Creatinine 2.28 (H) 0.70 - 1.30 MG/DL    BUN/Creatinine ratio 10 (L) 12 - 20      GFR est AA 37 (L) >60 ml/min/1.73m2    GFR est non-AA 30 (L) >60 ml/min/1.73m2    Calcium 9.4 8.5 - 10.1 MG/DL    Bilirubin, total 0.3 0.2 - 1.0 MG/DL    ALT (SGPT) 25 12 - 78 U/L    AST (SGOT) 15 15 - 37 U/L    Alk.  phosphatase 71 45 - 117 U/L    Protein, total 6.9 6.4 - 8.2 g/dL    Albumin 2.5 (L) 3.5 - 5.0 g/dL    Globulin 4.4 (H) 2.0 - 4.0 g/dL    A-G Ratio 0.6 (L) 1.1 - 2.2     TROPONIN I    Collection Time: 11/07/19  4:15 PM   Result Value Ref Range    Troponin-I, Qt. <0.05 <0.05 ng/mL

## 2019-11-07 NOTE — ED NOTES
Patient has been instructed that they have been given hydromorphone which contains opioids, benzodiazepines, or other sedating drugs. Patient is aware that they  will need to refrain from driving or operating heavy machinery after taking this medication. Patient also instructed that they need to avoid drinking alcohol and using other products containing opioids, benzodiazepines, or other sedating drugs. Patient verbalized understanding.

## 2019-11-07 NOTE — PROGRESS NOTES
Pharmacy Medication Reconciliation     Recommendations/Findings:   1) Patient was told to stop taking Norvasc due to blood pressure was low. (last BP in ER at 1600 was 114/65)    2) REMOVED THE FOLLOWING MEDS FROM PTA MED LIST:  Aspirin  Lidoderm patch  Meloxicam  Ondansetron   Oxycodone  Miralax  Lyrica  Promethazine supp      -Clarified PTA med list with rx query and patient interview   PTA medication list was corrected to the following:     Prior to Admission Medications   Prescriptions Last Dose Informant Patient Reported? Taking? DULoxetine (CYMBALTA) 30 mg capsule   No Yes   Sig: Take 1 capsule by mouth every morning along with 60 mg dose   Patient taking differently: Take 30 mg by mouth daily. DULoxetine (CYMBALTA) 60 mg capsule  Self No Yes   Sig: TAKE ONE CAPSULE BY MOUTH EVERY MORNING ALONG WITH 30 MG   Patient taking differently: Take 60 mg by mouth daily. TAKE ONE CAPSULE BY MOUTH EVERY MORNING ALONG WITH 30 MG   HYDROmorphone (DILAUDID) 4 mg tablet 11/7/2019 at Unknown time  Yes Yes   Sig: Take 1-1.5 Tabs by mouth every four (4) hours as needed for Pain. LEVEMIR FLEXTOUCH U-100 INSULN 100 unit/mL (3 mL) inpn 11/7/2019 at Unknown time  No Yes   Sig: INJECT 65 UNITS BY SUBCUTANEOUS ROUTE TWO (2) TIMES A DAY   albuterol (PROVENTIL HFA, VENTOLIN HFA, PROAIR HFA) 90 mcg/actuation inhaler   No Yes   Sig: Take 2 Puffs by inhalation every four (4) hours as needed for Wheezing. albuterol (PROVENTIL VENTOLIN) 2.5 mg /3 mL (0.083 %) nebulizer solution   No Yes   Sig: 3 mL by Nebulization route every four (4) hours as needed for Wheezing. amLODIPine (NORVASC) 10 mg tablet   No No   Sig: TAKE 1 TABLET BY MOUTH EVERY DAY   Patient taking differently: Take 10 mg by mouth every morning. apixaban (ELIQUIS) 5 mg tablet 10/7/2019 at Unknown time  No Yes   Sig: Take 1 Tab by mouth two (2) times a day. atorvastatin (LIPITOR) 40 mg tablet 11/6/2019 at Unknown time  No Yes   Sig: Take 1 Tab by mouth nightly. cyclobenzaprine (FLEXERIL) 10 mg tablet   Yes Yes   Sig: Take 10 mg by mouth three (3) times daily as needed for Muscle Spasm(s). docusate sodium (COLACE) 100 mg capsule 11/7/2019  Yes Yes   Sig: Take 100 mg by mouth two (2) times daily as needed for Constipation. fluticasone propion-salmeterol (WIXELA INHUB) 250-50 mcg/dose diskus inhaler 11/6/2019 at Unknown time  Yes Yes   Sig: Take 1 Puff by inhalation two (2) times a day. furosemide (LASIX) 20 mg tablet 11/7/2019 at Unknown time  Yes Yes   Sig: Take 20 mg by mouth daily. lisinopril (PRINIVIL, ZESTRIL) 40 mg tablet 11/7/2019 at Unknown time  No Yes   Sig: TAKE 1 TABLET BY MOUTH EVERY DAY   Patient taking differently: Take 40 mg by mouth every morning. metFORMIN (GLUMETZA ER) 500 mg TG24 24 hour tablet 11/7/2019  Yes Yes   Sig: Take 500 mg by mouth two (2) times a day. mirtazapine (REMERON) 7.5 mg tablet 11/6/2019 at Unknown time  No Yes   Sig: Take 1 Tab by mouth nightly. montelukast (SINGULAIR) 10 mg tablet 11/7/2019  Yes Yes   Sig: Take 10 mg by mouth daily. prazosin (MINIPRESS) 2 mg capsule 11/6/2019 at Unknown time  Yes Yes   Sig: Take 2 mg by mouth nightly. senna (SENNA) 8.6 mg tablet Unknown at Unknown time  Yes No   Sig: Take 2 Tabs by mouth daily. traZODone (DESYREL) 50 mg tablet 11/6/2019 at Unknown time  Yes Yes   Sig: Take 50 mg by mouth nightly.       Facility-Administered Medications: None          Thank you,  Sue Granados, [facility]

## 2019-11-07 NOTE — ED NOTES
See triage nurse note. Emergency Department Nursing Plan of Care       The Nursing Plan of Care is developed from the Nursing assessment and Emergency Department Attending provider initial evaluation. The plan of care may be reviewed in the ED Provider note.     The Plan of Care was developed with the following considerations:   Patient / Family readiness to learn indicated by:verbalized understanding  Persons(s) to be included in education: patient and family  Barriers to Learning/Limitations:No    Signed     Leeland Aschoff, RN    11/7/2019   3:42 PM

## 2019-11-07 NOTE — ED TRIAGE NOTES
Patient arrives in ED reporting back surgery/lumbar fusion 8 days ago. Patient has a history of blood clots and was told to stop taking Eliquis 3 days prior to surgery. Patients significant other noticed worsening shortness of breath on exertion x 3 days. Patient saw PCP today, PCP sent him for CTA, CT showed bilateral pulmonary emboli.

## 2019-11-08 ENCOUNTER — HOME CARE VISIT (OUTPATIENT)
Dept: HOME HEALTH SERVICES | Facility: HOME HEALTH | Age: 54
End: 2019-11-08
Payer: MEDICARE

## 2019-11-08 ENCOUNTER — APPOINTMENT (OUTPATIENT)
Dept: NON INVASIVE DIAGNOSTICS | Age: 54
End: 2019-11-08
Attending: STUDENT IN AN ORGANIZED HEALTH CARE EDUCATION/TRAINING PROGRAM
Payer: MEDICARE

## 2019-11-08 ENCOUNTER — APPOINTMENT (OUTPATIENT)
Dept: VASCULAR SURGERY | Age: 54
End: 2019-11-08
Attending: STUDENT IN AN ORGANIZED HEALTH CARE EDUCATION/TRAINING PROGRAM
Payer: MEDICARE

## 2019-11-08 VITALS
OXYGEN SATURATION: 100 % | WEIGHT: 263.1 LBS | HEART RATE: 87 BPM | DIASTOLIC BLOOD PRESSURE: 82 MMHG | RESPIRATION RATE: 18 BRPM | TEMPERATURE: 98.1 F | HEIGHT: 72 IN | BODY MASS INDEX: 35.64 KG/M2 | SYSTOLIC BLOOD PRESSURE: 130 MMHG

## 2019-11-08 LAB
ANION GAP SERPL CALC-SCNC: 8 MMOL/L (ref 5–15)
APTT PPP: 25.4 SEC (ref 22.1–32)
ATRIAL RATE: 99 BPM
BASOPHILS # BLD: 0 K/UL (ref 0–0.1)
BASOPHILS NFR BLD: 0 % (ref 0–1)
BUN SERPL-MCNC: 20 MG/DL (ref 6–20)
BUN/CREAT SERPL: 15 (ref 12–20)
CALCIUM SERPL-MCNC: 9.2 MG/DL (ref 8.5–10.1)
CALCULATED P AXIS, ECG09: 39 DEGREES
CALCULATED R AXIS, ECG10: 44 DEGREES
CALCULATED T AXIS, ECG11: 21 DEGREES
CHLORIDE SERPL-SCNC: 102 MMOL/L (ref 97–108)
CO2 SERPL-SCNC: 29 MMOL/L (ref 21–32)
CREAT SERPL-MCNC: 1.35 MG/DL (ref 0.7–1.3)
DIAGNOSIS, 93000: NORMAL
DIFFERENTIAL METHOD BLD: ABNORMAL
ECHO AO ROOT DIAM: 2.47 CM
ECHO AV AREA PLAN: 4.4 CM2
ECHO EST RA PRESSURE: 5 MMHG
ECHO LA AREA 4C: 21.3 CM2
ECHO LA MAJOR AXIS: 3.96 CM
ECHO LA TO AORTIC ROOT RATIO: 1.6
ECHO LA VOL 4C: 54.17 ML (ref 18–58)
ECHO LA VOLUME INDEX A4C: 22.63 ML/M2 (ref 16–28)
ECHO LV EDV A4C: 163.9 ML
ECHO LV EDV INDEX A4C: 68.5 ML/M2
ECHO LV EDV TEICHHOLZ: 43.8 ML
ECHO LV EJECTION FRACTION A4C: 53 %
ECHO LV ESV A4C: 77.5 ML
ECHO LV ESV INDEX A4C: 32.4 ML/M2
ECHO LV ESV TEICHHOLZ: 17.65 ML
ECHO LV INTERNAL DIMENSION DIASTOLIC: 4.82 CM (ref 4.2–5.9)
ECHO LV INTERNAL DIMENSION SYSTOLIC: 3.29 CM
ECHO LV IVSD: 1.1 CM (ref 0.6–1)
ECHO LV MASS 2D: 243 G (ref 88–224)
ECHO LV MASS INDEX 2D: 83.2 G/M2 (ref 49–115)
ECHO LV POSTERIOR WALL DIASTOLIC: 1.19 CM (ref 0.6–1)
ECHO LVOT DIAM: 2.02 CM
ECHO MV A VELOCITY: 45.29 CM/S
ECHO MV AREA PLAN: 11.2 CM2
ECHO MV E DECELERATION TIME (DT): 135 MS
ECHO MV E VELOCITY: 41.82 CM/S
ECHO MV E/A RATIO: 0.9
ECHO MV REGURGITANT PEAK GRADIENT: 69.4 MMHG
ECHO MV REGURGITANT PEAK VELOCITY: 416.43 CM/S
ECHO PULMONARY ARTERY SYSTOLIC PRESSURE (PASP): 26.2 MMHG
ECHO PV MAX VELOCITY: 71.06 CM/S
ECHO PV PEAK GRADIENT: 2 MMHG
ECHO RA AREA 4C: 17.35 CM2
ECHO RIGHT VENTRICULAR SYSTOLIC PRESSURE (RVSP): 26.2 MMHG
ECHO TV REGURGITANT MAX VELOCITY: 229.98 CM/S
ECHO TV REGURGITANT PEAK GRADIENT: 21.2 MMHG
EOSINOPHIL # BLD: 0.3 K/UL (ref 0–0.4)
EOSINOPHIL NFR BLD: 4 % (ref 0–7)
ERYTHROCYTE [DISTWIDTH] IN BLOOD BY AUTOMATED COUNT: 12.2 % (ref 11.5–14.5)
GLUCOSE BLD STRIP.AUTO-MCNC: 148 MG/DL (ref 65–100)
GLUCOSE BLD STRIP.AUTO-MCNC: 190 MG/DL (ref 65–100)
GLUCOSE SERPL-MCNC: 174 MG/DL (ref 65–100)
HCT VFR BLD AUTO: 32 % (ref 36.6–50.3)
HGB BLD-MCNC: 10.2 G/DL (ref 12.1–17)
IMM GRANULOCYTES # BLD AUTO: 0 K/UL (ref 0–0.04)
IMM GRANULOCYTES NFR BLD AUTO: 0 % (ref 0–0.5)
INR PPP: 1.1 (ref 0.9–1.1)
LVFS 2D: 31.78 %
LVSV (MOD SINGLE 4C): 34.93 ML
LVSV (TEICH): 26.15 ML
LYMPHOCYTES # BLD: 2.1 K/UL (ref 0.8–3.5)
LYMPHOCYTES NFR BLD: 27 % (ref 12–49)
MAGNESIUM SERPL-MCNC: 2.1 MG/DL (ref 1.6–2.4)
MCH RBC QN AUTO: 27.5 PG (ref 26–34)
MCHC RBC AUTO-ENTMCNC: 31.9 G/DL (ref 30–36.5)
MCV RBC AUTO: 86.3 FL (ref 80–99)
MONOCYTES # BLD: 0.6 K/UL (ref 0–1)
MONOCYTES NFR BLD: 7 % (ref 5–13)
MV DEC SLOPE: 3.12
NEUTS SEG # BLD: 4.8 K/UL (ref 1.8–8)
NEUTS SEG NFR BLD: 62 % (ref 32–75)
NRBC # BLD: 0 K/UL (ref 0–0.01)
NRBC BLD-RTO: 0 PER 100 WBC
P-R INTERVAL, ECG05: 144 MS
PHOSPHATE SERPL-MCNC: 2.8 MG/DL (ref 2.6–4.7)
PLATELET # BLD AUTO: 238 K/UL (ref 150–400)
PMV BLD AUTO: 9.3 FL (ref 8.9–12.9)
POTASSIUM SERPL-SCNC: 4.1 MMOL/L (ref 3.5–5.1)
PROTHROMBIN TIME: 10.6 SEC (ref 9–11.1)
Q-T INTERVAL, ECG07: 346 MS
QRS DURATION, ECG06: 78 MS
QTC CALCULATION (BEZET), ECG08: 444 MS
RBC # BLD AUTO: 3.71 M/UL (ref 4.1–5.7)
SERVICE CMNT-IMP: ABNORMAL
SERVICE CMNT-IMP: ABNORMAL
SODIUM SERPL-SCNC: 139 MMOL/L (ref 136–145)
THERAPEUTIC RANGE,PTTT: NORMAL SECS (ref 58–77)
TROPONIN I SERPL-MCNC: <0.05 NG/ML
VENTRICULAR RATE, ECG03: 99 BPM
WBC # BLD AUTO: 7.8 K/UL (ref 4.1–11.1)

## 2019-11-08 PROCEDURE — 85730 THROMBOPLASTIN TIME PARTIAL: CPT

## 2019-11-08 PROCEDURE — 84100 ASSAY OF PHOSPHORUS: CPT

## 2019-11-08 PROCEDURE — 84484 ASSAY OF TROPONIN QUANT: CPT

## 2019-11-08 PROCEDURE — 93306 TTE W/DOPPLER COMPLETE: CPT

## 2019-11-08 PROCEDURE — 99218 HC RM OBSERVATION: CPT

## 2019-11-08 PROCEDURE — 82962 GLUCOSE BLOOD TEST: CPT

## 2019-11-08 PROCEDURE — 85025 COMPLETE CBC W/AUTO DIFF WBC: CPT

## 2019-11-08 PROCEDURE — 93970 EXTREMITY STUDY: CPT

## 2019-11-08 PROCEDURE — 85610 PROTHROMBIN TIME: CPT

## 2019-11-08 PROCEDURE — 74011250637 HC RX REV CODE- 250/637: Performed by: STUDENT IN AN ORGANIZED HEALTH CARE EDUCATION/TRAINING PROGRAM

## 2019-11-08 PROCEDURE — 36415 COLL VENOUS BLD VENIPUNCTURE: CPT

## 2019-11-08 PROCEDURE — 74011636637 HC RX REV CODE- 636/637: Performed by: STUDENT IN AN ORGANIZED HEALTH CARE EDUCATION/TRAINING PROGRAM

## 2019-11-08 PROCEDURE — 80048 BASIC METABOLIC PNL TOTAL CA: CPT

## 2019-11-08 PROCEDURE — 83735 ASSAY OF MAGNESIUM: CPT

## 2019-11-08 PROCEDURE — 74011250636 HC RX REV CODE- 250/636: Performed by: STUDENT IN AN ORGANIZED HEALTH CARE EDUCATION/TRAINING PROGRAM

## 2019-11-08 RX ORDER — SODIUM CHLORIDE, SODIUM LACTATE, POTASSIUM CHLORIDE, CALCIUM CHLORIDE 600; 310; 30; 20 MG/100ML; MG/100ML; MG/100ML; MG/100ML
125 INJECTION, SOLUTION INTRAVENOUS CONTINUOUS
Status: DISCONTINUED | OUTPATIENT
Start: 2019-11-08 | End: 2019-11-08 | Stop reason: HOSPADM

## 2019-11-08 RX ORDER — HYDROMORPHONE HYDROCHLORIDE 2 MG/1
4 TABLET ORAL ONCE
Status: COMPLETED | OUTPATIENT
Start: 2019-11-08 | End: 2019-11-08

## 2019-11-08 RX ADMIN — INSULIN LISPRO 2 UNITS: 100 INJECTION, SOLUTION INTRAVENOUS; SUBCUTANEOUS at 09:07

## 2019-11-08 RX ADMIN — HYDROMORPHONE HYDROCHLORIDE 2 MG: 2 TABLET ORAL at 10:08

## 2019-11-08 RX ADMIN — CYCLOBENZAPRINE HYDROCHLORIDE 5 MG: 10 TABLET, FILM COATED ORAL at 10:07

## 2019-11-08 RX ADMIN — MONTELUKAST SODIUM 10 MG: 10 TABLET, FILM COATED ORAL at 09:00

## 2019-11-08 RX ADMIN — Medication 10 ML: at 06:00

## 2019-11-08 RX ADMIN — SODIUM CHLORIDE, SODIUM LACTATE, POTASSIUM CHLORIDE, AND CALCIUM CHLORIDE 125 ML/HR: 600; 310; 30; 20 INJECTION, SOLUTION INTRAVENOUS at 09:17

## 2019-11-08 RX ADMIN — INSULIN LISPRO 2 UNITS: 100 INJECTION, SOLUTION INTRAVENOUS; SUBCUTANEOUS at 12:37

## 2019-11-08 RX ADMIN — APIXABAN 10 MG: 2.5 TABLET, FILM COATED ORAL at 09:09

## 2019-11-08 RX ADMIN — FLUTICASONE PROPIONATE AND SALMETEROL 1 PUFF: 50; 250 POWDER RESPIRATORY (INHALATION) at 09:08

## 2019-11-08 RX ADMIN — HYDROMORPHONE HYDROCHLORIDE 2 MG: 2 TABLET ORAL at 08:03

## 2019-11-08 RX ADMIN — HYDROMORPHONE HYDROCHLORIDE 4 MG: 2 TABLET ORAL at 12:37

## 2019-11-08 NOTE — DISCHARGE SUMMARY
Hospitalist Discharge Summary     Patient ID:  Murali Ochoa  [id number]  45 y.o.  1965    PCP on record: Oswaldo Domínguez MD    Admit date: 11/7/2019  Discharge date and time: 11/8/2019      Admission Diagnoses: Pulmonary emboli [facility]) [I26.99]    Discharge Diagnoses: Active Problems:    Pulmonary emboli ([address] (11/7/2019)           Hospital Course:      Bilateral pulmonary embolus involving all 5 lobes, hemodynamically stable  History of DVT/PE, provoked while on testosterone in January 2019  Prior pulmonary embolism provoked as a result of testosterone use. He was on Eliquis which was stopped for recent surgery. Eliquis has been held since 10/27, surgery on 10/30. Patient was waiting for his follow-up appointment with orthopedic surgery just to restart anticoagulation. Noted to be short of breath at home by wife. CT chest as outpatient shows bilateral moderate pulmonary emboli. He is hemodynamically stable without any troponin leak. EKG pending. He is not hypoxic. ECHO shows normal LV and RV function, trivial TR. US Duplex doppler bilateral lower extremity showed acute DVT in left common femoral and deep femoral vein and chronic DVT in left popliteal, peroneal and posterior tibial veins.   -Restarted Eliquis 10 mg BID x 7 days followed by Eliquis 5 mg BID. Creatinine clearance 50.   - ECHO,  telemetry  - Outpatient follow up with PCP, pulmonology, hematology.           Acute kidney injury on chronic kidney disease stage III, POA, due to hypovolemia  Baseline creatinine is 1.21.4. Current creatinine 2.28. Likely prerenal due to hypovolemia. Received contrast dye for CTA chest that was done at 3 PM today. He is at risk for contrast-induced nephropathy given BLANCA on CKD. Underlying CKD is likely as a result of uncontrolled diabetes and hypertension.   Creatinine improved to baseline after hydration.         Hyperkalemia likely due to decreased renal clearance  Resolved with treatment of BLANCA.      Hyponatremia likely as a result of decreased oral intake  Resolved with hydration.      Type 2 diabetes, uncontrolled, with hyperglycemia, on long-term insulin  Diabetic neuropathy        Lab Results   Component Value Date/Time     Hemoglobin A1c 7.3 (H) 10/10/2019 04:01 PM     Hemoglobin A1c (POC) 7.7 11/14/2018 10:42 AM   Takes 65 units Levemir twice daily at home with metformin thousand milligrams twice daily. Resumed at discharge.      History of lumbar spinal stenosis status post spinal fusion 1 week ago  Postoperative pain  Dr. Kermit Danielle spoke with orthopedic surgeon at [facility] who stated that it is safe to resume anticoagulation. Patient  does not need to be seen by orthopedic surgery while admitted as inpatient and they will follow with him next week as scheduled. Home pain medication regimen resumed.         Normochromic normocytic anemia   likely associated with recent blood loss during surgery        Hypertension  Home meds resumed at discharge.      Hyperlipidemia  Continue atorvastatin.        Polysubstance abuse  History of cocaine use in the past last use 5 months ago. History of marijuana use in the past last used 2 weeks ago. Patient denies any opiate dependence in the past.  He was on methadone briefly that was started by a drug treatment program for cocaine addiction ? Patient reportedly discontinued it himself 2 months ago. History of tobacco abuse has been smoking 1 pack a day since age of 14. He has intermittently quit. Currently does not smoke for the last 1 month.     Asthma, moderate persistent, well controlled :   Lungs clear to auscultation. Continue Advair twice daily and as needed albuterol.     Body mass index is 36.18 kg/m². Obesity   Counseled on Lifestyle modifications, AHA Diet ,weight loss strategies.           CONSULTATIONS:  IP CONSULT TO ORTHOPEDIC SURGERY    Excerpted HPI from H&P of Rito Lowry MD:      ______________________________________________________________________  DISCHARGE SUMMARY/HOSPITAL COURSE:  for full details see H&P, daily progress notes, labs, consult notes. Visit Vitals  /82   Pulse 87   Temp 98.1 °F (36.7 °C)   Resp 18   Ht 6' (1.829 m)   Wt 119.3 kg (263 lb 1.6 oz)   SpO2 100%   BMI 35.68 kg/m²       _______________________________________________________________________  Patient seen and examined by me on discharge day. Pertinent Findings:  Gen:    Not in distress  Chest: Clear lungs  CVS:   Regular rhythm. No edema  Abd:  Soft, not distended, not tender  Neuro:  Alert with good insight. Oriented to person, place, and time   _______________________________________________________________________  DISCHARGE MEDICATIONS:   Current Discharge Medication List      CONTINUE these medications which have CHANGED    Details   apixaban (ELIQUIS) 5 mg tablet Take 10 mg two times daily for 12 doses starting tonight, then switch to 5 mg twice daily  Qty: 60 Tab, Refills: 0         CONTINUE these medications which have NOT CHANGED    Details   atorvastatin (LIPITOR) 40 mg tablet Take 1 Tab by mouth nightly. Qty: 90 Tab, Refills: 1    Associated Diagnoses: Mixed hyperlipidemia; Uncontrolled type 2 diabetes mellitus with peripheral neuropathy (HCC)      montelukast (SINGULAIR) 10 mg tablet Take 10 mg by mouth daily. cyclobenzaprine (FLEXERIL) 10 mg tablet Take 10 mg by mouth three (3) times daily as needed for Muscle Spasm(s). metFORMIN (GLUMETZA ER) 500 mg TG24 24 hour tablet Take 500 mg by mouth two (2) times a day. fluticasone propion-salmeterol (WIXELA INHUB) 250-50 mcg/dose diskus inhaler Take 1 Puff by inhalation two (2) times a day. traZODone (DESYREL) 50 mg tablet Take 50 mg by mouth nightly. docusate sodium (COLACE) 100 mg capsule Take 100 mg by mouth two (2) times daily as needed for Constipation.       HYDROmorphone (DILAUDID) 4 mg tablet Take 1-1.5 Tabs by mouth every four (4) hours as needed for Pain. furosemide (LASIX) 20 mg tablet Take 20 mg by mouth daily. mirtazapine (REMERON) 7.5 mg tablet Take 1 Tab by mouth nightly. Qty: 90 Tab, Refills: 0    Associated Diagnoses: Severe episode of recurrent major depressive disorder, without psychotic features (HCC); PTSD (post-traumatic stress disorder)      lisinopril (PRINIVIL, ZESTRIL) 40 mg tablet TAKE 1 TABLET BY MOUTH EVERY DAY  Qty: 30 Tab, Refills: 5    Associated Diagnoses: Essential hypertension      prazosin (MINIPRESS) 2 mg capsule Take 2 mg by mouth nightly. !! DULoxetine (CYMBALTA) 60 mg capsule TAKE ONE CAPSULE BY MOUTH EVERY MORNING ALONG WITH 30 MG  Qty: 30 Cap, Refills: 1    Associated Diagnoses: Severe episode of recurrent major depressive disorder, without psychotic features ([facility] PTSD (post-traumatic stress disorder)      !! DULoxetine (CYMBALTA) 30 mg capsule Take 1 capsule by mouth every morning along with 60 mg dose  Qty: 30 Cap, Refills: 1    Associated Diagnoses: Severe episode of recurrent major depressive disorder, without psychotic features (HCC); PTSD (post-traumatic stress disorder)      LEVEMIR FLEXTOUCH U-100 INSULN 100 unit/mL (3 mL) inpn INJECT 65 UNITS BY SUBCUTANEOUS ROUTE TWO (2) TIMES A DAY  Qty: 39 Adjustable Dose Pre-filled Pen Syringe, Refills: 2    Associated Diagnoses: Uncontrolled type 2 diabetes mellitus with peripheral neuropathy (HCC)      albuterol (PROVENTIL HFA, VENTOLIN HFA, PROAIR HFA) 90 mcg/actuation inhaler Take 2 Puffs by inhalation every four (4) hours as needed for Wheezing. Qty: 1 Inhaler, Refills: 0    Associated Diagnoses: Asthma, unspecified asthma severity, unspecified whether complicated, unspecified whether persistent      albuterol (PROVENTIL VENTOLIN) 2.5 mg /3 mL (0.083 %) nebulizer solution 3 mL by Nebulization route every four (4) hours as needed for Wheezing.   Qty: 24 Each, Refills: 0    Associated Diagnoses: Asthma, unspecified asthma severity, unspecified whether complicated, unspecified whether persistent      senna (SENNA) 8.6 mg tablet Take 2 Tabs by mouth daily. amLODIPine (NORVASC) 10 mg tablet TAKE 1 TABLET BY MOUTH EVERY DAY  Qty: 30 Tab, Refills: 2    Associated Diagnoses: Essential hypertension       !! - Potential duplicate medications found. Please discuss with provider. STOP taking these medications       polyethylene glycol (MIRALAX) 17 gram packet Comments:   Reason for Stopping:               My Recommended Diet, Activity, Wound Care, and follow-up labs are listed in the patient's Discharge Insturctions which I have personally completed and reviewed.     _______________________________________________________________________  DISPOSITION:     Home with Family: x   Home with HH/PT/OT/RN:    SNF/LTC:    ESTRELLA:    OTHER:        Condition at Discharge:  Stable  _______________________________________________________________________  Follow up with:   PCP : Enma Ibarra MD  Follow-up Information     Follow up With Specialties Details Why Contact Info    Enma Ibarra MD [place] [id number] [place] [address]  [phone]                Total time in minutes spent coordinating this discharge (includes going over instructions, follow-up, prescriptions, and preparing report for sign off to her PCP) :  25 minutes    Signed:  Gisueppe Wayne MD

## 2019-11-08 NOTE — PROGRESS NOTES
3647) Bedside shift change report given to Home Rangel, RN; Lakesha Aquino, RN; Annalise Summers, RN; Kaity Lacy LPN; Lord Mallorie LPN (oncoming nurse) by Sanya Valladares and Tony Friedman RN (offgoing nurse). Report included the following information SBAR, Kardex, MAR, Accordion, Recent Results and Cardiac Rhythm NSR  0844) Echo at bedside  1120) Discuss duplex with tech and Dr. Osmin Edouard, new DVT in L femoral  1217) Consult Dr. Osmin Edouard for pain 8/10. Able to discharge. If not driving, able to have 4 mg of dilaudid now. 1400 ). .Reviewed discharge instructions with pt including follow-up appointments, new medications and side effects, medications to continue, medications to discontinue, Eliquis and PE education, and MyChart information. BRAINS sheet reviewed. Pt expressed understanding. IV was removed.  Belongings sent home with pt.   [phone]) Pt walk downstairs with wife  [phone]) Call from [facility], care manager, home health contacted to resume care and provide Eliquis education

## 2019-11-08 NOTE — PROGRESS NOTES
PLEASE NOTE--Encounter / Re-Admission Within 30 Days  This patient has had another encounter or admission within the last 30 days. Reason for Admission: presents via private vehicle from outpatient radiology to the ED with cc of shortness of breath and bilateral PEs. Patient states he had a lumbar fusion on October 30th by Dr. Pollo Lopez at [address] [place] [address]. He has a history of a DVT and bilateral PEs from March of this year that he was taking Eliquis. He stopped taking the Eliquis on October 27th, 3 days before his surgery. He was told by Dr. Camilla Muñoz to follow-up with his primary care doctor to restart the Eliquis. He has been experiencing shortness of breath since his surgery that has progressively worsened over the past few days. RRAT Score: 29  Resources/supports as identified by patient/family:  Top Challenges facing patient (as identified by patient/family and CM): Finances/Medication cost? Patient pick-up Eliquis on 11/2/2019 per pharmacist his insurance will not pay for medication until 11/15/2019. CM provide Eliquis Coupon to patient and wife. Transportation? Wife is at bedside states she will be driving home. Support system or lack thereof? None at this time. Patient wife is at bedside. Living arrangements? At home with wife. Self-care/ADLs/Cognition? Independent. Current Advanced Directive/Advance Care Plan: on file. Plan for utilizing home health: Patient has [place] for PT/OT from previous admission. Services needs to be resume. CM will ask for orders. Resume services for Discharge planning, Home Health for PT/OT. Referral sent to [facility] for orders to resume. Discuss freedom of choice with patient. Likelihood of readmission: high per RRAT score. Transition of Care Plan: patient discharge home discuss Eliquis discount card and inform of insurance and cost. Discuss with RN medication and appointment for patient. Wife transportation patient home.  Discuss HH orders SN for medication teaching. Discuss with RN. Orders HH add to services SN. Discuss OBS and MOONS copy given to patient wife. Care Management Interventions  PCP Verified by CM:  Yes  Mode of Transport at Discharge: [address] (CM Consult): Discharge Planning  Current Support Network: Lives with Spouse  Confirm Follow Up Transport: Self  Plan discussed with Pt/Family/Caregiver: Yes  Freedom of Choice Offered: Yes  Discharge Location  Discharge Placement: [address]  [phone]

## 2019-11-08 NOTE — PROGRESS NOTES
[facility].       11/8/2019      RE: Zoraida Paul To Whom it May Concern: This is to certify that Zoraida Paul was admitted to hospital on 11/7/2019  And discharged on 11/8/2019. His family including his wife and mother were present in the hospital during that period. Thank you for your assistance in this matter.     Sincerely,      Yaquelin Moctezuma MD

## 2019-11-08 NOTE — PROGRESS NOTES
Bedside and Verbal shift change report given to [facility] RN (oncoming nurse) by [facility] nurse).  Report included the following information SBAR, Kardex, Procedure Summary, Intake/Output, MAR, Accordion, Recent Results and Med Rec Status

## 2019-11-08 NOTE — PROGRESS NOTES
Pharmacy Monitoring for Apixaban    Pharmacy review for this 48 y.o. male ordered Apixaban for bilateral PE  Major Interacting Medications: none  Platelet Inhibitors: none    Recent Labs     11/07/19  1615   HGB 10.8*      ALB 2.5*   SGOT 15   TBILI 0.3   CREA 2.28*   BUN 23*       Child Sorensen Score, if applicable (Avoid if level C): N/A      Impression/Plan: 10 mg po bid x 7 days followed by 5 mg po bid for PE       Thanks    Gina Se, RPH        CBC and BMP ordered every other day? Ordered for tomorrow AM   Maintenance dose entered? 10 mg po bid x 7 days    Previous order discontinued? N/A   Progress note entered? yes       Pharmacist review the information below to verify dose and frequency are appropriate and that the patient is a candidate for apixaban. Dosage   Non valvular AF: Usual 5 mg twice daily  2.5 mg twice daily if any two of the following are present  Age greater than or equal to 80 years  Weight less than or equal to 60 kg  Serum Creatinine greater than or equal to 1.5 mg/dl  2.5 mg twice daily if coadministration of strong dual inhibitors of CYP34A and P-gp (ketoconazole, itraconazole, ritonavir, clarithromycin)  Avoid use if any two of the following are present (Age > 80 years, Weight < 60 kg, Serum Creatinine > 1.5 mg/dl) and coadministration of strong dual inhibitors of CYP34A and P-gp (ketoconazole, itraconazole, ritonavir, clarithromycin)  Patients with ESRD with or without hemodialysis were not studied in clinical efficacy and safety studies with ELIQUIS; therefore, the dosing recommendation for patients with nonvalvular atrial fibrillation is based on pharmacokinetic and pharmacodynamic (anti-Factor Xa activity) data in subjects with ESRD maintained on dialysis. The recommended dose for ESRD patients maintained with hemodialysis is 5 mg orally twice daily. For ESRD patients maintained with hemodialysis with one of the following patient characteristics, age ? 80 years or body weight ?60 kg, reduce dose to 2.5 mg twice daily  Prophylaxis of deep vein thrombosis (DVT), which may lead to pulmonary embolism (PE), in patients who have undergone hip or knee replacement surgery: 2.5 mg twice daily for 12 days TKA, 35 days TEODORA  In patients already taking 2.5 mg twice daily, coadministration of ELIQUIS with strong dual inhibitors of CY and P-gp should be avoided. DVT/PE Treatment  10 mg BID for 7 days then 5 mg BID  Coadministration with strong dual CY and P-gp inhibitors: For patients receiving apixaban doses greater than 2.5 mg twice daily, reduce the dose by 50% when apixaban is coadministered with drugs that are strong dual inhibitors of cytochrome P450 3A4 (CY) and P-glycoprotein (P-gp) (e.g., ketoconazole, itraconazole, ritonavir, clarithromycin)  Prevention of recurrence:  2.5 mg BID after 6 months of treatment  In patients already taking 2.5 mg twice daily, coadministration of ELIQUIS with strong dual inhibitors of CY and P-gp should be avoided. Avoid use in patients with a Child Sorensen Score of C  Avoid use of apixaban with coadministration of strong dual inducers of CYP34A and P-gp  (rifampin, carbamazepine, phenytoin, Bobbys wort) due to low systemic levels. Coadministration with strong dual CY and P-gp inhibitors:  For patients receiving apixaban doses greater than 2.5 mg twice daily, reduce the dose by 50% when apixaban is coadministered with drugs that are strong dual inhibitors of cytochrome P450 3A4 (CY) and P-glycoprotein (P-gp) (e.g., ketoconazole, itraconazole, ritonavir, clarithromycin)  In patients already taking 2.5 mg twice daily, avoid coadministration of ELIQUIS with strong dual inhibitors of CY and P-gp  Factors affecting AUC of apixaban    Age >=65 year AUC increases 1.4 x normal  Severe renal impairment (< 15 ml/min) AUC 1.45 x normal  Moderate renal impairment AUC 1.4 x normal  Mild renal impairment AUC 1.2 x normal  Weight < 50 kg AUC 1.2 x normal    Updated 3/10/17

## 2019-11-08 NOTE — DISCHARGE INSTRUCTIONS
Patient Education        Pulmonary Embolism: Care Instructions  Your Care Instructions    Pulmonary embolism is the sudden blockage of an artery in the lung. Blood clots in the deep veins of the leg or pelvis (deep vein thrombosis, or DVT) are the most common cause. These blood clots can travel to the lungs. Pulmonary embolism can be very serious. Because you have had one pulmonary embolism, you are at greater risk for having another one. But you can take steps to prevent another pulmonary embolism by following your doctor's instructions. You will probably take a prescription blood-thinning medicine to prevent blood clots. A blood thinner can stop a blood clot from growing larger and prevent new clots from forming. Follow-up care is a key part of your treatment and safety. Be sure to make and go to all appointments, and call your doctor if you are having problems. It's also a good idea to know your test results and keep a list of the medicines you take. How can you care for yourself at home? · Take your medicines exactly as prescribed. Call your doctor if you think you are having a problem with your medicine. You will get more details on the specific medicines your doctor prescribes. · If you are taking a blood thinner, be sure you get instructions about how to take your medicine safely. Blood thinners can cause serious bleeding problems. Preventing future pulmonary embolisms  · Exercise. Keep blood moving in your legs to keep clots from forming. If you are traveling by car, stop every hour or so. Get out and walk around for a few minutes. If you are traveling by bus, train, or plane, get out of your seat and walk up and down the aisles every hour or so. You also can do leg exercises while you are seated. Pump your feet up and down by pulling your toes up toward your knees then pointing them down. · Get up out of bed as soon as possible after an illness or surgery. · Do not smoke.  If you need help quitting, talk to your doctor about stop-smoking programs and medicines. These can increase your chances of quitting for good. · Check with your doctor before taking hormone or birth control pills. These may increase your risk of blot clots. · Ask your doctor about wearing compression stockings to help prevent blood clots in your legs. There are different types of stockings, and they need to fit right. So your doctor will recommend what you need. When should you call for help? Call 911 anytime you think you may need emergency care. For example, call if:    · You have shortness of breath.     · You have chest pain.     · You passed out (lost consciousness).     · You cough up blood.    Call your doctor now or seek immediate medical care if:    · You have new or worsening pain or swelling in your leg.    Watch closely for changes in your health, and be sure to contact your doctor if:    · You do not get better as expected. Where can you learn more? Go to http://radames-clive.info/. Enter G296 in the search box to learn more about \"Pulmonary Embolism: Care Instructions. \"  Current as of: September 26, 2018  Content Version: 12.2  © 9924-3681 [employer]. Care instructions adapted under license by [employer] (which disclaims liability or warranty for this information). If you have questions about a medical condition or this instruction, always ask your healthcare professional. [address] any warranty or liability for your use of this information. You came to the hospital with shortness of breath after your recent orthopedic surgery and were found to have blood clots in both your lungs likely because he went off of Eliis for the surgery and were on bedrest after the surgery. Your ultrasound of your legs showed a blood clot in the veins of your thigh and groin which is new.   We did an ultrasound of your heart which showed that your heart was functioning normal.  We have restarted your Eliquis. Your kidney numbers were a little high when he first came in likely because you are a little dehydrated. They improved to your usual levels after IV fluid. Please keep well-hydrated. Please take 10 mg Eliquis twice daily starting tonight for 12 doses. Then switch to 5 mg twice daily.

## 2019-11-08 NOTE — PROGRESS NOTES
Problem: Diabetes Self-Management  Goal: *Disease process and treatment process  Description  Define diabetes and identify own type of diabetes; list 3 options for treating diabetes. Outcome: Progressing Towards Goal  Goal: *Incorporating nutritional management into lifestyle  Description  Describe effect of type, amount and timing of food on blood glucose; list 3 methods for planning meals. Outcome: Progressing Towards Goal  Goal: *Incorporating physical activity into lifestyle  Description  State effect of exercise on blood glucose levels. Outcome: Progressing Towards Goal  Goal: *Developing strategies to promote health/change behavior  Description  Define the ABC's of diabetes; identify appropriate screenings, schedule and personal plan for screenings. Outcome: Progressing Towards Goal  Goal: *Using medications safely  Description  State effect of diabetes medications on diabetes; name diabetes medication taking, action and side effects. Outcome: Progressing Towards Goal  Goal: *Monitoring blood glucose, interpreting and using results  Description  Identify recommended blood glucose targets  and personal targets. Outcome: Progressing Towards Goal  Goal: *Prevention, detection, treatment of acute complications  Description  List symptoms of hyper- and hypoglycemia; describe how to treat low blood sugar and actions for lowering  high blood glucose level. Outcome: Progressing Towards Goal  Goal: *Prevention, detection and treatment of chronic complications  Description  Define the natural course of diabetes and describe the relationship of blood glucose levels to long term complications of diabetes.   Outcome: Progressing Towards Goal  Goal: *Developing strategies to address psychosocial issues  Description  Describe feelings about living with diabetes; identify support needed and support network  Outcome: Progressing Towards Goal  Goal: *Insulin pump training  Outcome: Progressing Towards Goal  Goal: *Sick day guidelines  Outcome: Progressing Towards Goal  Goal: *Patient Specific Goal (EDIT GOAL, INSERT TEXT)  Outcome: Progressing Towards Goal     Problem: Falls - Risk of  Goal: *Absence of Falls  Description  Document [place] Fall Risk and appropriate interventions in the flowsheet.   Outcome: Progressing Towards Goal  Note:   Fall Risk Interventions:  Mobility Interventions: Utilize walker, cane, or other assistive device         Medication Interventions: Teach patient to arise slowly

## 2019-11-11 ENCOUNTER — PATIENT OUTREACH (OUTPATIENT)
Dept: INTERNAL MEDICINE CLINIC | Age: 54
End: 2019-11-11

## 2019-11-11 ENCOUNTER — HOME CARE VISIT (OUTPATIENT)
Dept: HOME HEALTH SERVICES | Facility: HOME HEALTH | Age: 54
End: 2019-11-11
Payer: MEDICARE

## 2019-11-11 NOTE — PROGRESS NOTES
Goals        Post Hospitalization     Prevent complications post hospitalization. 11/11/2019  Obs readmission 11/7/19- 11/8/19 DVT and PEs  - Reports he is feeling well and less SOB  - Is using the Incentive Spirometer multiple x throughout day- will continue to use  - Is getting up frequently and moving about house and when lying he is moving his feet, ankles and toes and is using the band for upper body strength  - Pt will take Eliquis as directed  - Reports blood sugar readings have been mostly \"good\"- Today, FBS was 136.   - Resume [address] for therapy  - States he will talk to Dr. Amber Maria about decreases his pain medications, he would prefer to be on oxycodone instead of dilaudid at this time  - Is able to verbalize red flags of when to call 911 to include increased SOB, CP, AMS/confusion, Difficulty breathing    - Schedule f/u appt with Dr. Amber Maria- if you do not hear back from his office by 11/12/19, call again to request appt  - Keep INDER appt with Dr. Vaishnavi Foy on 11/14/19. Pt was scheduled for appts with Dr. Vaishnavi Foy on 11/13/19 and 11/14/19- states his wife is not available to drive him on the [address], so that appt cancelled and he will attend [facility] on 11/14/19.    Wendy Arroyo RN, [facility]  Ambulatory Navigator, [facility]

## 2019-11-13 ENCOUNTER — HOME CARE VISIT (OUTPATIENT)
Dept: HOME HEALTH SERVICES | Facility: HOME HEALTH | Age: 54
End: 2019-11-13
Payer: MEDICARE

## 2019-11-14 ENCOUNTER — HOME CARE VISIT (OUTPATIENT)
Dept: HOME HEALTH SERVICES | Facility: HOME HEALTH | Age: 54
End: 2019-11-14
Payer: MEDICARE

## 2019-11-14 ENCOUNTER — HOME CARE VISIT (OUTPATIENT)
Dept: SCHEDULING | Facility: HOME HEALTH | Age: 54
End: 2019-11-14
Payer: MEDICARE

## 2019-11-14 ENCOUNTER — TELEPHONE (OUTPATIENT)
Dept: FAMILY MEDICINE CLINIC | Age: 54
End: 2019-11-14

## 2019-11-14 ENCOUNTER — OFFICE VISIT (OUTPATIENT)
Dept: FAMILY MEDICINE CLINIC | Age: 54
End: 2019-11-14

## 2019-11-14 VITALS
OXYGEN SATURATION: 97 % | WEIGHT: 272 LBS | HEIGHT: 72 IN | DIASTOLIC BLOOD PRESSURE: 88 MMHG | RESPIRATION RATE: 18 BRPM | SYSTOLIC BLOOD PRESSURE: 123 MMHG | HEART RATE: 68 BPM | TEMPERATURE: 98 F | BODY MASS INDEX: 36.84 KG/M2

## 2019-11-14 DIAGNOSIS — I26.99 BILATERAL PULMONARY EMBOLISM (HCC): ICD-10-CM

## 2019-11-14 DIAGNOSIS — Z86.711 HISTORY OF PULMONARY EMBOLUS (PE): ICD-10-CM

## 2019-11-14 DIAGNOSIS — Z86.718 HISTORY OF DVT OF LOWER EXTREMITY: ICD-10-CM

## 2019-11-14 DIAGNOSIS — Z09 HOSPITAL DISCHARGE FOLLOW-UP: Primary | ICD-10-CM

## 2019-11-14 NOTE — PROGRESS NOTES
Chief Complaint   Patient presents with   [facility] Follow Up     Discharged 11/8/19   1. Have you been to the ER, urgent care clinic since your last visit? Hospitalized since your last visit? Yes Where: [facility] Discharged 11/8/19    2. Have you seen or consulted any other health care providers outside of the [address] [place] [address] since your last visit? Include any pap smears or colon screening.  No   Denies Chest pain and shortness of breath

## 2019-11-14 NOTE — PROGRESS NOTES
Subjective:     Chief Complaint   Patient presents with   [facility] Follow Up     Discharged 11/8/19      He  is a 48 y.o. male who presents for evaluation of:  Hosp d/c f/u - Admitted for DVT and bilat PE from 11/7/19-11/8/19. Feeling much better with his breathing now. Previously, admitted for 10/17/19- 10/20/19 at [address] [place] [address] for back spasms and then had revision of lumbar laminectomy L3-4 with discectomy and revision post spinal fusion L3-S1 with Dr. Robbie Jerome. He did ok with surgery but at our f/u appt, he ended up having bilat PEs found on CT after being off Eliquis so was admitted for a day and restarted on higher dose of Eliquis at the tx dose.     ROS  Gen - no fever/chills  Resp - no dyspnea or cough  CV - no chest pain or BROWN  Rest per HPI    Past Medical History:   Diagnosis Date    Arthritis     2010    Asthma 1995    INHALER USE PRN    Chronic bilateral low back pain without sciatica 11/6/2017    Chronic hepatitis C without hepatic coma ([address] 11/6/2017    [facility] Chronic pain     lower back    Essential hypertension 11/6/2017    GERD (gastroesophageal reflux disease)     History of cardiac cath     Hypercholesterolemia     Mild episode of recurrent major depressive disorder ([address] 11/06/2012    Morbid obesity ([address]     PTSD (post-traumatic stress disorder)     Sleep apnea     pt stated was taken off cpap while on blood thinner    Stage 3 chronic kidney disease ([address] 11/06/2017    IMPROVED     Thromboembolus ([address]     1/3/19  DVT,PE while taking testosterone    Uncontrolled type 2 diabetes mellitus with peripheral neuropathy ([address] 11/06/2011     Past Surgical History:   Procedure Laterality Date    HX BACK SURGERY  04/10/2018    Fusion L4,L5    HX COLONOSCOPY  2013    CJW    HX HEART CATHETERIZATION  2017    NEG PER PATIENT    HX HERNIA REPAIR  1219    UMBILICAL    HX KNEE ARTHROSCOPY  1999    right knee    HX KNEE ARTHROSCOPY  2002    left knee    HX ROTATOR CUFF REPAIR Right 2016    HX UROLOGICAL  2015    Vasectomy      Current Outpatient Medications on File Prior to Visit   Medication Sig Dispense Refill    apixaban (ELIQUIS) 5 mg tablet Take 10 mg two times daily for 12 doses starting tonight, then switch to 5 mg twice daily 60 Tab 0    atorvastatin (LIPITOR) 40 mg tablet Take 1 Tab by mouth nightly. 90 Tab 1    montelukast (SINGULAIR) 10 mg tablet Take 10 mg by mouth daily.  cyclobenzaprine (FLEXERIL) 10 mg tablet Take 10 mg by mouth three (3) times daily as needed for Muscle Spasm(s).  metFORMIN (GLUMETZA ER) 500 mg TG24 24 hour tablet Take 500 mg by mouth two (2) times a day.  fluticasone propion-salmeterol (WIXELA INHUB) 250-50 mcg/dose diskus inhaler Take 1 Puff by inhalation two (2) times a day.  traZODone (DESYREL) 50 mg tablet Take 50 mg by mouth nightly.  docusate sodium (COLACE) 100 mg capsule Take 100 mg by mouth two (2) times daily as needed for Constipation.  HYDROmorphone (DILAUDID) 4 mg tablet Take 1-1.5 Tabs by mouth every four (4) hours as needed for Pain.  furosemide (LASIX) 20 mg tablet Take 20 mg by mouth daily.  senna (SENNA) 8.6 mg tablet Take 2 Tabs by mouth daily.  mirtazapine (REMERON) 7.5 mg tablet Take 1 Tab by mouth nightly. 90 Tab 0    amLODIPine (NORVASC) 10 mg tablet TAKE 1 TABLET BY MOUTH EVERY DAY (Patient taking differently: Take 10 mg by mouth every morning.) 30 Tab 2    lisinopril (PRINIVIL, ZESTRIL) 40 mg tablet TAKE 1 TABLET BY MOUTH EVERY DAY (Patient taking differently: Take 40 mg by mouth every morning.) 30 Tab 5    prazosin (MINIPRESS) 2 mg capsule Take 2 mg by mouth nightly.  DULoxetine (CYMBALTA) 60 mg capsule TAKE ONE CAPSULE BY MOUTH EVERY MORNING ALONG WITH 30 MG (Patient taking differently: Take 60 mg by mouth daily.  TAKE ONE CAPSULE BY MOUTH EVERY MORNING ALONG WITH 30 MG) 30 Cap 1    DULoxetine (CYMBALTA) 30 mg capsule Take 1 capsule by mouth every morning along with 60 mg dose (Patient taking differently: Take 30 mg by mouth daily.) 30 Cap 1    LEVEMIR FLEXTOUCH U-100 INSULN 100 unit/mL (3 mL) inpn INJECT 65 UNITS BY SUBCUTANEOUS ROUTE TWO (2) TIMES A DAY 39 Adjustable Dose Pre-filled Pen Syringe 2    albuterol (PROVENTIL HFA, VENTOLIN HFA, PROAIR HFA) 90 mcg/actuation inhaler Take 2 Puffs by inhalation every four (4) hours as needed for Wheezing. 1 Inhaler 0    albuterol (PROVENTIL VENTOLIN) 2.5 mg /3 mL (0.083 %) nebulizer solution 3 mL by Nebulization route every four (4) hours as needed for Wheezing. 24 Each 0     No current facility-administered medications on file prior to visit. Objective:     Vitals:    11/14/19 1101   BP: 123/88   Pulse: 68   Resp: 18   Temp: 98 °F (36.7 °C)   TempSrc: Oral   SpO2: 97%   Weight: 272 lb (123.4 kg)   Height: 6' (1.829 m)     Physical Examination:  General appearance - alert, well appearing, and in no distress  Eyes -sclera anicteric  Neck - supple, no significant adenopathy, no thyromegaly  Chest - clear to auscultation, no wheezes, rales or rhonchi, symmetric air entry  Heart - normal rate, regular rhythm, normal S1, S2, no murmurs, rubs, clicks or gallops  Neurological - alert, oriented, no focal findings or movement disorder noted  MSK-wearing back brace  Extremities-no edema  Psych-normal mood and affect    Assessment/ Plan:   Diagnoses and all orders for this visit:    1. Hospital discharge follow-up  2. Bilateral pulmonary embolism ([address]  3. History of pulmonary embolus (PE)  4. History of DVT of lower extremity  - Much improved on Eliquis. Will ct to tx and plan for long term anticoagulation since this was his 2nd VTE. Sending to Hematology again to help with hypercoag eval as needed. -     REFERRAL TO HEMATOLOGY    I have discussed the diagnosis with the patient and the intended plan as seen in the above orders. The patient has received an after-visit summary and questions were answered concerning future plans.   I have discussed medication side effects and warnings with the patient as well. The patient verbalizes understanding and agreement with the plan. Follow-up and Dispositions    · Return in about 6 weeks (around 12/26/2019), or if symptoms worsen or fail to improve.

## 2019-11-14 NOTE — TELEPHONE ENCOUNTER
Eugene Joya calling from [address] [place] [address] to know if patient can restart therapy       Best number to reach her is [phone]

## 2019-11-15 ENCOUNTER — HOME CARE VISIT (OUTPATIENT)
Dept: HOME HEALTH SERVICES | Facility: HOME HEALTH | Age: 54
End: 2019-11-15
Payer: MEDICARE

## 2019-11-18 ENCOUNTER — HOME CARE VISIT (OUTPATIENT)
Dept: HOME HEALTH SERVICES | Facility: HOME HEALTH | Age: 54
End: 2019-11-18
Payer: MEDICARE

## 2019-11-20 ENCOUNTER — HOME CARE VISIT (OUTPATIENT)
Dept: HOME HEALTH SERVICES | Facility: HOME HEALTH | Age: 54
End: 2019-11-20
Payer: MEDICARE

## 2019-11-21 ENCOUNTER — HOME CARE VISIT (OUTPATIENT)
Dept: HOME HEALTH SERVICES | Facility: HOME HEALTH | Age: 54
End: 2019-11-21
Payer: MEDICARE

## 2019-11-22 ENCOUNTER — HOME CARE VISIT (OUTPATIENT)
Dept: HOME HEALTH SERVICES | Facility: HOME HEALTH | Age: 54
End: 2019-11-22
Payer: MEDICARE

## 2019-11-26 ENCOUNTER — TELEPHONE (OUTPATIENT)
Dept: ONCOLOGY | Age: 54
End: 2019-11-26

## 2020-01-09 DIAGNOSIS — I10 ESSENTIAL HYPERTENSION: ICD-10-CM

## 2020-01-09 RX ORDER — AMLODIPINE BESYLATE 10 MG/1
TABLET ORAL
Qty: 30 TAB | Refills: 0 | Status: SHIPPED | OUTPATIENT
Start: 2020-01-09 | End: 2020-07-31 | Stop reason: SDUPTHER

## 2020-01-09 NOTE — LETTER
2/21/2020 4:54 PM 
 
Mr. [place] [id number] Dear Mr. Love Nico: 
 
Marcellus Enciso missed you! Please call our office at [phone] and schedule a follow up appointment for your continued care.  
 
 
 
Sincerely, 
 
 
Ramakrishna Carrera MD

## 2020-01-24 RX ORDER — METFORMIN HYDROCHLORIDE 500 MG/1
TABLET, EXTENDED RELEASE ORAL
Qty: 180 TAB | Refills: 0 | Status: SHIPPED | OUTPATIENT
Start: 2020-01-24 | End: 2020-05-26

## 2020-01-25 ENCOUNTER — HOSPITAL ENCOUNTER (EMERGENCY)
Age: 55
Discharge: HOME | End: 2020-01-25
Attending: EMERGENCY MEDICINE | Admitting: EMERGENCY MEDICINE
Payer: MEDICARE

## 2020-01-25 VITALS
DIASTOLIC BLOOD PRESSURE: 61 MMHG | HEART RATE: 84 BPM | TEMPERATURE: 98.2 F | SYSTOLIC BLOOD PRESSURE: 157 MMHG | BODY MASS INDEX: 36.84 KG/M2 | OXYGEN SATURATION: 98 % | HEIGHT: 72 IN | RESPIRATION RATE: 16 BRPM | WEIGHT: 272 LBS

## 2020-01-25 DIAGNOSIS — M54.50 ACUTE EXACERBATION OF CHRONIC LOW BACK PAIN: Primary | ICD-10-CM

## 2020-01-25 DIAGNOSIS — G89.29 ACUTE EXACERBATION OF CHRONIC LOW BACK PAIN: Primary | ICD-10-CM

## 2020-01-25 DIAGNOSIS — F43.10 PTSD (POST-TRAUMATIC STRESS DISORDER): ICD-10-CM

## 2020-01-25 DIAGNOSIS — F33.2 SEVERE EPISODE OF RECURRENT MAJOR DEPRESSIVE DISORDER, WITHOUT PSYCHOTIC FEATURES (HCC): ICD-10-CM

## 2020-01-25 PROCEDURE — 74011250636 HC RX REV CODE- 250/636: Performed by: PHYSICIAN ASSISTANT

## 2020-01-25 PROCEDURE — 99284 EMERGENCY DEPT VISIT MOD MDM: CPT

## 2020-01-25 PROCEDURE — 96372 THER/PROPH/DIAG INJ SC/IM: CPT

## 2020-01-25 RX ORDER — HYDROMORPHONE HYDROCHLORIDE 2 MG/ML
2 INJECTION, SOLUTION INTRAMUSCULAR; INTRAVENOUS; SUBCUTANEOUS
Status: COMPLETED | OUTPATIENT
Start: 2020-01-25 | End: 2020-01-25

## 2020-01-25 RX ORDER — MIRTAZAPINE 7.5 MG/1
TABLET, FILM COATED ORAL
Qty: 90 TAB | Refills: 0 | Status: SHIPPED | OUTPATIENT
Start: 2020-01-25 | End: 2020-09-30

## 2020-01-25 RX ORDER — HYDROMORPHONE HYDROCHLORIDE 1 MG/ML
1 INJECTION, SOLUTION INTRAMUSCULAR; INTRAVENOUS; SUBCUTANEOUS
Status: DISCONTINUED | OUTPATIENT
Start: 2020-01-25 | End: 2020-01-25

## 2020-01-25 RX ADMIN — HYDROMORPHONE HYDROCHLORIDE 2 MG: 2 INJECTION, SOLUTION INTRAMUSCULAR; INTRAVENOUS; SUBCUTANEOUS at 22:10

## 2020-01-26 NOTE — ED NOTES
MD reviewed discharge instructions and options with patient and patient verbalized understanding. RN reviewed discharge instructions using teachback method. Pt ambulated to exit without difficulty and in no signs of acute distress, and he is calling a friend who  will drive home. No complaints or needs expressed at this time. Patient was counseled on medications prescribed at discharge. VSS, verbalized relief from most intense pain. Patient to call his PCP in the morning for appointment.

## 2020-01-26 NOTE — DISCHARGE INSTRUCTIONS
Patient Education        Back Pain: Care Instructions  Your Care Instructions    Back pain has many possible causes. It is often related to problems with muscles and ligaments of the back. It may also be related to problems with the nerves, discs, or bones of the back. Moving, lifting, standing, sitting, or sleeping in an awkward way can strain the back. Sometimes you don't notice the injury until later. Arthritis is another common cause of back pain. Although it may hurt a lot, back pain usually improves on its own within several weeks. Most people recover in 12 weeks or less. Using good home treatment and being careful not to stress your back can help you feel better sooner. Follow-up care is a key part of your treatment and safety. Be sure to make and go to all appointments, and call your doctor if you are having problems. It's also a good idea to know your test results and keep a list of the medicines you take. How can you care for yourself at home? · Sit or lie in positions that are most comfortable and reduce your pain. Try one of these positions when you lie down:  ? Lie on your back with your knees bent and supported by large pillows. ? Lie on the floor with your legs on the seat of a sofa or chair. ? Lie on your side with your knees and hips bent and a pillow between your legs. ? Lie on your stomach if it does not make pain worse. · Do not sit up in bed, and avoid soft couches and twisted positions. Bed rest can help relieve pain at first, but it delays healing. Avoid bed rest after the first day of back pain. · Change positions every 30 minutes. If you must sit for long periods of time, take breaks from sitting. Get up and walk around, or lie in a comfortable position. · Try using a heating pad on a low or medium setting for 15 to 20 minutes every 2 or 3 hours. Try a warm shower in place of one session with the heating pad. · You can also try an ice pack for 10 to 15 minutes every 2 to 3 hours. Put a thin cloth between the ice pack and your skin. · Take pain medicines exactly as directed. ? If the doctor gave you a prescription medicine for pain, take it as prescribed. ? If you are not taking a prescription pain medicine, ask your doctor if you can take an over-the-counter medicine. · Take short walks several times a day. You can start with 5 to 10 minutes, 3 or 4 times a day, and work up to longer walks. Walk on level surfaces and avoid hills and stairs until your back is better. · Return to work and other activities as soon as you can. Continued rest without activity is usually not good for your back. · To prevent future back pain, do exercises to stretch and strengthen your back and stomach. Learn how to use good posture, safe lifting techniques, and proper body mechanics. When should you call for help? Call your doctor now or seek immediate medical care if:    · You have new or worsening numbness in your legs.     · You have new or worsening weakness in your legs. (This could make it hard to stand up.)     · You lose control of your bladder or bowels.    Watch closely for changes in your health, and be sure to contact your doctor if:    · You have a fever, lose weight, or don't feel well.     · You do not get better as expected. Where can you learn more? Go to http://radames-clive.info/. Enter R853 in the search box to learn more about \"Back Pain: Care Instructions. \"  Current as of: June 26, 2019  Content Version: 12.2  © 6936-7012 [employer]. Care instructions adapted under license by [employer] (which disclaims liability or warranty for this information). If you have questions about a medical condition or this instruction, always ask your healthcare professional. [place] [address] any warranty or liability for your use of this information.

## 2020-01-26 NOTE — ED TRIAGE NOTES
Patient arrives for back pain chronic in nature. Reports he has seen his back surgeon who upped his medications as well as [facility] where Dilaudid helped(CT scan was done which showed hardware was fine). Patient reports pain in low back worsened in the last three weeks. Denies any new injury. PVS intact.

## 2020-01-26 NOTE — ED PROVIDER NOTES
48 y.o. male with past medical history significant for arthritis, GERD, CKD, DM 2, asthma, thromboembolus, PTSD who presents via EMS to the ED with chief complaint of a burning sensation in low back into legs x 3 weeks. Patient states that he last had back surgery in October, lumbar revision by Dr. Jennifer Nuno, last f/u was 4 weeks ago and was doing well, still advised to wear back brace. On 1/13 he was seen at [facility] due to the burning sensation in legs but at that time also having shortness of breath and chest pain. He did have a postop PE and was on Eliquis. Patient states at [facility] he had a repeat CTA of his chest that showed improvement of his bilateral PEs, and he states he was given Dilaudid IV which helped with his symptoms in the hospital.  Patient specifically denies bowel or bladder dysfunction, numbness in his legs or groin, bowel or urinary incontinence, and denies weakness in his legs. Patient notes that he has diabetic neuropathy with decreased sensation to his feet, has been off Lyrica since prior to his back surgery and has not gotten back on it. He states that his blood sugars are well controlled and he ambulates at baseline with a walker. Patient also states that his back pain is a chronic problem for which he takes oxycontin. He is scheduled to follow-up with pain management in the near future. Patient denies any know trauma or injuries to the area. He denies shortness of breath, difficulty ambulating, wounds or any other complaints. There are no other acute medical concerns at this time. Social Hx: former Tobacco use, no EtOH use, quit Illicit Drug use (former cocaine and Marijuana use)  PCP: Kalyn Jon MD    Note written by Rekha King, as dictated by Mckenzie James PA-C 9:30 PM       The history is provided by the patient. No  was used.         Past Medical History:   Diagnosis Date    Arthritis     2010    Asthma 1995    INHALER USE PRN    Chronic bilateral low back pain without sciatica 11/6/2017    Chronic hepatitis C without hepatic coma ([address] 11/6/2017    [facility] at [facility] Chronic pain     lower back    Essential hypertension 11/6/2017    GERD (gastroesophageal reflux disease)     History of cardiac cath     Hypercholesterolemia     Mild episode of recurrent major depressive disorder ([address] 11/06/2012    Morbid obesity ([address]     PTSD (post-traumatic stress disorder)     Sleep apnea     pt stated was taken off cpap while on blood thinner    Stage 3 chronic kidney disease ([address] 11/06/2017    IMPROVED     Thromboembolus ([address]     1/3/19  DVT,PE while taking testosterone    Uncontrolled type 2 diabetes mellitus with peripheral neuropathy ([address] 11/06/2011       Past Surgical History:   Procedure Laterality Date    HX BACK SURGERY  04/10/2018    Fusion L4,L5    HX COLONOSCOPY  2013    CJW    HX HEART CATHETERIZATION  2017    NEG PER PATIENT    HX HERNIA REPAIR  2853    UMBILICAL    HX KNEE ARTHROSCOPY  1999    right knee    HX KNEE ARTHROSCOPY  2002    left knee    HX ROTATOR CUFF REPAIR Right 2016    HX UROLOGICAL  2015    Vasectomy          Family History:   Problem Relation Age of Onset    Hypertension Mother     Diabetes Mother     Heart Disease Mother         cabg    Pneumonia Father 67    Diabetes Father     Diabetes Sister     Other Brother 9        House Fire    Diabetes Sister     Other Sister 15        House Fire    Other Sister 6        House Fire    Other Sister 9        House Fire    Other Son 6        HURRICANE ARA    Anesth Problems Neg Hx        Social History     Socioeconomic History    Marital status: LEGALLY      Spouse name: Not on file    Number of children: Not on file    Years of education: Not on file    Highest education level: Not on file   Occupational History    Not on file   Social Needs    Financial resource strain: Not on file    Food insecurity:     Worry: Not on file     Inability: Not on file    Transportation needs:     Medical: Not on file     Non-medical: Not on file   Tobacco Use    Smoking status: Former Smoker     Packs/day: 1.50     Years: 19.00     Pack years: 28.50     Last attempt to quit: 8/6/2009     Years since quitting: 10.4    Smokeless tobacco: Never Used   Substance and Sexual Activity    Alcohol use: No    Drug use: Yes     Types: Marijuana, Cocaine     Comment: Quit 2013/Relapsed 7/31/19, last used marijuana 4/2019    Sexual activity: Not Currently   Lifestyle    Physical activity:     Days per week: Not on file     Minutes per session: Not on file    Stress: Not on file   Relationships    Social connections:     Talks on phone: Not on file     Gets together: Not on file     Attends Mandaeism service: Not on file     Active member of club or organization: Not on file     Attends meetings of clubs or organizations: Not on file     Relationship status: Not on file    Intimate partner violence:     Fear of current or ex partner: Not on file     Emotionally abused: Not on file     Physically abused: Not on file     Forced sexual activity: Not on file   Other Topics Concern    Not on file   Social History Narrative    Not on file         ALLERGIES: Patient has no known allergies. Review of Systems   Genitourinary: Negative for enuresis and frequency. Musculoskeletal: Positive for back pain. Negative for gait problem. Skin: Negative for color change, pallor and wound. Vitals:    01/25/20 2045   BP: (!) 169/98   Pulse: 91   Resp: 16   Temp: 98 °F (36.7 °C)   SpO2: 98%   Weight: 123.4 kg (272 lb)   Height: 6' (1.829 m)            Physical Exam  Vitals signs and nursing note reviewed. Constitutional:       General: He is not in acute distress. Appearance: He is well-developed. He is not toxic-appearing or diaphoretic.       Comments: AA male, not wearing back brace, has it with him though   HENT:      Head: Normocephalic and atraumatic. Eyes:      General:         Right eye: No discharge. Left eye: No discharge. Conjunctiva/sclera: Conjunctivae normal.      Pupils: Pupils are equal, round, and reactive to light. Neck:      Musculoskeletal: Full passive range of motion without pain and normal range of motion. Trachea: No tracheal tenderness. Cardiovascular:      Rate and Rhythm: Normal rate and regular rhythm. Pulses: Normal pulses. Heart sounds: Normal heart sounds. No murmur. No friction rub. No gallop. Pulmonary:      Effort: Pulmonary effort is normal. No respiratory distress. Breath sounds: Normal breath sounds. No wheezing or rales. Chest:      Chest wall: No tenderness. Abdominal:      General: Bowel sounds are normal. There is no distension. Palpations: Abdomen is soft. Tenderness: There is no tenderness. There is no guarding or rebound. Musculoskeletal: Normal range of motion. General: Tenderness (TTP along entire op scar, around op scar, even to light touch; no warmth, erythema, or abnoramlities visible on exam of low back; nvi throughout; moving legs and equal strength.) present. Skin:     General: Skin is warm and dry. Capillary Refill: Capillary refill takes less than 2 seconds. Findings: No abrasion, erythema or rash. Neurological:      Mental Status: He is alert and oriented to person, place, and time. Cranial Nerves: No cranial nerve deficit. Sensory: No sensory deficit.       Coordination: Coordination normal.   Psychiatric:         Speech: Speech normal.         Behavior: Behavior normal.          MDM  Number of Diagnoses or Management Options  Acute exacerbation of chronic low back pain:   Diagnosis management comments:   Neuropathic pain, acute exacerbation of chronic low back pain       Amount and/or Complexity of Data Reviewed  Review and summarize past medical records: yes  Discuss the patient with other providers: yes    Patient Progress  Patient progress: stable         Procedures      LABORATORY TESTS:  No results found for this or any previous visit (from the past 12 hour(s)). IMAGING RESULTS:    MEDICATIONS GIVEN:  Medications   HYDROmorphone (PF) (DILAUDID) injection 2 mg (2 mg IntraMUSCular Given 1/25/20 2210)         DISCHARGE NOTE:  11:04 PM  The patient's results have been reviewed with them and/or available family. Patient and/or family verbally conveyed their understanding and agreement of the patient's signs, symptoms, diagnosis, treatment and prognosis and additionally agree to follow up as recommended in the discharge instructions or to return to the Emergency Room should their condition change prior to their follow-up appointment. The patient/family verbally agrees with the care-plan and verbally conveys that all of their questions have been answered. The discharge instructions have also been provided to the patient and/or family with some educational information regarding the patient's diagnosis as well a list of reasons why the patient would want to return to the ER prior to their follow-up appointment, should their condition change. Plan:  1. F/U with pain management, ortho spine  2. Continue oxy 10mg  3. Uncomfortable adding prednisone, states he was on that and abx 3 weeks ago with CHIP and is also diabetic. encouraged f/u with ortho based on his complaints. Fever-free, no other concerning sx's to warrant emergent imaging and he is at his baseline ambulation.      Return precautions discussed and advised to return to ER if worse

## 2020-05-15 ENCOUNTER — TELEPHONE (OUTPATIENT)
Dept: FAMILY MEDICINE CLINIC | Age: 55
End: 2020-05-15

## 2020-05-15 DIAGNOSIS — F43.10 PTSD (POST-TRAUMATIC STRESS DISORDER): ICD-10-CM

## 2020-05-15 DIAGNOSIS — F33.2 SEVERE EPISODE OF RECURRENT MAJOR DEPRESSIVE DISORDER, WITHOUT PSYCHOTIC FEATURES (HCC): ICD-10-CM

## 2020-05-15 RX ORDER — MIRTAZAPINE 7.5 MG/1
TABLET, FILM COATED ORAL
Qty: 90 TAB | Refills: 0 | OUTPATIENT
Start: 2020-05-15

## 2020-05-15 NOTE — TELEPHONE ENCOUNTER
Dr Ash English  From [facility]     Being discharged today   Wants to know what can be done as the patient will be on Coumadin and labs will be required     Pls call him at  [phone]

## 2020-05-15 NOTE — TELEPHONE ENCOUNTER
Spoke with Dr Simon Guerra,  stated he is discharging pt today on Warfarin, 5/15/20 INR is  2.7,   he will be alternating between 7.5mg and 10 mg tabs,  todays dose will be 7.5mg,  would like pt seen on Tuesday for INR check.      Call placed to pt,  appt scheduled for Tuesday, May 19, 2020 08:30 AM

## 2020-05-19 ENCOUNTER — VIRTUAL VISIT (OUTPATIENT)
Dept: FAMILY MEDICINE CLINIC | Age: 55
End: 2020-05-19

## 2020-05-19 VITALS — BODY MASS INDEX: 36.84 KG/M2 | HEIGHT: 72 IN | WEIGHT: 272 LBS

## 2020-05-19 DIAGNOSIS — M48.00 SPINAL STENOSIS, UNSPECIFIED SPINAL REGION: ICD-10-CM

## 2020-05-19 DIAGNOSIS — Z86.711 HISTORY OF PULMONARY EMBOLUS (PE): ICD-10-CM

## 2020-05-19 DIAGNOSIS — Z09 HOSPITAL DISCHARGE FOLLOW-UP: Primary | ICD-10-CM

## 2020-05-19 DIAGNOSIS — Z79.01 ANTICOAGULATED ON COUMADIN: ICD-10-CM

## 2020-05-19 DIAGNOSIS — I10 ESSENTIAL HYPERTENSION: ICD-10-CM

## 2020-05-19 DIAGNOSIS — G47.33 OSA (OBSTRUCTIVE SLEEP APNEA): ICD-10-CM

## 2020-05-19 DIAGNOSIS — Z86.718 HISTORY OF DVT OF LOWER EXTREMITY: ICD-10-CM

## 2020-05-19 DIAGNOSIS — Z98.890 S/P LUMBAR LAMINECTOMY: ICD-10-CM

## 2020-05-19 RX ORDER — PREGABALIN 100 MG/1
100 CAPSULE ORAL
COMMUNITY
End: 2020-05-19 | Stop reason: SDUPTHER

## 2020-05-19 RX ORDER — OXYCODONE HYDROCHLORIDE 10 MG/1
10 TABLET ORAL
Qty: 28 TAB | Refills: 0 | Status: SHIPPED | OUTPATIENT
Start: 2020-05-19 | End: 2020-05-26 | Stop reason: SDUPTHER

## 2020-05-19 RX ORDER — WARFARIN 10 MG/1
TABLET ORAL
COMMUNITY
Start: 2020-05-15 | End: 2020-06-12 | Stop reason: SDUPTHER

## 2020-05-19 RX ORDER — OXYCODONE HYDROCHLORIDE 5 MG/1
TABLET ORAL
COMMUNITY
Start: 2020-05-15 | End: 2020-05-19 | Stop reason: SDUPTHER

## 2020-05-19 RX ORDER — WARFARIN 7.5 MG/1
TABLET ORAL
COMMUNITY
Start: 2020-05-15 | End: 2020-06-12 | Stop reason: SDUPTHER

## 2020-05-19 RX ORDER — PREGABALIN 100 MG/1
100 CAPSULE ORAL
Qty: 180 CAP | Refills: 0 | Status: SHIPPED | OUTPATIENT
Start: 2020-05-19 | End: 2020-09-16 | Stop reason: SDUPTHER

## 2020-05-19 RX ORDER — ASPIRIN 81 MG/1
TABLET ORAL
COMMUNITY
Start: 2020-05-15 | End: 2020-09-16 | Stop reason: SDUPTHER

## 2020-05-19 NOTE — PROGRESS NOTES
Chief Complaint   Patient presents with   [facility] Follow Up     dvt     1. Have you been to the ER, urgent care clinic since your last visit? Hospitalized since your last visit? Yes When: 5/7/2020 Where: [facility] Reason for visit: shortness of breath    2. Have you seen or consulted any other health care providers outside of the [address] [place] [address] since your last visit? Include any pap smears or colon screening. No    Call placed to pt. Verified patient with two type of identifiers.  came today for hosp follow up,  Admitted to [facility] on 5/7/2020 for shortness of breath and lower extremity swelling,  Dx with PE and DVT,  Discharged on 5/15/2020, was started on coumadin 7.5mg alternating with 10 mg,  Last dose this morning 7.5,  Has not scheduled his cardiology appt at this time

## 2020-05-19 NOTE — PROGRESS NOTES
Ezekiel Valdovinos is a 50 y.o. male who was seen by synchronous (real-time) audio-video technology on 5/19/2020. Consent: Ezekiel Valdovinos, who was seen by synchronous (real-time) audio-video technology, and/or his healthcare decision maker, is aware that this patient-initiated, Telehealth encounter on 5/19/2020 is a billable service, with coverage as determined by his insurance carrier. He is aware that he may receive a bill and has provided verbal consent to proceed: Yes. Assessment & Plan:   Diagnoses and all orders for this visit:    1. Hospital discharge follow-up  -     CBC W/O DIFF  -     METABOLIC PANEL, COMPREHENSIVE  -     HEMOGLOBIN A1C WITH EAG  -     LIPID PANEL  -     TSH 3RD GENERATION    2. History of pulmonary embolus (PE)  -     SLEEP MEDICINE REFERRAL  -     CBC W/O DIFF  -     PROTHROMBIN TIME + INR  -     REFERRAL TO HEMATOLOGY    3. History of DVT of lower extremity  -     SLEEP MEDICINE REFERRAL  -     CBC W/O DIFF  -     PROTHROMBIN TIME + INR  -     REFERRAL TO HEMATOLOGY    4. SHIRA (obstructive sleep apnea)  -     SLEEP MEDICINE REFERRAL    5. Uncontrolled type 2 diabetes mellitus with peripheral neuropathy (HCC)  -     METABOLIC PANEL, COMPREHENSIVE  -     HEMOGLOBIN A1C WITH EAG  -     LIPID PANEL  -     TSH 3RD GENERATION    6. Essential hypertension  -     METABOLIC PANEL, COMPREHENSIVE    7. Spinal stenosis, unspecified spinal region  -     pregabalin (LYRICA) 100 mg capsule; Take 1 Cap by mouth two (2) times daily as needed for Pain. Max Daily Amount: 200 mg.  -     oxyCODONE IR (ROXICODONE) 10 mg tab immediate release tablet; Take 1 Tab by mouth every six (6) hours as needed for Pain for up to 7 days. Max Daily Amount: 40 mg.  -     REFERRAL TO PAIN MANAGEMENT    8. S/P lumbar laminectomy  -     pregabalin (LYRICA) 100 mg capsule; Take 1 Cap by mouth two (2) times daily as needed for Pain.  Max Daily Amount: 200 mg.  -     oxyCODONE IR (ROXICODONE) 10 mg tab immediate release tablet; Take 1 Tab by mouth every six (6) hours as needed for Pain for up to 7 days. Max Daily Amount: 40 mg.  -     REFERRAL TO PAIN MANAGEMENT    9. Anticoagulated on Coumadin  -     CBC W/O DIFF  -     PROTHROMBIN TIME + INR      Follow-up and Dispositions    · Return in about 2 weeks (around 6/2/2020). Subjective:   James Newman is a 50 y.o. male who was seen for Hospital Follow Up (dvt)    Hospital discharge follow-up was admitted to [facility] on 5/7/2020 5/15/2020 for PE and DVT. Last visit with me was 11/2019 for DVT and bilateral PE at that time. he had an insurance change after that so was unable to follow-up with me until getting on an insurance plan work to be sleepy. He never followed up with Dr. Yeyo Clark (hematology) on 12/23/2019 as scheduled for the same reasonwas previously seeing Dr. Gaby Cowan at [facility]. This was his third VTE and he was supposed to be on Eliquis long-term for anticoagulation. Now on Coumadin and his INR at discharge on 5/15/2020 was 2.7. He will continue alternating 7.5 mg and 10 mg. He can to struggle with chronic pain. Has a history of spinal stenosis status post lumbar surgery x2 with Dr. Blanca Galeano who has now retired. He is working to see in a therapeutic spinal surgeon that will work with his insurance. He is not interested in having need for surgery but continues to struggle with pain especially in his low back radiating down his legs. He has been on Lyrica which is helped significantly. During his hospitalization he was put on oxycodone 10 mg 4 times daily PRN. He did see Dr. Marvel Malik in the past for pain management and is interested in following up. He was also taken off his CPAP after his last surgery due to his bilateral PEs. He needs to check in with sleep medicine for new PSG. For his diabetes, he tells me that his sugars have been mostly in the low 100s. Even in the hospital, his highest sugar was 170.   Feels like even probably well. Last A1c on file was 7.3% back in 10/2019. Continues on metformin. Prior to Admission medications    Medication Sig Start Date End Date Taking? Authorizing Provider   aspirin delayed-release 81 mg tablet  5/15/20  Yes Provider, Historical   warfarin (COUMADIN) 7.5 mg tablet  5/15/20  Yes Provider, Historical   warfarin (COUMADIN) 10 mg tablet  5/15/20  Yes Provider, Historical   pregabalin (LYRICA) 100 mg capsule Take 1 Cap by mouth two (2) times daily as needed for Pain. Max Daily Amount: 200 mg. 5/19/20  Yes Deedee Dooley MD   oxyCODONE IR (ROXICODONE) 10 mg tab immediate release tablet Take 1 Tab by mouth every six (6) hours as needed for Pain for up to 7 days. Max Daily Amount: 40 mg. 5/19/20 5/26/20 Yes Deedee Dooley MD   mirtazapine (REMERON) 7.5 mg tablet TAKE 1 TABLET BY MOUTH EVERY DAY AT NIGHT 1/25/20  Yes Deedee Dooley MD   amLODIPine (NORVASC) 10 mg tablet TAKE 1 TABLET BY MOUTH EVERY DAY 1/9/20  Yes Deedee Dooley MD   atorvastatin (LIPITOR) 40 mg tablet Take 1 Tab by mouth nightly. 11/7/19  Yes Deedee Dooley MD   montelukast (SINGULAIR) 10 mg tablet Take 10 mg by mouth daily. Yes Provider, Historical   cyclobenzaprine (FLEXERIL) 10 mg tablet Take 10 mg by mouth three (3) times daily as needed for Muscle Spasm(s). Yes Provider, Historical   metFORMIN (GLUMETZA ER) 500 mg TG24 24 hour tablet Take 500 mg by mouth two (2) times a day. Yes Provider, Historical   fluticasone propion-salmeterol (WIXELA INHUB) 250-50 mcg/dose diskus inhaler Take 1 Puff by inhalation two (2) times a day. Yes Deedee Dooley MD   traZODone (DESYREL) 50 mg tablet Take 50 mg by mouth nightly. Yes Deedee Dooley MD   docusate sodium (COLACE) 100 mg capsule Take 100 mg by mouth two (2) times daily as needed for Constipation. Yes Robby Griggs MD   furosemide (LASIX) 20 mg tablet Take 20 mg by mouth daily.    Yes Deedee Dooley MD   lisinopril (PRINIVIL, ZESTRIL) 40 mg tablet TAKE 1 TABLET BY MOUTH EVERY DAY  Patient taking differently: Take 40 mg by mouth every morning. 9/10/19  Yes Clayton Velazquez MD   DULoxetine (CYMBALTA) 60 mg capsule TAKE ONE CAPSULE BY MOUTH EVERY MORNING ALONG WITH 30 MG  Patient taking differently: Take 60 mg by mouth daily. TAKE ONE CAPSULE BY MOUTH EVERY MORNING ALONG WITH 30 MG 8/22/19  Yes Clayton Velazquez MD   DULoxetine (CYMBALTA) 30 mg capsule Take 1 capsule by mouth every morning along with 60 mg dose  Patient taking differently: Take 30 mg by mouth daily. 8/22/19  Yes Clayton Velazquez MD   LEVEMIR FLEXTOUCH U-100 INSULN 100 unit/mL (3 mL) inpn INJECT 65 UNITS BY SUBCUTANEOUS ROUTE TWO (2) TIMES A DAY 5/30/19  Yes Clayton Velazquez MD   albuterol (PROVENTIL HFA, VENTOLIN HFA, PROAIR HFA) 90 mcg/actuation inhaler Take 2 Puffs by inhalation every four (4) hours as needed for Wheezing. 1/15/19  Yes Clayton Velazquez MD   albuterol (PROVENTIL VENTOLIN) 2.5 mg /3 mL (0.083 %) nebulizer solution 3 mL by Nebulization route every four (4) hours as needed for Wheezing. 3/21/18  Yes Clayton Velazquez MD   oxyCODONE IR (ROXICODONE) 5 mg immediate release tablet  5/15/20 5/19/20  Provider, Historical   pregabalin (LYRICA) 100 mg capsule Take 100 mg by mouth two (2) times daily as needed. 5/19/20  Provider, Historical   metFORMIN ER (GLUCOPHAGE XR) 500 mg tablet TAKE 2 TABLETS BY MOUTH DAILY WITH DINNER 1/24/20   Clayton Velazquez MD   apixaban (ELIQUIS) 5 mg tablet Take 10 mg two times daily for 12 doses starting tonight, then switch to 5 mg twice daily  Patient not taking: Reported on 5/19/2020 11/8/19 5/19/20  Dawson Nelson MD   HYDROmorphone (DILAUDID) 4 mg tablet Take 1-1.5 Tabs by mouth every four (4) hours as needed for Pain. Shira Sandy MD   senna (SENNA) 8.6 mg tablet Take 2 Tabs by mouth daily. Shira Sandy MD   prazosin (MINIPRESS) 2 mg capsule Take 2 mg by mouth nightly.  7/29/19 Provider, Historical     No Known Allergies    Patient Active Problem List    Diagnosis Date Noted    Pulmonary emboli ([address] 11/07/2019    Lumbar spinal stenosis 10/30/2019    Type 2 diabetes mellitus with diabetic neuropathy ([address] 11/14/2018    Type 2 diabetes with nephropathy ([address] 04/10/2018    Spondylolisthesis, lumbar region 04/10/2018    Spinal stenosis 02/27/2018    Apnea 02/27/2018    Asthma 02/27/2018    Obesity, morbid ([address] 12/04/2017    Uncontrolled type 2 diabetes mellitus with peripheral neuropathy ([address] 11/06/2017    Essential hypertension 11/06/2017    Mild episode of recurrent major depressive disorder ([address] 11/06/2017    Chronic hepatitis C without hepatic coma ([address] 11/06/2017    Chronic bilateral low back pain without sciatica 11/06/2017    Mixed hyperlipidemia 11/06/2017    Testicular mass 10/29/2013     Past Medical History:   Diagnosis Date    Arthritis     2010    Asthma 1995    INHALER USE PRN    Chronic bilateral low back pain without sciatica 11/6/2017    Chronic hepatitis C without hepatic coma ([address] 11/6/2017    [facility] at [facility] Chronic pain     lower back    Essential hypertension 11/6/2017    GERD (gastroesophageal reflux disease)     History of cardiac cath     Hypercholesterolemia     Mild episode of recurrent major depressive disorder ([address] 11/06/2012    Morbid obesity ([address]     PTSD (post-traumatic stress disorder)     Sleep apnea     pt stated was taken off cpap while on blood thinner    Stage 3 chronic kidney disease ([address] 11/06/2017    IMPROVED     Thromboembolus ([address]     1/3/19  DVT,PE while taking testosterone    Uncontrolled type 2 diabetes mellitus with peripheral neuropathy ([address] 11/06/2011       ROS  Gen - no fever/chills  Resp - no dyspnea or cough  CV - no chest pain or BROWN  Rest per HPI    Objective:   Vital Signs: (As obtained by patient/caregiver at home)  Visit Vitals  Ht 6' (1.829 m)   Wt 272 lb (123.4 kg)   BMI 36.89 kg/m² Physical exam:  General appearance - alert, well appearing, and in no distress  Eyes -sclera anicteric, no discharge  HEENT normocephalic, atraumatic, moist mucous membranes, no visualized neck mass  Chest -normal respiratory effort, no visualized signs of respiratory distress  Neurological - alert, awake, normal speech, no focal findings or movement disorder noted  Psych - normal mood and affect  Skin no apparent lesions    We discussed the expected course, resolution and complications of the diagnosis(es) in detail. Medication risks, benefits, costs, interactions, and alternatives were discussed as indicated. I advised him to contact the office if his condition worsens, changes or fails to improve as anticipated. He expressed understanding with the diagnosis(es) and plan. Mell Alvarez. is a 50 y.o. male who was evaluated by a video visit encounter for concerns as above. Patient identification was verified prior to start of the visit. A caregiver was present when appropriate. Due to this being a TeleHealth encounter (During QVUSQ-43 public health emergency), evaluation of the following organ systems was limited: Vitals/Constitutional/EENT/Resp/CV/GI//MS/Neuro/Skin/Heme-Lymph-Imm. Pursuant to the emergency declaration under the [facility], 1135 waiver authority and the [employer] and [employer] Act, this Virtual  Visit was conducted, with patient's (and/or legal guardian's) consent, to reduce the patient's risk of exposure to COVID-19 and provide necessary medical care. Services were provided through a video synchronous discussion virtually to substitute for in-person clinic visit. Patient and provider were located at their individual homes.       Jarrod Savage MD

## 2020-05-21 LAB
ALBUMIN SERPL-MCNC: 4.2 G/DL (ref 3.8–4.9)
ALBUMIN/GLOB SERPL: 1.4 {RATIO} (ref 1.2–2.2)
ALP SERPL-CCNC: 94 IU/L (ref 39–117)
ALT SERPL-CCNC: 28 IU/L (ref 0–44)
AST SERPL-CCNC: 17 IU/L (ref 0–40)
BILIRUB SERPL-MCNC: 0.2 MG/DL (ref 0–1.2)
BUN SERPL-MCNC: 14 MG/DL (ref 6–24)
BUN/CREAT SERPL: 10 (ref 9–20)
CALCIUM SERPL-MCNC: 10 MG/DL (ref 8.7–10.2)
CHLORIDE SERPL-SCNC: 105 MMOL/L (ref 96–106)
CHOLEST SERPL-MCNC: 136 MG/DL (ref 100–199)
CO2 SERPL-SCNC: 24 MMOL/L (ref 20–29)
CREAT SERPL-MCNC: 1.38 MG/DL (ref 0.76–1.27)
ERYTHROCYTE [DISTWIDTH] IN BLOOD BY AUTOMATED COUNT: 13.2 % (ref 11.6–15.4)
EST. AVERAGE GLUCOSE BLD GHB EST-MCNC: 252 MG/DL
GLOBULIN SER CALC-MCNC: 2.9 G/DL (ref 1.5–4.5)
GLUCOSE SERPL-MCNC: 187 MG/DL (ref 65–99)
HBA1C MFR BLD: 10.4 % (ref 4.8–5.6)
HCT VFR BLD AUTO: 40.7 % (ref 37.5–51)
HDLC SERPL-MCNC: 40 MG/DL
HGB BLD-MCNC: 14 G/DL (ref 13–17.7)
INR PPP: 3.1 (ref 0.8–1.2)
INTERPRETATION: NORMAL
LDLC SERPL CALC-MCNC: 65 MG/DL (ref 0–99)
Lab: NORMAL
MCH RBC QN AUTO: 27.9 PG (ref 26.6–33)
MCHC RBC AUTO-ENTMCNC: 34.4 G/DL (ref 31.5–35.7)
MCV RBC AUTO: 81 FL (ref 79–97)
PLATELET # BLD AUTO: 314 X10E3/UL (ref 150–450)
POTASSIUM SERPL-SCNC: 4.1 MMOL/L (ref 3.5–5.2)
PROT SERPL-MCNC: 7.1 G/DL (ref 6–8.5)
PROTHROMBIN TIME: 30.8 SEC (ref 9.1–12)
RBC # BLD AUTO: 5.02 X10E6/UL (ref 4.14–5.8)
SODIUM SERPL-SCNC: 141 MMOL/L (ref 134–144)
TRIGL SERPL-MCNC: 156 MG/DL (ref 0–149)
TSH SERPL DL<=0.005 MIU/L-ACNC: 1.55 UIU/ML (ref 0.45–4.5)
VLDLC SERPL CALC-MCNC: 31 MG/DL (ref 5–40)
WBC # BLD AUTO: 5.2 X10E3/UL (ref 3.4–10.8)

## 2020-05-26 DIAGNOSIS — M48.00 SPINAL STENOSIS, UNSPECIFIED SPINAL REGION: ICD-10-CM

## 2020-05-26 DIAGNOSIS — Z98.890 S/P LUMBAR LAMINECTOMY: ICD-10-CM

## 2020-05-26 RX ORDER — METFORMIN HYDROCHLORIDE 500 MG/1
TABLET, EXTENDED RELEASE ORAL
Qty: 180 TAB | Refills: 0 | Status: SHIPPED | OUTPATIENT
Start: 2020-05-26 | End: 2020-08-19

## 2020-05-26 RX ORDER — OXYCODONE HYDROCHLORIDE 10 MG/1
10 TABLET ORAL
Qty: 28 TAB | Refills: 0 | Status: SHIPPED | OUTPATIENT
Start: 2020-05-26 | End: 2020-06-02

## 2020-05-26 RX ORDER — CYCLOBENZAPRINE HCL 10 MG
10 TABLET ORAL
Qty: 90 TAB | Refills: 0 | Status: SHIPPED | OUTPATIENT
Start: 2020-05-26 | End: 2020-06-22

## 2020-05-26 NOTE — PROGRESS NOTES
Refilled oxycodone and flexeril today. Needs f/u in 1 week. Needs to connect with pain management soon.

## 2020-06-01 ENCOUNTER — TELEPHONE (OUTPATIENT)
Dept: FAMILY MEDICINE CLINIC | Age: 55
End: 2020-06-01

## 2020-06-02 DIAGNOSIS — M48.00 SPINAL STENOSIS, UNSPECIFIED SPINAL REGION: ICD-10-CM

## 2020-06-02 DIAGNOSIS — Z98.890 S/P LUMBAR LAMINECTOMY: ICD-10-CM

## 2020-06-02 RX ORDER — PANTOPRAZOLE SODIUM 40 MG/1
40 TABLET, DELAYED RELEASE ORAL DAILY
COMMUNITY
Start: 2020-05-15 | End: 2020-12-18 | Stop reason: SDUPTHER

## 2020-06-02 RX ORDER — ALBUTEROL SULFATE 90 UG/1
POWDER, METERED RESPIRATORY (INHALATION)
COMMUNITY
Start: 2020-05-15 | End: 2021-06-28 | Stop reason: SDUPTHER

## 2020-06-02 RX ORDER — METHOCARBAMOL 750 MG/1
TABLET, FILM COATED ORAL
COMMUNITY
Start: 2020-05-15 | End: 2020-08-19 | Stop reason: ALTCHOICE

## 2020-06-02 RX ORDER — OXYCODONE HYDROCHLORIDE 10 MG/1
10 TABLET ORAL
Qty: 28 TAB | Refills: 0 | OUTPATIENT
Start: 2020-06-02 | End: 2020-06-09

## 2020-06-02 NOTE — TELEPHONE ENCOUNTER
----- Message from Go Hare sent at 6/2/2020  8:54 AM EDT -----  Regarding: Dr. Sonya Estevez  Medication Refill    Caller (if not patient):      Relationship of caller (if not patient):      Best contact number(s):[phone]      Name of medication and dosage if known:Oxycodone      Is patient out of this medication (yes/no):yes      Pharmacy name:[facility]    Pharmacy listed in chart? (yes/no):yes  Pharmacy phone number:      Details to clarify the request:refill of Oxycodone      Go Hare

## 2020-06-03 ENCOUNTER — OFFICE VISIT (OUTPATIENT)
Dept: FAMILY MEDICINE CLINIC | Age: 55
End: 2020-06-03

## 2020-06-03 VITALS
DIASTOLIC BLOOD PRESSURE: 72 MMHG | WEIGHT: 268 LBS | OXYGEN SATURATION: 97 % | HEART RATE: 99 BPM | RESPIRATION RATE: 16 BRPM | BODY MASS INDEX: 36.3 KG/M2 | TEMPERATURE: 96.5 F | HEIGHT: 72 IN | SYSTOLIC BLOOD PRESSURE: 124 MMHG

## 2020-06-03 DIAGNOSIS — Z86.718 HISTORY OF DVT OF LOWER EXTREMITY: ICD-10-CM

## 2020-06-03 DIAGNOSIS — Z79.01 ANTICOAGULATED ON COUMADIN: ICD-10-CM

## 2020-06-03 DIAGNOSIS — Z98.890 S/P LUMBAR LAMINECTOMY: ICD-10-CM

## 2020-06-03 DIAGNOSIS — Z86.711 HISTORY OF PULMONARY EMBOLUS (PE): ICD-10-CM

## 2020-06-03 DIAGNOSIS — M48.00 SPINAL STENOSIS, UNSPECIFIED SPINAL REGION: ICD-10-CM

## 2020-06-03 DIAGNOSIS — D68.59 HYPERCOAGULABLE STATE (HCC): Primary | ICD-10-CM

## 2020-06-03 LAB
INR BLD: 2.8
PT POC: NORMAL
VALID INTERNAL CONTROL?: YES

## 2020-06-03 RX ORDER — HYDROCODONE BITARTRATE AND ACETAMINOPHEN 10; 325 MG/1; MG/1
1 TABLET ORAL
Qty: 28 TAB | Refills: 0 | Status: SHIPPED | OUTPATIENT
Start: 2020-06-03 | End: 2020-06-03

## 2020-06-03 NOTE — Clinical Note
Please call patient to discuss increasing Levemir to 70 units twice daily. Continue to monitor sugars and will discuss again in 2 weeks when returning for INR check.   Thanks

## 2020-06-03 NOTE — PROGRESS NOTES
Chief Complaint   Patient presents with    Anticoagulation     PT/INR   1. Have you been to the ER, urgent care clinic since your last visit? Hospitalized since your last visit? No    2. Have you seen or consulted any other health care providers outside of the [address] [place] [address] since your last visit? Include any pap smears or colon screening.  No   Discuss pain medication

## 2020-06-03 NOTE — PROGRESS NOTES
Subjective:     Chief Complaint   Patient presents with    Anticoagulation     PT/INR        He  is a 50 y.o. male who presents for evaluation of:    Anticoagulation history of 3 VTEs. Most recently, admitted to [facility] on 5/7/2020 5/15/2020 for PE and DVT. Previously had  for DVT and bilateral PE in 11/2019. He had an insurance change after that so was unable to follow-up with me until getting on an insurance plan work to be sleepy. He never followed up with Dr. Radha Mac (hematology) on 12/23/2019 as scheduled for the same reasonwas previously seeing Dr. Tania Scott at [facility]. This was his third VTE and he was supposed to be on Eliquis long-term for anticoagulation. Now on Coumadin and his INR at discharge on 5/15/2020 was 2.7. He will continue alternating 7.5 mg and 10 mg. Chronic paincontinues to be a struggle. Has a history of spinal stenosis status post lumbar surgery x2 with Dr. Sharron Sandhoff who has now retired. He is working to see in a therapeutic spinal surgeon that will work with his insurance. He is not interested in having need for surgery but continues to struggle with pain especially in his low back radiating down his legs. He has been on Lyrica which is helped significantly. During his hospitalization he was put on oxycodone 10 mg 4 times daily PRN. He did see Dr. Mima Otero in the past for pain management and is interested in following upreferral was placed at last appointment but patient says he is still waiting for documents to be sent to Dr. Jens Hill. Does have a past history of drug use but has not used in many years. He was also taken off his CPAP after his last surgery due to his bilateral PEs. He needs to check in with sleep medicine for new PSGhas not done this yet.     Diabetes Mellitus: Uncontrolled with most recent A1c 10.4%  Taking meds, home glucose monitoring: is performed sporadically  Reports no polyuria or polydipsia, no chest pain, dyspnea or TIA's, chronic numbness, tingling or pain in extremities partly related to surgery  Currently in too much pain to exercise  Pt is a non smoker.     Lab Results   Component Value Date/Time    Hemoglobin A1c (POC) 7.7 11/14/2018 10:42 AM    Hemoglobin A1c (POC) 8.1 08/14/2018 04:30 PM    Hemoglobin A1c 10.4 (H) 05/20/2020 09:04 AM    Microalb/Creat ratio (ug/mg creat.) 5.9 04/10/2018 09:24 AM    LDL, calculated 65 05/20/2020 09:04 AM    Creatinine 1.38 (H) 05/20/2020 09:04 AM      Lab Results   Component Value Date/Time    GFR est AA 67 05/20/2020 09:04 AM    GFR est non-AA 58 (L) 05/20/2020 09:04 AM      Lab Results   Component Value Date/Time    TSH 1.550 05/20/2020 09:04 AM       ROS  Gen - no fever/chills  Resp - no dyspnea or cough  CV - no chest pain or BROWN  Rest per HPI    Past Medical History:   Diagnosis Date    Arthritis     2010    Asthma 1995    INHALER USE PRN    Chronic bilateral low back pain without sciatica 11/6/2017    Chronic hepatitis C without hepatic coma ([address] 11/6/2017    [facility] Chronic pain     lower back    Essential hypertension 11/6/2017    GERD (gastroesophageal reflux disease)     History of cardiac cath     Hypercholesterolemia     Mild episode of recurrent major depressive disorder ([address] 11/06/2012    Morbid obesity ([address]     PTSD (post-traumatic stress disorder)     Sleep apnea     pt stated was taken off cpap while on blood thinner    Stage 3 chronic kidney disease ([address] 11/06/2017    IMPROVED     Thromboembolus ([address]     1/3/19  DVT,PE while taking testosterone    Uncontrolled type 2 diabetes mellitus with peripheral neuropathy ([address] 11/06/2011     Past Surgical History:   Procedure Laterality Date    HX BACK SURGERY  04/10/2018    Fusion L4,L5    HX COLONOSCOPY  2013    CJW    HX HEART CATHETERIZATION  2017    NEG PER PATIENT    HX HERNIA REPAIR  7286    UMBILICAL    HX KNEE ARTHROSCOPY  1999    right knee    HX KNEE ARTHROSCOPY  2002    left knee    HX ROTATOR CUFF REPAIR Right 2016    HX UROLOGICAL  2015    Vasectomy      Current Outpatient Medications on File Prior to Visit   Medication Sig Dispense Refill    ProAir RespiClick 90 mcg/actuation breath activated inhaler       pantoprazole (PROTONIX) 40 mg tablet       metFORMIN ER (GLUCOPHAGE XR) 500 mg tablet TAKE 2 TABLETS BY MOUTH DAILY WITH DINNER 180 Tab 0    cyclobenzaprine (FLEXERIL) 10 mg tablet Take 1 Tab by mouth three (3) times daily as needed for Muscle Spasm(s). 90 Tab 0    aspirin delayed-release 81 mg tablet       warfarin (COUMADIN) 7.5 mg tablet       pregabalin (LYRICA) 100 mg capsule Take 1 Cap by mouth two (2) times daily as needed for Pain. Max Daily Amount: 200 mg. 180 Cap 0    mirtazapine (REMERON) 7.5 mg tablet TAKE 1 TABLET BY MOUTH EVERY DAY AT NIGHT 90 Tab 0    amLODIPine (NORVASC) 10 mg tablet TAKE 1 TABLET BY MOUTH EVERY DAY 30 Tab 0    atorvastatin (LIPITOR) 40 mg tablet Take 1 Tab by mouth nightly. 90 Tab 1    montelukast (SINGULAIR) 10 mg tablet Take 10 mg by mouth daily.  metFORMIN (GLUMETZA ER) 500 mg TG24 24 hour tablet Take 500 mg by mouth two (2) times a day.  fluticasone propion-salmeterol (WIXELA INHUB) 250-50 mcg/dose diskus inhaler Take 1 Puff by inhalation two (2) times a day.  traZODone (DESYREL) 50 mg tablet Take 50 mg by mouth nightly.  docusate sodium (COLACE) 100 mg capsule Take 100 mg by mouth two (2) times daily as needed for Constipation.  furosemide (LASIX) 20 mg tablet Take 20 mg by mouth daily.  senna (SENNA) 8.6 mg tablet Take 2 Tabs by mouth daily.  lisinopril (PRINIVIL, ZESTRIL) 40 mg tablet TAKE 1 TABLET BY MOUTH EVERY DAY (Patient taking differently: Take 40 mg by mouth every morning.) 30 Tab 5    prazosin (MINIPRESS) 2 mg capsule Take 2 mg by mouth nightly.  DULoxetine (CYMBALTA) 60 mg capsule TAKE ONE CAPSULE BY MOUTH EVERY MORNING ALONG WITH 30 MG (Patient taking differently: Take 60 mg by mouth daily.  TAKE ONE CAPSULE BY MOUTH EVERY MORNING ALONG WITH 30 MG) 30 Cap 1    DULoxetine (CYMBALTA) 30 mg capsule Take 1 capsule by mouth every morning along with 60 mg dose (Patient taking differently: Take 30 mg by mouth daily.) 30 Cap 1    LEVEMIR FLEXTOUCH U-100 INSULN 100 unit/mL (3 mL) inpn INJECT 65 UNITS BY SUBCUTANEOUS ROUTE TWO (2) TIMES A DAY 39 Adjustable Dose Pre-filled Pen Syringe 2    albuterol (PROVENTIL HFA, VENTOLIN HFA, PROAIR HFA) 90 mcg/actuation inhaler Take 2 Puffs by inhalation every four (4) hours as needed for Wheezing. 1 Inhaler 0    methocarbamoL (ROBAXIN) 750 mg tablet       [] oxyCODONE IR (ROXICODONE) 10 mg tab immediate release tablet Take 1 Tab by mouth every six (6) hours as needed for Pain for up to 7 days. Max Daily Amount: 40 mg. 28 Tab 0    warfarin (COUMADIN) 10 mg tablet       albuterol (PROVENTIL VENTOLIN) 2.5 mg /3 mL (0.083 %) nebulizer solution 3 mL by Nebulization route every four (4) hours as needed for Wheezing. 24 Each 0     No current facility-administered medications on file prior to visit. Objective:     Vitals:    20 1043   BP: 124/72   Pulse: 99   Resp: 16   Temp: (!) 96.5 °F (35.8 °C)   TempSrc: Oral   SpO2: 97%   Weight: 268 lb (121.6 kg)   Height: 6' (1.829 m)     Physical Examination:  General appearance - alert, well appearing, and in no distress  Eyes -sclera anicteric  Neck - supple, no significant adenopathy, no thyromegaly  Chest - clear to auscultation, no wheezes, rales or rhonchi, symmetric air entry  Heart - normal rate, regular rhythm, normal S1, S2, no murmurs, rubs, clicks or gallops  Neurological - alert, oriented, no focal findings or movement disorder noted  Extremitiesno edema  Psychnormal mood and affect    Assessment/ Plan:   Diagnoses and all orders for this visit:    1. Hypercoagulable state ([address]  2. History of pulmonary embolus (PE)  3. History of DVT of lower extremity  4.  Anticoagulated on Coumadin  Therapeutic, continue current Coumadin dose and follow-up in 2 weeks  -     AMB POC PT/INR    5. Uncontrolled type 2 diabetes mellitus with peripheral neuropathy (HCC) adjusting medication. Continue metformin. Increasing Levemir to 70 units twice daily. Pain may be a contributor. 6. Spinal stenosis, unspecified spinal region  7. S/P lumbar laminectomy  Needs to see pain management. Switching from oxycodone to hydrocodone to help with pain control but to a more appropriate dose. Continue on other medications including Cymbalta and Lyrica.  -     HYDROcodone-acetaminophen (NORCO)  mg tablet; Take 1 Tab by mouth every six (6) hours as needed for Pain for up to 7 days. Max Daily Amount: 4 Tabs. I have discussed the diagnosis with the patient and the intended plan as seen in the above orders. The patient has received an after-visit summary and questions were answered concerning future plans. I have discussed medication side effects and warnings with the patient as well. The patient verbalizes understanding and agreement with the plan. Follow-up and Dispositions    · Return in about 2 weeks (around 6/17/2020). Addendumpatient reports he has an allergy to Tylenol and specifically hydrocodone/APAP leading to nausea. Our office called Dr. Elliot Ruby office to help coordinate care. Pt was last seen 10/2019 and was getting hydrocodone/APAP. His chart was flagged because he was getting pain medication and also going to a methadone clinic. After this, I called the patient to clarify that no further pain medication prescriptions would come from me. Discussed need for him to see pain management for further evaluation and management.

## 2020-06-12 RX ORDER — WARFARIN 7.5 MG/1
TABLET ORAL
Qty: 90 TAB | Refills: 0 | Status: SHIPPED | OUTPATIENT
Start: 2020-06-12 | End: 2020-07-31 | Stop reason: SDUPTHER

## 2020-06-12 RX ORDER — WARFARIN 10 MG/1
TABLET ORAL
Qty: 90 TAB | Refills: 0 | Status: SHIPPED | OUTPATIENT
Start: 2020-06-12 | End: 2020-09-07

## 2020-06-12 NOTE — TELEPHONE ENCOUNTER
Pt states he needs Coumadin refill: 2 different doses   His pharmacy was supposed to reach out     [facility]    Best number to reach him is [phone]

## 2020-06-17 ENCOUNTER — OFFICE VISIT (OUTPATIENT)
Dept: FAMILY MEDICINE CLINIC | Age: 55
End: 2020-06-17

## 2020-06-17 VITALS
HEIGHT: 72 IN | TEMPERATURE: 97.3 F | OXYGEN SATURATION: 95 % | DIASTOLIC BLOOD PRESSURE: 84 MMHG | RESPIRATION RATE: 18 BRPM | BODY MASS INDEX: 38.33 KG/M2 | WEIGHT: 283 LBS | SYSTOLIC BLOOD PRESSURE: 138 MMHG | HEART RATE: 103 BPM

## 2020-06-17 DIAGNOSIS — Z86.711 HISTORY OF PULMONARY EMBOLUS (PE): ICD-10-CM

## 2020-06-17 DIAGNOSIS — Z98.890 S/P LUMBAR LAMINECTOMY: ICD-10-CM

## 2020-06-17 DIAGNOSIS — I10 ESSENTIAL HYPERTENSION: ICD-10-CM

## 2020-06-17 DIAGNOSIS — D68.59 HYPERCOAGULABLE STATE (HCC): ICD-10-CM

## 2020-06-17 DIAGNOSIS — Z79.01 ANTICOAGULATED ON COUMADIN: ICD-10-CM

## 2020-06-17 DIAGNOSIS — M48.00 SPINAL STENOSIS, UNSPECIFIED SPINAL REGION: ICD-10-CM

## 2020-06-17 DIAGNOSIS — Z00.00 MEDICARE ANNUAL WELLNESS VISIT, SUBSEQUENT: Primary | ICD-10-CM

## 2020-06-17 DIAGNOSIS — Z86.718 HISTORY OF DVT OF LOWER EXTREMITY: ICD-10-CM

## 2020-06-17 DIAGNOSIS — Z79.891 ENCOUNTER FOR LONG-TERM METHADONE USE: ICD-10-CM

## 2020-06-17 DIAGNOSIS — E78.2 MIXED HYPERLIPIDEMIA: ICD-10-CM

## 2020-06-17 LAB
INR BLD: 1.6
PT POC: NORMAL
VALID INTERNAL CONTROL?: YES

## 2020-06-17 RX ORDER — DULAGLUTIDE 0.75 MG/.5ML
0.75 INJECTION, SOLUTION SUBCUTANEOUS
Qty: 4 PEN | Refills: 1 | Status: SHIPPED | OUTPATIENT
Start: 2020-06-17 | End: 2020-09-16 | Stop reason: DRUGHIGH

## 2020-06-17 NOTE — PROGRESS NOTES
Chief Complaint   Patient presents with    Anticoagulation     PT/INR   1. Have you been to the ER, urgent care clinic since your last visit? Hospitalized since your last visit? No    2. Have you seen or consulted any other health care providers outside of the [address] [place] [address] since your last visit? Include any pap smears or colon screening.  No

## 2020-06-17 NOTE — PROGRESS NOTES
Subjective:     Chief Complaint   Patient presents with    Anticoagulation     PT/INR        He  is a 50 y.o. male who presents for evaluation of:    Anticoagulation history of 3 VTEs. Most recently, admitted to [facility] on 5/7/2020 5/15/2020 for PE and DVT. Previously had  for DVT and bilateral PE in 11/2019. He had an insurance change after that so was unable to follow-up with me until getting on an insurance plan work to be sleepy. He never followed up with Dr. Willim Bloch (hematology) on 12/23/2019 as scheduled for the same reasonwas previously seeing Dr. Rohini Limon at [facility]. This was his third VTE and he was supposed to be on Eliquis long-term for anticoagulation. Now on Coumadin and his INR at discharge on 5/15/2020 was 2.7. He will continue alternating 7.5 mg and 10 mg. Chronic paincontinues to be a struggle. Has a history of spinal stenosis status post lumbar surgery x2 with Dr. Arianna Lopez who has now retired. He is working to see in a therapeutic spinal surgeon that will work with his insurance. He is not interested in having need for surgery but continues to struggle with pain especially in his low back radiating down his legs. He has been on Lyrica which is helped significantly. During his hospitalization he was put on oxycodone 10 mg 4 times daily PRN. He did see Dr. Bautista Donahue in the past for pain management and is interested in following upreferral was placed at last appointment but patient says he is still waiting for documents to be sent to Dr. Last Marin. Does have a past history of drug use but has not used in many years. After last appt, I also clarified pain recommendations after my staff called Dr. Jatinder Jacobo office to help coordinate care. Pt was last seen there 10/2019 and was getting hydrocodone/APAP.   His chart was flagged because he was getting pain medication and also going to a methadone clinic (started while in [place] and pt weaned himself off after not liking how he felt). We had an honest discussion that I would not be prescribing his pain meds and we discussed the need for him to see pain management for this - pt agreed. SHIRA - He was also taken off his CPAP after his last surgery due to his bilateral PEs. He needs to check in with sleep medicine for new PSGhas not done this yet. Diabetes Mellitus: Uncontrolled with most recent A1c 10.4%  Taking meds, home glucose monitoring: is performed sporadically  Reports no polyuria or polydipsia, no chest pain, dyspnea or TIA's, chronic numbness, tingling or pain in extremities partly related to surgery  Currently in too much pain to exercise  Pt is a non smoker.     Lab Results   Component Value Date/Time    Hemoglobin A1c (POC) 7.7 11/14/2018 10:42 AM    Hemoglobin A1c (POC) 8.1 08/14/2018 04:30 PM    Hemoglobin A1c 10.4 (H) 05/20/2020 09:04 AM    Microalb/Creat ratio (ug/mg creat.) 5.9 04/10/2018 09:24 AM    LDL, calculated 65 05/20/2020 09:04 AM    Creatinine 1.38 (H) 05/20/2020 09:04 AM      Lab Results   Component Value Date/Time    GFR est AA 67 05/20/2020 09:04 AM    GFR est non-AA 58 (L) 05/20/2020 09:04 AM      Lab Results   Component Value Date/Time    TSH 1.550 05/20/2020 09:04 AM       ROS  Gen - no fever/chills  Resp - no dyspnea or cough  CV - no chest pain or BROWN  Rest per HPI    Past Medical History:   Diagnosis Date    Arthritis     2010    Asthma 1995    INHALER USE PRN    Chronic bilateral low back pain without sciatica 11/6/2017    Chronic hepatitis C without hepatic coma ([facility] 11/6/2017    treated at [facility] Chronic pain     lower back    Essential hypertension 11/6/2017    GERD (gastroesophageal reflux disease)     History of cardiac cath     Hypercholesterolemia     Mild episode of recurrent major depressive disorder ([address] 11/06/2012    Morbid obesity ([facility]     PTSD (post-traumatic stress disorder)     Sleep apnea     pt stated was taken off cpap while on blood thinner    Stage 3 chronic kidney disease ([facility] 11/06/2017    IMPROVED     Thromboembolus ([facility]     1/3/19  DVT,PE while taking testosterone    Uncontrolled type 2 diabetes mellitus with peripheral neuropathy ([facility] 11/06/2011     Past Surgical History:   Procedure Laterality Date    HX BACK SURGERY  04/10/2018    Fusion L4,L5    HX COLONOSCOPY  2013    CJW    HX HEART CATHETERIZATION  2017    NEG PER PATIENT    HX HERNIA REPAIR  6638    UMBILICAL    HX KNEE ARTHROSCOPY  1999    right knee    HX KNEE ARTHROSCOPY  2002    left knee    HX ROTATOR CUFF REPAIR Right 2016    HX UROLOGICAL  2015    Vasectomy      Current Outpatient Medications on File Prior to Visit   Medication Sig Dispense Refill    warfarin (COUMADIN) 10 mg tablet Take 1 tab by mouth daily or as directed 90 Tab 0    warfarin (COUMADIN) 7.5 mg tablet Take 1 tab by mouth daily or as directed 90 Tab 0    methocarbamoL (ROBAXIN) 750 mg tablet       ProAir RespiClick 90 mcg/actuation breath activated inhaler       pantoprazole (PROTONIX) 40 mg tablet       metFORMIN ER (GLUCOPHAGE XR) 500 mg tablet TAKE 2 TABLETS BY MOUTH DAILY WITH DINNER 180 Tab 0    cyclobenzaprine (FLEXERIL) 10 mg tablet Take 1 Tab by mouth three (3) times daily as needed for Muscle Spasm(s). 90 Tab 0    aspirin delayed-release 81 mg tablet       pregabalin (LYRICA) 100 mg capsule Take 1 Cap by mouth two (2) times daily as needed for Pain. Max Daily Amount: 200 mg. 180 Cap 0    mirtazapine (REMERON) 7.5 mg tablet TAKE 1 TABLET BY MOUTH EVERY DAY AT NIGHT 90 Tab 0    amLODIPine (NORVASC) 10 mg tablet TAKE 1 TABLET BY MOUTH EVERY DAY 30 Tab 0    atorvastatin (LIPITOR) 40 mg tablet Take 1 Tab by mouth nightly. 90 Tab 1    montelukast (SINGULAIR) 10 mg tablet Take 10 mg by mouth daily.  metFORMIN (GLUMETZA ER) 500 mg TG24 24 hour tablet Take 500 mg by mouth two (2) times a day.       fluticasone propion-salmeterol (WIXELA INHUB) 250-50 mcg/dose diskus inhaler Take 1 Puff by inhalation two (2) times a day.  traZODone (DESYREL) 50 mg tablet Take 50 mg by mouth nightly.  docusate sodium (COLACE) 100 mg capsule Take 100 mg by mouth two (2) times daily as needed for Constipation.  furosemide (LASIX) 20 mg tablet Take 20 mg by mouth daily.  senna (SENNA) 8.6 mg tablet Take 2 Tabs by mouth daily.  lisinopril (PRINIVIL, ZESTRIL) 40 mg tablet TAKE 1 TABLET BY MOUTH EVERY DAY (Patient taking differently: Take 40 mg by mouth every morning.) 30 Tab 5    prazosin (MINIPRESS) 2 mg capsule Take 2 mg by mouth nightly.  DULoxetine (CYMBALTA) 60 mg capsule TAKE ONE CAPSULE BY MOUTH EVERY MORNING ALONG WITH 30 MG (Patient taking differently: Take 60 mg by mouth daily. TAKE ONE CAPSULE BY MOUTH EVERY MORNING ALONG WITH 30 MG) 30 Cap 1    DULoxetine (CYMBALTA) 30 mg capsule Take 1 capsule by mouth every morning along with 60 mg dose (Patient taking differently: Take 30 mg by mouth daily.) 30 Cap 1    LEVEMIR FLEXTOUCH U-100 INSULN 100 unit/mL (3 mL) inpn INJECT 65 UNITS BY SUBCUTANEOUS ROUTE TWO (2) TIMES A DAY 39 Adjustable Dose Pre-filled Pen Syringe 2    albuterol (PROVENTIL HFA, VENTOLIN HFA, PROAIR HFA) 90 mcg/actuation inhaler Take 2 Puffs by inhalation every four (4) hours as needed for Wheezing. 1 Inhaler 0     No current facility-administered medications on file prior to visit.          Objective:     Vitals:    06/17/20 1043   BP: 138/84   Pulse: (!) 103   Resp: 18   Temp: 97.3 °F (36.3 °C)   TempSrc: Oral   SpO2: 95%   Weight: 283 lb (128.4 kg)   Height: 6' (1.829 m)     Physical Examination:  General appearance - alert, well appearing, and in no distress  Eyes -sclera anicteric  Neck - supple, no significant adenopathy, no thyromegaly  Chest - clear to auscultation, no wheezes, rales or rhonchi, symmetric air entry  Heart - normal rate, regular rhythm, normal S1, S2, no murmurs, rubs, clicks or gallops  Neurological - alert, oriented, no focal findings or movement disorder noted  ExtremitiesRLE with no edema, LLE with 2+ edema (chronic after DVTs)  Psychnormal mood and affect  Back - wearing back brace    Assessment/ Plan:   Diagnoses and all orders for this visit:    1. Medicare annual wellness visit, subsequent    2. Hypercoagulable state ([facility]  3. History of pulmonary embolus (PE)  4. History of DVT of lower extremity  5. Anticoagulated on Coumadin   Previously therapeutic but missed a dose so now subtherapeutic. Continue current Coumadin dosing with alignment in 2 weeks  -     AMB POC PT/INR    6. Uncontrolled type 2 diabetes mellitus with peripheral neuropathy (HCC)Continue Levemir 70 units twice daily and ct metformin. Adding on Trulicity.  -     HEMOGLOBIN A1C WITH EAG  -     METABOLIC PANEL, BASIC  -     MICROALBUMIN, UR, RAND W/ MICROALB/CREAT RATIO  -     dulaglutide (Trulicity) 3.42 QG/0.9 mL sub-q pen; 0.5 mL by SubCUTAneous route every seven (7) days. 7. Spinal stenosis, unspecified spinal region  8. S/P lumbar laminectomy  To see pain management - see specific details in HPI above. Continue on other medications including Cymbalta and Lyrica. Has MRI L spine set up. 9. Essential hypertensioncontrolled  -     METABOLIC PANEL, BASIC    10. Mixed hyperlipidemiahas been stable  -     HEMOGLOBIN A1C WITH EAG  -     METABOLIC PANEL, BASIC    11. Encounter for long-term methadone use  -     HEMOGLOBIN A1C WITH EAG  -     METABOLIC PANEL, BASIC  -     MICROALBUMIN, UR, RAND W/ MICROALB/CREAT RATIO    I have discussed the diagnosis with the patient and the intended plan as seen in the above orders. The patient has received an after-visit summary and questions were answered concerning future plans. I have discussed medication side effects and warnings with the patient as well. The patient verbalizes understanding and agreement with the plan. Follow-up and Dispositions    · Return in about 2 weeks (around 7/1/2020).                This is the Subsequent Medicare Annual Wellness Exam, performed 12 months or more after the Initial AWV or the last Subsequent AWV    I have reviewed the patient's medical history in detail and updated the computerized patient record.      History     Patient Active Problem List   Diagnosis Code    Uncontrolled type 2 diabetes mellitus with peripheral neuropathy (HCC) E11.42, E11.65    Essential hypertension I10    Mild episode of recurrent major depressive disorder (HCC) F33.0    Chronic hepatitis C without hepatic coma (HCC) B18.2    Chronic bilateral low back pain without sciatica M54.5, G89.29    Mixed hyperlipidemia E78.2    Obesity, morbid (HCC) E66.01    Spinal stenosis M48.00    Testicular mass N50.89    Apnea R06.81    Asthma J45.909    Type 2 diabetes with nephropathy (HCC) E11.21    Spondylolisthesis, lumbar region M43.16    Type 2 diabetes mellitus with diabetic neuropathy (HCC) E11.40    Lumbar spinal stenosis M48.061    Pulmonary emboli (HCC) I26.99     Past Medical History:   Diagnosis Date    Arthritis     2010    Asthma 1995    INHALER USE PRN    Chronic bilateral low back pain without sciatica 11/6/2017    Chronic hepatitis C without hepatic coma ([address] 11/6/2017    [facility] Chronic pain     lower back    Essential hypertension 11/6/2017    GERD (gastroesophageal reflux disease)     History of cardiac cath     Hypercholesterolemia     Mild episode of recurrent major depressive disorder ([address] 11/06/2012    Morbid obesity ([address]     PTSD (post-traumatic stress disorder)     Sleep apnea     pt stated was taken off cpap while on blood thinner    Stage 3 chronic kidney disease ([address] 11/06/2017    IMPROVED     Thromboembolus ([address]     1/3/19  DVT,PE while taking testosterone    Uncontrolled type 2 diabetes mellitus with peripheral neuropathy ([address] 11/06/2011      Past Surgical History:   Procedure Laterality Date    HX BACK SURGERY  04/10/2018    Fusion L4,L5    HX COLONOSCOPY  2013 CJW    HX HEART CATHETERIZATION  2017    NEG PER PATIENT    HX HERNIA REPAIR  9381    UMBILICAL    HX KNEE ARTHROSCOPY  1999    right knee    HX KNEE ARTHROSCOPY  2002    left knee    HX ROTATOR CUFF REPAIR Right 2016    HX UROLOGICAL  2015    Vasectomy      Current Outpatient Medications   Medication Sig Dispense Refill    dulaglutide (Trulicity) 3.32 AI/7.8 mL sub-q pen 0.5 mL by SubCUTAneous route every seven (7) days. 4 Pen 1    warfarin (COUMADIN) 10 mg tablet Take 1 tab by mouth daily or as directed 90 Tab 0    warfarin (COUMADIN) 7.5 mg tablet Take 1 tab by mouth daily or as directed 90 Tab 0    methocarbamoL (ROBAXIN) 750 mg tablet       ProAir RespiClick 90 mcg/actuation breath activated inhaler       pantoprazole (PROTONIX) 40 mg tablet       metFORMIN ER (GLUCOPHAGE XR) 500 mg tablet TAKE 2 TABLETS BY MOUTH DAILY WITH DINNER 180 Tab 0    cyclobenzaprine (FLEXERIL) 10 mg tablet Take 1 Tab by mouth three (3) times daily as needed for Muscle Spasm(s). 90 Tab 0    aspirin delayed-release 81 mg tablet       pregabalin (LYRICA) 100 mg capsule Take 1 Cap by mouth two (2) times daily as needed for Pain. Max Daily Amount: 200 mg. 180 Cap 0    mirtazapine (REMERON) 7.5 mg tablet TAKE 1 TABLET BY MOUTH EVERY DAY AT NIGHT 90 Tab 0    amLODIPine (NORVASC) 10 mg tablet TAKE 1 TABLET BY MOUTH EVERY DAY 30 Tab 0    atorvastatin (LIPITOR) 40 mg tablet Take 1 Tab by mouth nightly. 90 Tab 1    montelukast (SINGULAIR) 10 mg tablet Take 10 mg by mouth daily.  metFORMIN (GLUMETZA ER) 500 mg TG24 24 hour tablet Take 500 mg by mouth two (2) times a day.  fluticasone propion-salmeterol (WIXELA INHUB) 250-50 mcg/dose diskus inhaler Take 1 Puff by inhalation two (2) times a day.  traZODone (DESYREL) 50 mg tablet Take 50 mg by mouth nightly.  docusate sodium (COLACE) 100 mg capsule Take 100 mg by mouth two (2) times daily as needed for Constipation.       furosemide (LASIX) 20 mg tablet Take 20 mg by mouth daily.  senna (SENNA) 8.6 mg tablet Take 2 Tabs by mouth daily.  lisinopril (PRINIVIL, ZESTRIL) 40 mg tablet TAKE 1 TABLET BY MOUTH EVERY DAY (Patient taking differently: Take 40 mg by mouth every morning.) 30 Tab 5    prazosin (MINIPRESS) 2 mg capsule Take 2 mg by mouth nightly.  DULoxetine (CYMBALTA) 60 mg capsule TAKE ONE CAPSULE BY MOUTH EVERY MORNING ALONG WITH 30 MG (Patient taking differently: Take 60 mg by mouth daily. TAKE ONE CAPSULE BY MOUTH EVERY MORNING ALONG WITH 30 MG) 30 Cap 1    DULoxetine (CYMBALTA) 30 mg capsule Take 1 capsule by mouth every morning along with 60 mg dose (Patient taking differently: Take 30 mg by mouth daily.) 30 Cap 1    LEVEMIR FLEXTOUCH U-100 INSULN 100 unit/mL (3 mL) inpn INJECT 65 UNITS BY SUBCUTANEOUS ROUTE TWO (2) TIMES A DAY 39 Adjustable Dose Pre-filled Pen Syringe 2    albuterol (PROVENTIL HFA, VENTOLIN HFA, PROAIR HFA) 90 mcg/actuation inhaler Take 2 Puffs by inhalation every four (4) hours as needed for Wheezing.  1 Inhaler 0     No Known Allergies    Family History   Problem Relation Age of Onset    Hypertension Mother     Diabetes Mother     Heart Disease Mother         cabg    Pneumonia Father 67    Diabetes Father     Diabetes Sister     Other Brother 9        House Fire    Diabetes Sister     Other Sister 15        House Fire    Other Sister [address] [facility] Other Sister [address] [facility] Other Son 6        HURRICANE ARA    Anesth Problems Neg Hx      Social History     Tobacco Use    Smoking status: Former Smoker     Packs/day: 1.50     Years: 19.00     Pack years: 28.50     Last attempt to quit: 8/6/2009     Years since quitting: 10.8    Smokeless tobacco: Never Used   Substance Use Topics    Alcohol use: No       Depression Risk Factor Screening:     3 most recent PHQ Screens 5/19/2020   Little interest or pleasure in doing things Not at all   Feeling down, depressed, irritable, or hopeless Not at all   Total Score PHQ 2 0   Trouble falling or staying asleep, or sleeping too much -   Feeling tired or having little energy -   Poor appetite, weight loss, or overeating -   Feeling bad about yourself - or that you are a failure or have let yourself or your family down -   Trouble concentrating on things such as school, work, reading, or watching TV -   Moving or speaking so slowly that other people could have noticed; or the opposite being so fidgety that others notice -   Thoughts of being better off dead, or hurting yourself in some way -   PHQ 9 Score -   How difficult have these problems made it for you to do your work, take care of your home and get along with others -       Alcohol Risk Factor Screening (MALE < 65): Do you average more than 2 drinks per night or 14 drinks a week: No    On any one occasion in the past three months have you have had more than 4 drinks containing alcohol:  No      Functional Ability and Level of Safety:   Hearing: Hearing is good. Activities of Daily Living: The home contains: no safety equipment. Patient does total self care     Ambulation: with mild difficulty, using cane     Fall Risk:  Fall Risk Assessment, last 12 mths 5/19/2020   Able to walk? Yes   Fall in past 12 months? No     Abuse Screen:  Patient is not abused       Cognitive Screening   Has your family/caregiver stated any concerns about your memory: no     Cognitive Screening: Normal - Verbal Fluency Test    Patient Care Team   Patient Care Team:  Boone Ivy MD as PCP - General (Family Practice)  Boone Ivy MD as PCP - [facility] Empaneled Provider  Esthela Cullen MD (Pain Management)  Luca Cox MD (Sleep Medicine)  Kennedy Hernandez MD (Hematology and Oncology)  Rudy Garza MD (Orthopedic Surgery)    Assessment/Plan   Education and counseling provided:  Are appropriate based on today's review and evaluation    Diagnoses and all orders for this visit:    1.  Medicare annual wellness visit, subsequent    2. Hypercoagulable state ([facility]    3. History of pulmonary embolus (PE)  -     AMB POC PT/INR    4. History of DVT of lower extremity  -     AMB POC PT/INR    5. Anticoagulated on Coumadin  -     AMB POC PT/INR    6. Uncontrolled type 2 diabetes mellitus with peripheral neuropathy (HCC)  -     HEMOGLOBIN A1C WITH EAG  -     METABOLIC PANEL, BASIC  -     MICROALBUMIN, UR, RAND W/ MICROALB/CREAT RATIO  -     dulaglutide (Trulicity) 6.54 LC/9.2 mL sub-q pen; 0.5 mL by SubCUTAneous route every seven (7) days. 7. Spinal stenosis, unspecified spinal region    8. S/P lumbar laminectomy    9. Essential hypertension  -     METABOLIC PANEL, BASIC    10. Mixed hyperlipidemia  -     HEMOGLOBIN A1C WITH EAG  -     METABOLIC PANEL, BASIC    11.  Encounter for long-term methadone use  -     HEMOGLOBIN A1C WITH EAG  -     METABOLIC PANEL, BASIC  -     MICROALBUMIN, UR, RAND W/ MICROALB/CREAT RATIO        Health Maintenance Due   Topic Date Due    Eye Exam Retinal or Dilated  03/16/1977    Colonoscopy  03/16/1985    Shingrix Vaccine Age 50> (1 of 2) 03/16/2017    MICROALBUMIN Q1  04/10/2019    Medicare Yearly Exam  09/16/2019    Foot Exam Q1  11/14/2019

## 2020-06-17 NOTE — PATIENT INSTRUCTIONS
Medicare Wellness Visit, Male The best way to live healthy is to have a lifestyle where you eat a well-balanced diet, exercise regularly, limit alcohol use, and quit all forms of tobacco/nicotine, if applicable. Regular preventive services are another way to keep healthy. Preventive services (vaccines, screening tests, monitoring & exams) can help personalize your care plan, which helps you manage your own care. Screening tests can find health problems at the earliest stages, when they are easiest to treat. [facility] follows the current, evidence-based guidelines published by the [facility] ([facility]) when recommending preventive services for our patients. Because we follow these guidelines, sometimes recommendations change over time as research supports it. (For example, a prostate screening blood test is no longer routinely recommended for men with no symptoms). Of course, you and your doctor may decide to screen more often for some diseases, based on your risk and co-morbidities (chronic disease you are already diagnosed with). Preventive services for you include: - Medicare offers their members a free annual wellness visit, which is time for you and your primary care provider to discuss and plan for your preventive service needs. Take advantage of this benefit every year! 
-All adults over age 72 should receive the recommended pneumonia vaccines. Current USPSTF guidelines recommend a series of two vaccines for the best pneumonia protection.  
-All adults should have a flu vaccine yearly and tetanus vaccine every 10 years. 
-All adults age 48 and older should receive the shingles vaccines (series of two vaccines).       
-All adults age 38-68 who are overweight should have a diabetes screening test once every three years.  
-Other screening tests & preventive services for persons with diabetes include: an eye exam to screen for diabetic retinopathy, a kidney function test, a foot exam, and stricter control over your cholesterol.  
-Cardiovascular screening for adults with routine risk involves an electrocardiogram (ECG) at intervals determined by the provider.  
-Colorectal cancer screening should be done for adults age 54-65 with no increased risk factors for colorectal cancer. There are a number of acceptable methods of screening for this type of cancer. Each test has its own benefits and drawbacks. Discuss with your provider what is most appropriate for you during your annual wellness visit. The different tests include: colonoscopy (considered the best screening method), a fecal occult blood test, a fecal DNA test, and sigmoidoscopy. 
-All adults born between [facility] should be screened once for Hepatitis C. 
-An Abdominal Aortic Aneurysm (AAA) Screening is recommended for men age 73-68 who has ever smoked in their lifetime. Here is a list of your current Health Maintenance items (your personalized list of preventive services) with a due date: 
Health Maintenance Due Topic Date Due [facility] Eye Exam  03/16/1977  Colonoscopy  03/16/1985  Shingles Vaccine (1 of 2) 03/16/2017  Albumin Urine Test  04/10/2019 [facility] Annual Well Visit  09/16/2019 [facility] Diabetic Foot Care  11/14/2019

## 2020-06-20 DIAGNOSIS — M48.00 SPINAL STENOSIS, UNSPECIFIED SPINAL REGION: ICD-10-CM

## 2020-06-20 DIAGNOSIS — Z98.890 S/P LUMBAR LAMINECTOMY: ICD-10-CM

## 2020-06-22 RX ORDER — CYCLOBENZAPRINE HCL 10 MG
TABLET ORAL
Qty: 90 TAB | Refills: 0 | Status: SHIPPED | OUTPATIENT
Start: 2020-06-22 | End: 2020-08-19 | Stop reason: SDUPTHER

## 2020-06-23 ENCOUNTER — HOSPITAL ENCOUNTER (OUTPATIENT)
Dept: MRI IMAGING | Age: 55
Discharge: HOME | End: 2020-06-23
Attending: PHYSICIAN ASSISTANT
Payer: MEDICARE

## 2020-06-23 DIAGNOSIS — Z78.9 WEIGHT ABOVE 97TH PERCENTILE: ICD-10-CM

## 2020-06-23 DIAGNOSIS — M48.061 SPINAL STENOSIS, LUMBAR: ICD-10-CM

## 2020-06-23 DIAGNOSIS — M54.16 LUMBAR BACK PAIN WITH RADICULOPATHY AFFECTING LOWER EXTREMITY: ICD-10-CM

## 2020-06-23 DIAGNOSIS — M51.36 DDD (DEGENERATIVE DISC DISEASE), LUMBAR: ICD-10-CM

## 2020-06-23 DIAGNOSIS — Z98.1 S/P LUMBAR FUSION: ICD-10-CM

## 2020-06-23 PROCEDURE — 72148 MRI LUMBAR SPINE W/O DYE: CPT

## 2020-07-06 RX ORDER — DULAGLUTIDE 0.75 MG/.5ML
0.75 INJECTION, SOLUTION SUBCUTANEOUS
Qty: 4 PEN | Refills: 1 | Status: SHIPPED | OUTPATIENT
Start: 2020-07-06 | End: 2020-08-19 | Stop reason: SDUPTHER

## 2020-07-06 RX ORDER — FLUTICASONE PROPIONATE AND SALMETEROL 250; 50 UG/1; UG/1
1 POWDER RESPIRATORY (INHALATION) 2 TIMES DAILY
Qty: 1 EACH | Refills: 3 | Status: SHIPPED | OUTPATIENT
Start: 2020-07-06 | End: 2020-07-31 | Stop reason: SDUPTHER

## 2020-07-06 NOTE — TELEPHONE ENCOUNTER
Pt is calling about having refills sent to [address] [place] [address] Road - states they have been faxing request for a month     He needs refill:  Advair   Trulicity       Pls call patient . .. he is reading off medications that are not in his info and it seems [facility] wrote them              Best number to reach him is [phone]

## 2020-07-09 DIAGNOSIS — M48.00 SPINAL STENOSIS, UNSPECIFIED SPINAL REGION: ICD-10-CM

## 2020-07-09 DIAGNOSIS — Z98.890 S/P LUMBAR LAMINECTOMY: ICD-10-CM

## 2020-07-15 RX ORDER — PREGABALIN 100 MG/1
100 CAPSULE ORAL
Qty: 180 CAP | Refills: 0 | OUTPATIENT
Start: 2020-07-15

## 2020-07-20 DIAGNOSIS — Z98.890 S/P LUMBAR LAMINECTOMY: ICD-10-CM

## 2020-07-20 DIAGNOSIS — F43.10 PTSD (POST-TRAUMATIC STRESS DISORDER): ICD-10-CM

## 2020-07-20 DIAGNOSIS — I10 ESSENTIAL HYPERTENSION: ICD-10-CM

## 2020-07-20 DIAGNOSIS — M48.00 SPINAL STENOSIS, UNSPECIFIED SPINAL REGION: ICD-10-CM

## 2020-07-20 DIAGNOSIS — F33.2 SEVERE EPISODE OF RECURRENT MAJOR DEPRESSIVE DISORDER, WITHOUT PSYCHOTIC FEATURES (HCC): ICD-10-CM

## 2020-07-20 DIAGNOSIS — E78.2 MIXED HYPERLIPIDEMIA: ICD-10-CM

## 2020-07-23 NOTE — PROGRESS NOTES
Left message to call office and schedule appointment for PT/INR. Missed appointment 7/15/20 and letter mailed to home address.

## 2020-07-29 RX ORDER — CYCLOBENZAPRINE HCL 10 MG
TABLET ORAL
Qty: 90 TAB | Refills: 0 | OUTPATIENT
Start: 2020-07-29

## 2020-07-31 RX ORDER — FUROSEMIDE 20 MG/1
20 TABLET ORAL DAILY
Qty: 30 TAB | Refills: 0 | Status: SHIPPED | OUTPATIENT
Start: 2020-07-31 | End: 2020-08-31

## 2020-07-31 RX ORDER — AMLODIPINE BESYLATE 10 MG/1
TABLET ORAL
Qty: 30 TAB | Refills: 0 | Status: SHIPPED | OUTPATIENT
Start: 2020-07-31 | End: 2020-08-31

## 2020-07-31 RX ORDER — DULOXETIN HYDROCHLORIDE 30 MG/1
CAPSULE, DELAYED RELEASE ORAL
Qty: 30 CAP | Refills: 0 | Status: SHIPPED | OUTPATIENT
Start: 2020-07-31 | End: 2020-09-03 | Stop reason: SDUPTHER

## 2020-07-31 RX ORDER — WARFARIN 7.5 MG/1
TABLET ORAL
Qty: 90 TAB | Refills: 0 | Status: SHIPPED | OUTPATIENT
Start: 2020-07-31 | End: 2020-12-01

## 2020-07-31 RX ORDER — LISINOPRIL 40 MG/1
TABLET ORAL
Qty: 30 TAB | Refills: 0 | Status: SHIPPED | OUTPATIENT
Start: 2020-07-31 | End: 2020-09-16 | Stop reason: SDUPTHER

## 2020-07-31 RX ORDER — DULOXETIN HYDROCHLORIDE 60 MG/1
CAPSULE, DELAYED RELEASE ORAL
Qty: 30 CAP | Refills: 0 | Status: SHIPPED | OUTPATIENT
Start: 2020-07-31 | End: 2020-09-03 | Stop reason: SDUPTHER

## 2020-07-31 RX ORDER — ASPIRIN 81 MG/1
TABLET ORAL
Status: CANCELLED | OUTPATIENT
Start: 2020-07-31

## 2020-07-31 RX ORDER — FLUTICASONE PROPIONATE AND SALMETEROL 250; 50 UG/1; UG/1
1 POWDER RESPIRATORY (INHALATION) 2 TIMES DAILY
Qty: 1 EACH | Refills: 0 | Status: SHIPPED | OUTPATIENT
Start: 2020-07-31 | End: 2020-11-13

## 2020-07-31 RX ORDER — ATORVASTATIN CALCIUM 40 MG/1
40 TABLET, FILM COATED ORAL
Qty: 30 TAB | Refills: 0 | Status: SHIPPED | OUTPATIENT
Start: 2020-07-31 | End: 2020-08-31

## 2020-07-31 NOTE — TELEPHONE ENCOUNTER
Refills on:  atorvastatin (LIPITOR) 40 mg tablet  warfarin (COUMADIN) 7.5 mg tablet   DULoxetine (CYMBALTA) 60 mg capsule  DULoxetine (CYMBALTA) 30 mg capsule  amLODIPine (NORVASC) 10 mg tablet   furosemide (LASIX) 20 mg tablet   lisinopril (PRINIVIL, ZESTRIL) 40 mg tablet   Advair 250/50  Dr. Vaishnavi Foy out of office sent to  for 30 day refills.

## 2020-08-15 DIAGNOSIS — Z98.890 S/P LUMBAR LAMINECTOMY: ICD-10-CM

## 2020-08-15 DIAGNOSIS — M48.00 SPINAL STENOSIS, UNSPECIFIED SPINAL REGION: ICD-10-CM

## 2020-08-16 RX ORDER — CYCLOBENZAPRINE HCL 10 MG
TABLET ORAL
Qty: 90 TAB | Refills: 0 | OUTPATIENT
Start: 2020-08-16

## 2020-08-17 ENCOUNTER — DOCUMENTATION ONLY (OUTPATIENT)
Dept: FAMILY MEDICINE CLINIC | Age: 55
End: 2020-08-17

## 2020-08-17 NOTE — PROGRESS NOTES
Spoke with patient and advised needs in office appointment and scheduled him for 8/19/20 with Dr Salima Roldan @ 8:50 am

## 2020-08-19 ENCOUNTER — OFFICE VISIT (OUTPATIENT)
Dept: FAMILY MEDICINE CLINIC | Age: 55
End: 2020-08-19
Payer: MEDICARE

## 2020-08-19 VITALS
TEMPERATURE: 97.1 F | DIASTOLIC BLOOD PRESSURE: 90 MMHG | SYSTOLIC BLOOD PRESSURE: 145 MMHG | BODY MASS INDEX: 35.97 KG/M2 | WEIGHT: 265.6 LBS | HEIGHT: 72 IN | RESPIRATION RATE: 18 BRPM | OXYGEN SATURATION: 97 % | HEART RATE: 96 BPM

## 2020-08-19 DIAGNOSIS — F43.10 PTSD (POST-TRAUMATIC STRESS DISORDER): ICD-10-CM

## 2020-08-19 DIAGNOSIS — F33.2 SEVERE EPISODE OF RECURRENT MAJOR DEPRESSIVE DISORDER, WITHOUT PSYCHOTIC FEATURES (HCC): ICD-10-CM

## 2020-08-19 DIAGNOSIS — E11.40 TYPE 2 DIABETES MELLITUS WITH DIABETIC NEUROPATHY, WITH LONG-TERM CURRENT USE OF INSULIN (HCC): ICD-10-CM

## 2020-08-19 DIAGNOSIS — G89.29 CHRONIC BILATERAL LOW BACK PAIN WITHOUT SCIATICA: ICD-10-CM

## 2020-08-19 DIAGNOSIS — Z86.718 HISTORY OF DVT OF LOWER EXTREMITY: ICD-10-CM

## 2020-08-19 DIAGNOSIS — Z86.711 HISTORY OF PULMONARY EMBOLUS (PE): ICD-10-CM

## 2020-08-19 DIAGNOSIS — D68.59 HYPERCOAGULABLE STATE (HCC): Primary | ICD-10-CM

## 2020-08-19 DIAGNOSIS — I10 ESSENTIAL HYPERTENSION: ICD-10-CM

## 2020-08-19 DIAGNOSIS — M54.50 CHRONIC BILATERAL LOW BACK PAIN WITHOUT SCIATICA: ICD-10-CM

## 2020-08-19 DIAGNOSIS — M48.00 SPINAL STENOSIS, UNSPECIFIED SPINAL REGION: ICD-10-CM

## 2020-08-19 DIAGNOSIS — E78.2 MIXED HYPERLIPIDEMIA: ICD-10-CM

## 2020-08-19 DIAGNOSIS — Z79.4 TYPE 2 DIABETES MELLITUS WITH DIABETIC NEUROPATHY, WITH LONG-TERM CURRENT USE OF INSULIN (HCC): ICD-10-CM

## 2020-08-19 DIAGNOSIS — Z79.01 ANTICOAGULATED ON COUMADIN: ICD-10-CM

## 2020-08-19 DIAGNOSIS — Z98.890 S/P LUMBAR LAMINECTOMY: ICD-10-CM

## 2020-08-19 LAB
INR BLD: 2.4
PT POC: NORMAL
VALID INTERNAL CONTROL?: YES

## 2020-08-19 PROCEDURE — G8417 CALC BMI ABV UP PARAM F/U: HCPCS | Performed by: FAMILY MEDICINE

## 2020-08-19 PROCEDURE — G8755 DIAS BP > OR = 90: HCPCS | Performed by: FAMILY MEDICINE

## 2020-08-19 PROCEDURE — 99214 OFFICE O/P EST MOD 30 MIN: CPT | Performed by: FAMILY MEDICINE

## 2020-08-19 PROCEDURE — 3017F COLORECTAL CA SCREEN DOC REV: CPT | Performed by: FAMILY MEDICINE

## 2020-08-19 PROCEDURE — 2022F DILAT RTA XM EVC RTNOPTHY: CPT | Performed by: FAMILY MEDICINE

## 2020-08-19 PROCEDURE — G9717 DOC PT DX DEP/BP F/U NT REQ: HCPCS | Performed by: FAMILY MEDICINE

## 2020-08-19 PROCEDURE — 3046F HEMOGLOBIN A1C LEVEL >9.0%: CPT | Performed by: FAMILY MEDICINE

## 2020-08-19 PROCEDURE — G8753 SYS BP > OR = 140: HCPCS | Performed by: FAMILY MEDICINE

## 2020-08-19 PROCEDURE — 85610 PROTHROMBIN TIME: CPT | Performed by: FAMILY MEDICINE

## 2020-08-19 PROCEDURE — G8427 DOCREV CUR MEDS BY ELIG CLIN: HCPCS | Performed by: FAMILY MEDICINE

## 2020-08-19 RX ORDER — METFORMIN HYDROCHLORIDE 500 MG/1
TABLET, EXTENDED RELEASE ORAL
Qty: 180 TAB | Refills: 0 | Status: SHIPPED | OUTPATIENT
Start: 2020-08-19 | End: 2020-11-15 | Stop reason: SDUPTHER

## 2020-08-19 NOTE — PROGRESS NOTES
Chief Complaint   Patient presents with    Anticoagulation     PT/INR   1. Have you been to the ER, urgent care clinic since your last visit? Hospitalized since your last visit? Yes Where: [facility] pain    2. Have you seen or consulted any other health care providers outside of the [address] [place] [address] since your last visit? Include any pap smears or colon screening.  No   Discuss CBD oil and patches  Scheduled for injection 9/23/20 for back @ [facility]

## 2020-08-19 NOTE — PROGRESS NOTES
Subjective:     Chief Complaint   Patient presents with    Anticoagulation     PT/INR        He  is a 50 y.o. male who presents for evaluation of:    Anticoagulation history of 3 VTEs. Most recently, admitted to [facility] on 5/7/2020 5/15/2020 for PE and DVT. Previously had  for DVT and bilateral PE in 11/2019. He had an insurance change after that so was unable to follow-up with me until getting on an insurance plan work to be sleepy. He never followed up with Dr. Kayleen Crisostomo (hematology) on 12/23/2019 as scheduled for the same reasonwas previously seeing Dr. Elvie Boxer at [facility]. This was his third VTE and he was supposed to be on Eliquis long-term for anticoagulation. Now on Coumadin and his INR at discharge on 5/15/2020 was 2.7. He will continue alternating 7.5 mg and 10 mg. Chronic paincontinues to be a struggle. Has a history of spinal stenosis status post lumbar surgery x2 with Dr. Nell Nichole who has now retired. He is working to see in a therapeutic spinal surgeon that will work with his insurance. He is not interested in having need for surgery but continues to struggle with pain especially in his low back radiating down his legs. He has been on Lyrica which is helped significantly. During his hospitalization he was put on oxycodone 10 mg 4 times daily PRN. He did see Dr. Lam Soto in the past for pain management and is interested in following upreferral was placed at last appointment but patient says he is still waiting for documents to be sent to Dr. Denae Hewitt. Does have a past history of drug use but has not used in many years. After last appt, I also clarified pain recommendations after my staff called Dr. Lorri Pack office to help coordinate care. Pt was last seen there 10/2019 and was getting hydrocodone/APAP.   His chart was flagged because he was getting pain medication and also going to a methadone clinic (started while in [place] and pt weaned himself off after not liking how he felt). We had an honest discussion that I would not be prescribing his pain meds and we discussed the need for him to see pain management for this - pt agreed. We discussed this again today    SHIRA - He was also taken off his CPAP after his last surgery due to his bilateral PEs. He needs to check in with sleep medicine for new PSGhas not done this yet. Diabetes Mellitus: Uncontrolled with most recent A1c 10.4%. Added Trulicity at last appointment  Taking meds, home glucose monitoring: is performed sporadically  Reports no polyuria or polydipsia, no chest pain, dyspnea or TIA's, chronic numbness, tingling or pain in extremities partly related to surgery  Currently in too much pain to exercise  Pt is a non smoker.     Lab Results   Component Value Date/Time    Hemoglobin A1c (POC) 7.7 11/14/2018 10:42 AM    Hemoglobin A1c (POC) 8.1 08/14/2018 04:30 PM    Hemoglobin A1c 10.4 (H) 05/20/2020 09:04 AM    Microalb/Creat ratio (ug/mg creat.) 5.9 04/10/2018 09:24 AM    LDL, calculated 65 05/20/2020 09:04 AM    Creatinine 1.38 (H) 05/20/2020 09:04 AM      Lab Results   Component Value Date/Time    GFR est AA 67 05/20/2020 09:04 AM    GFR est non-AA 58 (L) 05/20/2020 09:04 AM      Lab Results   Component Value Date/Time    TSH 1.550 05/20/2020 09:04 AM       ROS  Gen - no fever/chills  Resp - no dyspnea or cough  CV - no chest pain or BROWN  Rest per HPI    Past Medical History:   Diagnosis Date    Arthritis     2010    Asthma 1995    INHALER USE PRN    Chronic bilateral low back pain without sciatica 11/6/2017    Chronic hepatitis C without hepatic coma ([address] 11/6/2017    [facility] at [facility] Chronic pain     lower back    Essential hypertension 11/6/2017    GERD (gastroesophageal reflux disease)     History of cardiac cath     Hypercholesterolemia     Mild episode of recurrent major depressive disorder ([address] 11/06/2012    Morbid obesity ([address]     PTSD (post-traumatic stress disorder)     Sleep apnea     pt stated was taken off cpap while on blood thinner    Stage 3 chronic kidney disease ([facility] 11/06/2017    IMPROVED     Thromboembolus ([facility]     1/3/19  DVT,PE while taking testosterone    Uncontrolled type 2 diabetes mellitus with peripheral neuropathy ([facility] 11/06/2011     Past Surgical History:   Procedure Laterality Date    HX BACK SURGERY  04/10/2018    Fusion L4,L5    HX COLONOSCOPY  2013    [facility]    HX HEART CATHETERIZATION  2017    NEG PER PATIENT    HX HERNIA REPAIR  5921    UMBILICAL    HX KNEE ARTHROSCOPY  1999    right knee    HX KNEE ARTHROSCOPY  2002    left knee    HX ROTATOR CUFF REPAIR Right 2016    HX UROLOGICAL  2015    Vasectomy      Current Outpatient Medications on File Prior to Visit   Medication Sig Dispense Refill    atorvastatin (LIPITOR) 40 mg tablet Take 1 Tab by mouth nightly. 30 Tab 0    warfarin (COUMADIN) 7.5 mg tablet Take 1 tab by mouth daily or as directed 90 Tab 0    DULoxetine (CYMBALTA) 30 mg capsule Take 1 capsule by mouth every morning along with 60 mg dose 30 Cap 0    DULoxetine (CYMBALTA) 60 mg capsule TAKE ONE CAPSULE BY MOUTH EVERY MORNING ALONG WITH 30 MG 30 Cap 0    amLODIPine (NORVASC) 10 mg tablet TAKE 1 TABLET BY MOUTH EVERY DAY 30 Tab 0    furosemide (LASIX) 20 mg tablet Take 1 Tab by mouth daily. 30 Tab 0    lisinopriL (PRINIVIL, ZESTRIL) 40 mg tablet TAKE 1 TABLET BY MOUTH EVERY DAY 30 Tab 0    fluticasone propion-salmeteroL (Wixela Inhub) 250-50 mcg/dose diskus inhaler Take 1 Puff by inhalation two (2) times a day. 1 Each 0    dulaglutide (Trulicity) 8.91 JZ/0.1 mL sub-q pen 0.5 mL by SubCUTAneous route every seven (7) days.  4 Pen 1    warfarin (COUMADIN) 10 mg tablet Take 1 tab by mouth daily or as directed 90 Tab 0    ProAir RespiClick 90 mcg/actuation breath activated inhaler       pantoprazole (PROTONIX) 40 mg tablet       aspirin delayed-release 81 mg tablet       mirtazapine (REMERON) 7.5 mg tablet TAKE 1 TABLET BY MOUTH EVERY DAY AT NIGHT 90 Tab 0    montelukast (SINGULAIR) 10 mg tablet Take 10 mg by mouth daily.  docusate sodium (COLACE) 100 mg capsule Take 100 mg by mouth two (2) times daily as needed for Constipation.  senna (SENNA) 8.6 mg tablet Take 2 Tabs by mouth daily.  prazosin (MINIPRESS) 2 mg capsule Take 2 mg by mouth nightly.  LEVEMIR FLEXTOUCH U-100 INSULN 100 unit/mL (3 mL) inpn INJECT 65 UNITS BY SUBCUTANEOUS ROUTE TWO (2) TIMES A DAY 39 Adjustable Dose Pre-filled Pen Syringe 2    pregabalin (LYRICA) 100 mg capsule Take 1 Cap by mouth two (2) times daily as needed for Pain. Max Daily Amount: 200 mg. 180 Cap 0     No current facility-administered medications on file prior to visit. Objective:     Vitals:    08/19/20 0857 08/19/20 0944   BP: (!) 146/93 145/90   Pulse: 96    Resp: 18    Temp: 97.1 °F (36.2 °C)    TempSrc: Temporal    SpO2: 97%    Weight: 265 lb 9.6 oz (120.5 kg)    Height: 6' (1.829 m)      Physical Examination:  General appearance - alert, well appearing, and in no distress  Eyes -sclera anicteric  Neck - supple, no significant adenopathy, no thyromegaly  Chest - clear to auscultation, no wheezes, rales or rhonchi, symmetric air entry  Heart - normal rate, regular rhythm, normal S1, S2, no murmurs, rubs, clicks or gallops  Neurological - alert, oriented, no focal findings or movement disorder noted  ExtremitiesRLE with no edema, LLE with 2+ edema (chronic after DVTs)  Psychnormal mood and affect    Assessment/ Plan:   Diagnoses and all orders for this visit:    1. Hypercoagulable state ([address]  2. Anticoagulated on Coumadin  3. History of pulmonary embolus (PE)  4. History of DVT of lower extremity  Therapeutic, continue current Coumadin dose  -     AMB POC PT/INR    5. Type 2 diabetes mellitus with diabetic neuropathy, with long-term current use of insulin (HCC)uncontrolled with last A1c of 10.4%. On Levemir 70 units twice daily, metformin, and started Trulicity at last appointment.   Made changes and rechecking labs  -     flash glucose sensor (FreeStyle Kateryna 14 Day Sensor) kit; Check blood sugars four times daily  Diagnosis E 11.9  -     flash glucose scanning reader (FreeStyle Kateryna 14 Day [place]) misc; Check blood sugars four times daily  Diagnosis E 11.9    6. Essential hypertensionBP too high today, recheck at next appt and may need med adjustments    7. Mixed hyperlipidemiacontrolled    8. Spinal stenosis, unspecified spinal region  9. S/P lumbar laminectomy  10. Chronic bilateral low back pain without sciatica  Working with pain management and to have MARIAH at [facility] as above. Continue on other medications including Cymbalta and Lyrica. -     cyclobenzaprine (FLEXERIL) 10 mg tablet; TAKE 1 TABLET BY MOUTH THREE TIMES A DAY AS NEEDED FOR MUSCLE SPASMS    11. PTSD (post-traumatic stress disorder)  12. Severe episode of recurrent major depressive disorder, without psychotic features ([address]  -     traZODone (DESYREL) 50 mg tablet; Take 1 Tab by mouth nightly. Take 50 mg by mouth nightly. I have discussed the diagnosis with the patient and the intended plan as seen in the above orders. The patient has received an after-visit summary and questions were answered concerning future plans. I have discussed medication side effects and warnings with the patient as well. The patient verbalizes understanding and agreement with the plan. Follow-up and Dispositions    · Return in about 4 weeks (around 9/16/2020).

## 2020-08-24 RX ORDER — TRAZODONE HYDROCHLORIDE 50 MG/1
50 TABLET ORAL
Qty: 30 TAB | Refills: 1 | Status: SHIPPED | OUTPATIENT
Start: 2020-08-24 | End: 2020-10-19

## 2020-08-24 RX ORDER — FLASH GLUCOSE SCANNING READER
EACH MISCELLANEOUS
Qty: 3 EACH | Refills: 2 | Status: SHIPPED | OUTPATIENT
Start: 2020-08-24 | End: 2020-12-17

## 2020-08-24 RX ORDER — CYCLOBENZAPRINE HCL 10 MG
TABLET ORAL
Qty: 90 TAB | Refills: 0 | Status: SHIPPED | OUTPATIENT
Start: 2020-08-24 | End: 2020-09-21

## 2020-08-24 RX ORDER — FLASH GLUCOSE SENSOR
KIT MISCELLANEOUS
Qty: 1 KIT | Refills: 1 | Status: SHIPPED | OUTPATIENT
Start: 2020-08-24 | End: 2020-12-17

## 2020-08-30 DIAGNOSIS — I10 ESSENTIAL HYPERTENSION: ICD-10-CM

## 2020-08-30 DIAGNOSIS — E78.2 MIXED HYPERLIPIDEMIA: ICD-10-CM

## 2020-08-31 RX ORDER — FUROSEMIDE 20 MG/1
TABLET ORAL
Qty: 30 TAB | Refills: 0 | Status: SHIPPED | OUTPATIENT
Start: 2020-08-31 | End: 2020-09-27

## 2020-08-31 RX ORDER — AMLODIPINE BESYLATE 10 MG/1
TABLET ORAL
Qty: 30 TAB | Refills: 0 | Status: SHIPPED | OUTPATIENT
Start: 2020-08-31 | End: 2020-09-27

## 2020-08-31 RX ORDER — ATORVASTATIN CALCIUM 40 MG/1
TABLET, FILM COATED ORAL
Qty: 30 TAB | Refills: 0 | Status: SHIPPED | OUTPATIENT
Start: 2020-08-31 | End: 2020-09-27

## 2020-09-03 DIAGNOSIS — F43.10 PTSD (POST-TRAUMATIC STRESS DISORDER): ICD-10-CM

## 2020-09-03 DIAGNOSIS — F33.2 SEVERE EPISODE OF RECURRENT MAJOR DEPRESSIVE DISORDER, WITHOUT PSYCHOTIC FEATURES (HCC): ICD-10-CM

## 2020-09-03 RX ORDER — DULOXETIN HYDROCHLORIDE 60 MG/1
CAPSULE, DELAYED RELEASE ORAL
Qty: 90 CAP | Refills: 0 | Status: SHIPPED | OUTPATIENT
Start: 2020-09-03 | End: 2020-11-30

## 2020-09-03 RX ORDER — DULOXETIN HYDROCHLORIDE 30 MG/1
CAPSULE, DELAYED RELEASE ORAL
Qty: 90 CAP | Refills: 0 | Status: SHIPPED | OUTPATIENT
Start: 2020-09-03 | End: 2020-11-19

## 2020-09-03 RX ORDER — DULOXETIN HYDROCHLORIDE 60 MG/1
CAPSULE, DELAYED RELEASE ORAL
Qty: 30 CAP | Refills: 0 | OUTPATIENT
Start: 2020-09-03

## 2020-09-05 LAB
ALBUMIN/CREAT UR: 17 MG/G CREAT (ref 0–29)
BUN SERPL-MCNC: 13 MG/DL (ref 6–24)
BUN/CREAT SERPL: 10 (ref 9–20)
CALCIUM SERPL-MCNC: 9.7 MG/DL (ref 8.7–10.2)
CHLORIDE SERPL-SCNC: 105 MMOL/L (ref 96–106)
CO2 SERPL-SCNC: 22 MMOL/L (ref 20–29)
CREAT SERPL-MCNC: 1.24 MG/DL (ref 0.76–1.27)
CREAT UR-MCNC: 236.8 MG/DL
EST. AVERAGE GLUCOSE BLD GHB EST-MCNC: 217 MG/DL
GLUCOSE SERPL-MCNC: 174 MG/DL (ref 65–99)
HBA1C MFR BLD: 9.2 % (ref 4.8–5.6)
MICROALBUMIN UR-MCNC: 41.4 UG/ML
POTASSIUM SERPL-SCNC: 4.3 MMOL/L (ref 3.5–5.2)
SODIUM SERPL-SCNC: 141 MMOL/L (ref 134–144)

## 2020-09-07 RX ORDER — WARFARIN 10 MG/1
TABLET ORAL
Qty: 90 TAB | Refills: 0 | Status: SHIPPED | OUTPATIENT
Start: 2020-09-07 | End: 2020-12-08

## 2020-09-16 ENCOUNTER — OFFICE VISIT (OUTPATIENT)
Dept: FAMILY MEDICINE CLINIC | Age: 55
End: 2020-09-16
Payer: MEDICARE

## 2020-09-16 VITALS
WEIGHT: 273 LBS | TEMPERATURE: 96.9 F | SYSTOLIC BLOOD PRESSURE: 141 MMHG | BODY MASS INDEX: 36.98 KG/M2 | DIASTOLIC BLOOD PRESSURE: 88 MMHG | HEART RATE: 99 BPM | RESPIRATION RATE: 16 BRPM | HEIGHT: 72 IN | OXYGEN SATURATION: 98 %

## 2020-09-16 DIAGNOSIS — Z86.711 HISTORY OF PULMONARY EMBOLUS (PE): ICD-10-CM

## 2020-09-16 DIAGNOSIS — Z79.01 ANTICOAGULATED ON COUMADIN: ICD-10-CM

## 2020-09-16 DIAGNOSIS — Z98.890 S/P LUMBAR LAMINECTOMY: ICD-10-CM

## 2020-09-16 DIAGNOSIS — I10 ESSENTIAL HYPERTENSION: ICD-10-CM

## 2020-09-16 DIAGNOSIS — M48.00 SPINAL STENOSIS, UNSPECIFIED SPINAL REGION: ICD-10-CM

## 2020-09-16 DIAGNOSIS — Z86.718 HISTORY OF DVT OF LOWER EXTREMITY: ICD-10-CM

## 2020-09-16 DIAGNOSIS — D68.59 HYPERCOAGULABLE STATE (HCC): Primary | ICD-10-CM

## 2020-09-16 LAB
INR BLD: 3
PT POC: NORMAL
VALID INTERNAL CONTROL?: YES

## 2020-09-16 PROCEDURE — G8417 CALC BMI ABV UP PARAM F/U: HCPCS | Performed by: FAMILY MEDICINE

## 2020-09-16 PROCEDURE — 3046F HEMOGLOBIN A1C LEVEL >9.0%: CPT | Performed by: FAMILY MEDICINE

## 2020-09-16 PROCEDURE — G8752 SYS BP LESS 140: HCPCS | Performed by: FAMILY MEDICINE

## 2020-09-16 PROCEDURE — G8754 DIAS BP LESS 90: HCPCS | Performed by: FAMILY MEDICINE

## 2020-09-16 PROCEDURE — 85610 PROTHROMBIN TIME: CPT | Performed by: FAMILY MEDICINE

## 2020-09-16 PROCEDURE — G8427 DOCREV CUR MEDS BY ELIG CLIN: HCPCS | Performed by: FAMILY MEDICINE

## 2020-09-16 PROCEDURE — 2022F DILAT RTA XM EVC RTNOPTHY: CPT | Performed by: FAMILY MEDICINE

## 2020-09-16 PROCEDURE — G9717 DOC PT DX DEP/BP F/U NT REQ: HCPCS | Performed by: FAMILY MEDICINE

## 2020-09-16 PROCEDURE — 99214 OFFICE O/P EST MOD 30 MIN: CPT | Performed by: FAMILY MEDICINE

## 2020-09-16 PROCEDURE — 3017F COLORECTAL CA SCREEN DOC REV: CPT | Performed by: FAMILY MEDICINE

## 2020-09-16 RX ORDER — DULAGLUTIDE 1.5 MG/.5ML
1.5 INJECTION, SOLUTION SUBCUTANEOUS
Qty: 4 SYRINGE | Refills: 2 | Status: SHIPPED | OUTPATIENT
Start: 2020-09-16 | End: 2021-02-09

## 2020-09-16 RX ORDER — PREGABALIN 100 MG/1
100 CAPSULE ORAL
Qty: 180 CAP | Refills: 0 | Status: SHIPPED | OUTPATIENT
Start: 2020-09-16 | End: 2020-12-17

## 2020-09-16 RX ORDER — LISINOPRIL 40 MG/1
TABLET ORAL
Qty: 90 TAB | Refills: 0 | Status: SHIPPED | OUTPATIENT
Start: 2020-09-16 | End: 2020-12-18

## 2020-09-16 RX ORDER — ASPIRIN 81 MG/1
81 TABLET ORAL DAILY
Qty: 90 TAB | Refills: 0 | Status: SHIPPED | OUTPATIENT
Start: 2020-09-16 | End: 2020-12-15

## 2020-09-16 NOTE — PROGRESS NOTES
Reviewed labs with patient in office. A1c down from 10.4% to 9.2% but still uncontrolled. Increasing Trulicity and starting Jardiance.   Sending to Endo to maintain optimal control of diabetes

## 2020-09-16 NOTE — PROGRESS NOTES
Chief Complaint   Patient presents with    Anticoagulation     PT/INR   1. Have you been to the ER, urgent care clinic since your last visit? Hospitalized since your last visit? No    2. Have you seen or consulted any other health care providers outside of the [address] [place] [address] since your last visit? Include any pap smears or colon screening.  No   No concerns voiced

## 2020-09-16 NOTE — PROGRESS NOTES
Subjective:     Chief Complaint   Patient presents with    Anticoagulation     PT/INR        He  is a 50 y.o. male who presents for evaluation of:    Anticoagulation history of 3 VTEs. Most recently, admitted to [facility] on 5/7/2020 5/15/2020 for PE and DVT. Previously had  for DVT and bilateral PE in 11/2019. He had an insurance change after that so was unable to follow-up with me until getting on an insurance plan work to be sleepy. He never followed up with Dr. Zaki Pena (hematology) on 12/23/2019 as scheduled for the same reasonwas previously seeing Dr. Paul Campbell at [facility]. This was his third VTE and he was supposed to be on Eliquis long-term for anticoagulation. Now on Coumadin and his INR at discharge on 5/15/2020 was 2.7. He will continue alternating 7.5 mg and 10 mg. Chronic paincontinues to be a struggle. Has a history of spinal stenosis status post lumbar surgery x2 with Dr. Elizabeth Choi who has now retired. He is working to see Ortho spine that will work with his insurance. He is not interested in having another sgy but ct to struggle with pain mauricio  low back radiating down his legs. He has been on Lyrica and Cymbalta which have helped significantly. During his hospitalization he was put on oxycodone 10 mg 4 times daily PRN. He did see Dr. Kaela Jose in the past for pain management. Does have a past history of drug use but has not used in many years. Prev clarified pain recommendations after my staff called Dr. Quincy Newman office to help coordinate care. Pt was last seen there 10/2019 and was getting hydrocodone/APAP. His chart was flagged because he was getting pain medication and also going to a methadone clinic (started while in [place] and pt weaned himself off after not liking how he felt). We had an honest discussion that I would not be prescribing his pain meds and we discussed the need for him to see pain management for this - pt agreed.     SHIRA - He was also taken off his CPAP after his last surgery due to his bilateral PEs. He needs to check in with sleep medicine for new PSGhas not done this yet. Diabetes Mellitus: Uncontrolled with most recent A1c down to 9.2% from 10.4%. On Levemir 70 units twice daily, metformin, and started Trulicity in 8/8801. Taking meds, home glucose monitoring: is performed sporadically  Reports no polyuria or polydipsia, no chest pain, dyspnea or TIA's, chronic numbness, tingling or pain in extremities partly related to surgery  Currently in too much pain to exercise  Pt is a non smoker.      Lab Results   Component Value Date/Time    Hemoglobin A1c (POC) 7.7 11/14/2018 10:42 AM    Hemoglobin A1c (POC) 8.1 08/14/2018 04:30 PM    Hemoglobin A1c 9.2 (H) 09/04/2020 10:55 AM    Microalb/Creat ratio (ug/mg creat.) 17 09/04/2020 10:55 AM    LDL, calculated 65 05/20/2020 09:04 AM    Creatinine 1.24 09/04/2020 10:55 AM      Lab Results   Component Value Date/Time    GFR est AA 76 09/04/2020 10:55 AM    GFR est non-AA 66 09/04/2020 10:55 AM      Lab Results   Component Value Date/Time    TSH 1.550 05/20/2020 09:04 AM       ROS  Gen - no fever/chills  Resp - no dyspnea or cough  CV - no chest pain or BROWN  Rest per HPI    Past Medical History:   Diagnosis Date    Arthritis     2010    Asthma 1995    INHALER USE PRN    Chronic bilateral low back pain without sciatica 11/6/2017    Chronic hepatitis C without hepatic coma ([address] 11/6/2017    treated at [facility] Chronic pain     lower back    Essential hypertension 11/6/2017    GERD (gastroesophageal reflux disease)     History of cardiac cath     Hypercholesterolemia     Mild episode of recurrent major depressive disorder ([address] 11/06/2012    Morbid obesity ([address]     PTSD (post-traumatic stress disorder)     Sleep apnea     pt stated was taken off cpap while on blood thinner    Stage 3 chronic kidney disease ([address] 11/06/2017    IMPROVED     Thromboembolus ([address]     1/3/19  DVT,PE while taking testosterone  Uncontrolled type 2 diabetes mellitus with peripheral neuropathy ([facility] 11/06/2011     Past Surgical History:   Procedure Laterality Date    HX BACK SURGERY  04/10/2018    Fusion L4,L5    [facility] COLONOSCOPY  2013    [facility]    HX HEART CATHETERIZATION  2017    NEG PER PATIENT    HX HERNIA REPAIR  4697    UMBILICAL    HX KNEE ARTHROSCOPY  1999    right knee    HX KNEE ARTHROSCOPY  2002    left knee    HX ROTATOR CUFF REPAIR Right 2016    HX UROLOGICAL  2015    Vasectomy      Current Outpatient Medications on File Prior to Visit   Medication Sig Dispense Refill    warfarin (COUMADIN) 10 mg tablet TAKE 1 TAB BY MOUTH DAILY OR AS DIRECTED 90 Tab 0    DULoxetine (CYMBALTA) 60 mg capsule TAKE ONE CAPSULE BY MOUTH EVERY MORNING ALONG WITH 30 MG 90 Cap 0    DULoxetine (CYMBALTA) 30 mg capsule Take 1 capsule by mouth every morning along with 60 mg dose 90 Cap 0    amLODIPine (NORVASC) 10 mg tablet TAKE 1 TABLET BY MOUTH EVERY DAY 30 Tab 0    atorvastatin (LIPITOR) 40 mg tablet TAKE 1 TABLET BY MOUTH EVERY DAY AT NIGHT 30 Tab 0    furosemide (LASIX) 20 mg tablet TAKE 1 TABLET BY MOUTH EVERY DAY 30 Tab 0    cyclobenzaprine (FLEXERIL) 10 mg tablet TAKE 1 TABLET BY MOUTH THREE TIMES A DAY AS NEEDED FOR MUSCLE SPASMS 90 Tab 0    traZODone (DESYREL) 50 mg tablet Take 1 Tab by mouth nightly. Take 50 mg by mouth nightly. 30 Tab 1    flash glucose sensor (FreeStyle Kateryna 14 Day Sensor) kit Check blood sugars four times daily  Diagnosis E 11.9 1 Kit 1    flash glucose scanning reader (FreeStyle Kateryna 14 Day [place]) misc Check blood sugars four times daily  Diagnosis E 11.9 3 Each 2    metFORMIN ER (GLUCOPHAGE XR) 500 mg tablet TAKE 2 TABLETS BY MOUTH DAILY WITH DINNER 180 Tab 0    warfarin (COUMADIN) 7.5 mg tablet Take 1 tab by mouth daily or as directed 90 Tab 0    fluticasone propion-salmeteroL (Wixela Inhub) 250-50 mcg/dose diskus inhaler Take 1 Puff by inhalation two (2) times a day.  1 Each 0    dulaglutide (Trulicity) 3.93 OD/5.4 mL sub-q pen 0.5 mL by SubCUTAneous route every seven (7) days. 4 Pen 1    ProAir RespiClick 90 mcg/actuation breath activated inhaler       pantoprazole (PROTONIX) 40 mg tablet       mirtazapine (REMERON) 7.5 mg tablet TAKE 1 TABLET BY MOUTH EVERY DAY AT NIGHT 90 Tab 0    montelukast (SINGULAIR) 10 mg tablet Take 10 mg by mouth daily.  docusate sodium (COLACE) 100 mg capsule Take 100 mg by mouth two (2) times daily as needed for Constipation.  senna (SENNA) 8.6 mg tablet Take 2 Tabs by mouth daily.  prazosin (MINIPRESS) 2 mg capsule Take 2 mg by mouth nightly.  LEVEMIR FLEXTOUCH U-100 INSULN 100 unit/mL (3 mL) inpn INJECT 65 UNITS BY SUBCUTANEOUS ROUTE TWO (2) TIMES A DAY 39 Adjustable Dose Pre-filled Pen Syringe 2    [DISCONTINUED] lisinopriL (PRINIVIL, ZESTRIL) 40 mg tablet TAKE 1 TABLET BY MOUTH EVERY DAY 30 Tab 0    [DISCONTINUED] aspirin delayed-release 81 mg tablet       [DISCONTINUED] pregabalin (LYRICA) 100 mg capsule Take 1 Cap by mouth two (2) times daily as needed for Pain. Max Daily Amount: 200 mg. 180 Cap 0     No current facility-administered medications on file prior to visit.          Objective:     Vitals:    09/16/20 1044 09/16/20 1056   BP: (!) 139/92 (!) 141/88   Pulse: 99    Resp: 16    Temp: 96.9 °F (36.1 °C)    TempSrc: Temporal    SpO2: 98%    Weight: 273 lb (123.8 kg)    Height: 6' (1.829 m)      Physical Examination:  General appearance - alert, well appearing, and in no distress  Eyes -sclera anicteric  Neck - supple, no significant adenopathy, no thyromegaly  Chest - clear to auscultation, no wheezes, rales or rhonchi, symmetric air entry  Heart - normal rate, regular rhythm, normal S1, S2, no murmurs, rubs, clicks or gallops  Neurological - alert, oriented, no focal findings or movement disorder noted  ExtremitiesRLE with no edema, LLE with 2+ edema (chronic after DVTs)  Psychnormal mood and affect    Assessment/ Plan:   Diagnoses and all orders for this visit:    1. Hypercoagulable state ([address]  2. Anticoagulated on Coumadin  3. History of pulmonary embolus (PE)  4. History of DVT of lower extremity  Therapeutic, continue current Coumadin dose  -     AMB POC PT/INR    5. Uncontrolled type 2 diabetes mellitus with peripheral neuropathy (HCC)uncontrolled with last A1c of 10.4% and down to 9.2%. On Levemir 70 units twice daily, metformin, and Trulicity. Incr Trulicity dose and adding on Jardiance. Sending to Endo  -     REFERRAL TO ENDOCRINOLOGY  -     empagliflozin (Jardiance) 10 mg tablet; Take 1 Tab by mouth daily. 6. Essential hypertension - slightly high, may improve with Jardiance  -     lisinopriL (PRINIVIL, ZESTRIL) 40 mg tablet; TAKE 1 TABLET BY MOUTH EVERY DAY  -     empagliflozin (Jardiance) 10 mg tablet; Take 1 Tab by mouth daily. 7. Spinal stenosis, unspecified spinal region  8. S/P lumbar laminectomy  -     pregabalin (LYRICA) 100 mg capsule; Take 1 Cap by mouth two (2) times daily as needed for Pain. Max Daily Amount: 200 mg.    9. BMI 37.0-37.9, adult -encouraged to work on weight loss with diet and exercise    Other orders  -     aspirin delayed-release 81 mg tablet; Take 1 Tab by mouth daily. I have discussed the diagnosis with the patient and the intended plan as seen in the above orders. The patient has received an after-visit summary and questions were answered concerning future plans. I have discussed medication side effects and warnings with the patient as well. The patient verbalizes understanding and agreement with the plan. Follow-up and Dispositions    · Return in about 4 weeks (around 10/14/2020), or if symptoms worsen or fail to improve.

## 2020-09-20 DIAGNOSIS — M48.00 SPINAL STENOSIS, UNSPECIFIED SPINAL REGION: ICD-10-CM

## 2020-09-20 DIAGNOSIS — Z98.890 S/P LUMBAR LAMINECTOMY: ICD-10-CM

## 2020-09-21 RX ORDER — CYCLOBENZAPRINE HCL 10 MG
TABLET ORAL
Qty: 90 TAB | Refills: 0 | Status: SHIPPED | OUTPATIENT
Start: 2020-09-21 | End: 2020-10-23

## 2020-09-25 DIAGNOSIS — E78.2 MIXED HYPERLIPIDEMIA: ICD-10-CM

## 2020-09-25 DIAGNOSIS — I10 ESSENTIAL HYPERTENSION: ICD-10-CM

## 2020-09-27 RX ORDER — ATORVASTATIN CALCIUM 40 MG/1
TABLET, FILM COATED ORAL
Qty: 30 TAB | Refills: 0 | Status: SHIPPED | OUTPATIENT
Start: 2020-09-27 | End: 2020-10-26

## 2020-09-27 RX ORDER — AMLODIPINE BESYLATE 10 MG/1
TABLET ORAL
Qty: 30 TAB | Refills: 0 | Status: SHIPPED | OUTPATIENT
Start: 2020-09-27 | End: 2020-10-26

## 2020-09-27 RX ORDER — FUROSEMIDE 20 MG/1
TABLET ORAL
Qty: 30 TAB | Refills: 0 | Status: SHIPPED | OUTPATIENT
Start: 2020-09-27 | End: 2020-10-26

## 2020-09-29 DIAGNOSIS — F33.2 SEVERE EPISODE OF RECURRENT MAJOR DEPRESSIVE DISORDER, WITHOUT PSYCHOTIC FEATURES (HCC): ICD-10-CM

## 2020-09-29 DIAGNOSIS — F43.10 PTSD (POST-TRAUMATIC STRESS DISORDER): ICD-10-CM

## 2020-09-30 RX ORDER — MIRTAZAPINE 7.5 MG/1
TABLET, FILM COATED ORAL
Qty: 90 TAB | Refills: 0 | Status: SHIPPED | OUTPATIENT
Start: 2020-09-30 | End: 2021-02-09

## 2020-10-19 DIAGNOSIS — F43.10 PTSD (POST-TRAUMATIC STRESS DISORDER): ICD-10-CM

## 2020-10-19 DIAGNOSIS — F33.2 SEVERE EPISODE OF RECURRENT MAJOR DEPRESSIVE DISORDER, WITHOUT PSYCHOTIC FEATURES (HCC): ICD-10-CM

## 2020-10-19 RX ORDER — TRAZODONE HYDROCHLORIDE 50 MG/1
TABLET ORAL
Qty: 30 TAB | Refills: 1 | Status: SHIPPED | OUTPATIENT
Start: 2020-10-19 | End: 2020-10-30 | Stop reason: SDUPTHER

## 2020-10-20 DIAGNOSIS — Z98.890 S/P LUMBAR LAMINECTOMY: ICD-10-CM

## 2020-10-20 DIAGNOSIS — M48.00 SPINAL STENOSIS, UNSPECIFIED SPINAL REGION: ICD-10-CM

## 2020-10-23 RX ORDER — CYCLOBENZAPRINE HCL 10 MG
TABLET ORAL
Qty: 90 TAB | Refills: 0 | Status: SHIPPED | OUTPATIENT
Start: 2020-10-23 | End: 2020-11-24

## 2020-10-25 DIAGNOSIS — I10 ESSENTIAL HYPERTENSION: ICD-10-CM

## 2020-10-25 DIAGNOSIS — E78.2 MIXED HYPERLIPIDEMIA: ICD-10-CM

## 2020-10-26 RX ORDER — FUROSEMIDE 20 MG/1
TABLET ORAL
Qty: 30 TAB | Refills: 0 | Status: SHIPPED | OUTPATIENT
Start: 2020-10-26 | End: 2020-11-20

## 2020-10-26 RX ORDER — ATORVASTATIN CALCIUM 40 MG/1
TABLET, FILM COATED ORAL
Qty: 30 TAB | Refills: 0 | Status: SHIPPED | OUTPATIENT
Start: 2020-10-26 | End: 2020-11-20

## 2020-10-26 RX ORDER — AMLODIPINE BESYLATE 10 MG/1
TABLET ORAL
Qty: 30 TAB | Refills: 0 | Status: SHIPPED | OUTPATIENT
Start: 2020-10-26 | End: 2020-11-20

## 2020-10-30 ENCOUNTER — OFFICE VISIT (OUTPATIENT)
Dept: FAMILY MEDICINE CLINIC | Age: 55
End: 2020-10-30
Payer: MEDICARE

## 2020-10-30 VITALS
OXYGEN SATURATION: 97 % | SYSTOLIC BLOOD PRESSURE: 133 MMHG | RESPIRATION RATE: 16 BRPM | HEIGHT: 72 IN | TEMPERATURE: 98.4 F | DIASTOLIC BLOOD PRESSURE: 93 MMHG | WEIGHT: 281.8 LBS | BODY MASS INDEX: 38.17 KG/M2 | HEART RATE: 97 BPM

## 2020-10-30 DIAGNOSIS — D68.59 HYPERCOAGULABLE STATE (HCC): Primary | ICD-10-CM

## 2020-10-30 DIAGNOSIS — F33.2 SEVERE EPISODE OF RECURRENT MAJOR DEPRESSIVE DISORDER, WITHOUT PSYCHOTIC FEATURES (HCC): ICD-10-CM

## 2020-10-30 DIAGNOSIS — I10 ESSENTIAL HYPERTENSION: ICD-10-CM

## 2020-10-30 DIAGNOSIS — Z79.01 ANTICOAGULATED ON COUMADIN: ICD-10-CM

## 2020-10-30 DIAGNOSIS — Z86.718 HISTORY OF DVT OF LOWER EXTREMITY: ICD-10-CM

## 2020-10-30 DIAGNOSIS — M48.00 SPINAL STENOSIS, UNSPECIFIED SPINAL REGION: ICD-10-CM

## 2020-10-30 DIAGNOSIS — Z98.890 S/P LUMBAR LAMINECTOMY: ICD-10-CM

## 2020-10-30 DIAGNOSIS — F43.10 PTSD (POST-TRAUMATIC STRESS DISORDER): ICD-10-CM

## 2020-10-30 DIAGNOSIS — Z86.711 HISTORY OF PULMONARY EMBOLUS (PE): ICD-10-CM

## 2020-10-30 LAB
INR BLD: 1.7
PT POC: 0 SECONDS
VALID INTERNAL CONTROL?: YES

## 2020-10-30 PROCEDURE — G8427 DOCREV CUR MEDS BY ELIG CLIN: HCPCS | Performed by: FAMILY MEDICINE

## 2020-10-30 PROCEDURE — G9717 DOC PT DX DEP/BP F/U NT REQ: HCPCS | Performed by: FAMILY MEDICINE

## 2020-10-30 PROCEDURE — G8417 CALC BMI ABV UP PARAM F/U: HCPCS | Performed by: FAMILY MEDICINE

## 2020-10-30 PROCEDURE — 3046F HEMOGLOBIN A1C LEVEL >9.0%: CPT | Performed by: FAMILY MEDICINE

## 2020-10-30 PROCEDURE — 2022F DILAT RTA XM EVC RTNOPTHY: CPT | Performed by: FAMILY MEDICINE

## 2020-10-30 PROCEDURE — 85610 PROTHROMBIN TIME: CPT | Performed by: FAMILY MEDICINE

## 2020-10-30 PROCEDURE — 99214 OFFICE O/P EST MOD 30 MIN: CPT | Performed by: FAMILY MEDICINE

## 2020-10-30 PROCEDURE — G8752 SYS BP LESS 140: HCPCS | Performed by: FAMILY MEDICINE

## 2020-10-30 PROCEDURE — 3017F COLORECTAL CA SCREEN DOC REV: CPT | Performed by: FAMILY MEDICINE

## 2020-10-30 PROCEDURE — G8755 DIAS BP > OR = 90: HCPCS | Performed by: FAMILY MEDICINE

## 2020-10-30 RX ORDER — TRAZODONE HYDROCHLORIDE 50 MG/1
TABLET ORAL
Qty: 30 TAB | Refills: 1 | Status: SHIPPED | OUTPATIENT
Start: 2020-10-30 | End: 2021-03-11

## 2020-10-30 RX ORDER — PRAZOSIN HYDROCHLORIDE 2 MG/1
2 CAPSULE ORAL
Qty: 90 CAP | Refills: 0 | Status: SHIPPED | OUTPATIENT
Start: 2020-10-30 | End: 2021-01-19 | Stop reason: SDUPTHER

## 2020-10-30 RX ORDER — KETOROLAC TROMETHAMINE 10 MG/1
10 TABLET, FILM COATED ORAL
Qty: 30 TAB | Refills: 0 | Status: SHIPPED | OUTPATIENT
Start: 2020-10-30 | End: 2020-12-15

## 2020-10-30 NOTE — PROGRESS NOTES
Subjective:     Chief Complaint   Patient presents with    Follow-up        He  is a 50 y.o. male who presents for evaluation of:    Anticoagulation history of 3 VTEs. Most recently, admitted to [facility] on 5/7/2020 5/15/2020 for PE and DVT. Previously had  for DVT and bilateral PE in 11/2019. He had an insurance change after that so was unable to follow-up with me until getting on an insurance plan work to be sleepy. He never followed up with Dr. Sridevi Conti (hematology) on 12/23/2019 as scheduled for the same reasonwas previously seeing Dr. Brock Roberto at [facility]. This was his third VTE and he was supposed to be on Eliquis long-term for anticoagulation. For his Coumadin dosing, he has continued alternating 7.5 mg and 10 mg and mostly been therapeutic. Chronic paincontinues to be a struggle. Working with pain management at [facility] again and planning for [facility]. Patient is very hesitant about any additional procedures and has not interested in following up with any surgeons as he feels any further surgery would only worsen the situation. Has a history of spinal stenosis status post lumbar surgery x2 with Dr. Thomsa Polanco who has now retired. He is working to see Ortho spine that will work with his insurance. He is not interested in having another sgy but ct to struggle with pain mauricio  low back radiating down his legs. He has been on Lyrica and Cymbalta which have helped significantly. During his hospitalization he was put on oxycodone 10 mg 4 times daily PRN. He did see Dr. Caridad Munoz in the past for pain management. Does have a past history of drug use but has not used in many years. Prev clarified pain recommendations after my staff called Dr. Yvon Mac office to help coordinate care. Pt was last seen there 10/2019 and was getting hydrocodone/APAP.   His chart was flagged because he was getting pain medication and also going to a methadone clinic (of note, the methadone was started while in [place] during a rehab stay and pt weaned himself off after not liking how he felt). SHIRA - He was also taken off his CPAP after his last surgery due to his bilateral PEs. He needs to check in with sleep medicine for new PSGhas not done this yet. Diabetes Mellitus: Uncontrolled with most recent A1c down to 9.2% from 10.4%. On Levemir 70 units twice daily, metformin, and started Trulicity in 8/4631. Taking meds, home glucose monitoring: is performed sporadically  Reports no polyuria or polydipsia, no chest pain, dyspnea or TIA's, chronic numbness, tingling or pain in extremities partly related to surgery  Currently in too much pain to exercise  Pt is a non smoker.      Lab Results   Component Value Date/Time    Hemoglobin A1c (POC) 7.7 11/14/2018 10:42 AM    Hemoglobin A1c (POC) 8.1 08/14/2018 04:30 PM    Hemoglobin A1c 9.2 (H) 09/04/2020 10:55 AM    Microalb/Creat ratio (ug/mg creat.) 17 09/04/2020 10:55 AM    LDL, calculated 65 05/20/2020 09:04 AM    Creatinine 1.24 09/04/2020 10:55 AM      Lab Results   Component Value Date/Time    GFR est AA 76 09/04/2020 10:55 AM    GFR est non-AA 66 09/04/2020 10:55 AM      Lab Results   Component Value Date/Time    TSH 1.550 05/20/2020 09:04 AM       ROS  Gen - no fever/chills  Resp - no dyspnea or cough  CV - no chest pain or BROWN  Rest per HPI    Past Medical History:   Diagnosis Date    Arthritis     2010    Asthma 1995    INHALER USE PRN    Chronic bilateral low back pain without sciatica 11/6/2017    Chronic hepatitis C without hepatic coma ([facility] 11/6/2017    treated at [facility] Chronic pain     lower back    Essential hypertension 11/6/2017    GERD (gastroesophageal reflux disease)     History of cardiac cath     Hypercholesterolemia     Mild episode of recurrent major depressive disorder ([address] 11/06/2012    Morbid obesity ([facility]     PTSD (post-traumatic stress disorder)     Sleep apnea     pt stated was taken off cpap while on blood thinner    Stage 3 chronic kidney disease 11/06/2017    IMPROVED     Thromboembolus ([facility]     1/3/19  DVT,PE while taking testosterone    Uncontrolled type 2 diabetes mellitus with peripheral neuropathy ([facility] [address] 11/06/2011     Past Surgical History:   Procedure Laterality Date    HX BACK SURGERY  04/10/2018    Fusion L4,L5    HX COLONOSCOPY  2013    CJW    HX HEART CATHETERIZATION  2017    NEG PER PATIENT    HX HERNIA REPAIR  6738    UMBILICAL    HX KNEE ARTHROSCOPY  1999    right knee    HX KNEE ARTHROSCOPY  2002    left knee    HX ROTATOR CUFF REPAIR Right 2016    HX UROLOGICAL  2015    Vasectomy      Current Outpatient Medications on File Prior to Visit   Medication Sig Dispense Refill    furosemide (LASIX) 20 mg tablet TAKE 1 TABLET BY MOUTH EVERY DAY 30 Tab 0    atorvastatin (LIPITOR) 40 mg tablet TAKE 1 TABLET BY MOUTH EVERY DAY AT NIGHT 30 Tab 0    amLODIPine (NORVASC) 10 mg tablet TAKE 1 TABLET BY MOUTH EVERY DAY 30 Tab 0    cyclobenzaprine (FLEXERIL) 10 mg tablet TAKE 1 TABLET BY MOUTH THREE TIMES A DAY AS NEEDED FOR MUSCLE SPASMS 90 Tab 0    mirtazapine (REMERON) 7.5 mg tablet TAKE 1 TABLET BY MOUTH EVERY DAY EVERY NIGHT 90 Tab 0    pregabalin (LYRICA) 100 mg capsule Take 1 Cap by mouth two (2) times daily as needed for Pain. Max Daily Amount: 200 mg. 180 Cap 0    aspirin delayed-release 81 mg tablet Take 1 Tab by mouth daily. 90 Tab 0    lisinopriL (PRINIVIL, ZESTRIL) 40 mg tablet TAKE 1 TABLET BY MOUTH EVERY DAY 90 Tab 0    empagliflozin (Jardiance) 10 mg tablet Take 1 Tab by mouth daily. 30 Tab 2    dulaglutide (Trulicity) 1.5 EC/7.5 mL sub-q pen 0.5 mL by SubCUTAneous route every seven (7) days.  4 Syringe 2    warfarin (COUMADIN) 10 mg tablet TAKE 1 TAB BY MOUTH DAILY OR AS DIRECTED 90 Tab 0    DULoxetine (CYMBALTA) 60 mg capsule TAKE ONE CAPSULE BY MOUTH EVERY MORNING ALONG WITH 30 MG 90 Cap 0    DULoxetine (CYMBALTA) 30 mg capsule Take 1 capsule by mouth every morning along with 60 mg dose 90 Cap 0  flash glucose sensor (FreeStyle Kateryna 14 Day Sensor) kit Check blood sugars four times daily  Diagnosis E 11.9 1 Kit 1    flash glucose scanning reader (FreeStyle Kateryna 14 Day [place]) misc Check blood sugars four times daily  Diagnosis E 11.9 3 Each 2    metFORMIN ER (GLUCOPHAGE XR) 500 mg tablet TAKE 2 TABLETS BY MOUTH DAILY WITH DINNER 180 Tab 0    warfarin (COUMADIN) 7.5 mg tablet Take 1 tab by mouth daily or as directed 90 Tab 0    fluticasone propion-salmeteroL (Wixela Inhub) 250-50 mcg/dose diskus inhaler Take 1 Puff by inhalation two (2) times a day. 1 Each 0    ProAir RespiClick 90 mcg/actuation breath activated inhaler       pantoprazole (PROTONIX) 40 mg tablet       montelukast (SINGULAIR) 10 mg tablet Take 10 mg by mouth daily.  docusate sodium (COLACE) 100 mg capsule Take 100 mg by mouth two (2) times daily as needed for Constipation.  senna (SENNA) 8.6 mg tablet Take 2 Tabs by mouth daily.  LEVEMIR FLEXTOUCH U-100 INSULN 100 unit/mL (3 mL) inpn INJECT 65 UNITS BY SUBCUTANEOUS ROUTE TWO (2) TIMES A DAY 39 Adjustable Dose Pre-filled Pen Syringe 2     No current facility-administered medications on file prior to visit.          Objective:     Vitals:    10/30/20 1117   BP: (!) 133/93   Pulse: 97   Resp: 16   Temp: 98.4 °F (36.9 °C)   TempSrc: Temporal   SpO2: 97%   Weight: 281 lb 12.8 oz (127.8 kg)   Height: 6' (1.829 m)     Physical Examination:  General appearance - alert, well appearing, and in no distress  Eyes -sclera anicteric  Neck - supple, no significant adenopathy, no thyromegaly  Chest - clear to auscultation, no wheezes, rales or rhonchi, symmetric air entry  Heart - normal rate, regular rhythm, normal S1, S2, no murmurs, rubs, clicks or gallops  Neurological - alert, oriented, no focal findings or movement disorder noted  ExtremitiesRLE with no edema, LLE with 2+ edema (chronic after DVTs)  Psychnormal mood and affect    Assessment/ Plan:   Diagnoses and all orders for this visit:    1. Hypercoagulable state ([address]  2. Anticoagulated on Coumadin  3. History of pulmonary embolus (PE)  4. History of DVT of lower extremity  INR subtherapeutic, continue current Coumadin dose and recheck in 1 week  -     AMB POC PT/INR    5. PTSD (post-traumatic stress disorder)working with psych and will see a new provider today. Encouraged him to continue working with psych and working on having regular therapy  -     traZODone (DESYREL) 50 mg tablet; TAKE 1 TABLET BY MOUTH NIGHTLY  -     prazosin (MINIPRESS) 2 mg capsule; Take 1 Cap by mouth nightly. 6. Severe episode of recurrent major depressive disorder, without psychotic features (HCC)as above. Likely needs medication adjustments  -     traZODone (DESYREL) 50 mg tablet; TAKE 1 TABLET BY MOUTH NIGHTLY    7. Uncontrolled type 2 diabetes mellitus with peripheral neuropathy (HCC)ongoing issue. Patient working on diet. Previously referred and and patient never followed up with this. We will see if he is amenable to working with our pharmacist.  Naseem Bar Trulicity 1.5 mg weekly  Adjusting Jardiance 10 mg daily to 25 mg daily  Currently on Levemir 65 units twice daily and will switch this to Levemir 65 units once daily and 70/30 insulin 65 units with dinner    8. Spinal stenosis, unspecified spinal region  9. S/P lumbar laminectomy  Encouraged patient to continue working with pain management on long-term plan. Will try another round of physical therapy. Also discussed using ketorolac as patient has in the past and would limit this especially in the setting of his Coumadin use. -     REFERRAL TO PHYSICAL THERAPY  -     ketorolac (TORADOL) 10 mg tablet; Take 1 Tab by mouth daily as needed for Pain. Use for 1-2 days in a row for severe pain and then stop. May use again as needed for flares    I have discussed the diagnosis with the patient and the intended plan as seen in the above orders.   The patient has received an after-visit summary and questions were answered concerning future plans. I have discussed medication side effects and warnings with the patient as well. The patient verbalizes understanding and agreement with the plan. Follow-up and Dispositions    · Return in about 1 week (around 11/6/2020), or if symptoms worsen or fail to improve.

## 2020-10-30 NOTE — PROGRESS NOTES
Chief Complaint   Patient presents with    Follow-up       1. Have you been to the ER, urgent care clinic since your last visit? Hospitalized since your last visit? Yes When: 10/23 [facility] ER back pain    2. Have you seen or consulted any other health care providers outside of the [address] [place] [address] since your last visit? Include any pap smears or colon screening.  No    Pt reports severe back pain, wants referral to chiropractor

## 2020-10-30 NOTE — Clinical Note
Please call patient-I would like him to change his diabetes meds as follows:  -Continue Trulicity 1.5 mg weekly  -Adjusting Jardiance 10 mg daily to 25 mg daily  -Currently on Levemir 65 units twice daily and will switch this to Levemir 65 units once daily and 70/30 insulin 65 units with dinner    I have also placed a referral for him to start working with [facility] to improve his sugar control. Thanks!

## 2020-11-13 RX ORDER — FLUTICASONE PROPIONATE AND SALMETEROL 250; 50 UG/1; UG/1
1 POWDER RESPIRATORY (INHALATION) 2 TIMES DAILY
Qty: 60 INHALER | Refills: 0 | Status: SHIPPED | OUTPATIENT
Start: 2020-11-13 | End: 2021-02-10

## 2020-11-15 RX ORDER — METFORMIN HYDROCHLORIDE 500 MG/1
TABLET, EXTENDED RELEASE ORAL
Qty: 180 TAB | Refills: 0 | Status: SHIPPED | OUTPATIENT
Start: 2020-11-15 | End: 2021-02-09

## 2020-11-18 DIAGNOSIS — F33.2 SEVERE EPISODE OF RECURRENT MAJOR DEPRESSIVE DISORDER, WITHOUT PSYCHOTIC FEATURES (HCC): ICD-10-CM

## 2020-11-18 DIAGNOSIS — F43.10 PTSD (POST-TRAUMATIC STRESS DISORDER): ICD-10-CM

## 2020-11-19 RX ORDER — DULOXETIN HYDROCHLORIDE 30 MG/1
CAPSULE, DELAYED RELEASE ORAL
Qty: 90 CAP | Refills: 0 | Status: SHIPPED | OUTPATIENT
Start: 2020-11-19 | End: 2020-12-02 | Stop reason: SDUPTHER

## 2020-11-20 DIAGNOSIS — I10 ESSENTIAL HYPERTENSION: ICD-10-CM

## 2020-11-20 DIAGNOSIS — E78.2 MIXED HYPERLIPIDEMIA: ICD-10-CM

## 2020-11-20 RX ORDER — AMLODIPINE BESYLATE 10 MG/1
TABLET ORAL
Qty: 30 TAB | Refills: 0 | Status: SHIPPED | OUTPATIENT
Start: 2020-11-20 | End: 2020-12-21

## 2020-11-20 RX ORDER — ATORVASTATIN CALCIUM 40 MG/1
TABLET, FILM COATED ORAL
Qty: 30 TAB | Refills: 0 | Status: SHIPPED | OUTPATIENT
Start: 2020-11-20 | End: 2020-12-21

## 2020-11-20 RX ORDER — FUROSEMIDE 20 MG/1
TABLET ORAL
Qty: 30 TAB | Refills: 0 | Status: SHIPPED | OUTPATIENT
Start: 2020-11-20 | End: 2020-12-21

## 2020-11-24 DIAGNOSIS — M48.00 SPINAL STENOSIS, UNSPECIFIED SPINAL REGION: ICD-10-CM

## 2020-11-24 DIAGNOSIS — Z98.890 S/P LUMBAR LAMINECTOMY: ICD-10-CM

## 2020-11-24 RX ORDER — CYCLOBENZAPRINE HCL 10 MG
TABLET ORAL
Qty: 90 TAB | Refills: 0 | Status: SHIPPED | OUTPATIENT
Start: 2020-11-24 | End: 2021-01-19 | Stop reason: ALTCHOICE

## 2020-11-29 DIAGNOSIS — F33.2 SEVERE EPISODE OF RECURRENT MAJOR DEPRESSIVE DISORDER, WITHOUT PSYCHOTIC FEATURES (HCC): ICD-10-CM

## 2020-11-29 DIAGNOSIS — F43.10 PTSD (POST-TRAUMATIC STRESS DISORDER): ICD-10-CM

## 2020-11-30 RX ORDER — DULOXETIN HYDROCHLORIDE 60 MG/1
CAPSULE, DELAYED RELEASE ORAL
Qty: 90 CAP | Refills: 0 | Status: SHIPPED | OUTPATIENT
Start: 2020-11-30 | End: 2021-03-15

## 2020-12-01 RX ORDER — WARFARIN 7.5 MG/1
TABLET ORAL
Qty: 90 TAB | Refills: 0 | Status: SHIPPED | OUTPATIENT
Start: 2020-12-01 | End: 2021-01-19 | Stop reason: ALTCHOICE

## 2020-12-02 DIAGNOSIS — F33.2 SEVERE EPISODE OF RECURRENT MAJOR DEPRESSIVE DISORDER, WITHOUT PSYCHOTIC FEATURES (HCC): ICD-10-CM

## 2020-12-02 DIAGNOSIS — F43.10 PTSD (POST-TRAUMATIC STRESS DISORDER): ICD-10-CM

## 2020-12-02 RX ORDER — DULOXETIN HYDROCHLORIDE 30 MG/1
CAPSULE, DELAYED RELEASE ORAL
Qty: 90 CAP | Refills: 0 | Status: SHIPPED | OUTPATIENT
Start: 2020-12-02 | End: 2021-03-11

## 2020-12-15 DIAGNOSIS — M48.00 SPINAL STENOSIS, UNSPECIFIED SPINAL REGION: ICD-10-CM

## 2020-12-15 DIAGNOSIS — Z98.890 S/P LUMBAR LAMINECTOMY: ICD-10-CM

## 2020-12-15 RX ORDER — KETOROLAC TROMETHAMINE 10 MG/1
TABLET, FILM COATED ORAL
Qty: 30 TAB | Refills: 0 | Status: SHIPPED | OUTPATIENT
Start: 2020-12-15 | End: 2020-12-17 | Stop reason: ALTCHOICE

## 2020-12-15 RX ORDER — ASPIRIN 81 MG/1
TABLET ORAL
Qty: 90 TAB | Refills: 0 | Status: SHIPPED | OUTPATIENT
Start: 2020-12-15 | End: 2021-03-09

## 2020-12-17 ENCOUNTER — OFFICE VISIT (OUTPATIENT)
Dept: FAMILY MEDICINE CLINIC | Age: 55
End: 2020-12-17

## 2020-12-17 VITALS — TEMPERATURE: 97.1 F

## 2020-12-17 DIAGNOSIS — I10 ESSENTIAL HYPERTENSION: ICD-10-CM

## 2020-12-17 DIAGNOSIS — Z79.4 TYPE 2 DIABETES MELLITUS WITH DIABETIC NEUROPATHY, WITH LONG-TERM CURRENT USE OF INSULIN (HCC): ICD-10-CM

## 2020-12-17 DIAGNOSIS — E11.40 TYPE 2 DIABETES MELLITUS WITH DIABETIC NEUROPATHY, WITH LONG-TERM CURRENT USE OF INSULIN (HCC): ICD-10-CM

## 2020-12-17 RX ORDER — TIZANIDINE 2 MG/1
2 TABLET ORAL DAILY
COMMUNITY
Start: 2020-12-14 | End: 2021-03-24 | Stop reason: SDUPTHER

## 2020-12-17 NOTE — PROGRESS NOTES
Presents for diabetes education and management as referred by Dr. Giuseppe Sullivan, initial visit. Last A1c 9.2% (previous 10.4%)    Reviewed diabetes medications. Not driving again yet. Has a caregiver. Feels he's doing better with his diabetes; motivated to get it under control. Not needing  NPH 70/30 with dinner as much since increase in Trulicity to 1.5 mg weekly  He takes this with dinner when his sugars are high, in addition to the Levemir twice daily. Eating better - not as many carbs and eating small meals 5 times a day    Losing Weight    TLC (Total Live Change) tea helping him with appetite   NRG pills - natural herbs     Checking sugars before each meal and bedtime - 4 times a day  No log today  Self report of readings range: 113-134, denies 200s   Did have one reading that was below 70; had a 65 and ate, then okay   If too low in AM (below 70) does not give AM dose of Levemir  Was using freestyle Kateryna but the sensor fell of several times and he's now going to try to the [employer] CGM system    Needs refill on pantoprazole     Warfarin confirmed dose of 7.5 mg alternating with 10 mg     Diabetes under better control per patient reported SMBGs. Checking sugars QID and TID insulin injections and taking much better control of his diabetes with adherence and attending sessions now with me to follow his sugars more closely; also losing weight; so CGM criteria met    Will send letter of medical necessity for CGM and also clinic notes. Follow up as scheduled with PCP and with me in 4-6 weeks     Patient verbalized understanding of information presented. Answered all of the patient's questions.       Cherise Jorgensen

## 2020-12-18 RX ORDER — LISINOPRIL 40 MG/1
TABLET ORAL
Qty: 90 TAB | Refills: 0 | Status: SHIPPED | OUTPATIENT
Start: 2020-12-18 | End: 2021-03-11

## 2020-12-20 DIAGNOSIS — E78.2 MIXED HYPERLIPIDEMIA: ICD-10-CM

## 2020-12-20 DIAGNOSIS — I10 ESSENTIAL HYPERTENSION: ICD-10-CM

## 2020-12-21 RX ORDER — ATORVASTATIN CALCIUM 40 MG/1
TABLET, FILM COATED ORAL
Qty: 30 TAB | Refills: 0 | Status: SHIPPED | OUTPATIENT
Start: 2020-12-21 | End: 2021-02-10

## 2020-12-21 RX ORDER — FUROSEMIDE 20 MG/1
TABLET ORAL
Qty: 30 TAB | Refills: 0 | Status: SHIPPED | OUTPATIENT
Start: 2020-12-21 | End: 2021-02-10

## 2020-12-21 RX ORDER — AMLODIPINE BESYLATE 10 MG/1
TABLET ORAL
Qty: 30 TAB | Refills: 0 | Status: SHIPPED | OUTPATIENT
Start: 2020-12-21 | End: 2021-02-10

## 2020-12-27 RX ORDER — PANTOPRAZOLE SODIUM 40 MG/1
40 TABLET, DELAYED RELEASE ORAL DAILY
Qty: 30 TAB | Refills: 0 | Status: SHIPPED | OUTPATIENT
Start: 2020-12-27 | End: 2021-03-24 | Stop reason: SDUPTHER

## 2020-12-31 RX ORDER — WARFARIN 10 MG/1
TABLET ORAL
Qty: 30 TAB | Refills: 1 | OUTPATIENT
Start: 2020-12-31

## 2021-01-13 ENCOUNTER — TELEPHONE (OUTPATIENT)
Dept: FAMILY MEDICINE CLINIC | Age: 56
End: 2021-01-13

## 2021-01-13 DIAGNOSIS — Z86.711 HISTORY OF PULMONARY EMBOLUS (PE): ICD-10-CM

## 2021-01-13 DIAGNOSIS — D68.59 HYPERCOAGULABLE STATE (HCC): ICD-10-CM

## 2021-01-13 DIAGNOSIS — Z79.01 ANTICOAGULATED ON COUMADIN: Primary | ICD-10-CM

## 2021-01-13 DIAGNOSIS — Z86.718 HISTORY OF DVT OF LOWER EXTREMITY: ICD-10-CM

## 2021-01-13 NOTE — TELEPHONE ENCOUNTER
Spoke with patient and faxed orders for PT/-[id number] and appointment scheduled for Dr Karina España 1/19/21

## 2021-01-13 NOTE — TELEPHONE ENCOUNTER
----- Message from Shannenelmer Guzmán sent at 1/13/2021 10:06 AM EST -----  Regarding: [place]/Telephone  Contact: [phone]  General Message/Vendor Calls    Caller's first and last name: n/a      Reason for call:Permission to take patient off his \"wifin\" medication       Callback required yes/no and why: Yes/Confirm      Best contact number(s):[phone]    Details to clarify the request: Patients pain doctor Dr. Chance Washington from [facility] would like Dr. Faulknre Found permission to take patient off his \"wifin\" medication while he does the injections in his back.        Shannen Guzmán

## 2021-01-19 ENCOUNTER — VIRTUAL VISIT (OUTPATIENT)
Dept: FAMILY MEDICINE CLINIC | Age: 56
End: 2021-01-19
Payer: MEDICARE

## 2021-01-19 DIAGNOSIS — Z86.718 HISTORY OF DVT OF LOWER EXTREMITY: ICD-10-CM

## 2021-01-19 DIAGNOSIS — F43.10 PTSD (POST-TRAUMATIC STRESS DISORDER): ICD-10-CM

## 2021-01-19 DIAGNOSIS — E78.2 MIXED HYPERLIPIDEMIA: ICD-10-CM

## 2021-01-19 DIAGNOSIS — D68.59 HYPERCOAGULABLE STATE (HCC): Primary | ICD-10-CM

## 2021-01-19 DIAGNOSIS — I10 ESSENTIAL HYPERTENSION: ICD-10-CM

## 2021-01-19 DIAGNOSIS — Z86.711 HISTORY OF PULMONARY EMBOLUS (PE): ICD-10-CM

## 2021-01-19 DIAGNOSIS — Z98.890 S/P LUMBAR LAMINECTOMY: ICD-10-CM

## 2021-01-19 DIAGNOSIS — M48.00 SPINAL STENOSIS, UNSPECIFIED SPINAL REGION: ICD-10-CM

## 2021-01-19 DIAGNOSIS — Z12.5 PROSTATE CANCER SCREENING: ICD-10-CM

## 2021-01-19 DIAGNOSIS — Z00.00 WELL ADULT EXAM: ICD-10-CM

## 2021-01-19 DIAGNOSIS — Z12.11 COLON CANCER SCREENING: ICD-10-CM

## 2021-01-19 DIAGNOSIS — Z79.01 ANTICOAGULATED ON COUMADIN: ICD-10-CM

## 2021-01-19 DIAGNOSIS — Z79.899 ENCOUNTER FOR LONG-TERM (CURRENT) USE OF MEDICATIONS: ICD-10-CM

## 2021-01-19 PROCEDURE — 99215 OFFICE O/P EST HI 40 MIN: CPT | Performed by: FAMILY MEDICINE

## 2021-01-19 RX ORDER — PRAZOSIN HYDROCHLORIDE 2 MG/1
CAPSULE ORAL
Qty: 90 CAP | Refills: 0 | Status: SHIPPED | OUTPATIENT
Start: 2021-01-19 | End: 2021-03-24 | Stop reason: SDUPTHER

## 2021-01-19 RX ORDER — OXYCODONE AND ACETAMINOPHEN 5; 325 MG/1; MG/1
1 TABLET ORAL
COMMUNITY
Start: 2021-01-16 | End: 2021-03-24 | Stop reason: ALTCHOICE

## 2021-01-19 NOTE — PROGRESS NOTES
Chief Complaint   Patient presents with    Anticoagulation     Needs Clearance Pocedure   1. Have you been to the ER, urgent care clinic since your last visit? Hospitalized since your last visit? No    2. Have you seen or consulted any other health care providers outside of the [address] [place] [address] since your last visit? Include any pap smears or colon screening.  Yes Where: [facility]

## 2021-01-19 NOTE — PROGRESS NOTES
Ariana Vivar. (: 1965) is a 50 y.o. male, established patient, here for evaluation of the following chief complaint(s): Anticoagulation (Needs Clearance Pocedure)       ASSESSMENT/PLAN:  Long discussion with him about overall medical care and compliance. Switching coumadin to Xarelto. Rechecking labs. To ct working with Pain management on chronic pain. 1. Hypercoagulable state ([address]  -     rivaroxaban (XARELTO) 20 mg tab tablet; Take 1 Tab by mouth daily. , Normal, Disp-30 Tab, R-2    2. Anticoagulated on Coumadin  -     rivaroxaban (XARELTO) 20 mg tab tablet; Take 1 Tab by mouth daily. , Normal, Disp-30 Tab, R-2    3. History of DVT of lower extremity  -     rivaroxaban (XARELTO) 20 mg tab tablet; Take 1 Tab by mouth daily. , Normal, Disp-30 Tab, R-2    4. Uncontrolled type 2 diabetes mellitus with peripheral neuropathy (HCC)  -     HEMOGLOBIN A1C WITH EAG  -     METABOLIC PANEL, COMPREHENSIVE  -     LIPID PANEL  -     TSH 3RD GENERATION    5. Essential hypertension  -     METABOLIC PANEL, COMPREHENSIVE    6. S/P lumbar laminectomy    7. Spinal stenosis, unspecified spinal region    8. History of pulmonary embolus (PE)  -     rivaroxaban (XARELTO) 20 mg tab tablet; Take 1 Tab by mouth daily. , Normal, Disp-30 Tab, R-2    9. Mixed hyperlipidemia  -     rivaroxaban (XARELTO) 20 mg tab tablet; Take 1 Tab by mouth daily. , Normal, Disp-30 Tab, R-2  -     HEMOGLOBIN A1C WITH EAG  -     METABOLIC PANEL, COMPREHENSIVE  -     LIPID PANEL  -     TSH 3RD GENERATION    10.  Encounter for long-term (current) use of medications  -     HEMOGLOBIN A1C WITH EAG  -     METABOLIC PANEL, COMPREHENSIVE  -     LIPID PANEL  -     TSH 3RD GENERATION  -     CBC W/O DIFF  -     PSA, DIAGNOSTIC (PROSTATE SPECIFIC AG)    11. Well adult exam  -     HEMOGLOBIN A1C WITH EAG  -     METABOLIC PANEL, COMPREHENSIVE  -     LIPID PANEL  -     TSH 3RD GENERATION  -     CBC W/O DIFF  -     COLOGUARD TEST (FECAL DNA COLORECTAL CANCER SCREENING)  -     PSA, DIAGNOSTIC (PROSTATE SPECIFIC AG)    12. Prostate cancer screening  -     PSA, DIAGNOSTIC (PROSTATE SPECIFIC AG)    13. Colon cancer screening  -     COLOGUARD TEST (FECAL DNA COLORECTAL CANCER SCREENING)        Return in about 3 weeks (around 2/9/2021), or if symptoms worsen or fail to improve. SUBJECTIVE/OBJECTIVE:  Reviewed hypercoag hx. Reviewed notes from H/O - Dr. Zhen Mills on 10/20/2021. Has had 3 issues with this including left LE DVT and PE on 1/2/2019 and 5/7/2020 at [facility].  Admitted to [facility] in 11/2019 for Bilat PE involving all 5 lobes with acute and chronic DVT after lumbar surgery and failure to restart Eliquis afterwards. Went 4 weeks off Eliquis and then became short of breath. Transitioned to coumadin at d/c. Has not been consistent with follow up so getting coumadin checks sporadically with last INR check on 10/30/2020. Results for Consuelo Rodriguez (MRN [id number]) as of 1/19/2021 16:54   5/20/2020 09:04 6/3/2020 10:38 6/17/2020 10:50 8/19/2020 09:05 9/16/2020 10:50 10/30/2020 11:58   INR 3.1 (H) 2.8 1.6 2.4 3.0 1.7       Will have MRI L spine with [facility] soon and needs clearance for this. Taking Percocet currently    DM - Major changes to DM control with diet and consistency with meds. Sugars staying under 130. No A1C on file since 9/4/2020 when it was 1.1%  Started on Trulicity and tolerating this well    There were no vitals taken for this visit.     Physical exam:  General appearance - alert, well appearing, and in no distress  Eyes -sclera anicteric, no discharge  HEENT normocephalic, atraumatic, moist mucous membranes, no visualized neck mass  Chest -normal respiratory effort, no visualized signs of respiratory distress  Neurological - alert, awake, normal speech, no focal findings or movement disorder noted  Psych - normal mood and affect  Skin no apparent lesions    On this date 01/19/21 I have spent 40 minutes reviewing previous notes, test results and face to face (virtual) with the patient discussing the diagnosis and importance of compliance with the treatment plan as well as documenting on the day of the visit. Hailey Moss. is being evaluated by a Virtual Visit (video visit) encounter to address concerns as mentioned above. A caregiver was present when appropriate. Due to this being a TeleHealth encounter (During QNPXX-67 public health emergency), evaluation of the following organ systems was limited: Vitals/Constitutional/EENT/Resp/CV/GI//MS/Neuro/Skin/Heme-Lymph-Imm. Pursuant to the emergency declaration under the [address] [place] [address], [address] [place] [address] authority and the [employer] and [employer] Act, this Virtual Visit was conducted with patient's (and/or legal guardian's) consent, to reduce the patient's risk of exposure to COVID-19 and provide necessary medical care. The patient (and/or legal guardian) has also been advised to contact this office for worsening conditions or problems, and seek emergency medical treatment and/or call 911 if deemed necessary. Patient identification was verified at the start of the visit: YES    Services were provided through a video synchronous discussion virtually to substitute for in-person clinic visit. Patient was located at home and provider was located in office or at home. An electronic signature was used to authenticate this note.   -- Todd Santillan MD

## 2021-01-20 LAB
INR PPP: 1.8 (ref 0.9–1.2)
PROTHROMBIN TIME: 18.8 SEC (ref 9.1–12)

## 2021-01-29 ENCOUNTER — HOSPITAL ENCOUNTER (EMERGENCY)
Dept: CT IMAGING | Age: 56
Discharge: HOME | End: 2021-01-29
Attending: PHYSICIAN ASSISTANT
Payer: MEDICARE

## 2021-01-29 ENCOUNTER — HOSPITAL ENCOUNTER (EMERGENCY)
Age: 56
Discharge: HOME | End: 2021-01-29
Attending: EMERGENCY MEDICINE | Admitting: EMERGENCY MEDICINE
Payer: MEDICARE

## 2021-01-29 VITALS
TEMPERATURE: 97.5 F | SYSTOLIC BLOOD PRESSURE: 143 MMHG | HEART RATE: 88 BPM | RESPIRATION RATE: 18 BRPM | DIASTOLIC BLOOD PRESSURE: 89 MMHG | OXYGEN SATURATION: 96 %

## 2021-01-29 DIAGNOSIS — M54.41 ACUTE MIDLINE LOW BACK PAIN WITH BILATERAL SCIATICA: ICD-10-CM

## 2021-01-29 DIAGNOSIS — M54.2 ACUTE NECK PAIN: ICD-10-CM

## 2021-01-29 DIAGNOSIS — M54.6 ACUTE MIDLINE THORACIC BACK PAIN: ICD-10-CM

## 2021-01-29 DIAGNOSIS — M54.42 ACUTE MIDLINE LOW BACK PAIN WITH BILATERAL SCIATICA: ICD-10-CM
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
## 2021-01-29 PROCEDURE — 72125 CT NECK SPINE W/O DYE: CPT

## 2021-01-29 PROCEDURE — 70450 CT HEAD/BRAIN W/O DYE: CPT

## 2021-01-29 PROCEDURE — 72131 CT LUMBAR SPINE W/O DYE: CPT

## 2021-01-29 PROCEDURE — 99284 EMERGENCY DEPT VISIT MOD MDM: CPT

## 2021-01-29 PROCEDURE — 74011636637 HC RX REV CODE- 636/637: Performed by: PHYSICIAN ASSISTANT

## 2021-01-29 PROCEDURE — 74011250637 HC RX REV CODE- 250/637: Performed by: PHYSICIAN ASSISTANT

## 2021-01-29 PROCEDURE — 72128 CT CHEST SPINE W/O DYE: CPT

## 2021-01-29 RX ORDER — PREDNISONE 20 MG/1
40 TABLET ORAL
Status: COMPLETED | OUTPATIENT
Start: 2021-01-29 | End: 2021-01-29

## 2021-01-29 RX ORDER — PREDNISONE 20 MG/1
20 TABLET ORAL DAILY
Qty: 3 TAB | Refills: 0 | Status: SHIPPED | OUTPATIENT
Start: 2021-01-29 | End: 2021-02-01

## 2021-01-29 RX ORDER — OXYCODONE HYDROCHLORIDE 5 MG/1
5 TABLET ORAL
Status: COMPLETED | OUTPATIENT
Start: 2021-01-29 | End: 2021-01-29

## 2021-01-29 RX ADMIN — OXYCODONE 5 MG: 5 TABLET ORAL at 22:42

## 2021-01-29 RX ADMIN — PREDNISONE 40 MG: 20 TABLET ORAL at 22:48

## 2021-01-30 NOTE — ED TRIAGE NOTES
Pt arrives via EMS from home with a CC of worsened lower back pain secondary to mechanical fall yesterday. Pt has hx of herniated lumbar discs and is supposed to follow up with pain management.

## 2021-01-30 NOTE — ED PROVIDER NOTES
80-year-old man presents to ED due to neck and back pain after falling at 1:30 PM yesterday. Patient states he has a history of back pain and has had 2 lumbar back surgeries. He fell forward out of a chair landing on his outstretched arms and since then has had much worse and low back pain from baseline. Notes neck pain since waking this morning and will with tingling in bilateral hands. Denies any loss of bowel or bladder control. Said no visual changes, headache, nausea, or vomiting. Is anticoagulated with warfarin. Denies hitting his head. States he has some weakness of the left leg at baseline which is unchanged. Since the back pain has been worsened he has had increased tingling in bilateral legs. Denies any numbness of his legs or saddle area. Scheduled for back injections with pain management at [facility], Dr. Ree Perla.      Past medical history of arthritis, asthma, hepatitis C, chronic low back pain, hypertension, GERD, hypercholesterolemia, morbid obesity, PTSD, DVT, PE, type 2 diabetes           Past Medical History:   Diagnosis Date    Arthritis     2010    Asthma 1995    INHALER USE PRN    Chronic bilateral low back pain without sciatica 11/6/2017    Chronic hepatitis C without hepatic coma ([address] 11/6/2017    treated at [facility] Chronic pain     lower back    Essential hypertension 11/6/2017    GERD (gastroesophageal reflux disease)     History of cardiac cath     Hypercholesterolemia     Mild episode of recurrent major depressive disorder ([address] 11/06/2012    Morbid obesity ([address]     PTSD (post-traumatic stress disorder)     Sleep apnea     pt stated was taken off cpap while on blood thinner    Stage 3 chronic kidney disease 11/06/2017    IMPROVED     Thromboembolus ([address]     1/3/19  DVT,PE while taking testosterone    Uncontrolled type 2 diabetes mellitus with peripheral neuropathy ([address] 11/06/2011       Past Surgical History:   Procedure Laterality Date    HX BACK SURGERY  04/10/2018 Fusion L4,L5    HX COLONOSCOPY  2013    CJW    HX HEART CATHETERIZATION  2017    NEG PER PATIENT    HX HERNIA REPAIR  0152    UMBILICAL    HX KNEE ARTHROSCOPY  1999    right knee    HX KNEE ARTHROSCOPY  2002    left knee    HX ROTATOR CUFF REPAIR Right 2016    HX UROLOGICAL  2015    Vasectomy          Family History:   Problem Relation Age of Onset    Hypertension Mother     Diabetes Mother     Heart Disease Mother         cabg    Pneumonia Father 67    Diabetes Father     Diabetes Sister     Other Brother 9        House Fire    Diabetes Sister     Other Sister 15        House Fire    Other Sister 6        House Fire    Other Sister 9        House Fire    Other Son 6        HURRICANE ARA    Anesth Problems Neg Hx        Social History     Socioeconomic History    Marital status: LEGALLY      Spouse name: Not on file    Number of children: Not on file    Years of education: Not on file    Highest education level: Not on file   Occupational History    Not on file   Social Needs    Financial resource strain: Not on file    Food insecurity     Worry: Not on file     Inability: Not on file   [employer] needs     Medical: Not on file     Non-medical: Not on file   Tobacco Use    Smoking status: Former Smoker     Packs/day: 1.50     Years: 19.00     Pack years: 28.50     Quit date: 2009     Years since quittin.4    Smokeless tobacco: Never Used   Substance and Sexual Activity    Alcohol use: No    Drug use: Yes     Types: Marijuana, Cocaine     Comment: Quit 2013/Relapsed 19, last used marijuana 2019    Sexual activity: Not Currently   Lifestyle    Physical activity     Days per week: Not on file     Minutes per session: Not on file    Stress: Not on file   Relationships    Social connections     Talks on phone: Not on file     Gets together: Not on file     Attends Mu-ism service: Not on file     Active member of club or organization: Not on file     Attends meetings of clubs or organizations: Not on file     Relationship status: Not on file    Intimate partner violence     Fear of current or ex partner: Not on file     Emotionally abused: Not on file     Physically abused: Not on file     Forced sexual activity: Not on file   Other Topics Concern    Not on file   Social History Narrative    Not on file         ALLERGIES: Tylenol [acetaminophen]    Review of Systems   Constitutional: Negative for fever. HENT: Negative for congestion and sore throat. Respiratory: Negative for cough and shortness of breath. Cardiovascular: Negative for chest pain. Gastrointestinal: Negative for nausea and vomiting. Genitourinary: Negative for dysuria. Musculoskeletal: Positive for back pain and neck pain. Negative for myalgias. Skin: Negative for rash. Neurological: Negative for dizziness, speech difficulty, weakness, numbness and headaches. Tingling legs and hands, intermittent       Vitals:    01/29/21 2037 01/29/21 2248   BP: (!) 142/101 (!) 143/89   Pulse: 87 88   Resp: 16 18   Temp: 97.5 °F (36.4 °C) 97.5 °F (36.4 °C)   SpO2: 98% 96%            Physical Exam  Vitals signs and nursing note reviewed. Constitutional:       General: He is not in acute distress. HENT:      Head: Normocephalic and atraumatic. Nose: Nose normal.      Mouth/Throat:      Mouth: Mucous membranes are moist.   Eyes:      Extraocular Movements: Extraocular movements intact. Pupils: Pupils are equal, round, and reactive to light. Neck:      Musculoskeletal: Normal range of motion. Pulmonary:      Effort: Pulmonary effort is normal. No respiratory distress. Musculoskeletal:      Cervical back: He exhibits bony tenderness. He exhibits normal range of motion and no deformity. Thoracic back: He exhibits bony tenderness. He exhibits no deformity. Lumbar back: He exhibits tenderness. He exhibits no deformity. Skin:     General: Skin is dry. Findings: No rash. Neurological:      Mental Status: He is alert and oriented to person, place, and time. Cranial Nerves: Cranial nerves are intact. Sensory: Sensation is intact. Motor: Motor function is intact. Coordination: Coordination is intact. Gait: Gait abnormal (walks with cane, baseline). Comments: No saddle anesthesia   Psychiatric:         Mood and Affect: Mood normal.        Medications   oxyCODONE IR (ROXICODONE) tablet 5 mg (5 mg Oral Given 1/29/21 2242)   predniSONE (DELTASONE) tablet 40 mg (40 mg Oral Given 1/29/21 2248)     Labs Reviewed - No data to display  CT SPINE CERV WO CONT   Final Result      1. No acute fracture in the cervical, thoracic, or lumbar spine. 2. Mild degenerative changes cervical spine at C5-6 and C6-7.   3. Postoperative changes L3-S1 with mild degenerative change at L2-3. CT SPINE [facility] WO CONT   Final Result      1. No acute fracture in the cervical, thoracic, or lumbar spine. 2. Mild degenerative changes cervical spine at C5-6 and C6-7.   3. Postoperative changes L3-S1 with mild degenerative change at L2-3. CT SPINE LUMB WO CONT   Final Result      1. No acute fracture in the cervical, thoracic, or lumbar spine. 2. Mild degenerative changes cervical spine at C5-6 and C6-7.   3. Postoperative changes L3-S1 with mild degenerative change at L2-3. CT HEAD WO CONT   Final Result   No acute intracranial abnormality. MDM  Number of Diagnoses or Management Options  Acute midline low back pain with bilateral sciatica  Acute midline thoracic back pain  Acute neck pain  Fall, initial encounter  Diagnosis management comments: Differential diagnosis includes acute fracture of cervical, thoracic, or lumbar spine, hardware loosening of lumbar spine, acute neurologic compromise including cauda equina syndrome, ICH, sciatica, cervical radiculopathy, and others    Patient ambulates with a cane at baseline.   States he has more pain than usual but is able to walk. Intermittent tingling in legs and occasionally in the hands since falling, weakness of left leg is unchanged from baseline. No signs symptoms of cauda equina syndrome    CT of head reveals no ICH. CT of full spine reveals no acute fracture or hardware displacement. There is multiple level degenerative change. Discussed with patient that he likely has some muscle tension and nerve irritation due to the recent fall. Patient already takes oxycodone and muscle relaxers, discussed he should continue taking these as previously prescribed. Discussed short course of steroid which she has tolerated well in the past.  Diabetic but monitor his sugars closely. Discussed appropriate follow-up with pain management as well as primary care provider.   Return precautions reviewed       Amount and/or Complexity of Data Reviewed  Tests in the radiology section of CPT®: reviewed           Procedures    Britney Newberry PA-C  1/30/2021

## 2021-02-09 DIAGNOSIS — F33.2 SEVERE EPISODE OF RECURRENT MAJOR DEPRESSIVE DISORDER, WITHOUT PSYCHOTIC FEATURES (HCC): ICD-10-CM

## 2021-02-09 DIAGNOSIS — I10 ESSENTIAL HYPERTENSION: ICD-10-CM

## 2021-02-09 DIAGNOSIS — F43.10 PTSD (POST-TRAUMATIC STRESS DISORDER): ICD-10-CM

## 2021-02-09 DIAGNOSIS — Z98.890 S/P LUMBAR LAMINECTOMY: ICD-10-CM

## 2021-02-09 DIAGNOSIS — M48.00 SPINAL STENOSIS, UNSPECIFIED SPINAL REGION: ICD-10-CM

## 2021-02-09 DIAGNOSIS — E78.2 MIXED HYPERLIPIDEMIA: ICD-10-CM

## 2021-02-09 RX ORDER — DULAGLUTIDE 1.5 MG/.5ML
INJECTION, SOLUTION SUBCUTANEOUS
Qty: 4 SYRINGE | Refills: 2 | Status: SHIPPED | OUTPATIENT
Start: 2021-02-09 | End: 2021-06-14 | Stop reason: SDUPTHER

## 2021-02-09 RX ORDER — CYCLOBENZAPRINE HCL 10 MG
TABLET ORAL
Qty: 90 TAB | Refills: 0 | Status: SHIPPED | OUTPATIENT
Start: 2021-02-09 | End: 2021-06-21 | Stop reason: ALTCHOICE

## 2021-02-09 RX ORDER — MIRTAZAPINE 7.5 MG/1
TABLET, FILM COATED ORAL
Qty: 90 TAB | Refills: 0 | Status: SHIPPED | OUTPATIENT
Start: 2021-02-09 | End: 2021-06-14 | Stop reason: SDUPTHER

## 2021-02-09 RX ORDER — METFORMIN HYDROCHLORIDE 500 MG/1
TABLET, EXTENDED RELEASE ORAL
Qty: 180 TAB | Refills: 0 | Status: SHIPPED | OUTPATIENT
Start: 2021-02-09 | End: 2021-05-03

## 2021-02-10 RX ORDER — FLUTICASONE PROPIONATE AND SALMETEROL 250; 50 UG/1; UG/1
1 POWDER RESPIRATORY (INHALATION) 2 TIMES DAILY
Qty: 1 INHALER | Refills: 0 | Status: SHIPPED | OUTPATIENT
Start: 2021-02-10

## 2021-02-10 RX ORDER — FUROSEMIDE 20 MG/1
TABLET ORAL
Qty: 30 TAB | Refills: 0 | Status: SHIPPED | OUTPATIENT
Start: 2021-02-10 | End: 2021-05-24

## 2021-02-10 RX ORDER — ATORVASTATIN CALCIUM 40 MG/1
TABLET, FILM COATED ORAL
Qty: 30 TAB | Refills: 0 | Status: SHIPPED | OUTPATIENT
Start: 2021-02-10 | End: 2021-03-24 | Stop reason: SDUPTHER

## 2021-02-10 RX ORDER — AMLODIPINE BESYLATE 10 MG/1
TABLET ORAL
Qty: 30 TAB | Refills: 0 | Status: SHIPPED | OUTPATIENT
Start: 2021-02-10 | End: 2021-03-24 | Stop reason: SDUPTHER

## 2021-03-09 RX ORDER — ASPIRIN 81 MG/1
TABLET ORAL
Qty: 90 TAB | Refills: 0 | Status: SHIPPED | OUTPATIENT
Start: 2021-03-09 | End: 2021-06-14 | Stop reason: SDUPTHER

## 2021-03-10 DIAGNOSIS — D68.59 HYPERCOAGULABLE STATE (HCC): ICD-10-CM

## 2021-03-10 DIAGNOSIS — F43.10 PTSD (POST-TRAUMATIC STRESS DISORDER): ICD-10-CM

## 2021-03-10 DIAGNOSIS — F33.2 SEVERE EPISODE OF RECURRENT MAJOR DEPRESSIVE DISORDER, WITHOUT PSYCHOTIC FEATURES (HCC): ICD-10-CM

## 2021-03-10 DIAGNOSIS — Z79.01 ANTICOAGULATED ON COUMADIN: ICD-10-CM

## 2021-03-10 DIAGNOSIS — E78.2 MIXED HYPERLIPIDEMIA: ICD-10-CM

## 2021-03-10 DIAGNOSIS — Z86.718 HISTORY OF DVT OF LOWER EXTREMITY: ICD-10-CM

## 2021-03-10 DIAGNOSIS — Z86.711 HISTORY OF PULMONARY EMBOLUS (PE): ICD-10-CM

## 2021-03-11 DIAGNOSIS — I10 ESSENTIAL HYPERTENSION: ICD-10-CM

## 2021-03-11 RX ORDER — TRAZODONE HYDROCHLORIDE 50 MG/1
TABLET ORAL
Qty: 30 TAB | Refills: 0 | Status: SHIPPED | OUTPATIENT
Start: 2021-03-11 | End: 2021-03-24 | Stop reason: SDUPTHER

## 2021-03-11 RX ORDER — LISINOPRIL 40 MG/1
TABLET ORAL
Qty: 90 TAB | Refills: 0 | Status: SHIPPED | OUTPATIENT
Start: 2021-03-11 | End: 2021-05-09

## 2021-03-11 RX ORDER — DULOXETIN HYDROCHLORIDE 30 MG/1
CAPSULE, DELAYED RELEASE ORAL
Qty: 90 CAP | Refills: 0 | Status: SHIPPED | OUTPATIENT
Start: 2021-03-11 | End: 2021-03-15 | Stop reason: DRUGHIGH

## 2021-03-11 RX ORDER — RIVAROXABAN 20 MG/1
TABLET, FILM COATED ORAL
Qty: 90 TAB | Refills: 0 | Status: SHIPPED | OUTPATIENT
Start: 2021-03-11 | End: 2021-06-28 | Stop reason: ALTCHOICE

## 2021-03-14 DIAGNOSIS — F43.10 PTSD (POST-TRAUMATIC STRESS DISORDER): ICD-10-CM

## 2021-03-14 DIAGNOSIS — F33.2 SEVERE EPISODE OF RECURRENT MAJOR DEPRESSIVE DISORDER, WITHOUT PSYCHOTIC FEATURES (HCC): ICD-10-CM

## 2021-03-15 RX ORDER — DULOXETIN HYDROCHLORIDE 60 MG/1
CAPSULE, DELAYED RELEASE ORAL
Qty: 90 CAP | Refills: 0 | Status: SHIPPED | OUTPATIENT
Start: 2021-03-15 | End: 2021-06-10

## 2021-03-24 ENCOUNTER — OFFICE VISIT (OUTPATIENT)
Dept: FAMILY MEDICINE CLINIC | Age: 56
End: 2021-03-24
Payer: MEDICARE

## 2021-03-24 VITALS
BODY MASS INDEX: 34.92 KG/M2 | DIASTOLIC BLOOD PRESSURE: 93 MMHG | SYSTOLIC BLOOD PRESSURE: 145 MMHG | WEIGHT: 257.8 LBS | HEIGHT: 72 IN | OXYGEN SATURATION: 96 % | HEART RATE: 98 BPM | RESPIRATION RATE: 18 BRPM | TEMPERATURE: 96.8 F

## 2021-03-24 DIAGNOSIS — F33.2 SEVERE EPISODE OF RECURRENT MAJOR DEPRESSIVE DISORDER, WITHOUT PSYCHOTIC FEATURES (HCC): ICD-10-CM

## 2021-03-24 DIAGNOSIS — Z98.890 S/P LUMBAR LAMINECTOMY: ICD-10-CM

## 2021-03-24 DIAGNOSIS — F43.10 PTSD (POST-TRAUMATIC STRESS DISORDER): ICD-10-CM

## 2021-03-24 DIAGNOSIS — E78.2 MIXED HYPERLIPIDEMIA: ICD-10-CM

## 2021-03-24 DIAGNOSIS — D68.59 HYPERCOAGULABLE STATE (HCC): ICD-10-CM

## 2021-03-24 DIAGNOSIS — B18.2 CHRONIC HEPATITIS C WITHOUT HEPATIC COMA (HCC): ICD-10-CM

## 2021-03-24 DIAGNOSIS — M48.00 SPINAL STENOSIS, UNSPECIFIED SPINAL REGION: ICD-10-CM

## 2021-03-24 DIAGNOSIS — I10 ESSENTIAL HYPERTENSION: Primary | ICD-10-CM

## 2021-03-24 DIAGNOSIS — Z86.711 HISTORY OF PULMONARY EMBOLUS (PE): ICD-10-CM

## 2021-03-24 DIAGNOSIS — Z86.718 HISTORY OF DVT OF LOWER EXTREMITY: ICD-10-CM

## 2021-03-24 PROCEDURE — 3017F COLORECTAL CA SCREEN DOC REV: CPT | Performed by: FAMILY MEDICINE

## 2021-03-24 PROCEDURE — 2022F DILAT RTA XM EVC RTNOPTHY: CPT | Performed by: FAMILY MEDICINE

## 2021-03-24 PROCEDURE — 3046F HEMOGLOBIN A1C LEVEL >9.0%: CPT | Performed by: FAMILY MEDICINE

## 2021-03-24 PROCEDURE — G8753 SYS BP > OR = 140: HCPCS | Performed by: FAMILY MEDICINE

## 2021-03-24 PROCEDURE — G8427 DOCREV CUR MEDS BY ELIG CLIN: HCPCS | Performed by: FAMILY MEDICINE

## 2021-03-24 PROCEDURE — 99214 OFFICE O/P EST MOD 30 MIN: CPT | Performed by: FAMILY MEDICINE

## 2021-03-24 PROCEDURE — G8417 CALC BMI ABV UP PARAM F/U: HCPCS | Performed by: FAMILY MEDICINE

## 2021-03-24 PROCEDURE — G8755 DIAS BP > OR = 90: HCPCS | Performed by: FAMILY MEDICINE

## 2021-03-24 PROCEDURE — G9717 DOC PT DX DEP/BP F/U NT REQ: HCPCS | Performed by: FAMILY MEDICINE

## 2021-03-24 RX ORDER — AMLODIPINE BESYLATE 10 MG/1
TABLET ORAL
Qty: 90 TAB | Refills: 0 | Status: SHIPPED | OUTPATIENT
Start: 2021-03-24 | End: 2021-06-14 | Stop reason: SDUPTHER

## 2021-03-24 RX ORDER — TIZANIDINE 2 MG/1
2 TABLET ORAL DAILY
Qty: 30 TAB | Refills: 2 | Status: SHIPPED | OUTPATIENT
Start: 2021-03-24 | End: 2021-06-14 | Stop reason: SDUPTHER

## 2021-03-24 RX ORDER — DULOXETIN HYDROCHLORIDE 30 MG/1
30 CAPSULE, DELAYED RELEASE ORAL DAILY
Qty: 90 CAP | Refills: 0 | Status: SHIPPED | OUTPATIENT
Start: 2021-03-24 | End: 2021-06-14 | Stop reason: SDUPTHER

## 2021-03-24 RX ORDER — PRAZOSIN HYDROCHLORIDE 2 MG/1
CAPSULE ORAL
Qty: 90 CAP | Refills: 0 | Status: SHIPPED | OUTPATIENT
Start: 2021-03-24 | End: 2021-06-28 | Stop reason: SDUPTHER

## 2021-03-24 RX ORDER — INSULIN DETEMIR 100 [IU]/ML
INJECTION, SOLUTION SUBCUTANEOUS
Qty: 39 ADJUSTABLE DOSE PRE-FILLED PEN SYRINGE | Refills: 2 | Status: SHIPPED | OUTPATIENT
Start: 2021-03-24 | End: 2021-06-14 | Stop reason: SDUPTHER

## 2021-03-24 RX ORDER — PANTOPRAZOLE SODIUM 40 MG/1
40 TABLET, DELAYED RELEASE ORAL DAILY
Qty: 90 TAB | Refills: 0 | Status: SHIPPED | OUTPATIENT
Start: 2021-03-24 | End: 2021-06-14 | Stop reason: SDUPTHER

## 2021-03-24 RX ORDER — ATORVASTATIN CALCIUM 40 MG/1
TABLET, FILM COATED ORAL
Qty: 90 TAB | Refills: 0 | Status: SHIPPED | OUTPATIENT
Start: 2021-03-24 | End: 2021-05-09

## 2021-03-24 RX ORDER — TRAZODONE HYDROCHLORIDE 50 MG/1
TABLET ORAL
Qty: 30 TAB | Refills: 2 | Status: SHIPPED | OUTPATIENT
Start: 2021-03-24 | End: 2021-06-28 | Stop reason: SDUPTHER

## 2021-03-24 NOTE — PROGRESS NOTES
Chief Complaint   Patient presents with    Anticoagulation     Follow-up   1. Have you been to the ER, urgent care clinic since your last visit? Hospitalized since your last visit? Yes Where: [facility]    2. Have you seen or consulted any other health care providers outside of the [address] [place] [address] since your last visit? Include any pap smears or colon screening.  Yes Where: Pain Management

## 2021-03-24 NOTE — PROGRESS NOTES
Sumit Landa (: 1965) is a 49 y.o. male, established patient, here for evaluation of the following chief complaint(s): Anticoagulation (Follow-up)       ASSESSMENT/PLAN:  BP running too high today. Refill medication and checking home readings. Follow-up soon for medication adjustment. Diabetes continues to be uncontrolled. Sugars significantly improved recently so expecting improvement in A1c. Continue to work on medication adjustments and encouraged diet and exercise. Sent to Endo in the past and patient not checking in there. Refilling medication today. Awaiting labsordered back in 2021 and recommended patient have these drawn again today. Patient seems to be doing better overall especially in relation to pain control despite being off medications and no longer seeing pain management. Of note he has been smoking more THC which seems to significantly help and he is interested in using this medically once it becomes available in the future. 1. Essential hypertension  -     amLODIPine (NORVASC) 10 mg tablet; TAKE 1 TABLET BY MOUTH EVERY DAY, Normal, Disp-90 Tab, R-0  -     empagliflozin (JARDIANCE) 25 mg tablet; Take 1 Tab by mouth daily. , Normal, Disp-90 Tab, R-1  2. Mixed hyperlipidemia  -     atorvastatin (LIPITOR) 40 mg tablet; TAKE 1 TABLET BY MOUTH EVERY DAY AT NIGHT, Normal, Disp-90 Tab, R-0  3. Uncontrolled type 2 diabetes mellitus with peripheral neuropathy (HCC)  -     atorvastatin (LIPITOR) 40 mg tablet; TAKE 1 TABLET BY MOUTH EVERY DAY AT NIGHT, Normal, Disp-90 Tab, R-0  -     empagliflozin (JARDIANCE) 25 mg tablet; Take 1 Tab by mouth daily. , Normal, Disp-90 Tab, R-1  -     insulin detemir U-100 (Levemir FlexTouch U-100 Insuln) 100 unit/mL (3 mL) inpn; INJECT 65 UNITS BY SUBCUTANEOUS ROUTE TWO (2) TIMES A DAY, Normal, Disp-39 Adjustable Dose Pre-filled Pen Syringe, R-2  4.  PTSD (post-traumatic stress disorder)  -     traZODone (DESYREL) 50 mg tablet; TAKE 1 TABLET BY MOUTH EVERY DAY AT NIGHT, Normal, Disp-30 Tab, R-2  -     prazosin (MINIPRESS) 2 mg capsule; TAKE 1 CAPSULE BY MOUTH EVERY DAY AT NIGHT, Normal, Disp-90 Cap, R-0  5. Severe episode of recurrent major depressive disorder, without psychotic features [address]  -     traZODone (DESYREL) 50 mg tablet; TAKE 1 TABLET BY MOUTH EVERY DAY AT NIGHT, Normal, Disp-30 Tab, R-2  6. History of DVT of lower extremity  7. Hypercoagulable state ([facility]  8. History of pulmonary embolus (PE)  9. Spinal stenosis, unspecified spinal region  -     tiZANidine (ZANAFLEX) 2 mg tablet; Take 1 Tab by mouth daily. As needed - do not take this and flexeril at the same timeTake 2 mg by mouth daily. As needed - do not take this and flexeril at the same time, Normal, Disp-30 Tab, R-2  -     DULoxetine (CYMBALTA) 30 mg capsule; Take 1 Cap by mouth daily. Take 1 capsule with 60 mg Duloxetine, Normal, Disp-90 Cap, R-0  -     REFERRAL TO PHYSICAL THERAPY  10. S/P lumbar laminectomy  -     tiZANidine (ZANAFLEX) 2 mg tablet; Take 1 Tab by mouth daily. As needed - do not take this and flexeril at the same timeTake 2 mg by mouth daily. As needed - do not take this and flexeril at the same time, Normal, Disp-30 Tab, R-2  -     DULoxetine (CYMBALTA) 30 mg capsule; Take 1 Cap by mouth daily. Take 1 capsule with 60 mg Duloxetine, Normal, Disp-90 Cap, R-0  -     REFERRAL TO PHYSICAL THERAPY  11. Chronic hepatitis C without hepatic coma (HCC)      Return in about 3 months (around 6/24/2021), or if symptoms worsen or fail to improve. SUBJECTIVE/OBJECTIVE:  Doing well currently. Has stopped seeing pain management. Not taking any meds for pain control but   Reviewed hypercoag hxs. Reviewed notes from H/O - Dr. Kathie Bar on 10/20/2021.   Has had 3 issues with this including left LE DVT and PE on 1/2/2019 and 5/7/2020 at [facility].  Admitted to [facility] in 11/2019 for Bilat PE involving all 5 lobes with acute and chronic DVT after lumbar surgery and failure to restart Eliquis afterwards. Transitioned to coumadin at d/c but we switched to Xarelto and has been compliant. DM - Major changes to DM control with diet and consistency with meds. Sugars staying under 130. No A1C on file since 9/4/2020 when it was 1.8%  Started on Trulicity and tolerating this well. Continues to have issues with his chronic back pain. Has stopped seeing pain management as above and feels like his pain is reasonably well controlled without this. He has been smoking more marijuana which he feels like helps his pain significantly. He would like to continue on Cymbalta and muscle relaxer. ROS  Gen - no fever/chills  Resp - no dyspnea or cough  CV - no chest pain or BROWN  Rest per HPI    Blood pressure (!) 145/93, pulse 98, temperature 96.8 °F (36 °C), temperature source Temporal, resp. rate 18, height 6' (1.829 m), weight 257 lb 12.8 oz (116.9 kg), SpO2 96 %. PE  General appearance - alert, well appearing, and in no distress  Eyes -sclera anicteric  Neck - supple, no significant adenopathy, no thyromegaly  Chest - clear to auscultation, no wheezes, rales or rhonchi, symmetric air entry  Heart - normal rate, regular rhythm, normal S1, S2, no murmurs, rubs, clicks or gallops  Neurological - alert, oriented, normal speech, no focal findings or movement disorder noted  Extr - no edema  Psych - normal mood and affect    On this date 03/24/2021 I have spent 30 minutes reviewing previous notes, test results and face to face with the patient discussing the diagnosis and importance of compliance with the treatment plan as well as documenting on the day of the visit. An electronic signature was used to authenticate this note.   -- Fabio Flowers MD

## 2021-05-03 RX ORDER — METFORMIN HYDROCHLORIDE 500 MG/1
TABLET, EXTENDED RELEASE ORAL
Qty: 180 TAB | Refills: 0 | Status: SHIPPED | OUTPATIENT
Start: 2021-05-03 | End: 2021-06-14 | Stop reason: SDUPTHER

## 2021-05-07 DIAGNOSIS — I10 ESSENTIAL HYPERTENSION: ICD-10-CM

## 2021-05-09 RX ORDER — LISINOPRIL 40 MG/1
TABLET ORAL
Qty: 90 TAB | Refills: 0 | Status: SHIPPED | OUTPATIENT
Start: 2021-05-09 | End: 2021-08-04

## 2021-05-24 RX ORDER — FUROSEMIDE 20 MG/1
TABLET ORAL
Qty: 30 TABLET | Refills: 0 | Status: SHIPPED | OUTPATIENT
Start: 2021-05-24 | End: 2021-06-09 | Stop reason: SDUPTHER

## 2021-06-09 DIAGNOSIS — F43.10 PTSD (POST-TRAUMATIC STRESS DISORDER): ICD-10-CM

## 2021-06-09 DIAGNOSIS — F33.2 SEVERE EPISODE OF RECURRENT MAJOR DEPRESSIVE DISORDER, WITHOUT PSYCHOTIC FEATURES (HCC): ICD-10-CM

## 2021-06-09 RX ORDER — FUROSEMIDE 20 MG/1
20 TABLET ORAL DAILY
Qty: 90 TABLET | Refills: 1 | Status: SHIPPED | OUTPATIENT
Start: 2021-06-09 | End: 2021-12-27

## 2021-06-10 RX ORDER — DULOXETIN HYDROCHLORIDE 60 MG/1
CAPSULE, DELAYED RELEASE ORAL
Qty: 90 CAPSULE | Refills: 0 | Status: SHIPPED | OUTPATIENT
Start: 2021-06-10 | End: 2021-09-07

## 2021-06-14 DIAGNOSIS — I10 ESSENTIAL HYPERTENSION: ICD-10-CM

## 2021-06-14 DIAGNOSIS — F43.10 PTSD (POST-TRAUMATIC STRESS DISORDER): ICD-10-CM

## 2021-06-14 DIAGNOSIS — M48.00 SPINAL STENOSIS, UNSPECIFIED SPINAL REGION: ICD-10-CM

## 2021-06-14 DIAGNOSIS — F33.2 SEVERE EPISODE OF RECURRENT MAJOR DEPRESSIVE DISORDER, WITHOUT PSYCHOTIC FEATURES (HCC): ICD-10-CM

## 2021-06-14 DIAGNOSIS — Z98.890 S/P LUMBAR LAMINECTOMY: ICD-10-CM

## 2021-06-21 RX ORDER — MIRTAZAPINE 7.5 MG/1
TABLET, FILM COATED ORAL
Qty: 90 TABLET | Refills: 0 | Status: SHIPPED | OUTPATIENT
Start: 2021-06-21 | End: 2021-08-04

## 2021-06-21 RX ORDER — METFORMIN HYDROCHLORIDE 500 MG/1
TABLET, EXTENDED RELEASE ORAL
Qty: 180 TABLET | Refills: 0 | Status: SHIPPED | OUTPATIENT
Start: 2021-06-21 | End: 2021-07-27

## 2021-06-21 RX ORDER — DULAGLUTIDE 1.5 MG/.5ML
INJECTION, SOLUTION SUBCUTANEOUS
Qty: 4 SYRINGE | Refills: 2 | Status: SHIPPED | OUTPATIENT
Start: 2021-06-21 | End: 2021-08-04

## 2021-06-21 RX ORDER — ASPIRIN 81 MG/1
TABLET ORAL
Qty: 90 TABLET | Refills: 0 | Status: SHIPPED | OUTPATIENT
Start: 2021-06-21 | End: 2021-08-04

## 2021-06-21 RX ORDER — TIZANIDINE 2 MG/1
2 TABLET ORAL DAILY
Qty: 30 TABLET | Refills: 2 | Status: SHIPPED | OUTPATIENT
Start: 2021-06-21 | End: 2021-06-28 | Stop reason: ALTCHOICE

## 2021-06-21 RX ORDER — DULOXETIN HYDROCHLORIDE 30 MG/1
30 CAPSULE, DELAYED RELEASE ORAL DAILY
Qty: 90 CAPSULE | Refills: 0 | Status: SHIPPED | OUTPATIENT
Start: 2021-06-21 | End: 2021-09-29

## 2021-06-21 RX ORDER — PANTOPRAZOLE SODIUM 40 MG/1
40 TABLET, DELAYED RELEASE ORAL DAILY
Qty: 90 TABLET | Refills: 0 | Status: SHIPPED | OUTPATIENT
Start: 2021-06-21 | End: 2021-08-04

## 2021-06-21 RX ORDER — INSULIN DETEMIR 100 [IU]/ML
INJECTION, SOLUTION SUBCUTANEOUS
Qty: 39 ADJUSTABLE DOSE PRE-FILLED PEN SYRINGE | Refills: 2 | Status: SHIPPED | OUTPATIENT
Start: 2021-06-21 | End: 2021-06-25 | Stop reason: SDUPTHER

## 2021-06-21 RX ORDER — PEN NEEDLE, DIABETIC 30 GX3/16"
NEEDLE, DISPOSABLE MISCELLANEOUS
Qty: 1 PACKAGE | Refills: 1 | Status: SHIPPED | OUTPATIENT
Start: 2021-06-21 | End: 2021-06-25 | Stop reason: SDUPTHER

## 2021-06-21 RX ORDER — AMLODIPINE BESYLATE 10 MG/1
TABLET ORAL
Qty: 90 TABLET | Refills: 0 | Status: SHIPPED | OUTPATIENT
Start: 2021-06-21 | End: 2021-08-04

## 2021-06-26 DIAGNOSIS — I10 ESSENTIAL HYPERTENSION: ICD-10-CM

## 2021-06-28 ENCOUNTER — VIRTUAL VISIT (OUTPATIENT)
Dept: FAMILY MEDICINE CLINIC | Age: 56
End: 2021-06-28
Payer: MEDICARE

## 2021-06-28 DIAGNOSIS — Z79.4 TYPE 2 DIABETES MELLITUS WITH DIABETIC NEUROPATHY, WITH LONG-TERM CURRENT USE OF INSULIN (HCC): ICD-10-CM

## 2021-06-28 DIAGNOSIS — Z09 HOSPITAL DISCHARGE FOLLOW-UP: Primary | ICD-10-CM

## 2021-06-28 DIAGNOSIS — Z95.828 S/P IVC FILTER: ICD-10-CM

## 2021-06-28 DIAGNOSIS — Z86.711 HISTORY OF PULMONARY EMBOLUS (PE): ICD-10-CM

## 2021-06-28 DIAGNOSIS — F33.2 SEVERE EPISODE OF RECURRENT MAJOR DEPRESSIVE DISORDER, WITHOUT PSYCHOTIC FEATURES (HCC): ICD-10-CM

## 2021-06-28 DIAGNOSIS — Z79.899 ENCOUNTER FOR LONG-TERM (CURRENT) USE OF MEDICATIONS: ICD-10-CM

## 2021-06-28 DIAGNOSIS — Z98.890 S/P LUMBAR LAMINECTOMY: ICD-10-CM

## 2021-06-28 DIAGNOSIS — Z86.718 HISTORY OF DVT OF LOWER EXTREMITY: ICD-10-CM

## 2021-06-28 DIAGNOSIS — D68.59 HYPERCOAGULABLE STATE (HCC): ICD-10-CM

## 2021-06-28 DIAGNOSIS — E11.40 TYPE 2 DIABETES MELLITUS WITH DIABETIC NEUROPATHY, WITH LONG-TERM CURRENT USE OF INSULIN (HCC): ICD-10-CM

## 2021-06-28 DIAGNOSIS — F43.10 PTSD (POST-TRAUMATIC STRESS DISORDER): ICD-10-CM

## 2021-06-28 DIAGNOSIS — M48.00 SPINAL STENOSIS, UNSPECIFIED SPINAL REGION: ICD-10-CM

## 2021-06-28 PROCEDURE — 1111F DSCHRG MED/CURRENT MED MERGE: CPT | Performed by: FAMILY MEDICINE

## 2021-06-28 PROCEDURE — G8427 DOCREV CUR MEDS BY ELIG CLIN: HCPCS | Performed by: FAMILY MEDICINE

## 2021-06-28 PROCEDURE — G8417 CALC BMI ABV UP PARAM F/U: HCPCS | Performed by: FAMILY MEDICINE

## 2021-06-28 PROCEDURE — 3046F HEMOGLOBIN A1C LEVEL >9.0%: CPT | Performed by: FAMILY MEDICINE

## 2021-06-28 PROCEDURE — 99214 OFFICE O/P EST MOD 30 MIN: CPT | Performed by: FAMILY MEDICINE

## 2021-06-28 PROCEDURE — 3017F COLORECTAL CA SCREEN DOC REV: CPT | Performed by: FAMILY MEDICINE

## 2021-06-28 PROCEDURE — 2022F DILAT RTA XM EVC RTNOPTHY: CPT | Performed by: FAMILY MEDICINE

## 2021-06-28 PROCEDURE — G9717 DOC PT DX DEP/BP F/U NT REQ: HCPCS | Performed by: FAMILY MEDICINE

## 2021-06-28 RX ORDER — PREGABALIN 75 MG/1
CAPSULE ORAL
Qty: 90 CAPSULE | Refills: 2 | Status: CANCELLED | OUTPATIENT
Start: 2021-06-28

## 2021-06-28 RX ORDER — PREGABALIN 75 MG/1
CAPSULE ORAL
COMMUNITY
Start: 2021-05-04 | End: 2021-06-28 | Stop reason: ALTCHOICE

## 2021-06-28 RX ORDER — APIXABAN 5 MG/1
5 TABLET, FILM COATED ORAL DAILY
COMMUNITY
Start: 2021-06-15

## 2021-06-28 RX ORDER — ALBUTEROL SULFATE 90 UG/1
2 POWDER, METERED RESPIRATORY (INHALATION)
Qty: 3 INHALER | Refills: 1 | Status: SHIPPED | OUTPATIENT
Start: 2021-06-28 | End: 2022-02-28

## 2021-06-28 RX ORDER — PREGABALIN 150 MG/1
150 CAPSULE ORAL 3 TIMES DAILY
Qty: 90 CAPSULE | Refills: 0 | Status: SHIPPED | OUTPATIENT
Start: 2021-06-28

## 2021-06-28 RX ORDER — PRAZOSIN HYDROCHLORIDE 2 MG/1
CAPSULE ORAL
Qty: 90 CAPSULE | Refills: 1 | Status: SHIPPED | OUTPATIENT
Start: 2021-06-28 | End: 2021-09-29

## 2021-06-28 RX ORDER — EMPAGLIFLOZIN 25 MG/1
TABLET, FILM COATED ORAL
Qty: 90 TABLET | Refills: 1 | Status: SHIPPED | OUTPATIENT
Start: 2021-06-28 | End: 2021-12-27

## 2021-06-28 RX ORDER — METHOCARBAMOL 750 MG/1
750 TABLET, FILM COATED ORAL 3 TIMES DAILY
COMMUNITY
Start: 2021-05-21

## 2021-06-28 RX ORDER — TRAZODONE HYDROCHLORIDE 50 MG/1
TABLET ORAL
Qty: 90 TABLET | Refills: 1 | Status: SHIPPED | OUTPATIENT
Start: 2021-06-28 | End: 2022-02-28

## 2021-06-28 NOTE — Clinical Note
pls call for appt, also need records from hospitalization (d/c summary would be great) and neurosurgery notes.  thx

## 2021-06-28 NOTE — PROGRESS NOTES
Chief Complaint   Patient presents with   [facility] Follow Up     Discharged [address] Admitted 5/24/21 Discharged 6/15/21   1. Have you been to the ER, urgent care clinic since your last visit? Hospitalized since your last visit? Yes Where: 5/2021 Veronika Left ,[facility] EMERGENCY [facility] DVT 5/21    2. Have you seen or consulted any other health care providers outside of the [address] [place] [address] since your last visit? Include any pap smears or colon screening.  Yes Where: Ortho   Had back surgery 5/5/21  Hospital d/c Lisinopril

## 2021-06-28 NOTE — PROGRESS NOTES
Transitional Care Management Progress Note    Patient: Dann Reza. : 1965  PCP: Zulma Barnett MD    Patient was admitted at [facility] in 2021 and ended up being discharged and then read[facility] on 2021 - 6/15/2021    Patient was contacted by Transitional Care Management services within two days after his discharge: No. This encounter and supporting documentation was reviewed if available. Medication reconciliation was performed today (2021). Assessment/Plan: Extensive hospitalizations reviewed with pt. Awaiting records to clarify some details. Ongoing pain issues so increasing Lyrica based on how he reports taking it. Can ct Robaxin and Cymbalta. At his age, will likely need pain management on board again. Would like to see if he is able to do aquatherapy and acupuncture in future as part of tx regimen. Ct Eliquis and discussed that he needs to clarify dose - want him on Eliquis 5 mg BID not daily. Also encouraged restarting Trulicity to help with weight and eventually decrease insulin dose. Diagnoses and all orders for this visit:    1. Hospital discharge follow-up  -     ID DISCHARGE MEDS RECONCILED W/ CURRENT OUTPATIENT MED LIST    2. PTSD (post-traumatic stress disorder)  -     traZODone (DESYREL) 50 mg tablet; TAKE 1 TABLET BY MOUTH EVERY DAY AT NIGHT  -     prazosin (MINIPRESS) 2 mg capsule; TAKE 1 CAPSULE BY MOUTH EVERY DAY AT NIGHT    3. Severe episode of recurrent major depressive disorder, without psychotic features ([facility] [address]  -     traZODone (DESYREL) 50 mg tablet; TAKE 1 TABLET BY MOUTH EVERY DAY AT NIGHT    4. Spinal stenosis, unspecified spinal region  -     pregabalin (LYRICA) 150 mg capsule; Take 1 Capsule by mouth three (3) times daily. Max Daily Amount: 450 mg.    5. S/P lumbar laminectomy  -     pregabalin (LYRICA) 150 mg capsule; Take 1 Capsule by mouth three (3) times daily.  Max Daily Amount: 450 mg.    6. History of pulmonary embolus (PE)    7. History of DVT of lower extremity    8. Hypercoagulable state ([address]    9. S/P IVC filter    10. Encounter for long-term (current) use of medications    11. Type 2 diabetes mellitus with diabetic neuropathy, with long-term current use of insulin (HCC)  -     pregabalin (LYRICA) 150 mg capsule; Take 1 Capsule by mouth three (3) times daily. Max Daily Amount: 450 mg. Other orders  -     ProAir RespiClick 90 mcg/actuation breath activated inhaler; Take 2 Puffs by inhalation every four (4) hours as needed for Wheezing. The complexity of medical decision making for this patient's transitional care is moderate   Follow-up and Dispositions    · Return in 1 month (on 7/28/2021), or if symptoms worsen or fail to improve. Routing History           Subjective:   Juan Carlos Gil is a 49 y.o. male presenting today for follow-up after being discharged. The discharge summary was reviewed or requested. Pt was admitted for back surgery after having a fall on steps and catching arms on rails - heard a pop and the next day, he was unable to walk. Had IVC placed and then had surgery at [address] [place] [address] with Dr. Ty Wong. Will f/u w/ Neurosurgery on 7/2/2021. Then had extensive DVTs since he was unable to be on blood thinners after surgery and admitted at HD. Admitting symptoms have: improved    Still in sig pain. Using Cymbalta and Lyrica.  shows Lyrica 75 mg TID was prescribed but pt endorses using it at the 150 mg dose TID and recently running out. He also had Percocet 5/325 mg prescribed and filled on 5/21/21     Weight coming down. Medications marked \"taking\" at this time:  Home Medications    Medication Sig Start Date End Date Taking? Authorizing Provider   Jardiance 25 mg tablet TAKE 1 TABLET BY MOUTH EVERY DAY 6/28/21  Yes Lexi Hansen MD   methocarbamoL (ROBAXIN) 750 mg tablet Take 750 mg by mouth three (3) times daily.  TAKE 1 TABLET BY MOUTH THREE TIMES A DAY 5/21/21  Yes Provider, Historical   Eliquis 5 mg tablet Take 5 mg by mouth daily. 6/15/21  Yes Provider, Historical   traZODone (DESYREL) 50 mg tablet TAKE 1 TABLET BY MOUTH EVERY DAY AT NIGHT 6/28/21  Yes Hailey Dunham MD   ProAir RespiClick 90 mcg/actuation breath activated inhaler Take 2 Puffs by inhalation every four (4) hours as needed for Wheezing. 6/28/21  Yes Hailey Dunham MD   prazosin (MINIPRESS) 2 mg capsule TAKE 1 CAPSULE BY MOUTH EVERY DAY AT NIGHT 6/28/21  Yes Hailey Dunham MD   pregabalin (LYRICA) 150 mg capsule Take 1 Capsule by mouth three (3) times daily. Max Daily Amount: 450 mg. 6/28/21  Yes Hailey Dunham MD   aspirin delayed-release 81 mg tablet TAKE 1 TABLET BY MOUTH EVERY DAY 6/21/21  Yes Hailey Dunham MD   DULoxetine (CYMBALTA) 30 mg capsule Take 1 Capsule by mouth daily. Take 1 capsule with 60 mg Duloxetine 6/21/21  Yes Hailey Dunham MD   amLODIPine (NORVASC) 10 mg tablet TAKE 1 TABLET BY MOUTH EVERY DAY 6/21/21  Yes Hailey Dunham MD   metFORMIN ER (GLUCOPHAGE XR) 500 mg tablet TAKE 2 TABLETS BY MOUTH DAILY WITH DINNER  Patient taking differently: 500 mg. TAKE 1 in morning and one at night 6/21/21  Yes aHiley Dunham MD   pantoprazole (PROTONIX) 40 mg tablet Take 1 Tablet by mouth daily.  6/21/21  Yes Hailey Dunham MD   insulin detemir U-100 (Levemir FlexTouch U-100 Insuln) 100 unit/mL (3 mL) inpn INJECT 65 UNITS BY SUBCUTANEOUS ROUTE TWO (2) TIMES A DAY  Patient taking differently: INJECT 60 UNITS BY SUBCUTANEOUS ROUTE TWO (2) TIMES A DAY 6/21/21  Yes Hailey Dunham MD   mirtazapine (REMERON) 7.5 mg tablet TAKE 1 TABLET BY MOUTH EVERY DAY EVERY NIGHT 6/21/21  Yes Hailey Dunham MD   Insulin Needles, Disposable, 31 gauge x 5/16\" ndle Use with Trulicity 7/72/84  Yes Hailey Dunham MD   DULoxetine (CYMBALTA) 60 mg capsule TAKE 1 CAPSULE BY MOUTH EVERY DAY IN THE MORNING ALONG WITH 30MG 6/10/21  Yes Hailey Dunham MD   furosemide (LASIX) 20 mg tablet Take 1 Tablet by mouth daily. 6/9/21  Yes Iram Garcia MD   atorvastatin (LIPITOR) 40 mg tablet TAKE 1 TABLET BY MOUTH EVERY DAY AT NIGHT 5/9/21  Yes Iram Garcia MD   fluticasone propion-salmeteroL (Wixela Inhub) 250-50 mcg/dose diskus inhaler Take 1 Puff by inhalation two (2) times a day. 2/10/21  Yes Carmen Barrera MD   pregabalin (LYRICA) 75 mg capsule TAKE 1 CAPSULE BY MOUTH THREE TIMES A DAY  Patient not taking: Reported on 6/28/2021 5/4/21 6/28/21  Provider, Historical   dulaglutide (Trulicity) 1.5 LA/5.2 mL sub-q pen INJECT 0.5 MLS BY SUBCUTANEOUS ROUTE EVERY SEVEN (7) DAY  Patient not taking: Reported on 6/28/2021 6/21/21   Iram Garcia MD   tiZANidine (ZANAFLEX) 2 mg tablet Take 1 Tablet by mouth daily. As needed - do not take this and flexeril at the same timeTake 2 mg by mouth daily. As needed - do not take this and flexeril at the same time 6/21/21 6/28/21  Iram Garcia MD   lisinopriL (PRINIVIL, ZESTRIL) 40 mg tablet TAKE 1 TABLET BY MOUTH EVERY DAY  Patient not taking: Reported on 6/28/2021 5/9/21   Iram Garcia MD   traZODone (DESYREL) 50 mg tablet TAKE 1 TABLET BY MOUTH EVERY DAY AT NIGHT 3/24/21 6/28/21  Iram Garcia MD   prazosin (MINIPRESS) 2 mg capsule TAKE 1 CAPSULE BY MOUTH EVERY DAY AT NIGHT 3/24/21 6/28/21  Iram Garcia MD   empagliflozin (JARDIANCE) 25 mg tablet Take 1 Tab by mouth daily. 3/24/21 6/28/21  Iram Garcia MD   Xarelto 20 mg tab tablet TAKE 1 TABLET BY MOUTH EVERY DAY 3/11/21 6/28/21  Iram Garcia MD   prazosin (MINIPRESS) 2 mg capsule Take 1 Cap by mouth nightly. 10/30/20   Iram Garcia MD   ProAir RespiClick 90 mcg/actuation breath activated inhaler  5/15/20 6/28/21  Provider, Historical        ROS  Gen - no fever/chills  Resp - no dyspnea or cough  CV - no chest pain or BROWN  Rest per HPI    Objective: There were no vitals taken for this visit.      Physical exam:  General appearance - alert, well appearing, and in no distress  Eyes -sclera anicteric, no discharge  HEENT normocephalic, atraumatic, moist mucous membranes, no visualized neck mass  Chest -normal respiratory effort, no visualized signs of respiratory distress  Neurological - alert, awake, normal speech, no focal findings or movement disorder noted  Psych - normal mood and affect  Skin no apparent lesions    We discussed the expected course, resolution and complications of the diagnosis(es) in detail. Medication risks, benefits, costs, interactions, and alternatives were discussed as indicated. I advised him to contact the office if his condition worsens, changes or fails to improve as anticipated. He expressed understanding with the diagnosis(es) and plan.      Dunia Ledesma MD

## 2021-07-08 RX ORDER — PEN NEEDLE, DIABETIC 30 GX3/16"
NEEDLE, DISPOSABLE MISCELLANEOUS
Qty: 1 PACKAGE | Refills: 11 | Status: SHIPPED | OUTPATIENT
Start: 2021-07-08

## 2021-07-27 RX ORDER — METFORMIN HYDROCHLORIDE 500 MG/1
TABLET, EXTENDED RELEASE ORAL
Qty: 180 TABLET | Refills: 0 | Status: SHIPPED | OUTPATIENT
Start: 2021-07-27 | End: 2021-08-04

## 2021-08-02 ENCOUNTER — VIRTUAL VISIT (OUTPATIENT)
Dept: FAMILY MEDICINE CLINIC | Age: 56
End: 2021-08-02

## 2021-08-02 RX ORDER — PREGABALIN 150 MG/1
150 CAPSULE ORAL 3 TIMES DAILY
Qty: 90 CAPSULE | Refills: 0 | Status: CANCELLED | OUTPATIENT
Start: 2021-08-02

## 2021-08-02 RX ORDER — OXYCODONE HYDROCHLORIDE 5 MG/1
TABLET ORAL
COMMUNITY
Start: 2021-07-02

## 2021-08-02 RX ORDER — CEPHALEXIN 500 MG/1
CAPSULE ORAL
COMMUNITY
Start: 2021-07-02 | End: 2021-08-02 | Stop reason: ALTCHOICE

## 2021-08-02 NOTE — PROGRESS NOTES
Chief Complaint   Patient presents with    Back Pain     Discuss options for pain treatment   1. Have you been to the ER, urgent care clinic since your last visit? Hospitalized since your last visit? Yes [facility] Dx: spinal stenosis ,yesterday    2. Have you seen or consulted any other health care providers outside of the [address] [place] [address] since your last visit? Include any pap smears or colon screening.  Yes Ortho    Given Oxycodone

## 2021-08-27 RX ORDER — ASPIRIN 81 MG/1
TABLET ORAL
Qty: 90 TABLET | Refills: 0 | Status: SHIPPED | OUTPATIENT
Start: 2021-08-27 | End: 2021-09-02

## 2021-09-02 DIAGNOSIS — F33.2 SEVERE EPISODE OF RECURRENT MAJOR DEPRESSIVE DISORDER, WITHOUT PSYCHOTIC FEATURES (HCC): ICD-10-CM

## 2021-09-02 DIAGNOSIS — I10 ESSENTIAL HYPERTENSION: ICD-10-CM

## 2021-09-02 DIAGNOSIS — F43.10 PTSD (POST-TRAUMATIC STRESS DISORDER): ICD-10-CM

## 2021-09-02 RX ORDER — MIRTAZAPINE 7.5 MG/1
TABLET, FILM COATED ORAL
Qty: 30 TABLET | Refills: 0 | Status: SHIPPED | OUTPATIENT
Start: 2021-09-02 | End: 2021-09-29

## 2021-09-02 RX ORDER — ASPIRIN 81 MG/1
TABLET ORAL
Qty: 30 TABLET | Refills: 0 | Status: SHIPPED | OUTPATIENT
Start: 2021-09-02 | End: 2021-09-29

## 2021-09-02 RX ORDER — METFORMIN HYDROCHLORIDE 500 MG/1
TABLET, EXTENDED RELEASE ORAL
Qty: 60 TABLET | Refills: 0 | Status: SHIPPED | OUTPATIENT
Start: 2021-09-02 | End: 2021-09-29

## 2021-09-02 RX ORDER — LISINOPRIL 40 MG/1
TABLET ORAL
Qty: 30 TABLET | Refills: 0 | Status: SHIPPED | OUTPATIENT
Start: 2021-09-02 | End: 2021-09-29

## 2021-09-02 RX ORDER — PANTOPRAZOLE SODIUM 40 MG/1
TABLET, DELAYED RELEASE ORAL
Qty: 30 TABLET | Refills: 0 | Status: SHIPPED | OUTPATIENT
Start: 2021-09-02 | End: 2021-09-29

## 2021-09-02 RX ORDER — AMLODIPINE BESYLATE 10 MG/1
TABLET ORAL
Qty: 30 TABLET | Refills: 0 | Status: SHIPPED | OUTPATIENT
Start: 2021-09-02 | End: 2021-09-29

## 2021-09-06 DIAGNOSIS — F33.2 SEVERE EPISODE OF RECURRENT MAJOR DEPRESSIVE DISORDER, WITHOUT PSYCHOTIC FEATURES (HCC): ICD-10-CM

## 2021-09-06 DIAGNOSIS — F43.10 PTSD (POST-TRAUMATIC STRESS DISORDER): ICD-10-CM

## 2021-09-07 RX ORDER — DULOXETIN HYDROCHLORIDE 60 MG/1
CAPSULE, DELAYED RELEASE ORAL
Qty: 90 CAPSULE | Refills: 0 | Status: SHIPPED | OUTPATIENT
Start: 2021-09-07 | End: 2021-11-29

## 2021-09-28 DIAGNOSIS — F43.10 PTSD (POST-TRAUMATIC STRESS DISORDER): ICD-10-CM

## 2021-09-28 DIAGNOSIS — F33.2 SEVERE EPISODE OF RECURRENT MAJOR DEPRESSIVE DISORDER, WITHOUT PSYCHOTIC FEATURES (HCC): ICD-10-CM

## 2021-09-28 DIAGNOSIS — Z98.890 S/P LUMBAR LAMINECTOMY: ICD-10-CM

## 2021-09-28 DIAGNOSIS — I10 ESSENTIAL HYPERTENSION: ICD-10-CM

## 2021-09-28 DIAGNOSIS — M48.00 SPINAL STENOSIS, UNSPECIFIED SPINAL REGION: ICD-10-CM

## 2021-09-29 RX ORDER — METFORMIN HYDROCHLORIDE 500 MG/1
TABLET, EXTENDED RELEASE ORAL
Qty: 60 TABLET | Refills: 1 | Status: SHIPPED | OUTPATIENT
Start: 2021-09-29 | End: 2021-12-27

## 2021-09-29 RX ORDER — AMLODIPINE BESYLATE 10 MG/1
TABLET ORAL
Qty: 30 TABLET | Refills: 1 | Status: SHIPPED | OUTPATIENT
Start: 2021-09-29 | End: 2021-12-27

## 2021-09-29 RX ORDER — ASPIRIN 81 MG/1
TABLET ORAL
Qty: 30 TABLET | Refills: 1 | Status: SHIPPED | OUTPATIENT
Start: 2021-09-29 | End: 2021-12-27

## 2021-09-29 RX ORDER — LISINOPRIL 40 MG/1
TABLET ORAL
Qty: 30 TABLET | Refills: 1 | Status: SHIPPED | OUTPATIENT
Start: 2021-09-29 | End: 2021-12-27

## 2021-09-29 RX ORDER — PRAZOSIN HYDROCHLORIDE 2 MG/1
CAPSULE ORAL
Qty: 30 CAPSULE | Refills: 1 | Status: SHIPPED | OUTPATIENT
Start: 2021-09-29 | End: 2022-02-26

## 2021-09-29 RX ORDER — MIRTAZAPINE 7.5 MG/1
TABLET, FILM COATED ORAL
Qty: 30 TABLET | Refills: 1 | Status: SHIPPED | OUTPATIENT
Start: 2021-09-29 | End: 2021-12-27

## 2021-09-29 RX ORDER — PANTOPRAZOLE SODIUM 40 MG/1
TABLET, DELAYED RELEASE ORAL
Qty: 30 TABLET | Refills: 1 | Status: SHIPPED | OUTPATIENT
Start: 2021-09-29 | End: 2021-12-27

## 2021-09-29 RX ORDER — DULOXETIN HYDROCHLORIDE 30 MG/1
CAPSULE, DELAYED RELEASE ORAL
Qty: 30 CAPSULE | Refills: 1 | Status: SHIPPED | OUTPATIENT
Start: 2021-09-29 | End: 2021-12-27

## 2021-10-22 LAB — HBA1C MFR BLD HPLC: 6.4 %

## 2021-10-28 DIAGNOSIS — E78.2 MIXED HYPERLIPIDEMIA: ICD-10-CM

## 2021-10-29 RX ORDER — DULAGLUTIDE 1.5 MG/.5ML
INJECTION, SOLUTION SUBCUTANEOUS
Qty: 2 ML | Refills: 0 | Status: SHIPPED | OUTPATIENT
Start: 2021-10-29 | End: 2021-11-29

## 2021-10-29 RX ORDER — ATORVASTATIN CALCIUM 40 MG/1
TABLET, FILM COATED ORAL
Qty: 30 TABLET | Refills: 0 | Status: SHIPPED | OUTPATIENT
Start: 2021-10-29 | End: 2021-11-29

## 2021-11-28 DIAGNOSIS — F33.2 SEVERE EPISODE OF RECURRENT MAJOR DEPRESSIVE DISORDER, WITHOUT PSYCHOTIC FEATURES (HCC): ICD-10-CM

## 2021-11-28 DIAGNOSIS — F43.10 PTSD (POST-TRAUMATIC STRESS DISORDER): ICD-10-CM

## 2021-11-29 DIAGNOSIS — E78.2 MIXED HYPERLIPIDEMIA: ICD-10-CM

## 2021-11-29 RX ORDER — DULAGLUTIDE 1.5 MG/.5ML
INJECTION, SOLUTION SUBCUTANEOUS
Qty: 2 ML | Refills: 0 | Status: SHIPPED | OUTPATIENT
Start: 2021-11-29 | End: 2022-01-19

## 2021-11-29 RX ORDER — ATORVASTATIN CALCIUM 40 MG/1
TABLET, FILM COATED ORAL
Qty: 30 TABLET | Refills: 0 | Status: SHIPPED | OUTPATIENT
Start: 2021-11-29 | End: 2021-12-27

## 2021-11-29 RX ORDER — DULOXETIN HYDROCHLORIDE 60 MG/1
CAPSULE, DELAYED RELEASE ORAL
Qty: 90 CAPSULE | Refills: 0 | Status: SHIPPED | OUTPATIENT
Start: 2021-11-29 | End: 2022-02-23

## 2021-12-27 DIAGNOSIS — F33.2 SEVERE EPISODE OF RECURRENT MAJOR DEPRESSIVE DISORDER, WITHOUT PSYCHOTIC FEATURES (HCC): ICD-10-CM

## 2021-12-27 DIAGNOSIS — Z98.890 S/P LUMBAR LAMINECTOMY: ICD-10-CM

## 2021-12-27 DIAGNOSIS — F43.10 PTSD (POST-TRAUMATIC STRESS DISORDER): ICD-10-CM

## 2021-12-27 DIAGNOSIS — M48.00 SPINAL STENOSIS, UNSPECIFIED SPINAL REGION: ICD-10-CM

## 2021-12-27 DIAGNOSIS — I10 ESSENTIAL HYPERTENSION: ICD-10-CM

## 2021-12-27 DIAGNOSIS — E78.2 MIXED HYPERLIPIDEMIA: ICD-10-CM

## 2021-12-27 RX ORDER — DULOXETIN HYDROCHLORIDE 30 MG/1
CAPSULE, DELAYED RELEASE ORAL
Qty: 30 CAPSULE | Refills: 10 | Status: SHIPPED | OUTPATIENT
Start: 2021-12-27 | End: 2022-01-09

## 2021-12-27 RX ORDER — LISINOPRIL 40 MG/1
TABLET ORAL
Qty: 30 TABLET | Refills: 10 | Status: SHIPPED | OUTPATIENT
Start: 2021-12-27

## 2021-12-27 RX ORDER — ASPIRIN 81 MG/1
TABLET ORAL
Qty: 30 TABLET | Refills: 10 | Status: SHIPPED | OUTPATIENT
Start: 2021-12-27 | End: 2022-06-29 | Stop reason: SDUPTHER

## 2021-12-27 RX ORDER — AMLODIPINE BESYLATE 10 MG/1
TABLET ORAL
Qty: 30 TABLET | Refills: 10 | Status: SHIPPED | OUTPATIENT
Start: 2021-12-27

## 2021-12-27 RX ORDER — PANTOPRAZOLE SODIUM 40 MG/1
TABLET, DELAYED RELEASE ORAL
Qty: 30 TABLET | Refills: 10 | Status: SHIPPED | OUTPATIENT
Start: 2021-12-27

## 2021-12-27 RX ORDER — ATORVASTATIN CALCIUM 40 MG/1
TABLET, FILM COATED ORAL
Qty: 30 TABLET | Refills: 10 | Status: SHIPPED | OUTPATIENT
Start: 2021-12-27

## 2021-12-27 RX ORDER — METFORMIN HYDROCHLORIDE 500 MG/1
TABLET, EXTENDED RELEASE ORAL
Qty: 60 TABLET | Refills: 10 | Status: SHIPPED | OUTPATIENT
Start: 2021-12-27

## 2021-12-27 RX ORDER — MIRTAZAPINE 7.5 MG/1
TABLET, FILM COATED ORAL
Qty: 30 TABLET | Refills: 10 | Status: SHIPPED | OUTPATIENT
Start: 2021-12-27

## 2022-01-08 DIAGNOSIS — Z98.890 S/P LUMBAR LAMINECTOMY: ICD-10-CM

## 2022-01-08 DIAGNOSIS — M48.00 SPINAL STENOSIS, UNSPECIFIED SPINAL REGION: ICD-10-CM

## 2022-01-09 RX ORDER — DULOXETIN HYDROCHLORIDE 30 MG/1
CAPSULE, DELAYED RELEASE ORAL
Qty: 90 CAPSULE | Refills: 0 | Status: SHIPPED | OUTPATIENT
Start: 2022-01-09

## 2022-01-19 RX ORDER — DULAGLUTIDE 1.5 MG/.5ML
INJECTION, SOLUTION SUBCUTANEOUS
Qty: 2 ML | Refills: 10 | Status: SHIPPED | OUTPATIENT
Start: 2022-01-19

## 2022-02-23 DIAGNOSIS — F43.10 PTSD (POST-TRAUMATIC STRESS DISORDER): ICD-10-CM

## 2022-02-23 DIAGNOSIS — F33.2 SEVERE EPISODE OF RECURRENT MAJOR DEPRESSIVE DISORDER, WITHOUT PSYCHOTIC FEATURES (HCC): ICD-10-CM

## 2022-02-23 RX ORDER — DULOXETIN HYDROCHLORIDE 60 MG/1
CAPSULE, DELAYED RELEASE ORAL
Qty: 90 CAPSULE | Refills: 0 | Status: SHIPPED | OUTPATIENT
Start: 2022-02-23 | End: 2022-03-22

## 2022-02-25 DIAGNOSIS — F43.10 PTSD (POST-TRAUMATIC STRESS DISORDER): ICD-10-CM

## 2022-02-26 RX ORDER — PRAZOSIN HYDROCHLORIDE 2 MG/1
CAPSULE ORAL
Qty: 30 CAPSULE | Refills: 0 | Status: SHIPPED | OUTPATIENT
Start: 2022-02-26 | End: 2022-02-28

## 2022-02-26 RX ORDER — TIZANIDINE 2 MG/1
TABLET ORAL
Qty: 30 TABLET | Refills: 0 | Status: SHIPPED | OUTPATIENT
Start: 2022-02-26 | End: 2022-03-30

## 2022-02-27 DIAGNOSIS — F43.10 PTSD (POST-TRAUMATIC STRESS DISORDER): ICD-10-CM

## 2022-02-27 DIAGNOSIS — F33.2 SEVERE EPISODE OF RECURRENT MAJOR DEPRESSIVE DISORDER, WITHOUT PSYCHOTIC FEATURES (HCC): ICD-10-CM

## 2022-02-28 RX ORDER — ALBUTEROL SULFATE 90 UG/1
POWDER, METERED RESPIRATORY (INHALATION)
Qty: 1 EACH | Refills: 0 | Status: SHIPPED | OUTPATIENT
Start: 2022-02-28

## 2022-02-28 RX ORDER — TRAZODONE HYDROCHLORIDE 50 MG/1
TABLET ORAL
Qty: 90 TABLET | Refills: 0 | Status: SHIPPED | OUTPATIENT
Start: 2022-02-28

## 2022-02-28 RX ORDER — PRAZOSIN HYDROCHLORIDE 2 MG/1
CAPSULE ORAL
Qty: 90 CAPSULE | Refills: 0 | Status: SHIPPED | OUTPATIENT
Start: 2022-02-28 | End: 2022-03-30

## 2022-03-18 PROBLEM — E78.2 MIXED HYPERLIPIDEMIA: Status: ACTIVE | Noted: 2017-11-06

## 2022-03-18 PROBLEM — B18.2 CHRONIC HEPATITIS C WITHOUT HEPATIC COMA (HCC): Status: ACTIVE | Noted: 2017-11-06

## 2022-03-18 PROBLEM — E66.01 OBESITY, MORBID (HCC): Status: ACTIVE | Noted: 2017-12-04

## 2022-03-18 PROBLEM — M43.16 SPONDYLOLISTHESIS, LUMBAR REGION: Status: ACTIVE | Noted: 2018-04-10

## 2022-03-18 PROBLEM — M54.50 CHRONIC BILATERAL LOW BACK PAIN WITHOUT SCIATICA: Status: ACTIVE | Noted: 2017-11-06

## 2022-03-18 PROBLEM — R06.81 APNEA: Status: ACTIVE | Noted: 2018-02-27

## 2022-03-18 PROBLEM — G89.29 CHRONIC BILATERAL LOW BACK PAIN WITHOUT SCIATICA: Status: ACTIVE | Noted: 2017-11-06

## 2022-03-18 PROBLEM — J45.909 ASTHMA: Status: ACTIVE | Noted: 2018-02-27

## 2022-03-19 PROBLEM — M48.00 SPINAL STENOSIS: Status: ACTIVE | Noted: 2018-02-27

## 2022-03-19 PROBLEM — F33.0 MILD EPISODE OF RECURRENT MAJOR DEPRESSIVE DISORDER (HCC): Status: ACTIVE | Noted: 2017-11-06

## 2022-03-19 PROBLEM — E11.40 TYPE 2 DIABETES MELLITUS WITH DIABETIC NEUROPATHY (HCC): Status: ACTIVE | Noted: 2018-11-14

## 2022-03-19 PROBLEM — M48.061 LUMBAR SPINAL STENOSIS: Status: ACTIVE | Noted: 2019-10-30

## 2022-03-19 PROBLEM — I10 ESSENTIAL HYPERTENSION: Status: ACTIVE | Noted: 2017-11-06

## 2022-03-19 PROBLEM — I26.99 PULMONARY EMBOLI (HCC): Status: ACTIVE | Noted: 2019-11-07

## 2022-03-20 PROBLEM — E11.21 TYPE 2 DIABETES WITH NEPHROPATHY (HCC): Status: ACTIVE | Noted: 2018-04-10

## 2022-03-21 DIAGNOSIS — F43.10 PTSD (POST-TRAUMATIC STRESS DISORDER): ICD-10-CM

## 2022-03-21 DIAGNOSIS — F33.2 SEVERE EPISODE OF RECURRENT MAJOR DEPRESSIVE DISORDER, WITHOUT PSYCHOTIC FEATURES (HCC): ICD-10-CM

## 2022-03-22 RX ORDER — DULOXETIN HYDROCHLORIDE 60 MG/1
CAPSULE, DELAYED RELEASE ORAL
Qty: 90 CAPSULE | Refills: 0 | Status: SHIPPED | OUTPATIENT
Start: 2022-03-22 | End: 2022-07-28

## 2022-03-29 DIAGNOSIS — F43.10 PTSD (POST-TRAUMATIC STRESS DISORDER): ICD-10-CM

## 2022-03-30 RX ORDER — PRAZOSIN HYDROCHLORIDE 2 MG/1
CAPSULE ORAL
Qty: 30 CAPSULE | Refills: 0 | Status: SHIPPED | OUTPATIENT
Start: 2022-03-30 | End: 2022-05-27

## 2022-03-30 RX ORDER — TIZANIDINE 2 MG/1
TABLET ORAL
Qty: 30 TABLET | Refills: 0 | Status: SHIPPED | OUTPATIENT
Start: 2022-03-30

## 2022-04-26 DIAGNOSIS — F43.10 PTSD (POST-TRAUMATIC STRESS DISORDER): ICD-10-CM

## 2022-04-27 RX ORDER — PRAZOSIN HYDROCHLORIDE 2 MG/1
CAPSULE ORAL
Qty: 30 CAPSULE | Refills: 10 | OUTPATIENT
Start: 2022-04-27

## 2022-04-27 RX ORDER — TIZANIDINE 2 MG/1
TABLET ORAL
Qty: 30 TABLET | Refills: 11 | OUTPATIENT
Start: 2022-04-27

## 2022-05-26 DIAGNOSIS — F43.10 PTSD (POST-TRAUMATIC STRESS DISORDER): ICD-10-CM

## 2022-05-27 RX ORDER — PRAZOSIN HYDROCHLORIDE 2 MG/1
CAPSULE ORAL
Qty: 30 CAPSULE | Refills: 0 | Status: SHIPPED | OUTPATIENT
Start: 2022-05-27 | End: 2022-07-02

## 2022-06-28 DIAGNOSIS — F43.10 PTSD (POST-TRAUMATIC STRESS DISORDER): ICD-10-CM

## 2022-06-29 RX ORDER — ASPIRIN 81 MG/1
TABLET ORAL
Qty: 90 TABLET | Refills: 1 | Status: SHIPPED | OUTPATIENT
Start: 2022-06-29

## 2022-07-02 RX ORDER — PRAZOSIN HYDROCHLORIDE 2 MG/1
CAPSULE ORAL
Qty: 30 CAPSULE | Refills: 0 | Status: SHIPPED | OUTPATIENT
Start: 2022-07-02

## 2022-07-20 DIAGNOSIS — F43.10 PTSD (POST-TRAUMATIC STRESS DISORDER): ICD-10-CM

## 2022-07-20 RX ORDER — PRAZOSIN HYDROCHLORIDE 2 MG/1
CAPSULE ORAL
Qty: 30 CAPSULE | Refills: 10 | OUTPATIENT
Start: 2022-07-20

## 2022-07-28 DIAGNOSIS — F33.2 SEVERE EPISODE OF RECURRENT MAJOR DEPRESSIVE DISORDER, WITHOUT PSYCHOTIC FEATURES (HCC): ICD-10-CM

## 2022-07-28 DIAGNOSIS — F43.10 PTSD (POST-TRAUMATIC STRESS DISORDER): ICD-10-CM

## 2022-07-28 RX ORDER — DULOXETIN HYDROCHLORIDE 60 MG/1
CAPSULE, DELAYED RELEASE ORAL
Qty: 90 CAPSULE | Refills: 0 | Status: SHIPPED | OUTPATIENT
Start: 2022-07-28

## 2022-10-05 LAB — HBA1C MFR BLD HPLC: 7 %

## 2022-11-05 DIAGNOSIS — F43.10 PTSD (POST-TRAUMATIC STRESS DISORDER): ICD-10-CM

## 2022-11-12 RX ORDER — PRAZOSIN HYDROCHLORIDE 2 MG/1
CAPSULE ORAL
Qty: 90 CAPSULE | OUTPATIENT
Start: 2022-11-12

## 2022-12-12 DIAGNOSIS — M48.00 SPINAL STENOSIS, UNSPECIFIED SPINAL REGION: ICD-10-CM

## 2022-12-12 DIAGNOSIS — Z98.890 S/P LUMBAR LAMINECTOMY: ICD-10-CM

## 2022-12-12 DIAGNOSIS — E78.2 MIXED HYPERLIPIDEMIA: ICD-10-CM

## 2022-12-13 RX ORDER — ASPIRIN 81 MG/1
TABLET ORAL
Qty: 30 TABLET | Refills: 0 | Status: SHIPPED | OUTPATIENT
Start: 2022-12-13

## 2022-12-13 RX ORDER — METFORMIN HYDROCHLORIDE 500 MG/1
TABLET, EXTENDED RELEASE ORAL
Qty: 60 TABLET | Refills: 0 | Status: SHIPPED | OUTPATIENT
Start: 2022-12-13

## 2022-12-13 RX ORDER — DULOXETIN HYDROCHLORIDE 30 MG/1
CAPSULE, DELAYED RELEASE ORAL
Qty: 30 CAPSULE | Refills: 0 | Status: SHIPPED | OUTPATIENT
Start: 2022-12-13

## 2022-12-13 RX ORDER — DULAGLUTIDE 1.5 MG/.5ML
INJECTION, SOLUTION SUBCUTANEOUS
Qty: 2 ML | Refills: 0 | Status: SHIPPED | OUTPATIENT
Start: 2022-12-13

## 2022-12-13 RX ORDER — ATORVASTATIN CALCIUM 40 MG/1
TABLET, FILM COATED ORAL
Qty: 30 TABLET | Refills: 0 | Status: SHIPPED | OUTPATIENT
Start: 2022-12-13

## 2022-12-19 ENCOUNTER — VIRTUAL VISIT (OUTPATIENT)
Dept: FAMILY MEDICINE CLINIC | Age: 57
End: 2022-12-19
Payer: MEDICARE

## 2022-12-19 DIAGNOSIS — Z98.890 S/P LUMBAR LAMINECTOMY: ICD-10-CM

## 2022-12-19 DIAGNOSIS — I10 ESSENTIAL HYPERTENSION: ICD-10-CM

## 2022-12-19 DIAGNOSIS — F43.10 PTSD (POST-TRAUMATIC STRESS DISORDER): ICD-10-CM

## 2022-12-19 DIAGNOSIS — F33.2 SEVERE EPISODE OF RECURRENT MAJOR DEPRESSIVE DISORDER, WITHOUT PSYCHOTIC FEATURES (HCC): ICD-10-CM

## 2022-12-19 DIAGNOSIS — Z79.899 ENCOUNTER FOR LONG-TERM (CURRENT) USE OF MEDICATIONS: ICD-10-CM

## 2022-12-19 DIAGNOSIS — D68.59 HYPERCOAGULABLE STATE (HCC): ICD-10-CM

## 2022-12-19 DIAGNOSIS — Z79.4 TYPE 2 DIABETES MELLITUS WITH DIABETIC NEUROPATHY, WITH LONG-TERM CURRENT USE OF INSULIN (HCC): ICD-10-CM

## 2022-12-19 DIAGNOSIS — Z86.711 HISTORY OF PULMONARY EMBOLUS (PE): ICD-10-CM

## 2022-12-19 DIAGNOSIS — Z86.718 HISTORY OF DVT OF LOWER EXTREMITY: ICD-10-CM

## 2022-12-19 DIAGNOSIS — E11.40 TYPE 2 DIABETES MELLITUS WITH DIABETIC NEUROPATHY, WITH LONG-TERM CURRENT USE OF INSULIN (HCC): ICD-10-CM

## 2022-12-19 DIAGNOSIS — E78.2 MIXED HYPERLIPIDEMIA: ICD-10-CM

## 2022-12-19 DIAGNOSIS — Z95.828 S/P IVC FILTER: ICD-10-CM

## 2022-12-19 DIAGNOSIS — M48.00 SPINAL STENOSIS, UNSPECIFIED SPINAL REGION: ICD-10-CM

## 2022-12-19 DIAGNOSIS — Z00.00 MEDICARE ANNUAL WELLNESS VISIT, SUBSEQUENT: Primary | ICD-10-CM

## 2022-12-19 RX ORDER — TIZANIDINE 4 MG/1
TABLET ORAL
Qty: 60 TABLET | Refills: 0 | Status: SHIPPED | OUTPATIENT
Start: 2022-12-19

## 2022-12-19 RX ORDER — MELATONIN 10 MG
TABLET,DISINTEGRATING ORAL
COMMUNITY
Start: 2022-10-17

## 2022-12-19 RX ORDER — MUPIROCIN 20 MG/G
OINTMENT TOPICAL
COMMUNITY
Start: 2022-10-05

## 2022-12-19 RX ORDER — ATORVASTATIN CALCIUM 40 MG/1
40 TABLET, FILM COATED ORAL
Qty: 30 TABLET | Refills: 0 | Status: SHIPPED | OUTPATIENT
Start: 2022-12-19

## 2022-12-19 RX ORDER — FUROSEMIDE 20 MG/1
20 TABLET ORAL DAILY
Qty: 90 TABLET | Refills: 1 | Status: SHIPPED | OUTPATIENT
Start: 2022-12-19

## 2022-12-19 RX ORDER — LABETALOL 200 MG/1
TABLET, FILM COATED ORAL
COMMUNITY
Start: 2022-11-09

## 2022-12-19 RX ORDER — DULOXETIN HYDROCHLORIDE 30 MG/1
CAPSULE, DELAYED RELEASE ORAL
Qty: 30 CAPSULE | Refills: 0 | Status: CANCELLED | OUTPATIENT
Start: 2022-12-19

## 2022-12-19 RX ORDER — TRAZODONE HYDROCHLORIDE 100 MG/1
100 TABLET ORAL
Qty: 90 TABLET | Refills: 0 | Status: SHIPPED | OUTPATIENT
Start: 2022-12-19

## 2022-12-19 RX ORDER — DICLOFENAC SODIUM 10 MG/G
GEL TOPICAL
Qty: 300 G | Refills: 0 | Status: SHIPPED | OUTPATIENT
Start: 2022-12-19

## 2022-12-19 RX ORDER — METOPROLOL SUCCINATE 50 MG/1
TABLET, EXTENDED RELEASE ORAL
COMMUNITY
Start: 2022-10-17

## 2022-12-19 RX ORDER — DULOXETIN HYDROCHLORIDE 20 MG/1
CAPSULE, DELAYED RELEASE ORAL
Qty: 60 CAPSULE | Refills: 0 | Status: SHIPPED | OUTPATIENT
Start: 2022-12-19

## 2022-12-19 RX ORDER — DULAGLUTIDE 1.5 MG/.5ML
1.5 INJECTION, SOLUTION SUBCUTANEOUS
Qty: 2 ML | Refills: 0 | Status: SHIPPED | OUTPATIENT
Start: 2022-12-19

## 2022-12-19 RX ORDER — DICLOFENAC SODIUM 10 MG/G
GEL TOPICAL
COMMUNITY
Start: 2022-10-24 | End: 2022-12-19 | Stop reason: SDUPTHER

## 2022-12-19 RX ORDER — METHOCARBAMOL 750 MG/1
750 TABLET, FILM COATED ORAL 3 TIMES DAILY
Qty: 90 TABLET | Refills: 0 | Status: CANCELLED | OUTPATIENT
Start: 2022-12-19

## 2022-12-19 RX ORDER — TRAZODONE HYDROCHLORIDE 100 MG/1
TABLET ORAL
COMMUNITY
Start: 2022-10-17 | End: 2022-12-19 | Stop reason: ALTCHOICE

## 2022-12-19 RX ORDER — PRAZOSIN HYDROCHLORIDE 2 MG/1
CAPSULE ORAL
Qty: 30 CAPSULE | Refills: 0 | Status: SHIPPED | OUTPATIENT
Start: 2022-12-19

## 2022-12-19 RX ORDER — DOXYCYCLINE HYCLATE 100 MG
100 TABLET ORAL 2 TIMES DAILY
COMMUNITY
Start: 2022-10-05

## 2022-12-19 RX ORDER — HYDRALAZINE HYDROCHLORIDE 50 MG/1
50 TABLET, FILM COATED ORAL 2 TIMES DAILY
COMMUNITY
Start: 2022-12-12

## 2022-12-19 NOTE — PROGRESS NOTES
Blanca Waters is a 56 y.o. male who was seen by synchronous (real-time) audio-video technology on 12/19/2022 for Establish Care      Assessment/ Plan:   Diagnoses and all orders for this visit:    1. Medicare annual wellness visit, subsequent    2. Spinal stenosis, unspecified spinal region  3. S/P lumbar laminectomy  - ct working with Dr. Maynor Rodriguez, refilling meds  -     diclofenac (VOLTAREN) 1 % gel; APPLY 1 GRAM TO THE AFFECTED AREA 4 TIMES A DAY AS NEEDED FOR PAIN  -     tiZANidine (ZANAFLEX) 4 mg tablet; TAKE 1 TABLET BY MOUTH TWICE DAILY AS NEEDED    4. Severe episode of recurrent major depressive disorder, without psychotic features ([address] [facility]  5. PTSD (post-traumatic stress disorder)  - stable, ct on current meds  -     DULoxetine (CYMBALTA) 20 mg capsule; Take 1 cap by mouth daily x 2 weeks and then increase to 2 caps daily  -     prazosin (MINIPRESS) 2 mg capsule; TAKE 1 CAPSULE BY MOUTH AT BEDTIME  -     traZODone (DESYREL) 100 mg tablet; Take 1 Tablet by mouth nightly. 6. Mixed hyperlipidemia - ct Lipitor  -     atorvastatin (LIPITOR) 40 mg tablet; Take 1 Tablet by mouth nightly.  -     HEMOGLOBIN A1C WITH EAG; Future  -     LIPID PANEL; Future  -     METABOLIC PANEL, COMPREHENSIVE; Future  -     TSH 3RD GENERATION; Future    7. Type 2 diabetes mellitus with diabetic neuropathy, with long-term current use of insulin (HCC) - rechecking labs, Trulicity, Metformin, and Jardiance  -     dulaglutide (Trulicity) 1.5 YE/7.4 mL sub-q pen; 0.5 mL by SubCUTAneous route every seven (7) days.  -     HEMOGLOBIN A1C WITH EAG; Future  -     LIPID PANEL; Future  -     METABOLIC PANEL, COMPREHENSIVE; Future  -     TSH 3RD GENERATION; Future  -     MICROALBUMIN, UR, RAND W/ MICROALB/CREAT RATIO; Future    8. Essential hypertension - to check in office at follow up appt  -     METABOLIC PANEL, COMPREHENSIVE; Future    9. Hypercoagulable state ([facility]  10. History of DVT of lower extremity  11.  History of pulmonary embolus (PE)  12. S/P IVC filter  -     rivaroxaban (XARELTO) 20 mg tab tablet; Take 1 Tablet by mouth daily. Switching from Eliquis to Xarelto    13. Encounter for long-term (current) use of medications  -     HEMOGLOBIN A1C WITH EAG; Future  -     LIPID PANEL; Future  -     METABOLIC PANEL, COMPREHENSIVE; Future  -     TSH 3RD GENERATION; Future  -     CBC W/O DIFF; Future  -     URINALYSIS W/ RFLX MICROSCOPIC; Future  -     MICROALBUMIN, UR, RAND W/ MICROALB/CREAT RATIO; Future    Other orders  -     furosemide (LASIX) 20 mg tablet; Take 1 Tablet by mouth daily. I spent at least 30 minutes on this visit with this established patient. Follow-up and Dispositions    Return in about 6 weeks (around 1/30/2023), or if symptoms worsen or fail to improve. Subjective:   Pt is a 56 y.o. male with PMH sig for asthma, chronic pain, obesity, DM2, CKD st 3a, SHIRA, GERD, Hep C treated, depression, and VTE (DVT & PE in 2019) who presents for routine follow up on chronic medical conditions. Has been following with Primary MD closer to his home but prefers to switch back. Needs refills today. Has been going to [facility] ER frequently over the past 6 months. Seeing Dr. Ignacio Monroe for back. Had surgery in 1/2022. Ct on Xarelto for hx of VTE. Has IVC filter in place. Depression and PTSD doing ok. Ct on Cymbalta, Prazosin, and Trazodone. Diabetes Mellitus:  Taking meds  Reports no polyuria or polydipsia, no chest pain, dyspnea or TIA's, last eye exam approximately < 1 yr ago. Not exercising or working on diet  Pt is a non smoker.     Lab Results   Component Value Date/Time    Hemoglobin A1c (POC) 7.7 11/14/2018 10:42 AM    Hemoglobin A1c (POC) 8.1 08/14/2018 04:30 PM    Hemoglobin A1c 9.2 (H) 09/04/2020 10:55 AM    Microalb/Creat ratio (ug/mg creat.) 17 09/04/2020 10:55 AM    LDL, calculated 65 05/20/2020 09:04 AM    Creatinine 1.24 09/04/2020 10:55 AM    Hemoglobin A1c, External 6.4 10/22/2021 12:00 AM Lab Results   Component Value Date/Time    GFR est AA 76 09/04/2020 10:55 AM    GFR est non-AA 66 09/04/2020 10:55 AM      Lab Results   Component Value Date/Time    TSH 1.550 05/20/2020 09:04 AM         ROS  Gen - no fever/chills  Resp - no dyspnea or cough  CV - no chest pain or BROWN  Rest per HPI    Past Medical History:   Diagnosis Date    Arthritis     2010    Asthma 1995    INHALER USE PRN    Chronic bilateral low back pain without sciatica 11/6/2017    Chronic hepatitis C without hepatic coma ([address] 11/6/2017    treated at [facility]    Chronic pain     lower back    Essential hypertension 11/6/2017    GERD (gastroesophageal reflux disease)     History of cardiac cath     Hypercholesterolemia     Mild episode of recurrent major depressive disorder ([address] 11/06/2012    Morbid obesity ([address]     PTSD (post-traumatic stress disorder)     Sleep apnea     pt stated was taken off cpap while on blood thinner    Stage 3 chronic kidney disease ([address] 11/06/2017    IMPROVED     Thromboembolus ([address]     1/3/19  DVT,PE while taking testosterone    Uncontrolled type 2 diabetes mellitus with peripheral neuropathy 11/06/2011     Past Surgical History:   Procedure Laterality Date    HX BACK SURGERY  04/10/2018    Fusion L4,L5    HX BACK SURGERY  05/14/2021    HX COLONOSCOPY  2013    CJW    HX HEART CATHETERIZATION  2017    NEG PER PATIENT    HX HERNIA REPAIR  0691    UMBILICAL    HX KNEE ARTHROSCOPY  1999    right knee    HX KNEE ARTHROSCOPY  2002    left knee    HX ROTATOR CUFF REPAIR Right 2016    HX UROLOGICAL  2015    Vasectomy         Objective:     Patient-Reported Vitals 8/2/2021   Patient-Reported Pulse -   Patient-Reported Systolic  989   Patient-Reported Diastolic 96        Physical exam:  General appearance - alert, well appearing, and in no distress  Eyes -sclera anicteric, no discharge  HEENT- normocephalic, atraumatic, moist mucous membranes, no visualized neck mass  Chest -normal respiratory effort, no visualized signs of respiratory distress  Neurological - alert, awake, normal speech, no focal findings or movement disorder noted  Psych - normal mood and affect  Skin- no apparent lesions    We discussed the expected course, resolution and complications of the diagnosis(es) in detail. Medication risks, benefits, costs, interactions, and alternatives were discussed as indicated. I advised him to contact the office if his condition worsens, changes or fails to improve as anticipated. He expressed understanding with the diagnosis(es) and plan. Cameron Danielle, was evaluated through a synchronous (real-time) audio-video encounter. The patient (or guardian if applicable) is aware that this is a billable service, which includes applicable co-pays. This Virtual Visit was conducted with patient's (and/or legal guardian's) consent. The visit was conducted pursuant to the emergency declaration under the [address] [place] [address], [address] [place] [address] authority and the [employer] Act. Patient identification was verified, and a caregiver was present when appropriate. The patient was located at: Home: [place] [id number]  The provider was located at: Facility (Appt Department): [address] [place] [address]        Maryam Elias MD          This is the Subsequent Medicare Annual Wellness Exam, performed 12 months or more after the Initial AWV or the last Subsequent AWV    I have reviewed the patient's medical history in detail and updated the computerized patient record. Assessment/Plan   Education and counseling provided:  Are appropriate based on today's review and evaluation    1. Medicare annual wellness visit, subsequent  2.  Spinal stenosis, unspecified spinal region  -     diclofenac (VOLTAREN) 1 % gel; APPLY 1 GRAM TO THE AFFECTED AREA 4 TIMES A DAY AS NEEDED FOR PAIN, Normal, Disp-300 g, R-0  - tiZANidine (ZANAFLEX) 4 mg tablet; TAKE 1 TABLET BY MOUTH TWICE DAILY AS NEEDED, Normal, Disp-60 Tablet, R-0  3. S/P lumbar laminectomy  -     diclofenac (VOLTAREN) 1 % gel; APPLY 1 GRAM TO THE AFFECTED AREA 4 TIMES A DAY AS NEEDED FOR PAIN, Normal, Disp-300 g, R-0  -     tiZANidine (ZANAFLEX) 4 mg tablet; TAKE 1 TABLET BY MOUTH TWICE DAILY AS NEEDED, Normal, Disp-60 Tablet, R-0  4. Severe episode of recurrent major depressive disorder, without psychotic features ([facility]  -     DULoxetine (CYMBALTA) 20 mg capsule; Take 1 cap by mouth daily x 2 weeks and then increase to 2 caps daily, Normal, Disp-60 Capsule, R-0  -     traZODone (DESYREL) 100 mg tablet; Take 1 Tablet by mouth nightly., Normal, Disp-90 Tablet, R-0  5. PTSD (post-traumatic stress disorder)  -     DULoxetine (CYMBALTA) 20 mg capsule; Take 1 cap by mouth daily x 2 weeks and then increase to 2 caps daily, Normal, Disp-60 Capsule, R-0  -     prazosin (MINIPRESS) 2 mg capsule; TAKE 1 CAPSULE BY MOUTH AT BEDTIME, Normal, Disp-30 Capsule, R-0  -     traZODone (DESYREL) 100 mg tablet; Take 1 Tablet by mouth nightly., Normal, Disp-90 Tablet, R-0  6. Mixed hyperlipidemia  -     atorvastatin (LIPITOR) 40 mg tablet; Take 1 Tablet by mouth nightly., Normal, Disp-30 Tablet, R-0  -     HEMOGLOBIN A1C WITH EAG; Future  -     LIPID PANEL; Future  -     METABOLIC PANEL, COMPREHENSIVE; Future  -     TSH 3RD GENERATION; Future  7. Type 2 diabetes mellitus with diabetic neuropathy, with long-term current use of insulin (HCC)  -     dulaglutide (Trulicity) 1.5 KS/9.6 mL sub-q pen; 0.5 mL by SubCUTAneous route every seven (7) days. , Normal, Disp-2 mL, R-0  -     HEMOGLOBIN A1C WITH EAG; Future  -     LIPID PANEL; Future  -     METABOLIC PANEL, COMPREHENSIVE; Future  -     TSH 3RD GENERATION; Future  -     MICROALBUMIN, UR, RAND W/ MICROALB/CREAT RATIO; Future  8. Essential hypertension  -     METABOLIC PANEL, COMPREHENSIVE; Future  9.  Hypercoagulable state ([facility]  - rivaroxaban (XARELTO) 20 mg tab tablet; Take 1 Tablet by mouth daily. Switching from Eliquis to Xarelto, Normal, Disp-90 Tablet, R-0  10. History of DVT of lower extremity  -     rivaroxaban (XARELTO) 20 mg tab tablet; Take 1 Tablet by mouth daily. Switching from Eliquis to Xarelto, Normal, Disp-90 Tablet, R-0  11. History of pulmonary embolus (PE)  -     rivaroxaban (XARELTO) 20 mg tab tablet; Take 1 Tablet by mouth daily. Switching from Eliquis to Xarelto, Normal, Disp-90 Tablet, R-0  12. S/P IVC filter  -     rivaroxaban (XARELTO) 20 mg tab tablet; Take 1 Tablet by mouth daily. Switching from Eliquis to Xarelto, Normal, Disp-90 Tablet, R-0  13. Encounter for long-term (current) use of medications  -     HEMOGLOBIN A1C WITH EAG; Future  -     LIPID PANEL; Future  -     METABOLIC PANEL, COMPREHENSIVE; Future  -     TSH 3RD GENERATION; Future  -     CBC W/O DIFF; Future  -     URINALYSIS W/ RFLX MICROSCOPIC;  Future  -     MICROALBUMIN, UR, RAND W/ MICROALB/CREAT RATIO; Future       Depression Risk Factor Screening     3 most recent PHQ Screens 5/19/2020   Little interest or pleasure in doing things Not at all   Feeling down, depressed, irritable, or hopeless Not at all   Total Score PHQ 2 0   Trouble falling or staying asleep, or sleeping too much -   Feeling tired or having little energy -   Poor appetite, weight loss, or overeating -   Feeling bad about yourself - or that you are a failure or have let yourself or your family down -   Trouble concentrating on things such as school, work, reading, or watching TV -   Moving or speaking so slowly that other people could have noticed; or the opposite being so fidgety that others notice -   Thoughts of being better off dead, or hurting yourself in some way -   PHQ 9 Score -   How difficult have these problems made it for you to do your work, take care of your home and get along with others -       Alcohol & Drug Abuse Risk Screen    Do you average more than 2 drinks per night or 14 drinks a week: No    On any one occasion in the past three months have you have had more than 4 drinks containing alcohol:  No       Opioid Risk: (Low risk score <55, High risk score ?55)  Opioid risk score: 52      Click here to complete the Controlled Substance Monitoring SmartForm    Last PDMP Yair as Reviewed:  Review User Review Instant Review Result   Ricardo Slaughter 6/28/2021  5:18 PM Reviewed PDMP [1]         Functional Ability and Level of Safety    Hearing: Hearing is good. Activities of Daily Living: The home contains: no safety equipment. Patient does total self care      Ambulation: with difficulty, uses a rolling walker     Fall Risk:  Fall Risk Assessment, last 12 mths 5/19/2020   Able to walk? Yes   Fall in past 12 months?  No      Abuse Screen:  Patient is not abused       Cognitive Screening    Has your family/caregiver stated any concerns about your memory: no     Cognitive Screening: Normal - Verbal Fluency Test    Health Maintenance Due     Health Maintenance Due   Topic Date Due    Eye Exam Retinal or Dilated  Never done    Depression Monitoring  Never done    Low dose CT lung screening  Never done    Foot Exam Q1  11/14/2019    Lipid Screen  05/20/2021    Diabetic Alb to Cr ratio (uACR) test  09/04/2021    GFR test (Diabetes, CKD 3-4, OR last GFR 15-59)  09/04/2021    COVID-19 Vaccine (3 - Booster for Pfizer series) 10/28/2021    Shingles Vaccine (2 of 2) 11/18/2021    Flu Vaccine (1) 08/01/2022    Pneumococcal 0-64 years (2 - PCV) 09/23/2022    A1C test (Diabetic or Prediabetic)  10/22/2022       Patient Care Team   Patient Care Team:  Evens Tolentino MD as PCP - General (Family Medicine)  Evens Tolentino MD as PCP - [facility] Empaneled Provider  Ranulfo Parsons MD (Pain Management)  Cristiane Stahl MD (Sleep Medicine Physician)  Cely Rene MD (Hematology and Oncology)  Dayna Rodriguez MD (Orthopedic Surgery)    History     Patient Active Problem List Diagnosis Code    Uncontrolled type 2 diabetes mellitus with peripheral neuropathy [id number]    Essential hypertension I10    Mild episode of recurrent major depressive disorder (HCC) F33.0    Chronic hepatitis C without hepatic coma (HCC) B18.2    Chronic bilateral low back pain without sciatica M54.50, G89.29    Mixed hyperlipidemia E78.2    Obesity, morbid (HCC) E66.01    Spinal stenosis M48.00    Testicular mass N50.89    Apnea R06.81    Asthma J45.909    Type 2 diabetes with nephropathy (HCC) E11.21    Spondylolisthesis, lumbar region M43.16    Type 2 diabetes mellitus with diabetic neuropathy (HCC) E11.40    Lumbar spinal stenosis M48.061    Pulmonary emboli (HCC) I26.99     Past Medical History:   Diagnosis Date    Arthritis     2010    Asthma 1995    INHALER USE PRN    Chronic bilateral low back pain without sciatica 11/6/2017    Chronic hepatitis C without hepatic coma ([address] 11/6/2017    [facility]    Chronic pain     lower back    Essential hypertension 11/6/2017    GERD (gastroesophageal reflux disease)     History of cardiac cath     Hypercholesterolemia     Mild episode of recurrent major depressive disorder ([address] 11/06/2012    Morbid obesity ([address]     PTSD (post-traumatic stress disorder)     Sleep apnea     pt stated was taken off cpap while on blood thinner    Stage 3 chronic kidney disease ([address] 11/06/2017    IMPROVED     Thromboembolus ([address]     1/3/19  DVT,PE while taking testosterone    Uncontrolled type 2 diabetes mellitus with peripheral neuropathy 11/06/2011      Past Surgical History:   Procedure Laterality Date    HX BACK SURGERY  04/10/2018    Fusion L4,L5    HX BACK SURGERY  05/14/2021    HX COLONOSCOPY  2013    CJW    HX HEART CATHETERIZATION  2017    NEG PER PATIENT    HX HERNIA REPAIR  5369    UMBILICAL    HX KNEE ARTHROSCOPY  1999    right knee    HX KNEE ARTHROSCOPY  2002    left knee    HX ROTATOR CUFF REPAIR Right 2016    HX UROLOGICAL  2015    Vasectomy      Current Outpatient Medications   Medication Sig Dispense Refill    doxycycline (VIBRA-TABS) 100 mg tablet Take 100 mg by mouth two (2) times a day. hydrALAZINE (APRESOLINE) 50 mg tablet Take 50 mg by mouth two (2) times a day. labetaloL (NORMODYNE) 200 mg tablet TAKE TWO TABLETS BY MOUTH TWICE DAILY      metoprolol succinate (TOPROL-XL) 50 mg XL tablet       mupirocin (BACTROBAN) 2 % ointment APPLY TO WOUND 2 TIMES A DAY      Skin Protectant A-D, pet, lashae, oint ointment       DULoxetine (CYMBALTA) 20 mg capsule Take 1 cap by mouth daily x 2 weeks and then increase to 2 caps daily 60 Capsule 0    diclofenac (VOLTAREN) 1 % gel APPLY 1 GRAM TO THE AFFECTED AREA 4 TIMES A DAY AS NEEDED FOR PAIN 300 g 0    atorvastatin (LIPITOR) 40 mg tablet Take 1 Tablet by mouth nightly. 30 Tablet 0    furosemide (LASIX) 20 mg tablet Take 1 Tablet by mouth daily. 90 Tablet 1    dulaglutide (Trulicity) 1.5 UN/0.8 mL sub-q pen 0.5 mL by SubCUTAneous route every seven (7) days. 2 mL 0    tiZANidine (ZANAFLEX) 4 mg tablet TAKE 1 TABLET BY MOUTH TWICE DAILY AS NEEDED 60 Tablet 0    prazosin (MINIPRESS) 2 mg capsule TAKE 1 CAPSULE BY MOUTH AT BEDTIME 30 Capsule 0    rivaroxaban (XARELTO) 20 mg tab tablet Take 1 Tablet by mouth daily. Switching from Eliquis to Xarelto 90 Tablet 0    traZODone (DESYREL) 100 mg tablet Take 1 Tablet by mouth nightly.  90 Tablet 0    aspirin delayed-release 81 mg tablet TAKE 1 TABLET BY MOUTH ONCE DAILY 30 Tablet 0    metFORMIN ER (GLUCOPHAGE XR) 500 mg tablet TAKE 2 TABLETS (1,000 MG) BY MOUTH IN THE EVENING WITH DINNER 60 Tablet 0    ProAir RespiClick 90 mcg/actuation breath activated inhaler INHALE 2 PUFFS BY MOUTH EVERY 4 HOURS AS NEEDED FOR WHEEZE 1 Each 0    pantoprazole (PROTONIX) 40 mg tablet TAKE 1 TABLET BY MOUTH ONCE DAILY 30 Tablet 10    lisinopriL (PRINIVIL, ZESTRIL) 40 mg tablet TAKE 1 TABLET BY MOUTH ONCE DAILY 30 Tablet 10    amLODIPine (NORVASC) 10 mg tablet TAKE 1 TABLET BY MOUTH ONCE DAILY 30 Tablet 10 oxyCODONE IR (ROXICODONE) 5 mg immediate release tablet TAKE 1 TABLET BY MOUTH EVERY 8 HOURS      Insulin Needles, Disposable, (BD Ultra-Fine Short Pen Needle) 31 gauge x 5/16\" ndle USE WITH TRULICITY 1 Package 11    fluticasone propion-salmeteroL (Wixela Inhub) 250-50 mcg/dose diskus inhaler Take 1 Puff by inhalation two (2) times a day. 1 Inhaler 0    Jardiance 25 mg tablet TAKE 1 TABLET BY MOUTH EVERY DAY (Patient not taking: Reported on 2022) 90 Tablet 1    pregabalin (LYRICA) 150 mg capsule Take 1 Capsule by mouth three (3) times daily. Max Daily Amount: 450 mg.  (Patient not taking: No sig reported) 90 Capsule 0     Allergies   Allergen Reactions    Tylenol [Acetaminophen] Nausea and Vomiting       Family History   Problem Relation Age of Onset    Hypertension Mother     Diabetes Mother     Heart Disease Mother         cabg    Pneumonia Father 67    Diabetes Father     Diabetes Sister     Other Brother 9        House Fire    Diabetes Sister     Other Sister 15        House Fire    Other Sister [address] [facility]    Other Sister 9        House Fire    Other Son 6        HURRICANE ARA    Anesth Problems Neg Hx      Social History     Tobacco Use    Smoking status: Former     Packs/day: 1.50     Years: 19.00     Pack years: 28.50     Types: Cigarettes     Quit date: 2009     Years since quittin.4    Smokeless tobacco: Never   Substance Use Topics    Alcohol use: No         Rk Cobb MD

## 2022-12-19 NOTE — PROGRESS NOTES
Chief Complaint   Patient presents with    [facility]     Re-establish care    1. Have you been to the ER, urgent care clinic since your last visit? Hospitalized since your last visit? Yes      2. Have you seen or consulted any other health care providers outside of the [address] [place] [address] since your last visit? Include any pap smears or colon screening.  Yes

## 2022-12-19 NOTE — PATIENT INSTRUCTIONS
Medicare Wellness Visit, Male    The best way to live healthy is to have a lifestyle where you eat a well-balanced diet, exercise regularly, limit alcohol use, and quit all forms of tobacco/nicotine, if applicable. Regular preventive services are another way to keep healthy. Preventive services (vaccines, screening tests, monitoring & exams) can help personalize your care plan, which helps you manage your own care. Screening tests can find health problems at the earliest stages, when they are easiest to treat. [facility] follows the current, evidence-based guidelines published by the [facility] ([facility]) when recommending preventive services for our patients. Because we follow these guidelines, sometimes recommendations change over time as research supports it. (For example, a prostate screening blood test is no longer routinely recommended for men with no symptoms). Of course, you and your doctor may decide to screen more often for some diseases, based on your risk and co-morbidities (chronic disease you are already diagnosed with). Preventive services for you include:  - Medicare offers their members a free annual wellness visit, which is time for you and your primary care provider to discuss and plan for your preventive service needs.  Take advantage of this benefit every year!    -Over the age of 72 should receive the recommended pneumonia vaccines.   -All adults should have a flu vaccine yearly.  -All adults should receive a tetanus vaccine every 10 years.  -Over the age of 48 should receive the shingles vaccines.    -All adults should be screened once for Hepatitis C.  -All adults age 38-68 who are overweight should have a diabetes screening test once every three years.   -Other screening tests & preventive services for persons with diabetes include: an eye exam to screen for diabetic retinopathy, a kidney function test, a foot exam, and stricter control over your cholesterol.   -Cardiovascular screening for adults with routine risk involves an electrocardiogram (ECG) at intervals determined by the provider.     -Colorectal cancer screening should be done for adults age 43-69 with no increased risk factors for colorectal cancer. There are a number of acceptable methods of screening for this type of cancer. Each test has its own benefits and drawbacks. Discuss with your provider what is most appropriate for you during your annual wellness visit. The different tests include: colonoscopy (considered the best screening method), a fecal occult blood test, a fecal DNA test, and sigmoidoscopy.    -Lung cancer screening is recommended annually with a low dose CT scan for adults between age 54 and 68, who have smoked at least 30 pack years (equivalent of 1 pack per day for 30 days), and who is a current smoker or quit less than 15 years ago. -An Abdominal Aortic Aneurysm (AAA) Screening is recommended for men age 73-68 who has ever smoked in their lifetime.      Here is a list of your current Health Maintenance items (your personalized list of preventive services) with a due date:  Health Maintenance Due   Topic Date Due    Eye Exam  Never done    Depression Monitoring  Never done    Diabetic Foot Care  11/14/2019    Cholesterol Test   05/20/2021    Albumin Urine Test  09/04/2021    COVID-19 Vaccine (3 - Booster for Pfizer series) 10/28/2021    Shingles Vaccine (2 of 2) 11/18/2021    Yearly Flu Vaccine (1) 08/01/2022    Pneumococcal Vaccine (2 - PCV) 09/23/2022    Hemoglobin A1C    10/22/2022

## 2023-01-11 DIAGNOSIS — F43.10 PTSD (POST-TRAUMATIC STRESS DISORDER): ICD-10-CM

## 2023-01-11 DIAGNOSIS — E78.2 MIXED HYPERLIPIDEMIA: ICD-10-CM

## 2023-01-11 DIAGNOSIS — Z79.4 TYPE 2 DIABETES MELLITUS WITH DIABETIC NEUROPATHY, WITH LONG-TERM CURRENT USE OF INSULIN (HCC): ICD-10-CM

## 2023-01-11 DIAGNOSIS — E11.40 TYPE 2 DIABETES MELLITUS WITH DIABETIC NEUROPATHY, WITH LONG-TERM CURRENT USE OF INSULIN (HCC): ICD-10-CM

## 2023-01-11 DIAGNOSIS — Z98.890 S/P LUMBAR LAMINECTOMY: ICD-10-CM

## 2023-01-11 DIAGNOSIS — M48.00 SPINAL STENOSIS, UNSPECIFIED SPINAL REGION: ICD-10-CM

## 2023-01-12 RX ORDER — ATORVASTATIN CALCIUM 40 MG/1
TABLET, FILM COATED ORAL
Qty: 30 TABLET | Refills: 10 | Status: SHIPPED | OUTPATIENT
Start: 2023-01-12

## 2023-01-12 RX ORDER — ASPIRIN 81 MG/1
TABLET ORAL
Qty: 30 TABLET | Refills: 10 | Status: SHIPPED | OUTPATIENT
Start: 2023-01-12

## 2023-01-12 RX ORDER — TIZANIDINE 4 MG/1
TABLET ORAL
Qty: 60 TABLET | Refills: 0 | Status: SHIPPED | OUTPATIENT
Start: 2023-01-12

## 2023-01-12 RX ORDER — PRAZOSIN HYDROCHLORIDE 2 MG/1
CAPSULE ORAL
Qty: 30 CAPSULE | Refills: 0 | Status: SHIPPED | OUTPATIENT
Start: 2023-01-12

## 2023-01-12 RX ORDER — METFORMIN HYDROCHLORIDE 500 MG/1
TABLET, EXTENDED RELEASE ORAL
Qty: 60 TABLET | Refills: 10 | Status: SHIPPED | OUTPATIENT
Start: 2023-01-12

## 2023-01-12 RX ORDER — DULOXETIN HYDROCHLORIDE 30 MG/1
CAPSULE, DELAYED RELEASE ORAL
Qty: 30 CAPSULE | Refills: 10 | Status: SHIPPED | OUTPATIENT
Start: 2023-01-12

## 2023-01-12 RX ORDER — DULAGLUTIDE 1.5 MG/.5ML
1.5 INJECTION, SOLUTION SUBCUTANEOUS
Qty: 6 ML | Refills: 0 | Status: SHIPPED | OUTPATIENT
Start: 2023-01-12

## 2023-01-24 DIAGNOSIS — M48.00 SPINAL STENOSIS, UNSPECIFIED SPINAL REGION: ICD-10-CM

## 2023-01-24 DIAGNOSIS — Z98.890 S/P LUMBAR LAMINECTOMY: ICD-10-CM

## 2023-01-24 DIAGNOSIS — Z86.711 HISTORY OF PULMONARY EMBOLUS (PE): ICD-10-CM

## 2023-01-24 DIAGNOSIS — F43.10 PTSD (POST-TRAUMATIC STRESS DISORDER): ICD-10-CM

## 2023-01-24 DIAGNOSIS — F33.2 SEVERE EPISODE OF RECURRENT MAJOR DEPRESSIVE DISORDER, WITHOUT PSYCHOTIC FEATURES (HCC): ICD-10-CM

## 2023-01-24 DIAGNOSIS — D68.59 HYPERCOAGULABLE STATE (HCC): ICD-10-CM

## 2023-01-24 DIAGNOSIS — Z86.718 HISTORY OF DVT OF LOWER EXTREMITY: ICD-10-CM

## 2023-01-24 DIAGNOSIS — E78.2 MIXED HYPERLIPIDEMIA: ICD-10-CM

## 2023-01-24 DIAGNOSIS — Z95.828 S/P IVC FILTER: ICD-10-CM

## 2023-01-24 RX ORDER — DICLOFENAC SODIUM 10 MG/G
GEL TOPICAL
Qty: 300 G | Refills: 0 | Status: SHIPPED | OUTPATIENT
Start: 2023-01-24

## 2023-01-24 RX ORDER — ATORVASTATIN CALCIUM 40 MG/1
TABLET, FILM COATED ORAL
Qty: 30 TABLET | Refills: 10 | Status: SHIPPED | OUTPATIENT
Start: 2023-01-24

## 2023-01-24 RX ORDER — RIVAROXABAN 20 MG/1
TABLET, FILM COATED ORAL
Qty: 90 TABLET | Refills: 0 | Status: SHIPPED | OUTPATIENT
Start: 2023-01-24

## 2023-01-24 RX ORDER — PRAZOSIN HYDROCHLORIDE 2 MG/1
CAPSULE ORAL
Qty: 30 CAPSULE | Refills: 0 | Status: SHIPPED | OUTPATIENT
Start: 2023-01-24

## 2023-01-24 RX ORDER — TRAZODONE HYDROCHLORIDE 100 MG/1
100 TABLET ORAL
Qty: 90 TABLET | Refills: 0 | Status: SHIPPED | OUTPATIENT
Start: 2023-01-24

## 2023-03-03 ENCOUNTER — TELEPHONE (OUTPATIENT)
Dept: FAMILY MEDICINE CLINIC | Age: 58
End: 2023-03-03

## 2023-03-06 ENCOUNTER — TELEPHONE (OUTPATIENT)
Dept: FAMILY MEDICINE CLINIC | Age: 58
End: 2023-03-06

## 2023-03-13 ENCOUNTER — OFFICE VISIT (OUTPATIENT)
Dept: FAMILY MEDICINE CLINIC | Age: 58
End: 2023-03-13
Payer: MEDICARE

## 2023-03-13 VITALS
BODY MASS INDEX: 35.87 KG/M2 | OXYGEN SATURATION: 99 % | RESPIRATION RATE: 16 BRPM | SYSTOLIC BLOOD PRESSURE: 118 MMHG | HEART RATE: 77 BPM | DIASTOLIC BLOOD PRESSURE: 78 MMHG | HEIGHT: 72 IN | TEMPERATURE: 97.8 F | WEIGHT: 264.8 LBS

## 2023-03-13 DIAGNOSIS — M48.00 SPINAL STENOSIS, UNSPECIFIED SPINAL REGION: ICD-10-CM

## 2023-03-13 DIAGNOSIS — M48.061 SPINAL STENOSIS OF LUMBAR REGION, UNSPECIFIED WHETHER NEUROGENIC CLAUDICATION PRESENT: ICD-10-CM

## 2023-03-13 DIAGNOSIS — Z98.890 S/P LUMBAR LAMINECTOMY: ICD-10-CM

## 2023-03-13 DIAGNOSIS — E11.42 DIABETIC POLYNEUROPATHY ASSOCIATED WITH TYPE 2 DIABETES MELLITUS (HCC): ICD-10-CM

## 2023-03-13 DIAGNOSIS — F43.10 PTSD (POST-TRAUMATIC STRESS DISORDER): ICD-10-CM

## 2023-03-13 DIAGNOSIS — I10 ESSENTIAL HYPERTENSION: ICD-10-CM

## 2023-03-13 DIAGNOSIS — Z79.4 TYPE 2 DIABETES MELLITUS WITH DIABETIC NEUROPATHY, WITH LONG-TERM CURRENT USE OF INSULIN (HCC): ICD-10-CM

## 2023-03-13 DIAGNOSIS — E66.01 OBESITY, MORBID (HCC): ICD-10-CM

## 2023-03-13 DIAGNOSIS — Z86.711 HISTORY OF PULMONARY EMBOLUS (PE): ICD-10-CM

## 2023-03-13 DIAGNOSIS — Z86.718 HISTORY OF DVT OF LOWER EXTREMITY: ICD-10-CM

## 2023-03-13 DIAGNOSIS — E11.40 TYPE 2 DIABETES MELLITUS WITH DIABETIC NEUROPATHY, WITH LONG-TERM CURRENT USE OF INSULIN (HCC): ICD-10-CM

## 2023-03-13 DIAGNOSIS — E78.2 MIXED HYPERLIPIDEMIA: ICD-10-CM

## 2023-03-13 DIAGNOSIS — Z95.828 S/P IVC FILTER: ICD-10-CM

## 2023-03-13 DIAGNOSIS — F33.2 SEVERE EPISODE OF RECURRENT MAJOR DEPRESSIVE DISORDER, WITHOUT PSYCHOTIC FEATURES (HCC): ICD-10-CM

## 2023-03-13 DIAGNOSIS — D68.59 HYPERCOAGULABLE STATE (HCC): ICD-10-CM

## 2023-03-13 DIAGNOSIS — Z74.09 MOBILITY IMPAIRED: Primary | ICD-10-CM

## 2023-03-13 PROCEDURE — 99214 OFFICE O/P EST MOD 30 MIN: CPT | Performed by: FAMILY MEDICINE

## 2023-03-13 PROCEDURE — 3046F HEMOGLOBIN A1C LEVEL >9.0%: CPT | Performed by: FAMILY MEDICINE

## 2023-03-13 PROCEDURE — G8417 CALC BMI ABV UP PARAM F/U: HCPCS | Performed by: FAMILY MEDICINE

## 2023-03-13 PROCEDURE — G9717 DOC PT DX DEP/BP F/U NT REQ: HCPCS | Performed by: FAMILY MEDICINE

## 2023-03-13 PROCEDURE — 3017F COLORECTAL CA SCREEN DOC REV: CPT | Performed by: FAMILY MEDICINE

## 2023-03-13 PROCEDURE — 3078F DIAST BP <80 MM HG: CPT | Performed by: FAMILY MEDICINE

## 2023-03-13 PROCEDURE — G8427 DOCREV CUR MEDS BY ELIG CLIN: HCPCS | Performed by: FAMILY MEDICINE

## 2023-03-13 PROCEDURE — 2022F DILAT RTA XM EVC RTNOPTHY: CPT | Performed by: FAMILY MEDICINE

## 2023-03-13 PROCEDURE — 3074F SYST BP LT 130 MM HG: CPT | Performed by: FAMILY MEDICINE

## 2023-03-13 RX ORDER — FLUTICASONE PROPIONATE AND SALMETEROL 250; 50 UG/1; UG/1
1 POWDER RESPIRATORY (INHALATION) 2 TIMES DAILY
Qty: 60 EACH | Refills: 3 | Status: SHIPPED | OUTPATIENT
Start: 2023-03-13

## 2023-03-13 RX ORDER — PREGABALIN 150 MG/1
150 CAPSULE ORAL 3 TIMES DAILY
Qty: 90 CAPSULE | Refills: 3 | Status: SHIPPED | OUTPATIENT
Start: 2023-03-13

## 2023-03-13 RX ORDER — FUROSEMIDE 20 MG/1
20 TABLET ORAL DAILY
Qty: 90 TABLET | Refills: 1 | Status: SHIPPED | OUTPATIENT
Start: 2023-03-13

## 2023-03-13 RX ORDER — METFORMIN HYDROCHLORIDE 500 MG/1
TABLET, EXTENDED RELEASE ORAL
Qty: 180 TABLET | Refills: 1 | Status: SHIPPED | OUTPATIENT
Start: 2023-03-13

## 2023-03-13 RX ORDER — DULAGLUTIDE 1.5 MG/.5ML
1.5 INJECTION, SOLUTION SUBCUTANEOUS
Qty: 6 ML | Refills: 1 | Status: SHIPPED | OUTPATIENT
Start: 2023-03-13

## 2023-03-13 RX ORDER — DICLOFENAC SODIUM 10 MG/G
GEL TOPICAL
Qty: 300 G | Refills: 1 | Status: SHIPPED | OUTPATIENT
Start: 2023-03-13

## 2023-03-13 RX ORDER — TIZANIDINE 4 MG/1
TABLET ORAL
Qty: 180 TABLET | Refills: 1 | Status: SHIPPED | OUTPATIENT
Start: 2023-03-13

## 2023-03-13 RX ORDER — PANTOPRAZOLE SODIUM 40 MG/1
40 TABLET, DELAYED RELEASE ORAL DAILY
Qty: 90 TABLET | Refills: 1 | Status: SHIPPED | OUTPATIENT
Start: 2023-03-13

## 2023-03-13 RX ORDER — HYDRALAZINE HYDROCHLORIDE 50 MG/1
50 TABLET, FILM COATED ORAL 2 TIMES DAILY
Qty: 180 TABLET | Refills: 1 | Status: SHIPPED | OUTPATIENT
Start: 2023-03-13

## 2023-03-13 RX ORDER — PEN NEEDLE, DIABETIC 30 GX3/16"
NEEDLE, DISPOSABLE MISCELLANEOUS
Qty: 1 EACH | Refills: 5 | Status: SHIPPED | OUTPATIENT
Start: 2023-03-13

## 2023-03-13 RX ORDER — TRAZODONE HYDROCHLORIDE 100 MG/1
100 TABLET ORAL
Qty: 90 TABLET | Refills: 1 | Status: SHIPPED | OUTPATIENT
Start: 2023-03-13

## 2023-03-13 RX ORDER — ASPIRIN 81 MG/1
81 TABLET ORAL DAILY
Qty: 90 TABLET | Refills: 1 | Status: SHIPPED | OUTPATIENT
Start: 2023-03-13

## 2023-03-13 RX ORDER — DULOXETIN HYDROCHLORIDE 60 MG/1
60 CAPSULE, DELAYED RELEASE ORAL 2 TIMES DAILY
Qty: 180 CAPSULE | Refills: 1 | Status: SHIPPED | OUTPATIENT
Start: 2023-03-13

## 2023-03-13 RX ORDER — AMLODIPINE BESYLATE 10 MG/1
10 TABLET ORAL DAILY
Qty: 90 TABLET | Refills: 1 | Status: SHIPPED | OUTPATIENT
Start: 2023-03-13

## 2023-03-13 RX ORDER — ATORVASTATIN CALCIUM 40 MG/1
40 TABLET, FILM COATED ORAL
Qty: 90 TABLET | Refills: 1 | Status: SHIPPED | OUTPATIENT
Start: 2023-03-13

## 2023-03-13 RX ORDER — PRAZOSIN HYDROCHLORIDE 2 MG/1
CAPSULE ORAL
Qty: 90 CAPSULE | Refills: 1 | Status: SHIPPED | OUTPATIENT
Start: 2023-03-13

## 2023-03-13 RX ORDER — LISINOPRIL 40 MG/1
40 TABLET ORAL DAILY
Qty: 90 TABLET | Refills: 1 | Status: SHIPPED | OUTPATIENT
Start: 2023-03-13

## 2023-03-13 RX ORDER — METHOCARBAMOL 750 MG/1
750 TABLET, FILM COATED ORAL 3 TIMES DAILY
COMMUNITY
Start: 2023-03-11

## 2023-03-13 RX ORDER — ALBUTEROL SULFATE 90 UG/1
POWDER, METERED RESPIRATORY (INHALATION)
Qty: 1 EACH | Refills: 1 | Status: SHIPPED | OUTPATIENT
Start: 2023-03-13

## 2023-03-13 NOTE — PROGRESS NOTES
Sudhir Giron. (: 1965) is a 56 y.o. male, established patient, here for evaluation of the following chief complaint(s): Other (Wheel chair assessment)       ASSESSMENT/PLAN:  Diagnoses and all orders for this visit:    1. Mobility impaired  2. Spinal stenosis of lumbar region, unspecified whether neurogenic claudication present  3. S/P lumbar laminectomy  4. Obesity, morbid ([facility]  5. Diabetic polyneuropathy associated with type 2 diabetes mellitus ([facility]  - ordering power mobility device today      Return in about 1 week (around 3/20/2023), or if symptoms worsen or fail to improve. Subjective:   Pt is a 56 y.o. male with PMH sig for asthma, chronic pain in setting of lumbar spinal stenosis s/ lumbar spinal surgery, right rotator cuff tear s/p repair, primary DJD in bilat knees s/p bilateral TKRs, obesity, DM2, CKD st 3a, SHIRA, GERD, Hep C treated, depression, and VTE (DVT & PE in 2019) who presents for better mobility assessment. Power Mobility exam:  He has frequent falls worsening over the past year and is now high risk for falls. In the emergency room for another fall last week while using his walking rollator. Currently, he using rollator d/t trouble with chronic pain due to lumbar spinal stenosis s/p 3 lumbar spine surgeries, diabetic neuropathy, and disequibrium. Using the rollator is very difficult as he cannot lift it. Cannot walk 1 block without a walker. If using a walker, can walk a little more than 1 block but does have to stop after 1/2 a block to sit down. Ongoing concerns with limited sensory perception. Meds listed below. Walking has become worse over the past 10 years and is especially worse after last surgery (has had 3 surgeries). His pace is very slow. Able to transition on his own but using assistive devices (walker to shower chair). He is shaky using a cane due to his disequilibrium.   Able to use a walker but unable to consistently use for long periods of time without concern for falls. Dealing with numbness in hands so unable to use a rollator/walker without risk for falls. Unable to consistently use a manual wheelchair due to the same issue of numbness ing hands and right rotator cuff tear s/p repair with ongoing pain. Able to do most ADLs- he does have trouble with getting to the bathroom and bathing. Has difficulty with dressing and grooming. Needs help with IADLs including cleaning, driving, cooking, and household chores. Pt is able to manage meds and money and talk on the phone. He has appropriate cognitive status and has appropriate UE function to operate a power operated vehicle.     ROS  Gen - no fever/chills  Resp - no dyspnea or cough  CV - no chest pain or BROWN  Rest per HPI    Past Medical History:   Diagnosis Date    Arthritis     2010    Asthma 1995    INHALER USE PRN    Chronic bilateral low back pain without sciatica 11/6/2017    Chronic hepatitis C without hepatic coma ([address] 11/6/2017    treated at [facility]    Chronic pain     lower back    Essential hypertension 11/6/2017    GERD (gastroesophageal reflux disease)     History of cardiac cath     Hypercholesterolemia     Mild episode of recurrent major depressive disorder ([address] 11/06/2012    Morbid obesity ([address]     PTSD (post-traumatic stress disorder)     Sleep apnea     pt stated was taken off cpap while on blood thinner    Stage 3 chronic kidney disease ([address] 11/06/2017    IMPROVED     Thromboembolus ([address]     1/3/19  DVT,PE while taking testosterone    Uncontrolled type 2 diabetes mellitus with peripheral neuropathy 11/06/2011     Past Surgical History:   Procedure Laterality Date    HX BACK SURGERY  04/10/2018    Fusion L4,L5    HX BACK SURGERY  05/14/2021    HX COLONOSCOPY  2013    CJW    HX HEART CATHETERIZATION  2017    NEG PER PATIENT    HX HERNIA REPAIR  4449    UMBILICAL    HX KNEE ARTHROSCOPY  1999    right knee    HX KNEE ARTHROSCOPY  2002    left knee    HX ROTATOR CUFF REPAIR Right 2016    HX UROLOGICAL  2015    Vasectomy      Current Outpatient Medications on File Prior to Visit   Medication Sig Dispense Refill    methocarbamoL (ROBAXIN) 750 mg tablet Take 750 mg by mouth three (3) times daily. DULoxetine (CYMBALTA) 60 mg capsule Take 1 Capsule by mouth two (2) times a day. Take 1 cap by mouth daily x 2 weeks and then increase to 1 cap twice daily 180 Capsule 0    Xarelto 20 mg tab tablet TAKE 1 TABLET BY MOUTH DAILY. SWITCHING FROM ELIQUIS TO XARELTO 90 Tablet 0    atorvastatin (LIPITOR) 40 mg tablet TAKE 1 TABLET BY MOUTH NIGHTLY 30 Tablet 10    prazosin (MINIPRESS) 2 mg capsule TAKE 1 CAPSULE BY MOUTH EVERYDAY AT BEDTIME 30 Capsule 0    traZODone (DESYREL) 100 mg tablet TAKE 1 TABLET BY MOUTH NIGHTLY 90 Tablet 0    diclofenac (VOLTAREN) 1 % gel APPLY 1 GRAM TO THE AFFECTED AREA 4 TIMES A DAY AS NEEDED FOR PAIN 630 g 0    Trulicity 1.5 VX/4.0 mL sub-q pen 0.5 ML BY SUBCUTANEOUS ROUTE EVERY SEVEN (7) DAYS. 6 mL 0    metFORMIN ER (GLUCOPHAGE XR) 500 mg tablet TAKE 2 TABLETS (1,000MG) BY MOUTH IN THE EVENING WITH DINNER 60 Tablet 10    aspirin delayed-release 81 mg tablet TAKE 1 TABLET BY MOUTH ONCE DAILY 30 Tablet 10    tiZANidine (ZANAFLEX) 4 mg tablet TAKE 1 TABLET BY MOUTH TWICE A DAY AS NEEDED 60 Tablet 0    doxycycline (VIBRA-TABS) 100 mg tablet Take 100 mg by mouth two (2) times a day. hydrALAZINE (APRESOLINE) 50 mg tablet Take 50 mg by mouth two (2) times a day. labetaloL (NORMODYNE) 200 mg tablet TAKE TWO TABLETS BY MOUTH TWICE DAILY      mupirocin (BACTROBAN) 2 % ointment APPLY TO WOUND 2 TIMES A DAY      furosemide (LASIX) 20 mg tablet Take 1 Tablet by mouth daily.  90 Tablet 1    ProAir RespiClick 90 mcg/actuation breath activated inhaler INHALE 2 PUFFS BY MOUTH EVERY 4 HOURS AS NEEDED FOR WHEEZE 1 Each 0    Jardiance 25 mg tablet TAKE 1 TABLET BY MOUTH EVERY DAY 90 Tablet 1    pantoprazole (PROTONIX) 40 mg tablet TAKE 1 TABLET BY MOUTH ONCE DAILY 30 Tablet 10 lisinopriL (PRINIVIL, ZESTRIL) 40 mg tablet TAKE 1 TABLET BY MOUTH ONCE DAILY 30 Tablet 10    amLODIPine (NORVASC) 10 mg tablet TAKE 1 TABLET BY MOUTH ONCE DAILY 30 Tablet 10    oxyCODONE IR (ROXICODONE) 5 mg immediate release tablet TAKE 1 TABLET BY MOUTH EVERY 8 HOURS      Insulin Needles, Disposable, (BD Ultra-Fine Short Pen Needle) 31 gauge x 5/16\" ndle USE WITH TRULICITY 1 Package 11    pregabalin (LYRICA) 150 mg capsule Take 1 Capsule by mouth three (3) times daily. Max Daily Amount: 450 mg. 90 Capsule 0    fluticasone propion-salmeteroL (Wixela Inhub) 250-50 mcg/dose diskus inhaler Take 1 Puff by inhalation two (2) times a day. 1 Inhaler 0    metoprolol succinate (TOPROL-XL) 50 mg XL tablet       Skin Protectant A-D, pet, lashae, oint ointment  (Patient not taking: Reported on 3/13/2023)       No current facility-administered medications on file prior to visit. Objective:     Blood pressure 118/78, pulse 77, temperature 97.8 °F (36.6 °C), temperature source Temporal, resp. rate 16, height 6' (1.829 m), weight 264 lb 12.8 oz (120.1 kg), SpO2 99 %. Physical Exam  General appearance - alert, well appearing, and in no distress  Eyes -sclera anicteric  Neck - supple, no significant adenopathy, no thyromegaly  Chest - clear to auscultation, no wheezes, rales or rhonchi, symmetric air entry  Heart - normal rate, regular rhythm, normal S1, S2, no murmurs, rubs, clicks or gallops  Neurological - alert, oriented, normal speech, no focal findings or movement disorder noted. LLE with hypoesthesia  Msk - Bilateral shoulders with restricted ROM over 90 degrees, surgical scar from right rotator cuff repair.   BIlat knees with surgical scars from, no effusion and FROM  Back - numerous surgical scars, using back brace, restricted extension, pain with rotation and lateral flexion  Strength - Bilat UE 5-/5, RLE 5/5, LLE 3+/5  Extr - no edema  Psych - normal mood and affect    On this date 03/13/2023 I have spent 30 minutes reviewing previous notes, test results and face to face with the patient discussing the diagnosis and importance of compliance with the treatment plan as well as documenting on the day of the visit. An electronic signature was used to authenticate this note.   -- Jovani Cronin MD

## 2023-03-15 LAB
ALBUMIN SERPL-MCNC: 4.8 G/DL (ref 3.8–4.9)
ALBUMIN/CREAT UR: 9 MG/G CREAT (ref 0–29)
ALBUMIN/GLOB SERPL: 1.9 {RATIO} (ref 1.2–2.2)
ALP SERPL-CCNC: 82 IU/L (ref 44–121)
ALT SERPL-CCNC: 33 IU/L (ref 0–44)
APPEARANCE UR: CLEAR
AST SERPL-CCNC: 24 IU/L (ref 0–40)
BILIRUB SERPL-MCNC: 0.3 MG/DL (ref 0–1.2)
BILIRUB UR QL STRIP: NEGATIVE
BUN SERPL-MCNC: 13 MG/DL (ref 6–24)
BUN/CREAT SERPL: 9 (ref 9–20)
CALCIUM SERPL-MCNC: 9.8 MG/DL (ref 8.7–10.2)
CHLORIDE SERPL-SCNC: 101 MMOL/L (ref 96–106)
CHOLEST SERPL-MCNC: 128 MG/DL (ref 100–199)
CO2 SERPL-SCNC: 24 MMOL/L (ref 20–29)
COLOR UR: YELLOW
CREAT SERPL-MCNC: 1.46 MG/DL (ref 0.76–1.27)
CREAT UR-MCNC: 269.6 MG/DL
EGFRCR SERPLBLD CKD-EPI 2021: 56 ML/MIN/1.73
ERYTHROCYTE [DISTWIDTH] IN BLOOD BY AUTOMATED COUNT: 13.3 % (ref 11.6–15.4)
EST. AVERAGE GLUCOSE BLD GHB EST-MCNC: 131 MG/DL
GLOBULIN SER CALC-MCNC: 2.5 G/DL (ref 1.5–4.5)
GLUCOSE SERPL-MCNC: 97 MG/DL (ref 70–99)
GLUCOSE UR QL STRIP: NEGATIVE
HBA1C MFR BLD: 6.2 % (ref 4.8–5.6)
HCT VFR BLD AUTO: 42.5 % (ref 37.5–51)
HDLC SERPL-MCNC: 33 MG/DL
HGB BLD-MCNC: 14.2 G/DL (ref 13–17.7)
HGB UR QL STRIP: NEGATIVE
KETONES UR QL STRIP: ABNORMAL
LDLC SERPL CALC-MCNC: 68 MG/DL (ref 0–99)
LEUKOCYTE ESTERASE UR QL STRIP: NEGATIVE
MCH RBC QN AUTO: 28.3 PG (ref 26.6–33)
MCHC RBC AUTO-ENTMCNC: 33.4 G/DL (ref 31.5–35.7)
MCV RBC AUTO: 85 FL (ref 79–97)
MICRO URNS: ABNORMAL
MICROALBUMIN UR-MCNC: 25.4 UG/ML
NITRITE UR QL STRIP: NEGATIVE
PH UR STRIP: 5.5 [PH] (ref 5–7.5)
PLATELET # BLD AUTO: 240 X10E3/UL (ref 150–450)
POTASSIUM SERPL-SCNC: 4.2 MMOL/L (ref 3.5–5.2)
PROT SERPL-MCNC: 7.3 G/DL (ref 6–8.5)
PROT UR QL STRIP: ABNORMAL
RBC # BLD AUTO: 5.01 X10E6/UL (ref 4.14–5.8)
REPORT: NORMAL
SODIUM SERPL-SCNC: 141 MMOL/L (ref 134–144)
SP GR UR STRIP: 1.03 (ref 1–1.03)
TRIGL SERPL-MCNC: 154 MG/DL (ref 0–149)
TSH SERPL DL<=0.005 MIU/L-ACNC: 2.19 UIU/ML (ref 0.45–4.5)
UROBILINOGEN UR STRIP-MCNC: 0.2 MG/DL (ref 0.2–1)
VLDLC SERPL CALC-MCNC: 27 MG/DL (ref 5–40)
WBC # BLD AUTO: 5.9 X10E3/UL (ref 3.4–10.8)

## 2023-03-16 ENCOUNTER — VIRTUAL VISIT (OUTPATIENT)
Dept: FAMILY MEDICINE CLINIC | Age: 58
End: 2023-03-16

## 2023-03-16 DIAGNOSIS — E11.42 DIABETIC POLYNEUROPATHY ASSOCIATED WITH TYPE 2 DIABETES MELLITUS (HCC): ICD-10-CM

## 2023-03-16 DIAGNOSIS — E78.2 MIXED HYPERLIPIDEMIA: ICD-10-CM

## 2023-03-16 DIAGNOSIS — D68.59 HYPERCOAGULABLE STATE (HCC): ICD-10-CM

## 2023-03-16 DIAGNOSIS — Z98.890 S/P LUMBAR LAMINECTOMY: ICD-10-CM

## 2023-03-16 DIAGNOSIS — Z95.828 S/P IVC FILTER: ICD-10-CM

## 2023-03-16 DIAGNOSIS — G89.29 CHRONIC BILATERAL LOW BACK PAIN WITHOUT SCIATICA: ICD-10-CM

## 2023-03-16 DIAGNOSIS — I10 ESSENTIAL HYPERTENSION: ICD-10-CM

## 2023-03-16 DIAGNOSIS — Z00.00 WELL ADULT EXAM: Primary | ICD-10-CM

## 2023-03-16 DIAGNOSIS — E11.21 TYPE 2 DIABETES WITH NEPHROPATHY (HCC): ICD-10-CM

## 2023-03-16 DIAGNOSIS — Z79.4 TYPE 2 DIABETES MELLITUS WITH DIABETIC NEUROPATHY, WITH LONG-TERM CURRENT USE OF INSULIN (HCC): ICD-10-CM

## 2023-03-16 DIAGNOSIS — M48.061 SPINAL STENOSIS OF LUMBAR REGION, UNSPECIFIED WHETHER NEUROGENIC CLAUDICATION PRESENT: ICD-10-CM

## 2023-03-16 DIAGNOSIS — M54.50 CHRONIC BILATERAL LOW BACK PAIN WITHOUT SCIATICA: ICD-10-CM

## 2023-03-16 DIAGNOSIS — Z86.718 HISTORY OF DVT OF LOWER EXTREMITY: ICD-10-CM

## 2023-03-16 DIAGNOSIS — Z86.711 HISTORY OF PULMONARY EMBOLUS (PE): ICD-10-CM

## 2023-03-16 DIAGNOSIS — E66.01 OBESITY, MORBID (HCC): ICD-10-CM

## 2023-03-16 DIAGNOSIS — E11.40 TYPE 2 DIABETES MELLITUS WITH DIABETIC NEUROPATHY, WITH LONG-TERM CURRENT USE OF INSULIN (HCC): ICD-10-CM

## 2023-03-16 RX ORDER — OXYCODONE HYDROCHLORIDE 10 MG/1
10 TABLET ORAL 2 TIMES DAILY
Qty: 30 TABLET | Refills: 0 | Status: SHIPPED | OUTPATIENT
Start: 2023-03-16 | End: 2023-03-31

## 2023-03-16 NOTE — PROGRESS NOTES
Chief Complaint   Patient presents with    Follow-up     ER Visit/ Discuss CT scan report    1. Have you been to the ER, urgent care clinic since your last visit? Hospitalized since your last visit? No    2. Have you seen or consulted any other health care providers outside of the [address] [place] [address] since your last visit? Include any pap smears or colon screening.  Yes Where: Ortho      Discuss pain medication

## 2023-03-16 NOTE — PROGRESS NOTES
Yahir Parham. is a 66 y.o. male who was seen by synchronous (real-time) audio-video technology on 3/16/2023 for Follow-up (ER Visit/ Discuss CT scan report)      Assessment/ Plan:   Diagnoses and all orders for this visit:    1. Well adult exam    2. Diabetic polyneuropathy associated with type 2 diabetes mellitus ([address]    3. Type 2 diabetes with nephropathy ([address]    4. Type 2 diabetes mellitus with diabetic neuropathy, with long-term current use of insulin ([address]    5. Essential hypertension    6. Mixed hyperlipidemia    7. Spinal stenosis of lumbar region, unspecified whether neurogenic claudication present  -     oxyCODONE IR (ROXICODONE) 10 mg tab immediate release tablet; Take 1 Tablet by mouth two (2) times a day for 15 days. Max Daily Amount: 20 mg.    8. S/P lumbar laminectomy  -     oxyCODONE IR (ROXICODONE) 10 mg tab immediate release tablet; Take 1 Tablet by mouth two (2) times a day for 15 days. Max Daily Amount: 20 mg.    9. Chronic bilateral low back pain without sciatica  -     oxyCODONE IR (ROXICODONE) 10 mg tab immediate release tablet; Take 1 Tablet by mouth two (2) times a day for 15 days. Max Daily Amount: 20 mg. 10. Hypercoagulable state ([facility]    11. History of DVT of lower extremity    12. History of pulmonary embolus (PE)    13. S/P IVC filter    14. Obesity, morbid ([facility]      2. Spinal stenosis, unspecified spinal region  3. S/P lumbar laminectomy  - ct working with Dr. Arabella Smith, refilling meds  -     diclofenac (VOLTAREN) 1 % gel; APPLY 1 GRAM TO THE AFFECTED AREA 4 TIMES A DAY AS NEEDED FOR PAIN  -     tiZANidine (ZANAFLEX) 4 mg tablet; TAKE 1 TABLET BY MOUTH TWICE DAILY AS NEEDED    4. Severe episode of recurrent major depressive disorder, without psychotic features ([facility]  5. PTSD (post-traumatic stress disorder)  - stable, ct on current meds  -     DULoxetine (CYMBALTA) 20 mg capsule;  Take 1 cap by mouth daily x 2 weeks and then increase to 2 caps daily  -     prazosin (MINIPRESS) 2 mg capsule; TAKE 1 CAPSULE BY MOUTH AT BEDTIME  -     traZODone (DESYREL) 100 mg tablet; Take 1 Tablet by mouth nightly. 6. Mixed hyperlipidemia - ct Lipitor  -     atorvastatin (LIPITOR) 40 mg tablet; Take 1 Tablet by mouth nightly.  -     HEMOGLOBIN A1C WITH EAG; Future  -     LIPID PANEL; Future  -     METABOLIC PANEL, COMPREHENSIVE; Future  -     TSH 3RD GENERATION; Future    7. Type 2 diabetes mellitus with diabetic neuropathy, with long-term current use of insulin (HCC) - rechecking labs, Trulicity, Metformin, and Jardiance  -     dulaglutide (Trulicity) 1.5 OY/7.5 mL sub-q pen; 0.5 mL by SubCUTAneous route every seven (7) days.  -     HEMOGLOBIN A1C WITH EAG; Future  -     LIPID PANEL; Future  -     METABOLIC PANEL, COMPREHENSIVE; Future  -     TSH 3RD GENERATION; Future  -     MICROALBUMIN, UR, RAND W/ MICROALB/CREAT RATIO; Future    8. Essential hypertension - to check in office at follow up appt  -     METABOLIC PANEL, COMPREHENSIVE; Future    9. Hypercoagulable state ([facility]  10. History of DVT of lower extremity  11. History of pulmonary embolus (PE)  12. S/P IVC filter  -     rivaroxaban (XARELTO) 20 mg tab tablet; Take 1 Tablet by mouth daily. Switching from Eliquis to Xarelto    13. Encounter for long-term (current) use of medications  -     HEMOGLOBIN A1C WITH EAG; Future  -     LIPID PANEL; Future  -     METABOLIC PANEL, COMPREHENSIVE; Future  -     TSH 3RD GENERATION; Future  -     CBC W/O DIFF; Future  -     URINALYSIS W/ RFLX MICROSCOPIC; Future  -     MICROALBUMIN, UR, RAND W/ MICROALB/CREAT RATIO; Future    Other orders  -     furosemide (LASIX) 20 mg tablet; Take 1 Tablet by mouth daily. I spent at least 30 minutes on this visit with this established patient.            Subjective:   Pt is a 66 y.o. male with PMH sig for asthma, chronic pain, obesity, DM2, CKD st 3a, SHIRA, GERD, Hep C treated, depression, and VTE (DVT & PE in 2019) who presents for routine follow up on chronic medical conditions. Will have surgery with Dr. Yony Hinojosa on 4/17/23. Does have some mild urinary and bowel incontinence    Has been following with Primary MD closer to his home but prefers to switch back. Needs refills today. Has been going to [facility] frequently over the past 6 months. Seeing Dr. Yony Hinojosa for back. Had surgery in 1/2022. Ct on Xarelto for hx of VTE. Has IVC filter in place. Depression and PTSD doing ok. Ct on Cymbalta, Prazosin, and Trazodone. Diabetes Mellitus:  Taking meds  Reports no polyuria or polydipsia, no chest pain, dyspnea or TIA's, last eye exam approximately < 1 yr ago. Not exercising or working on diet  Pt is a non smoker.     Lab Results   Component Value Date/Time    Hemoglobin A1c (POC) 7.7 11/14/2018 10:42 AM    Hemoglobin A1c (POC) 8.1 08/14/2018 04:30 PM    Hemoglobin A1c 6.2 (H) 03/13/2023 12:16 PM    Microalb/Creat ratio (ug/mg creat.) 9 03/13/2023 12:16 PM    LDL, calculated 68 03/13/2023 12:16 PM    LDL, calculated 65 05/20/2020 09:04 AM    Creatinine 1.46 (H) 03/13/2023 12:16 PM    Hemoglobin A1c, External 7.0 10/05/2022 12:00 AM      Lab Results   Component Value Date/Time    GFR est AA 76 09/04/2020 10:55 AM    GFR est non-AA 66 09/04/2020 10:55 AM      Lab Results   Component Value Date/Time    TSH 2.190 03/13/2023 12:16 PM         ROS  Gen - no fever/chills  Resp - no dyspnea or cough  CV - no chest pain or BROWN  Rest per HPI    Past Medical History:   Diagnosis Date    Arthritis     2010    Asthma 1995    INHALER USE PRN    Chronic bilateral low back pain without sciatica 11/6/2017    Chronic hepatitis C without hepatic coma ([facility] 11/6/2017    [facility]    Chronic pain     lower back    Essential hypertension 11/6/2017    GERD (gastroesophageal reflux disease)     History of cardiac cath     Hypercholesterolemia     Mild episode of recurrent major depressive disorder ([address] 11/06/2012    Morbid obesity ([facility] [address]     PTSD (post-traumatic stress disorder) Sleep apnea     pt stated was taken off cpap while on blood thinner    Stage 3 chronic kidney disease ([address] 11/06/2017    IMPROVED     Thromboembolus ([facility]     1/3/19  DVT,PE while taking testosterone    Uncontrolled type 2 diabetes mellitus with peripheral neuropathy 11/06/2011     Past Surgical History:   Procedure Laterality Date    HX BACK SURGERY  04/10/2018    Fusion L4,L5    HX BACK SURGERY  05/14/2021    HX COLONOSCOPY  2013    CJW    HX HEART CATHETERIZATION  2017    NEG PER PATIENT    HX HERNIA REPAIR  1340    UMBILICAL    HX KNEE ARTHROSCOPY  1999    right knee    HX KNEE ARTHROSCOPY  2002    left knee    HX ROTATOR CUFF REPAIR Right 2016    HX UROLOGICAL  2015    Vasectomy         Objective:     Patient-Reported Vitals 3/16/2023   Patient-Reported Weight 263   Patient-Reported Pulse 87   Patient-Reported Temperature 97.2   Patient-Reported Systolic  536   Patient-Reported Diastolic 88        Physical exam:  General appearance - alert, well appearing, and in no distress  Eyes -sclera anicteric, no discharge  HEENT- normocephalic, atraumatic, moist mucous membranes, no visualized neck mass  Chest -normal respiratory effort, no visualized signs of respiratory distress  Neurological - alert, awake, normal speech, no focal findings or movement disorder noted  Psych - normal mood and affect  Skin- no apparent lesions    We discussed the expected course, resolution and complications of the diagnosis(es) in detail. Medication risks, benefits, costs, interactions, and alternatives were discussed as indicated. I advised him to contact the office if his condition worsens, changes or fails to improve as anticipated. He expressed understanding with the diagnosis(es) and plan. Estefaniaarley Vivas., was evaluated through a synchronous (real-time) audio-video encounter. The patient (or guardian if applicable) is aware that this is a billable service, which includes applicable co-pays.  This Virtual Visit was conducted with patient's (and/or legal guardian's) consent. The visit was conducted pursuant to the emergency declaration under the [address] [place] [address] authority and the Local Matters Act. Patient identification was verified, and a caregiver was present when appropriate. The patient was located at: Home: [facility] [id number]  The provider was located at:  Facility (Appt Department): [facility]        Ingrid Londono MD

## 2023-03-17 RX ORDER — METOPROLOL SUCCINATE 50 MG/1
TABLET, EXTENDED RELEASE ORAL
Qty: 90 TABLET | Refills: 1 | Status: SHIPPED | OUTPATIENT
Start: 2023-03-17

## 2023-03-17 RX ORDER — DOXYCYCLINE HYCLATE 100 MG
TABLET ORAL
Qty: 60 TABLET | OUTPATIENT
Start: 2023-03-17

## 2023-03-17 RX ORDER — LABETALOL 200 MG/1
TABLET, FILM COATED ORAL
Qty: 360 TABLET | Refills: 1 | Status: SHIPPED | OUTPATIENT
Start: 2023-03-17

## 2023-03-30 DIAGNOSIS — M48.061 SPINAL STENOSIS OF LUMBAR REGION, UNSPECIFIED WHETHER NEUROGENIC CLAUDICATION PRESENT: ICD-10-CM

## 2023-03-30 DIAGNOSIS — G89.29 CHRONIC BILATERAL LOW BACK PAIN WITHOUT SCIATICA: ICD-10-CM

## 2023-03-30 DIAGNOSIS — Z98.890 S/P LUMBAR LAMINECTOMY: ICD-10-CM

## 2023-03-30 DIAGNOSIS — Z79.4 TYPE 2 DIABETES MELLITUS WITH DIABETIC NEUROPATHY, WITH LONG-TERM CURRENT USE OF INSULIN (HCC): ICD-10-CM

## 2023-03-30 DIAGNOSIS — M54.50 CHRONIC BILATERAL LOW BACK PAIN WITHOUT SCIATICA: ICD-10-CM

## 2023-03-30 DIAGNOSIS — M48.00 SPINAL STENOSIS, UNSPECIFIED SPINAL REGION: ICD-10-CM

## 2023-03-30 DIAGNOSIS — I10 ESSENTIAL HYPERTENSION: ICD-10-CM

## 2023-03-30 DIAGNOSIS — E11.40 TYPE 2 DIABETES MELLITUS WITH DIABETIC NEUROPATHY, WITH LONG-TERM CURRENT USE OF INSULIN (HCC): ICD-10-CM

## 2023-03-30 RX ORDER — OXYCODONE HYDROCHLORIDE 10 MG/1
10 TABLET ORAL 2 TIMES DAILY
Qty: 30 TABLET | Refills: 0 | Status: CANCELLED | OUTPATIENT
Start: 2023-03-30 | End: 2023-04-14

## 2023-03-30 RX ORDER — TIZANIDINE 4 MG/1
TABLET ORAL
Qty: 180 TABLET | Refills: 1 | Status: CANCELLED | OUTPATIENT
Start: 2023-03-30

## 2023-03-30 RX ORDER — DULAGLUTIDE 1.5 MG/.5ML
1.5 INJECTION, SOLUTION SUBCUTANEOUS
Qty: 6 ML | Refills: 1 | Status: CANCELLED | OUTPATIENT
Start: 2023-03-30

## 2023-03-30 NOTE — TELEPHONE ENCOUNTER
Trulicity was sent on 6/02/22 for an 84 d/s with 1 refill. Zanaflex was sent on 3/13/23 for #180 with 1 refill. Last Visit: 3/16/23 with MD Alondra Akers  Next Appointment: none  Previous Refill Encounter(s): 12/27/21 Jardiance #90 with 1 refill, 3/13/23 Voltaren #300g with 1 refill, 3/16/23 Judi #30    Requested Prescriptions     Pending Prescriptions Disp Refills    empagliflozin (Jardiance) 25 mg tablet 90 Tablet 1     Sig: Take 1 Tablet by mouth daily. diclofenac (VOLTAREN) 1 % gel 300 g 1     Sig: APPLY 1 GRAM TO THE AFFECTED AREA 4 TIMES A DAY AS NEEDED FOR PAIN    oxyCODONE IR (ROXICODONE) 10 mg tab immediate release tablet 30 Tablet 0     Sig: Take 1 Tablet by mouth two (2) times a day for 15 days. Max Daily Amount: 20 mg. For Pharmacy Admin Tracking Only    Program: Medication Refill  CPA in place:   Recommendation Provided To:    Intervention Detail: New Rx: 5, reason: Patient Preference  Intervention Accepted By:   Kristy Thao Closed?:   Time Spent (min): 5

## 2023-03-31 RX ORDER — DICLOFENAC SODIUM 10 MG/G
GEL TOPICAL
Qty: 300 G | Refills: 1 | OUTPATIENT
Start: 2023-03-31

## 2023-04-03 ENCOUNTER — TELEPHONE (OUTPATIENT)
Dept: FAMILY MEDICINE CLINIC | Age: 58
End: 2023-04-03

## 2023-04-03 NOTE — TELEPHONE ENCOUNTER
----- Message from Tucker Beasley sent at 3/31/2023 11:19 AM EDT -----  Subject: Message to Provider    QUESTIONS  Information for Provider? [facility] calling   to check on the status of the patient's appointment for his Wheelchair. Please call Mookie at [phone] ext [phone]  ---------------------------------------------------------------------------  --------------  Sheeba AUSTIN  [phone]; OK to leave message on voicemail  ---------------------------------------------------------------------------  --------------  SCRIPT ANSWERS  Relationship to Patient? Third Party  Third Party Type? Durable Medical Equipment? Representative Name?  Sandi Jurado

## 2023-04-25 DIAGNOSIS — M48.00 SPINAL STENOSIS, UNSPECIFIED SPINAL REGION: Primary | ICD-10-CM

## 2023-04-25 DIAGNOSIS — Z98.890 S/P LUMBAR LAMINECTOMY: ICD-10-CM

## 2023-05-01 RX ORDER — OXYCODONE HYDROCHLORIDE 10 MG/1
10 TABLET ORAL
Qty: 28 TABLET | Refills: 0 | OUTPATIENT
Start: 2023-05-01 | End: 2023-05-08

## 2023-05-19 ENCOUNTER — TELEPHONE (OUTPATIENT)
Age: 58
End: 2023-05-19

## 2023-05-19 ENCOUNTER — NURSE TRIAGE (OUTPATIENT)
Dept: OTHER | Facility: CLINIC | Age: 58
End: 2023-05-19

## 2023-05-19 NOTE — TELEPHONE ENCOUNTER
Reason for Disposition   Nursing judgment    Protocols used:  Information Only Call - No Triage-ADULT-[place]

## 2023-05-19 NOTE — TELEPHONE ENCOUNTER
Leatha from [facility] ([facility]) called. Patient needs to schedule a Hospital Follow up appt. Nancy Llanos He can be reached at [phone].

## 2023-05-19 NOTE — TELEPHONE ENCOUNTER
Claudia Martinez from [address] calling for [facility] surgery and having a lot pain that is not being controlled with pain medications available to him    Discharged 5/17 with no pain meds and had to go back to the ER    Unable to triage - Umu Cordova not with caller and is only a medication request for situation already evaluated    Future Appointments   Date Time Provider Rubén Cadena   6/8/2023 10:15 AM Quintin Szymanski MD [facility]B

## 2023-05-19 NOTE — TELEPHONE ENCOUNTER
----- Message from Siddharth Torres sent at 5/19/2023 12:11 PM EDT -----  Subject: Message to Provider    QUESTIONS  Information for Provider? Altagracia Michelle from [address] [place] [address] wants to make   sure the provider is following up with the patient physical and   occupational therapy home healthcare orders. ---------------------------------------------------------------------------  --------------  Sreedhar WALSH  [phone]; OK to leave message on voicemail  ---------------------------------------------------------------------------  --------------  SCRIPT ANSWERS  Relationship to Patient? Covered Entity  Covered Entity Type? Home Health Care? Representative Name?  Altagracia Michelle

## 2023-05-22 DIAGNOSIS — M48.00 SPINAL STENOSIS, UNSPECIFIED SPINAL REGION: Primary | ICD-10-CM

## 2023-05-22 DIAGNOSIS — Z98.890 S/P LUMBAR LAMINECTOMY: ICD-10-CM

## 2023-05-22 RX ORDER — OXYCODONE HYDROCHLORIDE 10 MG/1
10 TABLET ORAL EVERY 6 HOURS PRN
Qty: 28 TABLET | Refills: 0 | Status: SHIPPED | OUTPATIENT
Start: 2023-05-22 | End: 2023-05-29

## 2023-05-22 NOTE — TELEPHONE ENCOUNTER
Last appointment: 4/11/23  Next appointment: 6/8/23  Previous refill encounter(s): 4/12/23 #28    Requested Prescriptions     Pending Prescriptions Disp Refills    oxyCODONE HCl (OXY-IR) 10 MG immediate release tablet 28 tablet 0     Sig: Take 1 tablet by mouth every 6 hours as needed for Pain for up to 7 days. Intended supply: 30 days Max Daily Amount: 40 mg           For Pharmacy Admin Tracking Only    Program: Medication Refill  CPA in place:    Recommendation Provided To:    Intervention Detail: New Rx: 1, reason: Patient Preference  Intervention Accepted By:   Alex Gomez Closed?:    Time Spent (min): 5

## 2023-05-22 NOTE — TELEPHONE ENCOUNTER
----- Message from Eula Underwood sent at 5/22/2023 11:57 AM EDT -----  Subject: Refill Request    QUESTIONS  Name of Medication? oxyCODONE HCl (OXY-IR) 10 MG immediate release tablet  Patient-reported dosage and instructions? As Prescribed  How many days do you have left? 0  Preferred Pharmacy? [id number]  Pharmacy phone number (if available)? [phone]  ---------------------------------------------------------------------------  --------------  CALL BACK INFO  What is the best way for the office to contact you? OK to leave message on   voicemail  Preferred Call Back Phone Number? [phone]  ---------------------------------------------------------------------------  --------------  SCRIPT ANSWERS  Relationship to Patient?  Self

## 2023-05-24 ENCOUNTER — TELEPHONE (OUTPATIENT)
Age: 58
End: 2023-05-24

## 2023-05-24 DIAGNOSIS — M48.00 SPINAL STENOSIS, UNSPECIFIED SPINAL REGION: ICD-10-CM

## 2023-05-24 DIAGNOSIS — Z98.890 S/P LUMBAR LAMINECTOMY: ICD-10-CM

## 2023-05-26 DIAGNOSIS — M48.00 SPINAL STENOSIS, UNSPECIFIED SPINAL REGION: ICD-10-CM

## 2023-05-26 DIAGNOSIS — Z98.890 S/P LUMBAR LAMINECTOMY: ICD-10-CM

## 2023-05-26 RX ORDER — OXYCODONE HYDROCHLORIDE 10 MG/1
10 TABLET ORAL EVERY 6 HOURS PRN
Qty: 28 TABLET | Refills: 0 | Status: CANCELLED | OUTPATIENT
Start: 2023-05-26 | End: 2023-06-02

## 2023-05-30 ENCOUNTER — TELEPHONE (OUTPATIENT)
Age: 58
End: 2023-05-30

## 2023-05-30 RX ORDER — OXYCODONE HYDROCHLORIDE 10 MG/1
10 TABLET ORAL EVERY 6 HOURS PRN
Qty: 28 TABLET | Refills: 0 | Status: SHIPPED | OUTPATIENT
Start: 2023-05-30 | End: 2023-06-06

## 2023-05-31 RX ORDER — DULAGLUTIDE 1.5 MG/.5ML
1.5 INJECTION, SOLUTION SUBCUTANEOUS
Qty: 12 ADJUSTABLE DOSE PRE-FILLED PEN SYRINGE | Refills: 1 | Status: SHIPPED | OUTPATIENT
Start: 2023-05-31

## 2023-05-31 RX ORDER — ALBUTEROL SULFATE 90 UG/1
2 AEROSOL, METERED RESPIRATORY (INHALATION) EVERY 4 HOURS PRN
Qty: 3 EACH | Refills: 3 | Status: SHIPPED | OUTPATIENT
Start: 2023-05-31

## 2023-05-31 NOTE — TELEPHONE ENCOUNTER
Cee Riggins is requesting a 90 day supply    Last appointment: 4/11/23  Next appointment: 6/8/23    Requested Prescriptions     Pending Prescriptions Disp Refills    dulaglutide (TRULICITY) 1.5 RN/9.3HN SC injection 12 Adjustable Dose Pre-filled Pen Syringe 1     Sig: Inject 0.5 mLs into the skin every 7 days    albuterol sulfate HFA (PROVENTIL;VENTOLIN;PROAIR) 108 (90 Base) MCG/ACT inhaler 3 each 3     Sig: Inhale 2 puffs into the lungs every 4 hours as needed for Wheezing         For Pharmacy Admin Tracking Only    Program: Medication Refill  CPA in place:    Recommendation Provided To:    Intervention Detail: New Rx: 2, reason: Patient Preference  Intervention Accepted By:   Fidencio Hi Closed?:    Time Spent (min): 5

## 2023-06-11 RX ORDER — LISINOPRIL 40 MG/1
40 TABLET ORAL DAILY
Qty: 30 TABLET | Refills: 3 | Status: SHIPPED | OUTPATIENT
Start: 2023-06-11

## 2023-06-15 ENCOUNTER — TELEPHONE (OUTPATIENT)
Age: 58
End: 2023-06-15

## 2023-06-16 ENCOUNTER — TELEPHONE (OUTPATIENT)
Age: 58
End: 2023-06-16

## 2023-06-16 NOTE — TELEPHONE ENCOUNTER
Spoke with Alex Leon advised Dr Mary Kay Connelly unable to sign orders due to patient has not been seen for hospital follow-up. Patient was schedule but cancelled appointment.

## 2023-06-16 NOTE — TELEPHONE ENCOUNTER
[id number]     Pt cancelled appt today for PT - states he had an appt and is tired         Best number to reach her is [phone]

## 2023-06-16 NOTE — TELEPHONE ENCOUNTER
----- Message from Brien Ratliff sent at 6/16/2023  2:34 PM EDT -----  Subject: Message to Provider    QUESTIONS  Information for Provider? Aubrey is calling to follow up on a request for a   hospital bed with mattress with gel overlay. They have sent it 3 times   since 6/2 to the correct fax number but have not heard anything back and   no record in the patient's chart. Please call Cici Nino with any   questions  ---------------------------------------------------------------------------  --------------  Brandin Perkins INFO  [phone]; OK to leave message on voicemail  ---------------------------------------------------------------------------  --------------  SCRIPT ANSWERS  Relationship to Patient? Covered Entity  Covered Entity Type? Durable Medical Equipment? Representative Name?  Aubrey

## 2023-06-21 RX ORDER — FLUTICASONE PROPIONATE AND SALMETEROL 250; 50 UG/1; UG/1
POWDER RESPIRATORY (INHALATION)
Qty: 180 EACH | Refills: 3 | Status: SHIPPED | OUTPATIENT
Start: 2023-06-21

## 2023-06-21 NOTE — TELEPHONE ENCOUNTER
Last appointment: 4/11/23  Next appointment: 6/29/23  Previous refill encounter(s): 3/13/23 #60 with 3 refills    Requested Prescriptions     Pending Prescriptions Disp Refills    fluticasone-salmeterol (ADVAIR) 250-50 MCG/ACT AEPB diskus inhaler [Pharmacy Med Name: FLUTICASONE  250MCG 50MCG INHALANT POWDER  SALMET DIS] 180 each 3     Sig: USE 1 INHALATION BY MOUTH TWICE  DAILY         For Pharmacy Admin Tracking Only    Program: Medication Refill  CPA in place:    Recommendation Provided To:    Intervention Detail: New Rx: 1, reason: Patient Preference  Intervention Accepted By:   Bogdan Garcia Closed?:    Time Spent (min): 5

## 2023-06-23 DIAGNOSIS — M48.00 SPINAL STENOSIS, UNSPECIFIED SPINAL REGION: Primary | ICD-10-CM

## 2023-06-23 DIAGNOSIS — Z98.890 S/P LUMBAR LAMINECTOMY: ICD-10-CM

## 2023-06-23 NOTE — TELEPHONE ENCOUNTER
Patient states he is out and in pain    Last appointment: 4/11/23  Next appointment: 6/29/23  Previous refill encounter(s): 5/30/23 #28    Requested Prescriptions     Pending Prescriptions Disp Refills    oxyCODONE HCl (OXY-IR) 10 MG immediate release tablet 28 tablet 0     Sig: Take 1 tablet by mouth every 6 hours as needed for Pain for up to 7 days. Max Daily Amount: 40 mg         For Pharmacy Admin Tracking Only    Program: Medication Refill  CPA in place:    Recommendation Provided To:    Intervention Detail: New Rx: 1, reason: Patient Preference  Intervention Accepted By:   Lionel Henao Closed?:    Time Spent (min): 5

## 2023-06-24 RX ORDER — OXYCODONE HYDROCHLORIDE 10 MG/1
10 TABLET ORAL EVERY 6 HOURS PRN
Qty: 28 TABLET | Refills: 0 | OUTPATIENT
Start: 2023-06-24 | End: 2023-07-01

## 2023-06-26 DIAGNOSIS — M48.00 SPINAL STENOSIS, SITE UNSPECIFIED: ICD-10-CM

## 2023-06-26 DIAGNOSIS — Z98.890 OTHER SPECIFIED POSTPROCEDURAL STATES: ICD-10-CM

## 2023-06-26 RX ORDER — PREGABALIN 150 MG/1
CAPSULE ORAL
Qty: 90 CAPSULE | Refills: 3 | Status: SHIPPED | OUTPATIENT
Start: 2023-06-26 | End: 2023-07-26

## 2023-06-28 ENCOUNTER — TELEPHONE (OUTPATIENT)
Age: 58
End: 2023-06-28

## 2023-07-13 RX ORDER — FUROSEMIDE 20 MG/1
TABLET ORAL
Qty: 90 TABLET | Refills: 3 | Status: SHIPPED | OUTPATIENT
Start: 2023-07-13

## 2023-07-13 RX ORDER — METOPROLOL SUCCINATE 50 MG/1
TABLET, EXTENDED RELEASE ORAL
Qty: 90 TABLET | Refills: 3 | Status: SHIPPED | OUTPATIENT
Start: 2023-07-13

## 2023-07-13 NOTE — TELEPHONE ENCOUNTER
Kimo Cotterdow is requesting a 90 day supply  Previous Rx's were sent to [facility]    Last appointment: 4/11/23  Next appointment: no show 7/10/23    Requested Prescriptions     Pending Prescriptions Disp Refills    furosemide (LASIX) 20 MG tablet [Pharmacy Med Name: Furosemide 20 MG Oral Tablet] 90 tablet 3     Sig: TAKE 1 TABLET BY MOUTH DAILY    metoprolol succinate (TOPROL XL) 50 MG extended release tablet [Pharmacy Med Name: Metoprolol Succinate ER 50 MG Oral Tablet Extended Release 24 Hour] 90 tablet 3     Sig: TAKE 1 TABLET BY MOUTH DAILY         For Pharmacy Admin Tracking Only    Program: Medication Refill  CPA in place:    Recommendation Provided To:    Intervention Detail: New Rx: 2, reason: Patient Preference  Intervention Accepted By:   Venessa Malin Closed?:    Time Spent (min): 5

## 2023-07-14 ENCOUNTER — TELEPHONE (OUTPATIENT)
Age: 58
End: 2023-07-14

## 2023-07-14 NOTE — TELEPHONE ENCOUNTER
Spoke with Nathalie Ca advised patient has not hospital Follow-up and appointments were scheduled but patient did not come in.

## 2023-07-14 NOTE — TELEPHONE ENCOUNTER
Trenton Najera from [address] [place] [address] called, staitng they faxed 2 orders   one on 6/1/23  and 6/7/23    Did you receive fax and have they been signed    Please call [phone]

## 2023-07-19 RX ORDER — AMLODIPINE BESYLATE 10 MG/1
TABLET ORAL
Qty: 90 TABLET | Refills: 0 | Status: SHIPPED | OUTPATIENT
Start: 2023-07-19

## 2023-07-19 NOTE — TELEPHONE ENCOUNTER
Last appointment: 4/11/23  Next appointment: no show 7/10/23  Previous refill encounter(s): 3/13/23 #90 with 1 refill    Requested Prescriptions     Pending Prescriptions Disp Refills    amLODIPine (NORVASC) 10 MG tablet [Pharmacy Med Name: amLODIPine Besylate 10 MG Oral Tablet] 90 tablet 0     Sig: TAKE 1 TABLET BY MOUTH DAILY         For Pharmacy Admin Tracking Only    Program: Medication Refill  CPA in place:    Recommendation Provided To:    Intervention Detail: New Rx: 1, reason: Patient Preference  Intervention Accepted By:   Mandi Carney Closed?:    Time Spent (min): 5

## 2023-07-25 ENCOUNTER — OFFICE VISIT (OUTPATIENT)
Age: 58
End: 2023-07-25
Payer: MEDICARE

## 2023-07-25 VITALS
BODY MASS INDEX: 35.87 KG/M2 | WEIGHT: 264.8 LBS | HEIGHT: 72 IN | DIASTOLIC BLOOD PRESSURE: 70 MMHG | RESPIRATION RATE: 20 BRPM | SYSTOLIC BLOOD PRESSURE: 111 MMHG | TEMPERATURE: 97.2 F | OXYGEN SATURATION: 95 % | HEART RATE: 96 BPM

## 2023-07-25 DIAGNOSIS — E11.40 TYPE 2 DIABETES MELLITUS WITH DIABETIC NEUROPATHY, WITH LONG-TERM CURRENT USE OF INSULIN (HCC): ICD-10-CM

## 2023-07-25 DIAGNOSIS — Z74.1 NEED FOR ASSISTANCE WITH PERSONAL CARE: ICD-10-CM

## 2023-07-25 DIAGNOSIS — Z98.890 S/P LUMBAR LAMINECTOMY: ICD-10-CM

## 2023-07-25 DIAGNOSIS — Z86.718 HISTORY OF VENOUS THROMBOEMBOLISM: ICD-10-CM

## 2023-07-25 DIAGNOSIS — L98.491 SKIN ULCER, LIMITED TO BREAKDOWN OF SKIN (HCC): ICD-10-CM

## 2023-07-25 DIAGNOSIS — M48.00 SPINAL STENOSIS, UNSPECIFIED SPINAL REGION: Primary | ICD-10-CM

## 2023-07-25 DIAGNOSIS — Z79.4 TYPE 2 DIABETES MELLITUS WITH DIABETIC NEUROPATHY, WITH LONG-TERM CURRENT USE OF INSULIN (HCC): ICD-10-CM

## 2023-07-25 DIAGNOSIS — G89.4 CHRONIC PAIN SYNDROME: ICD-10-CM

## 2023-07-25 PROCEDURE — G8427 DOCREV CUR MEDS BY ELIG CLIN: HCPCS | Performed by: FAMILY MEDICINE

## 2023-07-25 PROCEDURE — 2022F DILAT RTA XM EVC RTNOPTHY: CPT | Performed by: FAMILY MEDICINE

## 2023-07-25 PROCEDURE — 99214 OFFICE O/P EST MOD 30 MIN: CPT | Performed by: FAMILY MEDICINE

## 2023-07-25 PROCEDURE — 3044F HG A1C LEVEL LT 7.0%: CPT | Performed by: FAMILY MEDICINE

## 2023-07-25 PROCEDURE — G8417 CALC BMI ABV UP PARAM F/U: HCPCS | Performed by: FAMILY MEDICINE

## 2023-07-25 PROCEDURE — 1036F TOBACCO NON-USER: CPT | Performed by: FAMILY MEDICINE

## 2023-07-25 PROCEDURE — 3074F SYST BP LT 130 MM HG: CPT | Performed by: FAMILY MEDICINE

## 2023-07-25 PROCEDURE — 3017F COLORECTAL CA SCREEN DOC REV: CPT | Performed by: FAMILY MEDICINE

## 2023-07-25 PROCEDURE — 3078F DIAST BP <80 MM HG: CPT | Performed by: FAMILY MEDICINE

## 2023-07-25 RX ORDER — OXYCODONE HYDROCHLORIDE 10 MG/1
TABLET ORAL
COMMUNITY
Start: 2023-07-21

## 2023-07-25 RX ORDER — ZOSTER VACCINE RECOMBINANT, ADJUVANTED 50 MCG/0.5
KIT INTRAMUSCULAR
COMMUNITY

## 2023-07-25 RX ORDER — ONDANSETRON 4 MG/1
4 TABLET, ORALLY DISINTEGRATING ORAL EVERY 8 HOURS PRN
COMMUNITY
Start: 2023-07-14

## 2023-07-25 RX ORDER — IPRATROPIUM BROMIDE AND ALBUTEROL SULFATE 2.5; .5 MG/3ML; MG/3ML
SOLUTION RESPIRATORY (INHALATION)
COMMUNITY
Start: 2023-06-29

## 2023-07-25 RX ORDER — PEN NEEDLE, DIABETIC 31 GX5/16"
NEEDLE, DISPOSABLE MISCELLANEOUS
COMMUNITY
Start: 2023-06-11

## 2023-07-25 RX ORDER — METFORMIN HYDROCHLORIDE 500 MG/1
TABLET, EXTENDED RELEASE ORAL
Qty: 180 TABLET | Refills: 3 | Status: SHIPPED | OUTPATIENT
Start: 2023-07-25

## 2023-07-25 RX ORDER — CYCLOBENZAPRINE HCL 10 MG
TABLET ORAL
COMMUNITY
Start: 2023-07-17

## 2023-07-25 RX ORDER — FLUTICASONE FUROATE, UMECLIDINIUM BROMIDE AND VILANTEROL TRIFENATATE 100; 62.5; 25 UG/1; UG/1; UG/1
POWDER RESPIRATORY (INHALATION)
COMMUNITY
Start: 2023-07-18

## 2023-07-25 SDOH — ECONOMIC STABILITY: INCOME INSECURITY: HOW HARD IS IT FOR YOU TO PAY FOR THE VERY BASICS LIKE FOOD, HOUSING, MEDICAL CARE, AND HEATING?: SOMEWHAT HARD

## 2023-07-25 SDOH — ECONOMIC STABILITY: FOOD INSECURITY: WITHIN THE PAST 12 MONTHS, YOU WORRIED THAT YOUR FOOD WOULD RUN OUT BEFORE YOU GOT MONEY TO BUY MORE.: SOMETIMES TRUE

## 2023-07-25 SDOH — ECONOMIC STABILITY: FOOD INSECURITY: WITHIN THE PAST 12 MONTHS, THE FOOD YOU BOUGHT JUST DIDN'T LAST AND YOU DIDN'T HAVE MONEY TO GET MORE.: SOMETIMES TRUE

## 2023-07-25 SDOH — ECONOMIC STABILITY: HOUSING INSECURITY
IN THE LAST 12 MONTHS, WAS THERE A TIME WHEN YOU DID NOT HAVE A STEADY PLACE TO SLEEP OR SLEPT IN A SHELTER (INCLUDING NOW)?: NO

## 2023-07-25 ASSESSMENT — PATIENT HEALTH QUESTIONNAIRE - PHQ9
SUM OF ALL RESPONSES TO PHQ QUESTIONS 1-9: 21
4. FEELING TIRED OR HAVING LITTLE ENERGY: 3
9. THOUGHTS THAT YOU WOULD BE BETTER OFF DEAD, OR OF HURTING YOURSELF: 0
1. LITTLE INTEREST OR PLEASURE IN DOING THINGS: 3
SUM OF ALL RESPONSES TO PHQ QUESTIONS 1-9: 21
6. FEELING BAD ABOUT YOURSELF - OR THAT YOU ARE A FAILURE OR HAVE LET YOURSELF OR YOUR FAMILY DOWN: 3
7. TROUBLE CONCENTRATING ON THINGS, SUCH AS READING THE NEWSPAPER OR WATCHING TELEVISION: 0
3. TROUBLE FALLING OR STAYING ASLEEP: 3
5. POOR APPETITE OR OVEREATING: 3
10. IF YOU CHECKED OFF ANY PROBLEMS, HOW DIFFICULT HAVE THESE PROBLEMS MADE IT FOR YOU TO DO YOUR WORK, TAKE CARE OF THINGS AT HOME, OR GET ALONG WITH OTHER PEOPLE: 2
8. MOVING OR SPEAKING SO SLOWLY THAT OTHER PEOPLE COULD HAVE NOTICED. OR THE OPPOSITE, BEING SO FIGETY OR RESTLESS THAT YOU HAVE BEEN MOVING AROUND A LOT MORE THAN USUAL: 3
2. FEELING DOWN, DEPRESSED OR HOPELESS: 3
SUM OF ALL RESPONSES TO PHQ9 QUESTIONS 1 & 2: 6

## 2023-07-25 ASSESSMENT — COLUMBIA-SUICIDE SEVERITY RATING SCALE - C-SSRS
1. WITHIN THE PAST MONTH, HAVE YOU WISHED YOU WERE DEAD OR WISHED YOU COULD GO TO SLEEP AND NOT WAKE UP?: NO
2. HAVE YOU ACTUALLY HAD ANY THOUGHTS OF KILLING YOURSELF?: NO
6. HAVE YOU EVER DONE ANYTHING, STARTED TO DO ANYTHING, OR PREPARED TO DO ANYTHING TO END YOUR LIFE?: NO

## 2023-07-25 NOTE — TELEPHONE ENCOUNTER
Last appointment: today  Next appointment: none  Previous refill encounter(s): 3/13/23 #180 with 1 refill    Requested Prescriptions     Pending Prescriptions Disp Refills    metFORMIN (GLUCOPHAGE-XR) 500 MG extended release tablet [Pharmacy Med Name: metFORMIN HCl  MG Oral Tablet Extended Release 24 Hour] 180 tablet 3     Sig: TAKE 2 TABLETS BY MOUTH IN THE  [address] Tracking Only    Program: Medication Refill  CPA in place:    Recommendation Provided To:    Intervention Detail: New Rx: 1, reason: Patient Preference  Intervention Accepted By:   Ilya Mayers Closed?:    Time Spent (min): 5

## 2023-08-10 DIAGNOSIS — M48.00 SPINAL STENOSIS, SITE UNSPECIFIED: ICD-10-CM

## 2023-08-10 DIAGNOSIS — F43.10 POST-TRAUMATIC STRESS DISORDER, UNSPECIFIED: ICD-10-CM

## 2023-08-10 DIAGNOSIS — Z98.890 OTHER SPECIFIED POSTPROCEDURAL STATES: ICD-10-CM

## 2023-08-11 RX ORDER — HYDRALAZINE HYDROCHLORIDE 50 MG/1
TABLET, FILM COATED ORAL
Qty: 180 TABLET | Refills: 1 | Status: SHIPPED | OUTPATIENT
Start: 2023-08-11

## 2023-08-11 RX ORDER — PRAZOSIN HYDROCHLORIDE 2 MG/1
CAPSULE ORAL
Qty: 90 CAPSULE | Refills: 1 | Status: SHIPPED | OUTPATIENT
Start: 2023-08-11

## 2023-08-11 RX ORDER — TIZANIDINE 4 MG/1
TABLET ORAL
Qty: 180 TABLET | Refills: 1 | Status: SHIPPED | OUTPATIENT
Start: 2023-08-11

## 2023-08-22 ENCOUNTER — TELEPHONE (OUTPATIENT)
Age: 58
End: 2023-08-22

## 2023-08-22 NOTE — TELEPHONE ENCOUNTER
Ricardo Page [phone] [facility] wants to know if you received the notes faxed [address] [phone]  [birth year]

## 2023-08-23 ENCOUNTER — TELEMEDICINE (OUTPATIENT)
Age: 58
End: 2023-08-23
Payer: MEDICARE

## 2023-08-23 DIAGNOSIS — F19.11 HISTORY OF DRUG ABUSE (HCC): ICD-10-CM

## 2023-08-23 DIAGNOSIS — M48.061 SPINAL STENOSIS OF LUMBAR REGION, UNSPECIFIED WHETHER NEUROGENIC CLAUDICATION PRESENT: Primary | ICD-10-CM

## 2023-08-23 DIAGNOSIS — Z98.890 S/P LUMBAR LAMINECTOMY: ICD-10-CM

## 2023-08-23 DIAGNOSIS — G89.4 CHRONIC PAIN SYNDROME: ICD-10-CM

## 2023-08-23 PROCEDURE — 99214 OFFICE O/P EST MOD 30 MIN: CPT | Performed by: FAMILY MEDICINE

## 2023-08-23 PROCEDURE — G8427 DOCREV CUR MEDS BY ELIG CLIN: HCPCS | Performed by: FAMILY MEDICINE

## 2023-08-23 PROCEDURE — 3017F COLORECTAL CA SCREEN DOC REV: CPT | Performed by: FAMILY MEDICINE

## 2023-08-23 RX ORDER — OXYCODONE HYDROCHLORIDE 10 MG/1
10 TABLET ORAL EVERY 8 HOURS PRN
Qty: 15 TABLET | Refills: 0 | Status: SHIPPED | OUTPATIENT
Start: 2023-08-23 | End: 2023-08-30

## 2023-08-23 RX ORDER — LABETALOL 200 MG/1
TABLET, FILM COATED ORAL
Qty: 360 TABLET | Refills: 1 | Status: SHIPPED | OUTPATIENT
Start: 2023-08-23

## 2023-08-23 RX ORDER — CYCLOBENZAPRINE HCL 10 MG
10 TABLET ORAL
Qty: 30 TABLET | Refills: 1 | Status: SHIPPED | OUTPATIENT
Start: 2023-08-23

## 2023-08-23 RX ORDER — ALBUTEROL SULFATE 2.5 MG/3ML
SOLUTION RESPIRATORY (INHALATION)
COMMUNITY
Start: 2023-08-10

## 2023-08-23 NOTE — PROGRESS NOTES
Chief Complaint   Patient presents with    Back Pain     Discuss pain management /Surgery cancelled for today / Urine drug screen positive for Cocaine    1. Have you been to the ER, urgent care clinic since your last visit? Hospitalized since your last visit? Yes  ER     2. Have you seen or consulted any other health care providers outside of the [address] [place] [address] Avenue since your last visit? Include any pap smears or colon screening.  Yes Where: Ortho      Seen @ [id number] [facility] and [facility]
oxycodone 10 mg 4 times daily PRN. He did see Dr. Brigido Cervantes in the past for pain management. Does have a past history of drug use. Last saw Dr. Brigido Cervantes in 10/2019 and was getting hydrocodone/APAP. His chart was flagged because he was getting pain medication and also going to a methadone clinic (of note, the methadone was started while in [place] during a rehab stay and pt weaned himself off after not liking how he felt). Currently, he is on Oxycodone. ROS  Gen - no fever/chills  Resp - no dyspnea or cough  CV - no chest pain or REEVES  Rest per HPI    Past Medical History:   Diagnosis Date    Arthritis     2010    Asthma 1995    INHALER USE PRN    Chronic bilateral low back pain without sciatica 11/6/2017    Chronic hepatitis C without hepatic coma ([address]) 11/6/2017    treated at [facility]    Chronic pain     lower back    Essential hypertension 11/6/2017    GERD (gastroesophageal reflux disease)     History of cardiac cath     Hypercholesterolemia     Mild episode of recurrent major depressive disorder ([address]) 11/06/2012    Morbid obesity ([address])     PTSD (post-traumatic stress disorder)     Sleep apnea     pt stated was taken off cpap while on blood thinner    Stage 3 chronic kidney disease ([address]) 11/06/2017    IMPROVED     Thromboembolus ([address])     1/3/19  DVT,PE while taking testosterone    Uncontrolled type 2 diabetes mellitus with peripheral neuropathy 11/06/2011     Past Surgical History:   Procedure Laterality Date    BACK SURGERY  04/10/2018    Fusion L4,L5    BACK SURGERY  05/14/2021    CARDIAC CATHETERIZATION  2017    NEG PER PATIENT    COLONOSCOPY  2013    [address] [place]    UMBILICAL    KNEE ARTHROSCOPY  2002    left knee    KNEE ARTHROSCOPY  1999    right knee    LUMBAR FUSION  05/16/2023    L3    ROTATOR CUFF REPAIR Right 2016    UROLOGICAL SURGERY  2015    Vasectomy         Objective:     No flowsheet data found.      Physical exam:  General appearance - alert, well appearing, and in no distress  Eyes -sclera

## 2023-08-23 NOTE — TELEPHONE ENCOUNTER
Last appointment: 7/25/23  Next appointment: today  Previous refill encounter(s): 3/17/23 #360 with 1 refill    Requested Prescriptions     Pending Prescriptions Disp Refills    labetalol (NORMODYNE) 200 MG tablet [Pharmacy Med Name: LABETALOL  MG TABLET] 360 tablet 1     Sig: TAKE 2 TABLETS BY MOUTH TWICE A DAY         For Pharmacy Admin Tracking Only    Program: Medication Refill  CPA in place:    Recommendation Provided To:    Intervention Detail: New Rx: 1, reason: Patient Preference  Intervention Accepted By:   Laura Keita Closed?:    Time Spent (min): 5

## 2023-08-29 RX ORDER — PANTOPRAZOLE SODIUM 40 MG/1
TABLET, DELAYED RELEASE ORAL
Qty: 90 TABLET | Refills: 1 | Status: SHIPPED | OUTPATIENT
Start: 2023-08-29

## 2023-08-29 NOTE — TELEPHONE ENCOUNTER
Last appointment: 8/23/23  Next appointment: 9/20/23  Previous refill encounter(s): 3/13/23 #90 with 1 refill    Requested Prescriptions     Pending Prescriptions Disp Refills    pantoprazole (PROTONIX) 40 MG tablet [Pharmacy Med Name: PANTOPRAZOLE SOD DR 40 MG TAB] 90 tablet 1     Sig: TAKE 1 TABLET BY MOUTH EVERY DAY         For Pharmacy Admin Tracking Only    Program: Medication Refill  CPA in place:    Recommendation Provided To:    Intervention Detail: New Rx: 1, reason: Patient Preference  Intervention Accepted By:   Adán Russell Closed?:    Time Spent (min): 5

## 2023-09-06 ENCOUNTER — TELEPHONE (OUTPATIENT)
Age: 58
End: 2023-09-06

## 2023-09-06 NOTE — TELEPHONE ENCOUNTER
----- Message from Ivanna Moreno sent at 8/21/2023  4:09 PM EDT -----  Subject: Message to Provider    QUESTIONS  Information for Provider? pt is calling because he talked to his surgeon   and they are suggesting that he try methadone to try to prevent surgery. please advise if that would be possible.   ---------------------------------------------------------------------------  --------------  Shilo Izquierdo INFO  [phone]; OK to leave message on voicemail  ---------------------------------------------------------------------------  --------------  SCRIPT ANSWERS  Relationship to Patient?  Self

## 2023-09-06 NOTE — TELEPHONE ENCOUNTER
----- Message from [address] sent at 8/21/2023 12:21 PM EDT -----  Subject: Message to Provider    QUESTIONS  Information for Provider? patient came pos. on cocain and he is asking if   he can be prescribed something for the pain so he can have his back   surgery please call he said he cant wait until his VV on the 23rd   ---------------------------------------------------------------------------  --------------  Aditi LOPEZ  [phone]; OK to leave message on voicemail  ---------------------------------------------------------------------------  --------------  SCRIPT ANSWERS  Relationship to Patient?  Self

## 2023-09-20 RX ORDER — DULOXETIN HYDROCHLORIDE 60 MG/1
60 CAPSULE, DELAYED RELEASE ORAL 2 TIMES DAILY
Qty: 200 CAPSULE | Refills: 3 | Status: SHIPPED | OUTPATIENT
Start: 2023-09-20

## 2023-09-20 NOTE — TELEPHONE ENCOUNTER
Pharmacy is requesting a 100 d/s with refills enough to last a year. Last appointment: 8/23/23  Next appointment: today    Requested Prescriptions     Pending Prescriptions Disp Refills    DULoxetine (CYMBALTA) 60 MG extended release capsule [Pharmacy Med Name: DULoxetine HCl 60 MG Oral Capsule Delayed Release Particles] 200 capsule 3     Sig: TAKE 1 CAPSULE BY MOUTH TWICE  DAILY         For Pharmacy Admin Tracking Only    Program: Medication Refill  CPA in place:    Recommendation Provided To:    Intervention Detail: New Rx: 1, reason: Patient Preference  Intervention Accepted By:   Laura Keita Closed?:    Time Spent (min): 5

## 2023-09-22 ENCOUNTER — TELEPHONE (OUTPATIENT)
Age: 58
End: 2023-09-22

## 2023-09-22 NOTE — TELEPHONE ENCOUNTER
Darrell Spencer from [address] [place] [address]  called, stating they faxed 2 orders for patient on   6/7/23 8/24/23 9/18/23    She only has 2 days left for her to be able to file the claims      Please sign asap    [phone]

## 2023-09-29 ENCOUNTER — TELEPHONE (OUTPATIENT)
Age: 58
End: 2023-09-29

## 2023-09-29 DIAGNOSIS — F43.10 POST-TRAUMATIC STRESS DISORDER, UNSPECIFIED: ICD-10-CM

## 2023-09-29 NOTE — TELEPHONE ENCOUNTER
Returned call and number states unable to complete call . Attempts to call mobile number unsuccessful.

## 2023-10-01 DIAGNOSIS — L98.491 SKIN ULCER, LIMITED TO BREAKDOWN OF SKIN (HCC): ICD-10-CM

## 2023-10-02 RX ORDER — PRAZOSIN HYDROCHLORIDE 2 MG/1
2 CAPSULE ORAL
Qty: 90 CAPSULE | Refills: 0 | Status: SHIPPED | OUTPATIENT
Start: 2023-10-02

## 2023-10-02 RX ORDER — FUROSEMIDE 20 MG/1
20 TABLET ORAL DAILY
Qty: 90 TABLET | Refills: 0 | Status: SHIPPED | OUTPATIENT
Start: 2023-10-02

## 2023-10-02 RX ORDER — MIRTAZAPINE 7.5 MG/1
7.5 TABLET, FILM COATED ORAL
Qty: 90 TABLET | Refills: 0 | Status: SHIPPED | OUTPATIENT
Start: 2023-10-02

## 2023-10-02 RX ORDER — LISINOPRIL 40 MG/1
40 TABLET ORAL DAILY
Qty: 90 TABLET | Refills: 0 | Status: SHIPPED | OUTPATIENT
Start: 2023-10-02

## 2023-10-02 RX ORDER — AMLODIPINE BESYLATE 10 MG/1
10 TABLET ORAL DAILY
Qty: 90 TABLET | Refills: 10 | Status: SHIPPED | OUTPATIENT
Start: 2023-10-02

## 2023-10-02 RX ORDER — PANTOPRAZOLE SODIUM 40 MG/1
40 TABLET, DELAYED RELEASE ORAL DAILY
Qty: 90 TABLET | Refills: 0 | Status: SHIPPED | OUTPATIENT
Start: 2023-10-02

## 2023-10-02 RX ORDER — TIZANIDINE 4 MG/1
4 TABLET ORAL 2 TIMES DAILY PRN
Qty: 180 TABLET | Refills: 0 | Status: SHIPPED | OUTPATIENT
Start: 2023-10-02

## 2023-10-02 NOTE — TELEPHONE ENCOUNTER
New mailorder, [employer], is requesting a refill. Previous Rx was sent to [facility]. Last appointment: 8/23/23  Next appointment: no show 9/20/23    Requested Prescriptions     Pending Prescriptions Disp Refills    furosemide (LASIX) 20 MG tablet [Pharmacy Med Name: FUROSEMIDE 20MG TABLET] 90 tablet 0     Sig: TAKE 1 TABLET BY MOUTH ONCE DAILY         For Pharmacy Admin Tracking Only    Program: Medication Refill  CPA in place:    Recommendation Provided To:    Intervention Detail: New Rx: 1, reason: Patient Preference  Intervention Accepted By:   Kaitlynn Naqvi Closed?:    Time Spent (min): 5

## 2023-10-02 NOTE — TELEPHONE ENCOUNTER
I show Zanaflex was d/c on 8/23/23 citing \"therapy complete\". Wilner Tabares is requesting a 90 day supply  Previous Rx's were sent to [facility].    Last appointment: 8/23/23  Next appointment: no show 9/20/23    Requested Prescriptions     Pending Prescriptions Disp Refills    amLODIPine (NORVASC) 10 MG tablet [Pharmacy Med Name: AMLODIPINE 10 MG TAB] 90 tablet 10     Sig: TAKE 1 TABLET BY MOUTH ONCE DAILY    insulin detemir (LEVEMIR FLEXTOUCH) 100 UNIT/ML injection pen 105 mL 0     Sig: Inject 60 Units into the skin 2 times daily    lisinopril (PRINIVIL;ZESTRIL) 40 MG tablet [Pharmacy Med Name: LISINOPRIL 40 MG TABLET] 90 tablet 0     Sig: TAKE 1 TABLET BY MOUTH ONCE DAILY    mirtazapine (REMERON) 7.5 MG tablet [Pharmacy Med Name: MIRTAZAPINE 7.5 MG TABLET] 90 tablet 0     Sig: TAKE 1 TABLET BY MOUTH AT NIGHT    pantoprazole (PROTONIX) 40 MG tablet [Pharmacy Med Name: PANTOPRAZOLE SOD DR 40 MG TAB] 90 tablet 0     Sig: TAKE 1 TABLET BY MOUTH ONCE DAILY    prazosin (MINIPRESS) 2 MG capsule [Pharmacy Med Name: PRAZOSIN 2 MG CAPSULE] 90 capsule 0     Sig: TAKE 1 CAPSULE BY MOUTH AT BEDTIME    tiZANidine (ZANAFLEX) 4 MG tablet 180 tablet 0     Sig: Take 1 tablet by mouth 2 times daily as needed (PRN)         For Pharmacy Admin Tracking Only    Program: Medication Refill  CPA in place:    Recommendation Provided To:    Intervention Detail: New Rx: 7, reason: Patient Preference  Intervention Accepted By:   Mancel Seats Closed?:    Time Spent (min): 5

## 2023-10-03 NOTE — TELEPHONE ENCOUNTER
Last appointment: 8/23/23  Next appointment: none  Previous refill encounter(s): 7/25/23 #30    Requested Prescriptions     Pending Prescriptions Disp Refills    collagenase (SANTYL) 250 UNIT/GM ointment [Pharmacy Med Name: Anu Antonio 30 g 0     Sig: APPLY TO [address] Tracking Only    Program: Medication Refill  CPA in place:    Recommendation Provided To:    Intervention Detail: New Rx: 1, reason: Patient Preference  Intervention Accepted By:   Megha Myers Closed?:    Time Spent (min): 5

## 2023-10-05 RX ORDER — INSULIN DETEMIR 100 [IU]/ML
INJECTION, SOLUTION SUBCUTANEOUS
Qty: 45 ML | Refills: 10 | OUTPATIENT
Start: 2023-10-05

## 2023-10-05 NOTE — TELEPHONE ENCOUNTER
Levemir was sent on 10/2/23      For Pharmacy Admin Tracking Only    Program: Medication Refill  CPA in place:    Recommendation Provided To:    Intervention Detail: Discontinued Rx: 1, reason: Duplicate Therapy  Intervention Accepted By:   Arvind Burrows Closed?:    Time Spent (min): 5

## 2023-10-06 RX ORDER — PANTOPRAZOLE SODIUM 40 MG/1
40 TABLET, DELAYED RELEASE ORAL DAILY
Qty: 30 TABLET | Refills: 10 | OUTPATIENT
Start: 2023-10-06

## 2023-10-06 RX ORDER — MIRTAZAPINE 7.5 MG/1
7.5 TABLET, FILM COATED ORAL
Qty: 30 TABLET | Refills: 10 | OUTPATIENT
Start: 2023-10-06

## 2023-10-06 RX ORDER — LISINOPRIL 40 MG/1
40 TABLET ORAL DAILY
Qty: 30 TABLET | Refills: 10 | OUTPATIENT
Start: 2023-10-06

## 2023-10-06 NOTE — TELEPHONE ENCOUNTER
Duplicate      For Pharmacy Admin Tracking Only    Program: Medication Refill  CPA in place:    Recommendation Provided To:    Intervention Detail: Discontinued Rx: 3, reason: Duplicate Therapy  Intervention Accepted By:   Lionel Henao Closed?:    Time Spent (min): 5

## 2023-10-11 ENCOUNTER — CLINICAL DOCUMENTATION (OUTPATIENT)
Age: 58
End: 2023-10-11

## 2023-10-11 RX ORDER — AMLODIPINE BESYLATE 10 MG/1
10 TABLET ORAL DAILY
Qty: 90 TABLET | Refills: 0 | Status: SHIPPED | OUTPATIENT
Start: 2023-10-11

## 2023-10-11 NOTE — TELEPHONE ENCOUNTER
New mailorder, [facility], is requesting a new prescription. Last appointment: 8/23/23  Next appointment: none    Requested Prescriptions     Pending Prescriptions Disp Refills    amLODIPine (NORVASC) 10 MG tablet 90 tablet 0     Sig: Take 1 tablet by mouth daily         For Pharmacy Admin Tracking Only    Program: Medication Refill  CPA in place:    Recommendation Provided To:    Intervention Detail: New Rx: 1, reason: Patient Preference and Scheduled Appointment  Intervention Accepted By:   Luz Campbell Closed?:    Time Spent (min): 5 Benzoyl Peroxide Pregnancy And Lactation Text: This medication is Pregnancy Category C. It is unknown if benzoyl peroxide is excreted in breast milk.

## 2023-10-26 ENCOUNTER — TELEPHONE (OUTPATIENT)
Age: 58
End: 2023-10-26

## 2023-10-26 NOTE — TELEPHONE ENCOUNTER
----- Message from [facility] sent at 9/26/2023 11:15 AM EDT -----  Subject: Message to Provider    QUESTIONS  Information for Provider? Adalgisa flores from [employer] wanted the   provider to be aware that the patient was recently hospitalized. Please   advise.   ---------------------------------------------------------------------------  --------------  Eri January GXRG  [phone]; OK to leave message on voicemail  ---------------------------------------------------------------------------  --------------  SCRIPT ANSWERS  Relationship to Patient? Covered Entity  Covered Entity Type? Health Insurance?   Representative Name? Nina Keith

## 2023-11-06 DIAGNOSIS — F43.10 POST-TRAUMATIC STRESS DISORDER, UNSPECIFIED: ICD-10-CM

## 2023-11-06 RX ORDER — LISINOPRIL 40 MG/1
40 TABLET ORAL DAILY
Qty: 90 TABLET | Refills: 0 | Status: CANCELLED | OUTPATIENT
Start: 2023-11-06

## 2023-11-06 RX ORDER — MIRTAZAPINE 7.5 MG/1
7.5 TABLET, FILM COATED ORAL
Qty: 90 TABLET | Refills: 0 | Status: CANCELLED | OUTPATIENT
Start: 2023-11-06

## 2023-11-06 RX ORDER — PRAZOSIN HYDROCHLORIDE 2 MG/1
2 CAPSULE ORAL
Qty: 90 CAPSULE | Refills: 0 | OUTPATIENT
Start: 2023-11-06

## 2023-11-06 RX ORDER — PANTOPRAZOLE SODIUM 40 MG/1
40 TABLET, DELAYED RELEASE ORAL DAILY
Qty: 90 TABLET | Refills: 0 | Status: CANCELLED | OUTPATIENT
Start: 2023-11-06

## 2023-11-06 RX ORDER — TIZANIDINE 4 MG/1
4 TABLET ORAL 2 TIMES DAILY PRN
Qty: 180 TABLET | Refills: 0 | OUTPATIENT
Start: 2023-11-06

## 2023-11-06 NOTE — TELEPHONE ENCOUNTER
Zanaflex & Remeron were sent on 10/2/23 for a 90 d/s        For Pharmacy Admin Tracking Only    Program: Medication Refill  CPA in place:    Recommendation Provided To:   Intervention Detail: New Rx: 2, reason: Patient Preference and Scheduled Appointment  Intervention Accepted By:   Gap Closed?:    Time Spent (min): 5

## 2023-11-07 DIAGNOSIS — L98.491 SKIN ULCER, LIMITED TO BREAKDOWN OF SKIN (HCC): ICD-10-CM

## 2023-11-08 NOTE — TELEPHONE ENCOUNTER
Last appointment: 8/23/23  Next appointment: none  Previous refill encounter(s): 10/3/23 #30    Requested Prescriptions     Pending Prescriptions Disp Refills    collagenase (SANTYL) 250 UNIT/GM ointment [Pharmacy Med Name: SANTYL OINTMENT] 30 g 0     Sig: APPLY TO AFFECTED AREA TOPICALLY EVERY DAY         For Pharmacy Admin Tracking Only    Program: Medication Refill  CPA in place:    Recommendation Provided To:   Intervention Detail: New Rx: 1, reason: Patient Preference and Scheduled Appointment  Intervention Accepted By:   Gap Closed?:    Time Spent (min): 5

## 2023-11-09 NOTE — TELEPHONE ENCOUNTER
Needs appt with a new provider   Pt notified, verbalized understanding. Pt stated that she is taking Pravastatin 40mg, Fenofibrate 54mg,  and fish oil. Pt stated that she stopped taking Lipitor 2 weeks after she started because it was causing her to have leg problems. Please advise.

## 2023-11-13 RX ORDER — ATORVASTATIN CALCIUM 40 MG/1
40 TABLET, FILM COATED ORAL DAILY
Qty: 30 TABLET | Refills: 0 | Status: SHIPPED | OUTPATIENT
Start: 2023-11-13

## 2023-11-13 NOTE — TELEPHONE ENCOUNTER
Last appointment: 8/23/23  Next appointment: none  Previous refill encounter(s): 3/13/23 #90 with 1 refill    Requested Prescriptions     Pending Prescriptions Disp Refills    atorvastatin (LIPITOR) 40 MG tablet 30 tablet 0     Sig: Take 1 tablet by mouth daily Patient needs an appointment for further refills         For Pharmacy Admin Tracking Only    Program: Medication Refill  CPA in place:    Recommendation Provided To:    Intervention Detail: New Rx: 1, reason: Patient Preference  Intervention Accepted By:   Nelli Newman Closed?:    Time Spent (min): 5

## 2023-11-28 ENCOUNTER — TELEPHONE (OUTPATIENT)
Age: 58
End: 2023-11-28

## 2023-12-04 ENCOUNTER — TELEPHONE (OUTPATIENT)
Age: 58
End: 2023-12-04

## 2023-12-04 NOTE — TELEPHONE ENCOUNTER
Spoke with [facility] @ "[facility] Scripts advised patient needs to call office and schedule with new provider. Pharmacy Tech Temo Webb) was advised also.

## 2023-12-04 NOTE — TELEPHONE ENCOUNTER
refills    tiZANidine (ZANAFLEX) 4 MG tablet   ntoprazol    prazosin (MINIPRESS) 2 MG capsule         mirtazapine (REMERON) 7.5 MG tablet     lisinopril (PRINIVIL;ZESTRIL) 40 MG tablet       insulin detemir (LEVEMIR FLEXTOUCH) 100 UNIT/ML injection pen        pantoprazole (PROTONIX) 40 MG tablet          [facility] [phone]   Phone [phone]

## 2023-12-11 ENCOUNTER — CLINICAL DOCUMENTATION (OUTPATIENT)
Age: 58
End: 2023-12-11

## 2023-12-22 ENCOUNTER — TELEPHONE (OUTPATIENT)
Age: 58
End: 2023-12-22

## 2023-12-22 NOTE — TELEPHONE ENCOUNTER
MD Gonzalez patient.      [facility] calling stating there is only 1 refill left of the Levemir flexpen for patient.  Asking for new rx to be sent.  Thanks, Constance    Also noted that [facility] has been advised that patient needs to schedule w/new provider.      Please contact patient to schedule w/new provider asap.  Thanks, Constance      For Pharmacy Admin Tracking Only    Program: Medication Refill  CPA in place:    Recommendation Provided To:   Intervention Detail: New Rx: 1, reason: Patient Preference  Intervention Accepted By:   Gap Closed?:    Time Spent (min): 5

## 2023-12-26 RX ORDER — ATORVASTATIN CALCIUM 40 MG/1
40 TABLET, FILM COATED ORAL DAILY
Qty: 30 TABLET | Refills: 11 | OUTPATIENT
Start: 2023-12-26

## 2024-01-03 ENCOUNTER — TELEPHONE (OUTPATIENT)
Age: 59
End: 2024-01-03

## 2024-01-03 NOTE — TELEPHONE ENCOUNTER
Pt called b/c of refill request that came through     Pt states he has a new Pcp at [facility] - Dr Canada     Pls disregard any other requests

## 2024-01-10 RX ORDER — AMLODIPINE BESYLATE 10 MG/1
10 TABLET ORAL DAILY
Qty: 90 TABLET | Refills: 3 | OUTPATIENT
Start: 2024-01-10

## 2024-01-10 NOTE — TELEPHONE ENCOUNTER
No longer a patient here      For Pharmacy Admin Tracking Only    Program: Medication Refill  CPA in place:    Recommendation Provided To:   Intervention Detail: New Rx: 1, reason: Patient Preference  Intervention Accepted By:   Gap Closed?:    Time Spent (min): 5

## 2024-02-22 NOTE — TELEPHONE ENCOUNTER
Left message that request for medical records be sent to Medical Records via fax [phone]. Consent: The patient's consent was obtained including but not limited to risks of crusting, scabbing, blistering, scarring, darker or lighter pigmentary change, recurrence, incomplete removal and infection. Show Topical Anesthesia Variable?: Yes Spray Paint Text: The liquid nitrogen was applied to the skin utilizing a spray paint frosting technique. Add 52 Modifier (Optional): no Detail Level: Detailed Post-Care Instructions: I reviewed with the patient in detail post-care instructions. Patient is to wear sunprotection, and avoid picking at any of the treated lesions. Pt may apply Vaseline to crusted or scabbing areas. Medical Necessity Clause: This procedure was medically necessary because the lesions that were treated were: Medical Necessity Information: It is in your best interest to select a reason for this procedure from the list below. All of these items fulfill various CMS LCD requirements except the new and changing color options.

## 2024-03-25 RX ORDER — ATORVASTATIN CALCIUM 40 MG/1
40 TABLET, FILM COATED ORAL DAILY
Qty: 30 TABLET | Refills: 11 | OUTPATIENT
Start: 2024-03-25

## 2024-04-02 RX ORDER — FUROSEMIDE 20 MG/1
20 TABLET ORAL DAILY
Qty: 30 TABLET | Refills: 2 | Status: SHIPPED | OUTPATIENT
Start: 2024-04-02

## 2024-04-02 NOTE — TELEPHONE ENCOUNTER
Last appointment: 8/23/23  Next appointment: none  Previous refill encounter(s): 10/2/23 #90    Requested Prescriptions     Pending Prescriptions Disp Refills    furosemide (LASIX) 20 MG tablet [Pharmacy Med Name: FUROSEMIDE 20MG TABLET] 30 tablet 0     Sig: Take 1 tablet by mouth daily Patient needs an appointment for further refills         For Pharmacy Admin Tracking Only    Program: Medication Refill  CPA in place:    Recommendation Provided To:   Intervention Detail: New Rx: 1, reason: Patient Preference  Intervention Accepted By:   Gap Closed?:    Time Spent (min): 5

## 2024-04-05 RX ORDER — ATORVASTATIN CALCIUM 40 MG/1
40 TABLET, FILM COATED ORAL DAILY
Qty: 30 TABLET | Refills: 11 | OUTPATIENT
Start: 2024-04-05

## 2024-04-10 ENCOUNTER — TELEPHONE (OUTPATIENT)
Age: 59
End: 2024-04-10

## 2024-04-10 NOTE — TELEPHONE ENCOUNTER
Spoke with patient declined appointment on 4/17/24 has another appointment  that day. Patient can be reached at [phone]. Spoke to patient , will call office to schedule after he is released from the hospital.

## 2024-04-10 NOTE — TELEPHONE ENCOUNTER
Daija Farrell from [facility] [phone] called to schedule a follow up hospital visit in for DVT. No appointments until June. Patient can be reached at [phone].

## 2024-04-10 NOTE — TELEPHONE ENCOUNTER
Isabel Frey, will call the pt about appointment for 4/17/274 at 12 noon with Dr. Young.  Pt scheduled with Dr. Young.

## 2024-04-10 NOTE — PROGRESS NOTES
Per Isabel Frey, the pt can not make the appointment on 4/17/24 to see  because he has another appointment

## 2024-04-11 RX ORDER — DULAGLUTIDE 1.5 MG/.5ML
1.5 INJECTION, SOLUTION SUBCUTANEOUS
Qty: 6 ML | Refills: 0 | Status: SHIPPED | OUTPATIENT
Start: 2024-04-11

## 2024-04-11 NOTE — TELEPHONE ENCOUNTER
Last appointment: 8/23/23  Next appointment: none  Previous refill encounter(s): 5/31/23 Trulicity    Requested Prescriptions     Pending Prescriptions Disp Refills    dulaglutide (TRULICITY) 1.5 MG/0.5ML SC injection 6 mL 0     Sig: Inject 0.5 mLs into the skin every 7 days         For Pharmacy Admin Tracking Only    Program: Medication Refill  CPA in place:    Recommendation Provided To:   Intervention Detail: New Rx: 1, reason: Patient Preference  Intervention Accepted By:   Gap Closed?:    Time Spent (min): 5

## 2024-04-15 RX ORDER — ATORVASTATIN CALCIUM 40 MG/1
40 TABLET, FILM COATED ORAL DAILY
Qty: 30 TABLET | Refills: 0 | OUTPATIENT
Start: 2024-04-15

## 2024-04-15 NOTE — TELEPHONE ENCOUNTER
Former patient of Dr. Gonzalez.    Last appointment: 8/23/23  Next appointment: none  Previous refill encounter(s): 10/11/23 Norvasc #90, 10/2/23 Levemir #105ml, 10/2/23 Remeron #90    Requested Prescriptions     Pending Prescriptions Disp Refills    amLODIPine (NORVASC) 10 MG tablet [Pharmacy Med Name: amLODIPine Besylate 10 MG Oral Tablet] 30 tablet 0     Sig: Take 1 tablet by mouth daily Patient needs an appointment for further refills    insulin detemir (LEVEMIR FLEXTOUCH) 100 UNIT/ML injection pen 45 mL 0     Sig: Inject 60 Units into the skin 2 times daily Patient needs an appointment for further refills    mirtazapine (REMERON) 7.5 MG tablet 30 tablet 0     Sig: Take 1 tablet by mouth nightly Patient needs an appointment for further refills         For Pharmacy Admin Tracking Only    Program: Medication Refill  CPA in place:    Recommendation Provided To:   Intervention Detail: New Rx: 3, reason: Patient Preference  Intervention Accepted By:   Gap Closed?:    Time Spent (min): 5

## 2024-04-15 NOTE — TELEPHONE ENCOUNTER
Former patient of Dr. Gonzalez    Last appointment: 8/23/23  Next appointment: none  Previous refill encounter(s): 11/13/23 #30    Requested Prescriptions     Pending Prescriptions Disp Refills    atorvastatin (LIPITOR) 40 MG tablet [Pharmacy Med Name: Atorvastatin Calcium 40 MG Oral Tablet] 30 tablet 0     Sig: Take 1 tablet by mouth daily Patient needs an appointment for further refills         For Pharmacy Admin Tracking Only    Program: Medication Refill  CPA in place:    Recommendation Provided To:   Intervention Detail: New Rx: 1, reason: Patient Preference  Intervention Accepted By:   Gap Closed?:    Time Spent (min): 5

## 2024-04-16 RX ORDER — MIRTAZAPINE 7.5 MG/1
7.5 TABLET, FILM COATED ORAL NIGHTLY
Qty: 90 TABLET | Refills: 0 | Status: SHIPPED | OUTPATIENT
Start: 2024-04-16

## 2024-04-16 RX ORDER — AMLODIPINE BESYLATE 10 MG/1
10 TABLET ORAL DAILY
Qty: 90 TABLET | Refills: 0 | Status: SHIPPED | OUTPATIENT
Start: 2024-04-16

## 2024-05-14 NOTE — TELEPHONE ENCOUNTER
Last appointment: 8/23/23  Next appointment: none  Previous refill encounter(s): 4/16/24 #45ml    Requested Prescriptions     Pending Prescriptions Disp Refills    insulin detemir (LEVEMIR FLEXPEN) 100 UNIT/ML injection pen [Pharmacy Med Name: LEVEMIR  5ML SOLUTION PEN-INJECTOR  ML FLEXPEN 100 U 3 U X] 45 mL 0     Sig: Inject 60 Units into the skin 2 times daily Patient needs an appointment for further refills         For Pharmacy Admin Tracking Only    Program: Medication Refill  CPA in place:    Recommendation Provided To:   Intervention Detail: New Rx: 1, reason: Patient Preference  Intervention Accepted By:   Gap Closed?:    Time Spent (min): 5

## 2024-05-30 DIAGNOSIS — F43.10 POST-TRAUMATIC STRESS DISORDER, UNSPECIFIED: ICD-10-CM

## 2024-05-30 RX ORDER — PRAZOSIN HYDROCHLORIDE 2 MG/1
2 CAPSULE ORAL NIGHTLY
Qty: 90 CAPSULE | Refills: 0 | Status: SHIPPED | OUTPATIENT
Start: 2024-05-30

## 2024-05-30 RX ORDER — TIZANIDINE 4 MG/1
4 TABLET ORAL 2 TIMES DAILY PRN
Qty: 180 TABLET | Refills: 0 | Status: SHIPPED | OUTPATIENT
Start: 2024-05-30

## 2024-05-30 NOTE — TELEPHONE ENCOUNTER
Last appointment: 9/20/23  Next appointment: none  Previous refill encounter(s): 10/2/23 90 d/s    Requested Prescriptions     Pending Prescriptions Disp Refills    prazosin (MINIPRESS) 2 MG capsule 90 capsule 0     Sig: Take 1 capsule by mouth nightly    tiZANidine (ZANAFLEX) 4 MG tablet 180 tablet 0     Sig: Take 1 tablet by mouth 2 times daily as needed (PRN)         For Pharmacy Admin Tracking Only    Program: Medication Refill  CPA in place:    Recommendation Provided To:   Intervention Detail: New Rx: 2, reason: Patient Preference  Intervention Accepted By:   Gap Closed?:    Time Spent (min): 5

## 2024-06-03 RX ORDER — DULAGLUTIDE 1.5 MG/.5ML
INJECTION, SOLUTION SUBCUTANEOUS
Qty: 6 ML | Refills: 3 | Status: SHIPPED | OUTPATIENT
Start: 2024-06-03

## 2024-06-03 NOTE — TELEPHONE ENCOUNTER
Pharmacy is requesting a 90 d/s with refills enough to last a year.    Last appointment: 8/23/23  Next appointment: none  Previous refill encounter(s): 4/11/24    Requested Prescriptions     Pending Prescriptions Disp Refills    TRULICITY 1.5 MG/0.5ML SC injection [Pharmacy Med Name: Trulicity 1.5 MG/0.5ML Subcutaneous Solution Pen-injector] 6 mL 3     Sig: INJECT THE CONTENTS OF ONE PEN  SUBCUTANEOUSLY WEEKLY AS  DIRECTED         For Pharmacy Admin Tracking Only    Program: Medication Refill  CPA in place:    Recommendation Provided To:   Intervention Detail: New Rx: 1, reason: Patient Preference  Intervention Accepted By:   Gap Closed?:    Time Spent (min): 5

## 2024-06-12 NOTE — TELEPHONE ENCOUNTER
Pharmacy is requesting a 90 d/s with refills enough to last a year.    Last appointment: 8/23/23  Next appointment: none    Requested Prescriptions     Pending Prescriptions Disp Refills    insulin detemir (LEVEMIR FLEXPEN) 100 UNIT/ML injection pen [Pharmacy Med Name: LEVEMIR  5ML SOLUTION PEN-INJECTOR  ML FLEXPEN 100 U 3 U X] 108 mL 3     Sig: Inject 60 Units into the skin 2 times daily         For Pharmacy Admin Tracking Only    Program: Medication Refill  CPA in place:    Recommendation Provided To:   Intervention Detail: New Rx: 1, reason: Improve Adherence  Intervention Accepted By:   Gap Closed?:    Time Spent (min): 5

## 2024-06-18 RX ORDER — MIRTAZAPINE 7.5 MG/1
TABLET, FILM COATED ORAL
Qty: 90 TABLET | Refills: 3 | OUTPATIENT
Start: 2024-06-18

## 2024-06-18 RX ORDER — AMLODIPINE BESYLATE 10 MG/1
10 TABLET ORAL DAILY
Qty: 90 TABLET | Refills: 3 | OUTPATIENT
Start: 2024-06-18

## 2024-06-18 NOTE — TELEPHONE ENCOUNTER
Former patient of Dr. Gonzalez. Needs OV per last notes.    For Pharmacy Admin Tracking Only    Program: Medication Refill  CPA in place:    Recommendation Provided To:   Intervention Detail: New Rx: 1, reason: Patient Preference  Intervention Accepted By:   Gap Closed?:    Time Spent (min): 5

## 2024-07-11 ENCOUNTER — TELEPHONE (OUTPATIENT)
Age: 59
End: 2024-07-11

## 2024-07-11 NOTE — TELEPHONE ENCOUNTER
Linnette from [facility] is wanting all active medication that the pt is taking along with instructions faxed to them at [phone]. The best call back number for them is [phone] if needed.  --------------------------------------------------------------  Not sure of the pt current medications because he has not had an office visit with Dr. Young.  His las office visit was on 7/25/23 with Dr. Gonzalez.

## 2024-07-11 NOTE — TELEPHONE ENCOUNTER
Linnette from [facility] is wanting all active medication that the pt is taking along with instructions faxed to them at [phone]. The best call back number for them is [phone] if needed.

## 2024-07-22 RX ORDER — AMLODIPINE BESYLATE 10 MG/1
10 TABLET ORAL DAILY
Qty: 90 TABLET | Refills: 3 | OUTPATIENT
Start: 2024-07-22

## 2024-07-22 RX ORDER — MIRTAZAPINE 7.5 MG/1
TABLET, FILM COATED ORAL
Qty: 90 TABLET | Refills: 3 | OUTPATIENT
Start: 2024-07-22

## 2024-07-22 RX ORDER — INSULIN DETEMIR 100 [IU]/ML
INJECTION, SOLUTION SUBCUTANEOUS
Qty: 45 ML | Refills: 8 | OUTPATIENT
Start: 2024-07-22

## 2024-07-25 RX ORDER — INSULIN DETEMIR 100 [IU]/ML
INJECTION, SOLUTION SUBCUTANEOUS
Qty: 45 ML | Refills: 8 | OUTPATIENT
Start: 2024-07-25

## 2024-07-31 DIAGNOSIS — F43.10 POST-TRAUMATIC STRESS DISORDER, UNSPECIFIED: ICD-10-CM

## 2024-07-31 RX ORDER — PRAZOSIN HYDROCHLORIDE 2 MG/1
2 CAPSULE ORAL NIGHTLY
Qty: 100 CAPSULE | Refills: 3 | OUTPATIENT
Start: 2024-07-31

## 2024-07-31 NOTE — TELEPHONE ENCOUNTER
Patient has a new PCP    For Pharmacy Admin Tracking Only    Program: Medication Refill  CPA in place:    Recommendation Provided To:   Intervention Detail: Discontinued Rx: 1, reason: Non-adherence  Intervention Accepted By:   Gap Closed?:    Time Spent (min): 5

## 2024-09-10 NOTE — PROGRESS NOTES
Chief Complaint   Patient presents with    Diabetes     3 month follow-up   1. Have you been to the ER, urgent care clinic since your last visit? Hospitalized since your last visit? No    2. Have you seen or consulted any other health care providers outside of the [facility] since your last visit? Include any pap smears or colon screening.  Yes Where: Dr Haney Doing [facility]    09/10/2024 12:40 PM    Assessment/Plan:    Patient presents today with therapeutic INR.    Assessment of patient findings and chart review: no significant findings     Recommendation for patient's warfarin regimen: Continue current maintenance dose    Recommend repeat INR in 1 week  _________________________________________________________________    Tim Richards (59 y.o.) is followed by the [facility].    Anticoagulation Summary  As of 9/10/2024      INR goal:  2.0-3.0   TTR:  69.7% (5.9 y)   INR used for dosin.7 (9/10/2024)   Warfarin maintenance plan:  5 mg (5 mg x 1) every day   Weekly warfarin total:  35 mg   Plan last modified:  Pearl Mendez, PharmD (2024)   Next INR check:  2024   Target end date:      Indications    LVAD (left ventricular assist device) present (Resolved) [Z95.811]  Anticoagulation monitoring  INR range 2-3 (Resolved) [Z79.01]                 Anticoagulation Episode Summary       INR check location:  Clinic Lab    Preferred lab:      Send INR reminders to:  [facility]    Comments:  LVAD/ [facility] [place] Results:[id number] Acct# [id number] Phone: [phone]/dc1/ [facility] -[phone],fax [phone]/ Bridge:NO(h/o bleeds) see 3/12 encounter for meter process          Anticoagulation Care Providers       Provider Role Specialty Phone number    Antonio Hadley Jr., MD [facility] [phone]

## 2024-10-09 RX ORDER — DULOXETIN HYDROCHLORIDE 60 MG/1
60 CAPSULE, DELAYED RELEASE ORAL 2 TIMES DAILY
Qty: 60 CAPSULE | Refills: 0 | Status: SHIPPED | OUTPATIENT
Start: 2024-10-09

## 2024-10-09 NOTE — TELEPHONE ENCOUNTER
Former patient of Dr. Gonzalez    Last appointment: 8/23/23  Next appointment: none  Previous refill encounter(s): 9/20/23    Requested Prescriptions     Pending Prescriptions Disp Refills    DULoxetine (CYMBALTA) 60 MG extended release capsule 60 capsule 0     Sig: Take 1 capsule by mouth 2 times daily         For Pharmacy Admin Tracking Only    Program: Medication Refill  CPA in place:    Recommendation Provided To:   Intervention Detail: New Rx: 1, reason: Patient Preference  Intervention Accepted By:   Gap Closed?:    Time Spent (min): 5

## 2024-10-22 NOTE — TELEPHONE ENCOUNTER
Last appointment: 8/23/23  Next appointment: none  Previous refill encounter(s): 4/16/24 #30    Requested Prescriptions     Pending Prescriptions Disp Refills    amLODIPine (NORVASC) 10 MG tablet [Pharmacy Med Name: amLODIPine Besylate 10 MG Oral Tablet] 30 tablet 0     Sig: TAKE 1 TABLET BY MOUTH DAILY         For Pharmacy Admin Tracking Only    Program: Medication Refill  CPA in place:    Recommendation Provided To:   Intervention Detail: New Rx: 1, reason: Patient Preference  Intervention Accepted By:   Gap Closed?:    Time Spent (min): 5

## 2024-10-23 RX ORDER — AMLODIPINE BESYLATE 10 MG/1
10 TABLET ORAL DAILY
Qty: 30 TABLET | Refills: 0 | Status: SHIPPED | OUTPATIENT
Start: 2024-10-23

## 2024-10-24 RX ORDER — DULOXETIN HYDROCHLORIDE 60 MG/1
60 CAPSULE, DELAYED RELEASE ORAL 2 TIMES DAILY
Qty: 60 CAPSULE | Refills: 0 | Status: SHIPPED | OUTPATIENT
Start: 2024-10-24

## 2024-10-24 NOTE — TELEPHONE ENCOUNTER
Last appointment: 8/23/23  Next appointment: 4/17/24 pt cancelled appt  Previous refill encounter(s): 10/9/24 #60    Requested Prescriptions     Pending Prescriptions Disp Refills    DULoxetine (CYMBALTA) 60 MG extended release capsule [Pharmacy Med Name: DULoxetine HCl 60 MG Oral Capsule Delayed Release Particles] 60 capsule 0     Sig: TAKE 1 CAPSULE BY MOUTH TWICE  DAILY         For Pharmacy Admin Tracking Only    Program: Medication Refill  CPA in place:    Recommendation Provided To:   Intervention Detail: New Rx: 1, reason: Patient Preference  Intervention Accepted By:   Gap Closed?:    Time Spent (min): 5

## 2024-11-07 RX ORDER — AMLODIPINE BESYLATE 10 MG/1
10 TABLET ORAL DAILY
Qty: 30 TABLET | Refills: 0 | Status: SHIPPED | OUTPATIENT
Start: 2024-11-07

## 2024-11-07 NOTE — TELEPHONE ENCOUNTER
Last appointment: 8/23/23  Next appointment: none  Previous refill encounter(s): 10/23/24 #30    Requested Prescriptions     Pending Prescriptions Disp Refills    amLODIPine (NORVASC) 10 MG tablet [Pharmacy Med Name: amLODIPine Besylate 10 MG Oral Tablet] 90 tablet 0     Sig: Take 1 tablet by mouth daily Patient needs an appointment for further refills         For Pharmacy Admin Tracking Only    Program: Medication Refill  CPA in place:    Recommendation Provided To:   Intervention Detail: New Rx: 1, reason: Patient Preference  Intervention Accepted By:   Gap Closed?:    Time Spent (min): 5

## 2024-11-08 RX ORDER — MIRTAZAPINE 7.5 MG/1
7.5 TABLET, FILM COATED ORAL NIGHTLY
Qty: 90 TABLET | Refills: 0 | Status: SHIPPED | OUTPATIENT
Start: 2024-11-08

## 2024-11-08 RX ORDER — METOPROLOL SUCCINATE 50 MG/1
50 TABLET, EXTENDED RELEASE ORAL DAILY
Qty: 90 TABLET | Refills: 0 | Status: SHIPPED | OUTPATIENT
Start: 2024-11-08

## 2024-11-08 NOTE — TELEPHONE ENCOUNTER
Last appointment: 8/30/23  Next appointment: none  Previous refill encounter(s): 5/30/24 Zanaflex #180, 4/16/24 Remeron #90, 7/13/23 Toprol    Requested Prescriptions     Pending Prescriptions Disp Refills    mirtazapine (REMERON) 7.5 MG tablet [Pharmacy Med Name: MIRTAZAPINE  7.5MG  TAB] 90 tablet 0     Sig: Take 1 tablet by mouth nightly Patient needs an appointment for further refills    tiZANidine (ZANAFLEX) 4 MG tablet [Pharmacy Med Name: tiZANidine HCl 4 MG Oral Tablet] 180 tablet 0     Sig: Take 1 tablet by mouth 2 times daily as needed (prn) Patient needs an appointment for further refills    metoprolol succinate (TOPROL XL) 50 MG extended release tablet 90 tablet 0     Sig: Take 1 tablet by mouth daily Patient needs an appointment for further refills         For Pharmacy Admin Tracking Only    Program: Medication Refill  CPA in place:    Recommendation Provided To:   Intervention Detail: New Rx: 3, reason: Patient Preference  Intervention Accepted By:   Gap Closed?:    Time Spent (min): 5

## 2024-11-14 NOTE — TELEPHONE ENCOUNTER
Last appointment: 8/30/23  Next appointment: none  Previous refill encounter(s): 10/24/24 #60    Requested Prescriptions     Pending Prescriptions Disp Refills    DULoxetine (CYMBALTA) 60 MG extended release capsule [Pharmacy Med Name: DULoxetine HCl 60 MG Oral Capsule Delayed Release Particles] 180 capsule 0     Sig: Take 1 capsule by mouth 2 times daily Patient needs an appointment for further refills         For Pharmacy Admin Tracking Only    Program: Medication Refill  CPA in place:    Recommendation Provided To:   Intervention Detail: New Rx: 1, reason: Patient Preference  Intervention Accepted By:   Gap Closed?:    Time Spent (min): 5

## 2024-11-15 RX ORDER — DULOXETIN HYDROCHLORIDE 60 MG/1
60 CAPSULE, DELAYED RELEASE ORAL 2 TIMES DAILY
Qty: 180 CAPSULE | Refills: 0 | OUTPATIENT
Start: 2024-11-15

## 2024-11-29 RX ORDER — AMLODIPINE BESYLATE 10 MG/1
10 TABLET ORAL DAILY
Qty: 30 TABLET | Refills: 11 | OUTPATIENT
Start: 2024-11-29

## 2024-11-29 NOTE — TELEPHONE ENCOUNTER
Pt needs an appt    For Pharmacy Admin Tracking Only    Program: Medication Refill  CPA in place:    Recommendation Provided To:   Intervention Detail: Discontinued Rx: 1, reason: Non-adherence  Intervention Accepted By:   Gap Closed?:    Time Spent (min): 5

## 2024-12-06 ENCOUNTER — CLINICAL DOCUMENTATION (OUTPATIENT)
Age: 59
End: 2024-12-06

## 2024-12-11 RX ORDER — LIDOCAINE 50 MG/G
1 PATCH TOPICAL DAILY
Qty: 30 PATCH | Refills: 0 | OUTPATIENT
Start: 2024-12-11

## 2024-12-11 NOTE — TELEPHONE ENCOUNTER
This med is listed as historical. Please sign if appropriate.    Last appointment: 8/23/23  Next appointment: none    Requested Prescriptions     Pending Prescriptions Disp Refills    lidocaine (LIDODERM) 5 % 30 patch 0     Sig: Place 1 patch onto the skin daily 12 hours on, 12 hours off. For pain         For Pharmacy Admin Tracking Only    Program: Medication Refill  CPA in place:    Recommendation Provided To:   Intervention Detail: New Rx: 1, reason: Patient Preference  Intervention Accepted By:   Gap Closed?:    Time Spent (min): 5

## 2025-01-03 RX ORDER — METOPROLOL SUCCINATE 50 MG/1
50 TABLET, EXTENDED RELEASE ORAL DAILY
Qty: 90 TABLET | Refills: 3 | OUTPATIENT
Start: 2025-01-03

## 2025-01-09 RX ORDER — MIRTAZAPINE 7.5 MG/1
7.5 TABLET, FILM COATED ORAL NIGHTLY
Qty: 90 TABLET | Refills: 0 | OUTPATIENT
Start: 2025-01-09

## 2025-01-09 NOTE — TELEPHONE ENCOUNTER
Patient has never been.  Dr. Young listed in error.  Patient is former patient of Dr. Gonzalez.  Will need office visit for med refills.

## 2025-02-14 RX ORDER — METFORMIN HYDROCHLORIDE 500 MG/1
TABLET, EXTENDED RELEASE ORAL
Qty: 200 TABLET | Refills: 2 | OUTPATIENT
Start: 2025-02-14

## 2025-02-14 NOTE — TELEPHONE ENCOUNTER
Former pt of Dr. Gonzalez - needs appt    For Pharmacy Admin Tracking Only    Program: Medication Refill  CPA in place:    Recommendation Provided To:   Intervention Detail: Discontinued Rx: 1, reason: Non-adherence  Intervention Accepted By:   Gap Closed?:    Time Spent (min): 5

## 2025-04-29 RX ORDER — DULAGLUTIDE 1.5 MG/.5ML
INJECTION, SOLUTION SUBCUTANEOUS
Qty: 6 ML | Refills: 3 | OUTPATIENT
Start: 2025-04-29

## 2025-04-29 NOTE — TELEPHONE ENCOUNTER
Pt needs appt    For Pharmacy Admin Tracking Only    Program: Medication Refill  CPA in place:    Recommendation Provided To:   Intervention Detail: Discontinued Rx: 2, reason: Non-adherence  Intervention Accepted By:   Gap Closed?:    Time Spent (min): 5

## 2025-05-06 RX ORDER — METFORMIN HYDROCHLORIDE 500 MG/1
TABLET, EXTENDED RELEASE ORAL
Qty: 180 TABLET | Refills: 3 | OUTPATIENT
Start: 2025-05-06

## 2025-05-16 RX ORDER — DULAGLUTIDE 1.5 MG/.5ML
INJECTION, SOLUTION SUBCUTANEOUS
Qty: 6 ML | Refills: 3 | OUTPATIENT
Start: 2025-05-16

## 2025-05-16 NOTE — TELEPHONE ENCOUNTER
Needs appt    For Pharmacy Admin Tracking Only    Program: Medication Refill  CPA in place:    Recommendation Provided To:   Intervention Detail: Discontinued Rx: 1, reason: Non-adherence  Intervention Accepted By:   Gap Closed?:    Time Spent (min): 5

## (undated) DEVICE — COVER,TABLE,HEAVY DUTY,60"X90",STRL: Brand: MEDLINE

## (undated) DEVICE — SUTURE VCRL 2-0 L27IN ABSRB UD CP-2 L26MM 1/2 CIR REV CUT J869H

## (undated) DEVICE — HANDPIECE SET WITH BONE CLEANING TIP AND SUCTION TUBE: Brand: INTERPULSE

## (undated) DEVICE — BONE WAX WHITE: Brand: BONE WAX WHITE

## (undated) DEVICE — PAD 05IN BASE 3IN PEAK M DENS CONVOLUTED FOAM

## (undated) DEVICE — STERILE POLYISOPRENE POWDER-FREE SURGICAL GLOVES WITH EMOLLIENT COATING: Brand: PROTEXIS

## (undated) DEVICE — TUBING, SUCTION, 1/4" X 12', STRAIGHT: Brand: MEDLINE

## (undated) DEVICE — MEDI-VAC NON-CONDUCTIVE SUCTION TUBING: Brand: CARDINAL HEALTH

## (undated) DEVICE — TOTAL TRAY, 16FR 10ML SIL FOLEY, URN: Brand: MEDLINE

## (undated) DEVICE — PAD BD MATTRESS 73X32 IN STD CONVOLUTED FOAM LTX FREE

## (undated) DEVICE — ELECTRODE BLDE L4IN NONINSULATED EDGE

## (undated) DEVICE — SPONGE LAP 18X18IN STRL -- 5/PK

## (undated) DEVICE — INFECTION CONTROL KIT SYS

## (undated) DEVICE — X-RAY SPONGES,16 PLY: Brand: DERMACEA

## (undated) DEVICE — 4-PORT MANIFOLD: Brand: NEPTUNE 2

## (undated) DEVICE — GARMENT,MEDLINE,DVT,INT,CALF,MED, GEN2: Brand: MEDLINE

## (undated) DEVICE — GAUZE SPONGES,12 PLY: Brand: CURITY

## (undated) DEVICE — LAMINECTOMY RICHMOND-LF: Brand: MEDLINE INDUSTRIES, INC.

## (undated) DEVICE — ROCKER SWITCH PENCIL BLADE ELECTRODE, HOLSTER: Brand: EDGE

## (undated) DEVICE — SUTURE ABSRB BRAID COAT UD OS-6 NO 1 27IN VCRL J535H

## (undated) DEVICE — SOLUTION IV 250ML 0.9% SOD CHL CLR INJ FLX BG CONT PRT CLSR

## (undated) DEVICE — SURGIFOAM SPNG SZ 100

## (undated) DEVICE — POSITIONER HD REST FOAM CMFRT TCH

## (undated) DEVICE — SOLUTION IRRIG 3000ML 0.9% SOD CHL FLX CONT 0797208] ICU MEDICAL INC]

## (undated) DEVICE — STRAP,POSITIONING,KNEE/BODY,FOAM,4X60": Brand: MEDLINE

## (undated) DEVICE — BIPOLAR IRRIGATOR INTEGRATED TUBING AND BIPOLAR CORD SET, DISPOSABLE

## (undated) DEVICE — SUTURE VCRL SZ 3-0 L27IN ABSRB UD CP-2 L26MM 1/2 CIR REV J868H

## (undated) DEVICE — FLOSEAL HEMOSTATIC MATRIX, 10 ML: Brand: FLOSEAL

## (undated) DEVICE — TOOL 14BA50 LEGEND 14CM 5MM BA: Brand: MIDAS REX ™

## (undated) DEVICE — PREP SKN PREVAIL 40ML APPL --

## (undated) DEVICE — ADHESIVE SKIN CLOSURE 4X22 CM PREMIERPRO EXOFINFUSION DISP

## (undated) DEVICE — SPONGE GZ W4XL4IN COT 12 PLY TYP VII WVN C FLD DSGN

## (undated) DEVICE — TRAY CATH 16F URIN MTR LTX -- CONVERT TO ITEM 363111

## (undated) DEVICE — GOWN,SIRUS,NONRNF,SETINSLV,2XL,18/CS: Brand: MEDLINE

## (undated) DEVICE — SYSTEM SKIN CLSR 22CM DERMBND PRINEO

## (undated) DEVICE — KIT EVAC 0.13IN RECT TB DIA10FR 400CC PVC 3 SPR Y CONN DRN

## (undated) DEVICE — PILLOW POS AD L7IN R FOAM HD REST INTUB SLOT DISP

## (undated) DEVICE — DEVON™ KNEE AND BODY STRAP 60" X 3" (1.5 M X 7.6 CM): Brand: DEVON

## (undated) DEVICE — SOLUTION IRRIGATION NACL 0.9% 1000 ML FLX CONTAINER

## (undated) DEVICE — KENDALL SCD EXPRESS SLEEVES, KNEE LENGTH, MEDIUM: Brand: KENDALL SCD

## (undated) DEVICE — DRAIN KT WND 10FR RND 400ML --